# Patient Record
Sex: FEMALE | Race: WHITE | NOT HISPANIC OR LATINO | Employment: OTHER | URBAN - METROPOLITAN AREA
[De-identification: names, ages, dates, MRNs, and addresses within clinical notes are randomized per-mention and may not be internally consistent; named-entity substitution may affect disease eponyms.]

---

## 2017-01-12 ENCOUNTER — LAB REQUISITION (OUTPATIENT)
Dept: LAB | Facility: HOSPITAL | Age: 82
End: 2017-01-12

## 2017-01-12 DIAGNOSIS — D12.4 BENIGN NEOPLASM OF DESCENDING COLON: ICD-10-CM

## 2017-01-12 DIAGNOSIS — D12.5 BENIGN NEOPLASM OF SIGMOID COLON: ICD-10-CM

## 2017-01-12 PROCEDURE — 88305 TISSUE EXAM BY PATHOLOGIST: CPT | Performed by: INTERNAL MEDICINE

## 2017-01-13 LAB
CYTO UR: NORMAL
LAB AP CASE REPORT: NORMAL
LAB AP CLINICAL INFORMATION: NORMAL
Lab: NORMAL
PATH REPORT.FINAL DX SPEC: NORMAL
PATH REPORT.GROSS SPEC: NORMAL

## 2019-10-01 ENCOUNTER — APPOINTMENT (OUTPATIENT)
Dept: CT IMAGING | Facility: HOSPITAL | Age: 84
End: 2019-10-01

## 2019-10-01 ENCOUNTER — HOSPITAL ENCOUNTER (EMERGENCY)
Facility: HOSPITAL | Age: 84
Discharge: HOME | End: 2019-10-01
Attending: EMERGENCY MEDICINE | Admitting: EMERGENCY MEDICINE

## 2019-10-01 VITALS
RESPIRATION RATE: 16 BRPM | DIASTOLIC BLOOD PRESSURE: 78 MMHG | TEMPERATURE: 97.2 F | HEART RATE: 84 BPM | HEIGHT: 63 IN | BODY MASS INDEX: 24.63 KG/M2 | WEIGHT: 139 LBS | OXYGEN SATURATION: 92 % | SYSTOLIC BLOOD PRESSURE: 131 MMHG
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
## 2019-10-01 DIAGNOSIS — S00.83XA FOREHEAD CONTUSION, INITIAL ENCOUNTER: ICD-10-CM

## 2019-10-01 DIAGNOSIS — S09.90XA MINOR HEAD INJURY WITHOUT LOSS OF CONSCIOUSNESS, INITIAL ENCOUNTER: ICD-10-CM

## 2019-10-01 PROCEDURE — 72125 CT NECK SPINE W/O DYE: CPT

## 2019-10-01 PROCEDURE — 99283 EMERGENCY DEPT VISIT LOW MDM: CPT

## 2019-10-01 PROCEDURE — 70450 CT HEAD/BRAIN W/O DYE: CPT

## 2019-10-01 RX ORDER — LEVOTHYROXINE SODIUM 0.05 MG/1
50 TABLET ORAL DAILY
COMMUNITY

## 2019-10-01 RX ORDER — PRAVASTATIN SODIUM 20 MG
20 TABLET ORAL NIGHTLY
COMMUNITY

## 2019-10-01 RX ORDER — OMEPRAZOLE 20 MG/1
20 CAPSULE, DELAYED RELEASE ORAL DAILY
COMMUNITY

## 2019-10-01 RX ORDER — CARVEDILOL 12.5 MG/1
12.5 TABLET ORAL 2 TIMES DAILY WITH MEALS
Status: ON HOLD | COMMUNITY
End: 2020-10-29 | Stop reason: SDUPTHER

## 2019-10-01 RX ORDER — LISINOPRIL 20 MG/1
20 TABLET ORAL DAILY
Status: ON HOLD | COMMUNITY
End: 2021-06-25

## 2019-10-01 RX ORDER — ISOSORBIDE DINITRATE 30 MG/1
30 TABLET ORAL 4 TIMES DAILY
Status: ON HOLD | COMMUNITY
End: 2021-06-25

## 2019-10-01 RX ORDER — SPIRONOLACTONE 25 MG/1
25 TABLET ORAL DAILY
Status: ON HOLD | COMMUNITY
End: 2021-06-25

## 2019-10-01 RX ORDER — TORSEMIDE 20 MG/1
20 TABLET ORAL 2 TIMES DAILY
COMMUNITY

## 2019-10-01 RX ORDER — NITROGLYCERIN 0.4 MG/1
0.4 TABLET SUBLINGUAL
COMMUNITY

## 2019-10-01 RX ORDER — TRAZODONE HYDROCHLORIDE 50 MG/1
50 TABLET ORAL NIGHTLY
Status: ON HOLD | COMMUNITY
End: 2021-06-25

## 2019-10-02 NOTE — ED PROVIDER NOTES
Subjective   The patient presents to the emergency department after a trip and fall with head injury.  Patient reports she lost her footing and fell forward hitting her forehead on a step.  No loss of consciousness.  Patient denies any bleeding disorders or anticoagulation medicines.  Patient denies any neck or back pain.  No other acute injuries or complaints.  The patient does report a frontal headache.  No vision changes and no bloody nose or nasal/ear discharge.  No other acute symptoms or complaints this time.        History provided by:  Patient and relative      Review of Systems   Constitutional: Negative.    Respiratory: Negative.    Cardiovascular: Negative.    Gastrointestinal: Negative.    Musculoskeletal: Negative.  Negative for back pain and neck pain.   Skin: Negative.    Neurological: Positive for headaches.   Hematological: Does not bruise/bleed easily.   Psychiatric/Behavioral: Negative.    All other systems reviewed and are negative.      No past medical history on file.    Allergies   Allergen Reactions   • Codeine GI Intolerance   • Sulfa Antibiotics Unknown (See Comments)     Unknown         Past Surgical History:   Procedure Laterality Date   • CHOLECYSTECTOMY     • MASTECTOMY Left        No family history on file.    Social History     Socioeconomic History   • Marital status: Unknown     Spouse name: Not on file   • Number of children: Not on file   • Years of education: Not on file   • Highest education level: Not on file   Tobacco Use   • Smoking status: Never Smoker   • Smokeless tobacco: Never Used   Substance and Sexual Activity   • Alcohol use: No     Frequency: Never   • Drug use: No   • Sexual activity: Defer           Objective   Physical Exam   Constitutional: She is oriented to person, place, and time. She appears well-developed and well-nourished.   HENT:   Head: Normocephalic. Head is with abrasion and with contusion. Head is without raccoon's eyes, without Guardado's sign,  without laceration, without right periorbital erythema and without left periorbital erythema.       Right Ear: External ear normal.   Left Ear: External ear normal.   Nose: Nose normal.   Mouth/Throat: Oropharynx is clear and moist.   Eyes: EOM are normal. Pupils are equal, round, and reactive to light.   Neck: Normal range of motion. Neck supple.   Cardiovascular: Normal rate, regular rhythm, normal heart sounds and intact distal pulses.   Pulmonary/Chest: Effort normal and breath sounds normal.   Musculoskeletal: Normal range of motion.        Cervical back: She exhibits no bony tenderness.        Thoracic back: She exhibits no bony tenderness.        Lumbar back: She exhibits no bony tenderness.   Neurological: She is alert and oriented to person, place, and time.   Skin: Skin is warm and dry. Capillary refill takes less than 2 seconds.   Psychiatric: She has a normal mood and affect. Her behavior is normal.   Nursing note and vitals reviewed.      Procedures           ED Course  ED Course as of Oct 02 0043   Tue Oct 01, 2019   2016 CT scan of the head and neck demonstrate no acute traumatic findings.  We will discharge the patient with symptomatic management to follow-up with her doctor.  Patient is alert and oriented and in no distress.  She states she is ready and excited to go home. I had a discussion with the patient/family regarding diagnosis, diagnostic results, treatment plan, and medications.  The patient/family indicated understanding of these instructions.  I spent adequate time at the bedside proceeding discharge necessary to personally discuss the aftercare instructions, giving patient education, providing explanations of the results of our evaluations/findings, and my decision making to assure that the patient/family understand the plan of care.  Time was allotted to answer questions at that time and throughout the ED course.  Emphasis was placed on timely follow-up after discharge.  I also  discussed the potential for the development of an acute emergent condition requiring further evaluation, admission, or even surgical intervention. I discussed that we found nothing during the visit today indicating the need for further workup, admission, or the presence of an unstable medical condition.  I encouraged the patient to return to the emergency department immediately for ANY concerns, worsening, new complaints, or if symptoms persist and unable to seek follow-up in a timely fashion.  The patient/family expressed understanding and agreement with this plan.   [RS]      ED Course User Index  [RS] Salomón Miller MD                  MDM  Number of Diagnoses or Management Options  Fall on stairs, initial encounter:   Forehead contusion, initial encounter:   Minor head injury without loss of consciousness, initial encounter:   Diagnosis management comments: No results found for this or any previous visit (from the past 24 hour(s)).  Note: In addition to lab results from this visit, the labs listed above may include labs taken at another facility or during a different encounter within the last 24 hours. Please correlate lab times with ED admission and discharge times for further clarification of the services performed during this visit.    CT Head Without Contrast   Final Result        1. No acute intracranial abnormality. Age-appropriate senescent changes.    2. Small left frontal scalp hematoma. No underlying osseous injury.    3. Mild mucosal thickening bilateral maxillary sinuses.                    Signer Name: Onofre Keita MD     Signed: 10/1/2019 8:10 PM     Workstation Name: [email or web address]      [facility]     CT Cervical Spine Without Contrast   Final Result        1. No acute cervical spine injury.    2. Advanced multilevel degenerative changes, detailed above.    3. Minimal grade 1 anterolisthesis C5 on C6 and C7 on T1, likely secondary to advanced bilateral facet  "arthropathy.        Signer Name: Onofre Keita MD     Signed: 10/1/2019 8:09 PM     Workstation Name: [id number]      [facility]     --------------------------------------               10/01/19      10/01/19                  1800          2044     --------------------------------------   BP:          122/82        131/78     BP Location:    Left arm     Right arm    Patient Position:     Sitting      Sitting     Pulse:         95            84       Resp:          14            16       Temp:   97.2 °F (36.2 °C)             TempSrc:      Oral                    SpO2:          92%          92%       Weight:  63 kg (139 lb)               Height:   160 cm (63\")               --------------------------------------  Medications - No data to display  ECG/EMG Results (last 24 hours)     ** No results found for the last 24 hours. **      No orders to display         Amount and/or Complexity of Data Reviewed  Tests in the radiology section of CPT®: reviewed  Independent visualization of images, tracings, or specimens: yes        Final diagnoses:   Fall on stairs, initial encounter   Forehead contusion, initial encounter   Minor head injury without loss of consciousness, initial encounter              Salomón Miller MD  10/02/19 0043    "

## 2020-10-20 ENCOUNTER — HOSPITAL ENCOUNTER (EMERGENCY)
Facility: HOSPITAL | Age: 85
Discharge: HOME | End: 2020-10-20
Attending: EMERGENCY MEDICINE | Admitting: EMERGENCY MEDICINE

## 2020-10-20 ENCOUNTER — APPOINTMENT (OUTPATIENT)
Dept: CT IMAGING | Facility: HOSPITAL | Age: 85
End: 2020-10-20

## 2020-10-20 VITALS
RESPIRATION RATE: 16 BRPM | DIASTOLIC BLOOD PRESSURE: 80 MMHG | TEMPERATURE: 98 F | OXYGEN SATURATION: 94 % | WEIGHT: 140 LBS | HEART RATE: 75 BPM | HEIGHT: 63 IN | BODY MASS INDEX: 24.8 KG/M2 | SYSTOLIC BLOOD PRESSURE: 140 MMHG

## 2020-10-20 DIAGNOSIS — I48.20 CHRONIC ATRIAL FIBRILLATION (HCC): ICD-10-CM

## 2020-10-20 DIAGNOSIS — R05.9 COUGH: ICD-10-CM

## 2020-10-20 DIAGNOSIS — R11.2 NON-INTRACTABLE VOMITING WITH NAUSEA, UNSPECIFIED VOMITING TYPE: Primary | ICD-10-CM

## 2020-10-20 DIAGNOSIS — R06.02 SOB (SHORTNESS OF BREATH): ICD-10-CM

## 2020-10-20 LAB
ALBUMIN SERPL-MCNC: 4.3 G/DL (ref 3.5–5.2)
ALBUMIN/GLOB SERPL: 2.4 G/DL
ALP SERPL-CCNC: 136 U/L (ref 39–117)
ALT SERPL W P-5'-P-CCNC: 16 U/L (ref 1–33)
ANION GAP SERPL CALCULATED.3IONS-SCNC: 11 MMOL/L (ref 5–15)
AST SERPL-CCNC: 22 U/L (ref 1–32)
BACTERIA UR QL AUTO: NORMAL /HPF
BASOPHILS # BLD AUTO: 0.01 10*3/MM3 (ref 0–0.2)
BASOPHILS NFR BLD AUTO: 0.3 % (ref 0–1.5)
BILIRUB SERPL-MCNC: 0.4 MG/DL (ref 0–1.2)
BILIRUB UR QL STRIP: NEGATIVE
BUN SERPL-MCNC: 14 MG/DL (ref 8–23)
BUN/CREAT SERPL: 14.3 (ref 7–25)
CALCIUM SPEC-SCNC: 8.7 MG/DL (ref 8.2–9.6)
CHLORIDE SERPL-SCNC: 95 MMOL/L (ref 98–107)
CLARITY UR: CLEAR
CO2 SERPL-SCNC: 29 MMOL/L (ref 22–29)
COLOR UR: YELLOW
CREAT SERPL-MCNC: 0.98 MG/DL (ref 0.57–1)
DEPRECATED RDW RBC AUTO: 52.3 FL (ref 37–54)
EOSINOPHIL # BLD AUTO: 0.04 10*3/MM3 (ref 0–0.4)
EOSINOPHIL NFR BLD AUTO: 1 % (ref 0.3–6.2)
ERYTHROCYTE [DISTWIDTH] IN BLOOD BY AUTOMATED COUNT: 14.7 % (ref 12.3–15.4)
GFR SERPL CREATININE-BSD FRML MDRD: 53 ML/MIN/1.73
GLOBULIN UR ELPH-MCNC: 1.8 GM/DL
GLUCOSE SERPL-MCNC: 204 MG/DL (ref 65–99)
GLUCOSE UR STRIP-MCNC: NEGATIVE MG/DL
HCT VFR BLD AUTO: 32.6 % (ref 34–46.6)
HGB BLD-MCNC: 10.1 G/DL (ref 12–15.9)
HGB UR QL STRIP.AUTO: NEGATIVE
HOLD SPECIMEN: NORMAL
HOLD SPECIMEN: NORMAL
HYALINE CASTS UR QL AUTO: NORMAL /LPF
IMM GRANULOCYTES # BLD AUTO: 0.01 10*3/MM3 (ref 0–0.05)
IMM GRANULOCYTES NFR BLD AUTO: 0.3 % (ref 0–0.5)
KETONES UR QL STRIP: NEGATIVE
LEUKOCYTE ESTERASE UR QL STRIP.AUTO: NEGATIVE
LYMPHOCYTES # BLD AUTO: 0.54 10*3/MM3 (ref 0.7–3.1)
LYMPHOCYTES NFR BLD AUTO: 13.6 % (ref 19.6–45.3)
MCH RBC QN AUTO: 30.2 PG (ref 26.6–33)
MCHC RBC AUTO-ENTMCNC: 31 G/DL (ref 31.5–35.7)
MCV RBC AUTO: 97.6 FL (ref 79–97)
MONOCYTES # BLD AUTO: 0.32 10*3/MM3 (ref 0.1–0.9)
MONOCYTES NFR BLD AUTO: 8.1 % (ref 5–12)
NEUTROPHILS NFR BLD AUTO: 3.05 10*3/MM3 (ref 1.7–7)
NEUTROPHILS NFR BLD AUTO: 76.7 % (ref 42.7–76)
NITRITE UR QL STRIP: NEGATIVE
NRBC BLD AUTO-RTO: 0 /100 WBC (ref 0–0.2)
NT-PROBNP SERPL-MCNC: 3099 PG/ML (ref 0–1800)
PH UR STRIP.AUTO: 6 [PH] (ref 5–8)
PLATELET # BLD AUTO: 161 10*3/MM3 (ref 140–450)
PMV BLD AUTO: 10.8 FL (ref 6–12)
POTASSIUM SERPL-SCNC: 3.6 MMOL/L (ref 3.5–5.2)
PROT SERPL-MCNC: 6.1 G/DL (ref 6–8.5)
PROT UR QL STRIP: ABNORMAL
RBC # BLD AUTO: 3.34 10*6/MM3 (ref 3.77–5.28)
RBC # UR: NORMAL /HPF
REF LAB TEST METHOD: NORMAL
SODIUM SERPL-SCNC: 135 MMOL/L (ref 136–145)
SP GR UR STRIP: 1.02 (ref 1–1.03)
SQUAMOUS #/AREA URNS HPF: NORMAL /HPF
TROPONIN T SERPL-MCNC: 0.01 NG/ML (ref 0–0.03)
UROBILINOGEN UR QL STRIP: ABNORMAL
WBC # BLD AUTO: 3.97 10*3/MM3 (ref 3.4–10.8)
WBC UR QL AUTO: NORMAL /HPF
WHOLE BLOOD HOLD SPECIMEN: NORMAL
WHOLE BLOOD HOLD SPECIMEN: NORMAL

## 2020-10-20 PROCEDURE — 93005 ELECTROCARDIOGRAM TRACING: CPT | Performed by: EMERGENCY MEDICINE

## 2020-10-20 PROCEDURE — 71250 CT THORAX DX C-: CPT

## 2020-10-20 PROCEDURE — U0004 COV-19 TEST NON-CDC HGH THRU: HCPCS | Performed by: PHYSICIAN ASSISTANT

## 2020-10-20 PROCEDURE — 81001 URINALYSIS AUTO W/SCOPE: CPT | Performed by: PHYSICIAN ASSISTANT

## 2020-10-20 PROCEDURE — 84484 ASSAY OF TROPONIN QUANT: CPT | Performed by: EMERGENCY MEDICINE

## 2020-10-20 PROCEDURE — 85025 COMPLETE CBC W/AUTO DIFF WBC: CPT | Performed by: EMERGENCY MEDICINE

## 2020-10-20 PROCEDURE — 83880 ASSAY OF NATRIURETIC PEPTIDE: CPT | Performed by: EMERGENCY MEDICINE

## 2020-10-20 PROCEDURE — 80053 COMPREHEN METABOLIC PANEL: CPT | Performed by: EMERGENCY MEDICINE

## 2020-10-20 PROCEDURE — 99284 EMERGENCY DEPT VISIT MOD MDM: CPT

## 2020-10-20 RX ORDER — ALBUTEROL SULFATE 90 UG/1
2 AEROSOL, METERED RESPIRATORY (INHALATION) EVERY 6 HOURS PRN
Qty: 3.7 G | Refills: 0 | Status: ON HOLD | OUTPATIENT
Start: 2020-10-20 | End: 2020-10-29

## 2020-10-20 RX ORDER — SODIUM CHLORIDE 0.9 % (FLUSH) 0.9 %
10 SYRINGE (ML) INJECTION AS NEEDED
Status: DISCONTINUED | OUTPATIENT
Start: 2020-10-20 | End: 2020-10-20 | Stop reason: HOSPADM

## 2020-10-20 RX ORDER — ONDANSETRON 4 MG/1
4 TABLET, ORALLY DISINTEGRATING ORAL EVERY 6 HOURS PRN
Qty: 15 TABLET | Refills: 0 | Status: ON HOLD | OUTPATIENT
Start: 2020-10-20 | End: 2022-03-15

## 2020-10-20 RX ADMIN — SODIUM CHLORIDE 500 ML: 9 INJECTION, SOLUTION INTRAVENOUS at 16:10

## 2020-10-21 ENCOUNTER — TELEPHONE (OUTPATIENT)
Dept: EMERGENCY DEPT | Facility: HOSPITAL | Age: 85
End: 2020-10-21

## 2020-10-21 LAB — SARS-COV-2 RNA RESP QL NAA+PROBE: DETECTED

## 2020-10-21 NOTE — TELEPHONE ENCOUNTER
Discussed positive Covid test result with daughter Betty. She state she has been doing better today, getting out of bed and has been active. They have been monitoring her temp and pulse ox which have both been normal. They understand to have a low threshold to return to ED if new or worse sx. Daughter reports health department has contacted them already as well and will be monitoring her condition. They plan to touch base with PCP as well.

## 2020-10-22 ENCOUNTER — READMISSION MANAGEMENT (OUTPATIENT)
Dept: CALL CENTER | Facility: HOSPITAL | Age: 85
End: 2020-10-22

## 2020-10-22 NOTE — OUTREACH NOTE
COVID-19 Week 1 Survey      Responses   [facility] patient discharged from?  [place]   Does the patient have one of the following disease processes/diagnoses(primary or secondary)?  COVID-19   COVID-19 underlying condition?  None   Call Number  Call 1   Week 1 Call successful?  Yes   Call start time  1120   Call end time  1124   General alerts for this patient  ED Discharge - call x 3 only   Discharge diagnosis  Covid 19   Is patient permission given to speak with other caregiver?  Yes   Person spoke with today (if not patient) and relationship  Betty   Meds reviewed with patient/caregiver?  Yes   Is the patient having any side effects they believe may be caused by any medication additions or changes?  No   Does the patient have all medications ordered at discharge?  Yes   Is the patient taking all medications as directed (includes completed medication regime)?  Yes   Does the patient have a primary care provider?   Yes   Does the patient have an appointment with their PCP or specialist within 7 days of discharge?  Greater than 7 days   What is preventing the patient from scheduling follow up appointments within 7 days of discharge?  Earlier appointment not available   Nursing Interventions  Educated patient on importance of making appointment   Has the patient kept scheduled appointments due by today?  N/A   Has home health visited the patient within 72 hours of discharge?  N/A   What DME was ordered?  sent home w/ pulse ox   Psychosocial issues?  No   Comments  Patient unable to hold a long conversation.  Needs education on cleaning, toothbrush, etc   Did the patient receive a copy of their discharge instructions?  Yes   What is the patient's perception of their health status since discharge?  Same   Does the patient have any of the following symptoms?  Shortness of breath   Nursing Interventions  Nurse provided patient education   Pulse Ox monitoring  Intermittent   Pulse Ox device source  Hospital   Is  the patient/caregiver able to teach back steps to recovery at home?  Set small, achievable goals for return to baseline health   If the patient is a current smoker, are they able to teach back resources for cessation?  Not a smoker   Is the patient/caregiver able to teach back the hierarchy of who to call/visit for symptoms/problems? PCP, Specialist, Home health nurse, Urgent Care, ED, 911  Yes   COVID-19 call completed?  Yes   Wrap up additional comments  Says she is not feeling well at all.          Nitza Hidalgo RN

## 2020-10-22 NOTE — OUTREACH NOTE
Prep Survey      Responses   [facility] patient discharged from?  [place]   Is LACE score < 7 ?  Yes   Eligibility  Readm Mgmt   Discharge diagnosis  Covid 19   Does the patient have one of the following disease processes/diagnoses(primary or secondary)?  COVID-19   Does the patient have Home health ordered?  No   Is there a DME ordered?  Yes   What DME was ordered?  sent home w/ pulse ox   General alerts for this patient  ED Discharge - call x 3 only   Prep survey completed?  Yes          Vandana Mathews RN

## 2020-10-23 ENCOUNTER — APPOINTMENT (OUTPATIENT)
Dept: GENERAL RADIOLOGY | Facility: HOSPITAL | Age: 85
End: 2020-10-23

## 2020-10-23 ENCOUNTER — HOSPITAL ENCOUNTER (INPATIENT)
Facility: HOSPITAL | Age: 85
LOS: 6 days | Discharge: HOME | End: 2020-10-29
Attending: EMERGENCY MEDICINE | Admitting: INTERNAL MEDICINE

## 2020-10-23 ENCOUNTER — READMISSION MANAGEMENT (OUTPATIENT)
Dept: CALL CENTER | Facility: HOSPITAL | Age: 85
End: 2020-10-23

## 2020-10-23 ENCOUNTER — APPOINTMENT (OUTPATIENT)
Dept: CT IMAGING | Facility: HOSPITAL | Age: 85
End: 2020-10-23

## 2020-10-23 ENCOUNTER — NURSE TRIAGE (OUTPATIENT)
Dept: CALL CENTER | Facility: HOSPITAL | Age: 85
End: 2020-10-23

## 2020-10-23 DIAGNOSIS — R06.02 SHORTNESS OF BREATH: ICD-10-CM

## 2020-10-23 DIAGNOSIS — M79.10 MYALGIA: ICD-10-CM

## 2020-10-23 DIAGNOSIS — J12.82 PNEUMONIA DUE TO COVID-19 VIRUS: Primary | ICD-10-CM

## 2020-10-23 DIAGNOSIS — U07.1 PNEUMONIA DUE TO COVID-19 VIRUS: Primary | ICD-10-CM

## 2020-10-23 DIAGNOSIS — I50.9 CONGESTIVE HEART FAILURE, UNSPECIFIED HF CHRONICITY, UNSPECIFIED HEART FAILURE TYPE (HCC): ICD-10-CM

## 2020-10-23 DIAGNOSIS — R09.02 HYPOXIA: ICD-10-CM

## 2020-10-23 PROBLEM — R94.31 PROLONGED Q-T INTERVAL ON ECG: Status: ACTIVE | Noted: 2020-10-23

## 2020-10-23 PROBLEM — Z95.0 PRESENCE OF CARDIAC PACEMAKER: Status: ACTIVE | Noted: 2020-10-23

## 2020-10-23 PROBLEM — D61.818 PANCYTOPENIA: Status: ACTIVE | Noted: 2020-10-23

## 2020-10-23 PROBLEM — I48.91 ATRIAL FIBRILLATION (HCC): Status: ACTIVE | Noted: 2020-10-23

## 2020-10-23 LAB
ALBUMIN SERPL-MCNC: 3.9 G/DL (ref 3.5–5.2)
ALBUMIN/GLOB SERPL: 2.6 G/DL
ALP SERPL-CCNC: 113 U/L (ref 39–117)
ALT SERPL W P-5'-P-CCNC: 10 U/L (ref 1–33)
ANION GAP SERPL CALCULATED.3IONS-SCNC: 8 MMOL/L (ref 5–15)
AST SERPL-CCNC: 14 U/L (ref 1–32)
BASOPHILS # BLD AUTO: 0.01 10*3/MM3 (ref 0–0.2)
BASOPHILS NFR BLD AUTO: 0.3 % (ref 0–1.5)
BILIRUB SERPL-MCNC: 0.3 MG/DL (ref 0–1.2)
BUN SERPL-MCNC: 13 MG/DL (ref 8–23)
BUN/CREAT SERPL: 17.1 (ref 7–25)
CALCIUM SPEC-SCNC: 8.5 MG/DL (ref 8.2–9.6)
CHLORIDE SERPL-SCNC: 96 MMOL/L (ref 98–107)
CO2 SERPL-SCNC: 28 MMOL/L (ref 22–29)
CREAT SERPL-MCNC: 0.76 MG/DL (ref 0.57–1)
D-LACTATE SERPL-SCNC: 1.1 MMOL/L (ref 0.5–2)
DEPRECATED RDW RBC AUTO: 51.3 FL (ref 37–54)
EOSINOPHIL # BLD AUTO: 0.04 10*3/MM3 (ref 0–0.4)
EOSINOPHIL NFR BLD AUTO: 1.4 % (ref 0.3–6.2)
ERYTHROCYTE [DISTWIDTH] IN BLOOD BY AUTOMATED COUNT: 14.9 % (ref 12.3–15.4)
FERRITIN SERPL-MCNC: 1549 NG/ML (ref 13–150)
GFR SERPL CREATININE-BSD FRML MDRD: 71 ML/MIN/1.73
GLOBULIN UR ELPH-MCNC: 1.5 GM/DL
GLUCOSE SERPL-MCNC: 176 MG/DL (ref 65–99)
HCT VFR BLD AUTO: 28 % (ref 34–46.6)
HGB BLD-MCNC: 8.7 G/DL (ref 12–15.9)
HOLD SPECIMEN: NORMAL
HOLD SPECIMEN: NORMAL
IMM GRANULOCYTES # BLD AUTO: 0.01 10*3/MM3 (ref 0–0.05)
IMM GRANULOCYTES NFR BLD AUTO: 0.3 % (ref 0–0.5)
LDH SERPL-CCNC: 169 U/L (ref 135–214)
LYMPHOCYTES # BLD AUTO: 0.52 10*3/MM3 (ref 0.7–3.1)
LYMPHOCYTES NFR BLD AUTO: 17.8 % (ref 19.6–45.3)
MCH RBC QN AUTO: 29.2 PG (ref 26.6–33)
MCHC RBC AUTO-ENTMCNC: 31.1 G/DL (ref 31.5–35.7)
MCV RBC AUTO: 94 FL (ref 79–97)
MONOCYTES # BLD AUTO: 0.22 10*3/MM3 (ref 0.1–0.9)
MONOCYTES NFR BLD AUTO: 7.5 % (ref 5–12)
NEUTROPHILS NFR BLD AUTO: 2.12 10*3/MM3 (ref 1.7–7)
NEUTROPHILS NFR BLD AUTO: 72.7 % (ref 42.7–76)
NRBC BLD AUTO-RTO: 0 /100 WBC (ref 0–0.2)
NT-PROBNP SERPL-MCNC: 2994 PG/ML (ref 0–1800)
PLATELET # BLD AUTO: 129 10*3/MM3 (ref 140–450)
PMV BLD AUTO: 10.8 FL (ref 6–12)
POTASSIUM SERPL-SCNC: 3.5 MMOL/L (ref 3.5–5.2)
PROCALCITONIN SERPL-MCNC: 0.06 NG/ML (ref 0–0.25)
PROT SERPL-MCNC: 5.4 G/DL (ref 6–8.5)
RBC # BLD AUTO: 2.98 10*6/MM3 (ref 3.77–5.28)
SODIUM SERPL-SCNC: 132 MMOL/L (ref 136–145)
TROPONIN T SERPL-MCNC: 0.01 NG/ML (ref 0–0.03)
WBC # BLD AUTO: 2.92 10*3/MM3 (ref 3.4–10.8)
WHOLE BLOOD HOLD SPECIMEN: NORMAL
WHOLE BLOOD HOLD SPECIMEN: NORMAL

## 2020-10-23 PROCEDURE — 83605 ASSAY OF LACTIC ACID: CPT | Performed by: EMERGENCY MEDICINE

## 2020-10-23 PROCEDURE — 85045 AUTOMATED RETICULOCYTE COUNT: CPT | Performed by: NURSE PRACTITIONER

## 2020-10-23 PROCEDURE — 83615 LACTATE (LD) (LDH) ENZYME: CPT | Performed by: EMERGENCY MEDICINE

## 2020-10-23 PROCEDURE — 82728 ASSAY OF FERRITIN: CPT | Performed by: EMERGENCY MEDICINE

## 2020-10-23 PROCEDURE — 82746 ASSAY OF FOLIC ACID SERUM: CPT | Performed by: NURSE PRACTITIONER

## 2020-10-23 PROCEDURE — 93005 ELECTROCARDIOGRAM TRACING: CPT | Performed by: EMERGENCY MEDICINE

## 2020-10-23 PROCEDURE — 86900 BLOOD TYPING SEROLOGIC ABO: CPT | Performed by: INTERNAL MEDICINE

## 2020-10-23 PROCEDURE — 85025 COMPLETE CBC W/AUTO DIFF WBC: CPT | Performed by: EMERGENCY MEDICINE

## 2020-10-23 PROCEDURE — 82607 VITAMIN B-12: CPT | Performed by: NURSE PRACTITIONER

## 2020-10-23 PROCEDURE — 25010000002 DEXAMETHASONE PER 1 MG: Performed by: EMERGENCY MEDICINE

## 2020-10-23 PROCEDURE — 80053 COMPREHEN METABOLIC PANEL: CPT | Performed by: EMERGENCY MEDICINE

## 2020-10-23 PROCEDURE — 71045 X-RAY EXAM CHEST 1 VIEW: CPT

## 2020-10-23 PROCEDURE — 83880 ASSAY OF NATRIURETIC PEPTIDE: CPT | Performed by: EMERGENCY MEDICINE

## 2020-10-23 PROCEDURE — 84484 ASSAY OF TROPONIN QUANT: CPT | Performed by: EMERGENCY MEDICINE

## 2020-10-23 PROCEDURE — 85379 FIBRIN DEGRADATION QUANT: CPT | Performed by: INTERNAL MEDICINE

## 2020-10-23 PROCEDURE — 84145 PROCALCITONIN (PCT): CPT | Performed by: EMERGENCY MEDICINE

## 2020-10-23 PROCEDURE — 99284 EMERGENCY DEPT VISIT MOD MDM: CPT

## 2020-10-23 PROCEDURE — 86850 RBC ANTIBODY SCREEN: CPT | Performed by: INTERNAL MEDICINE

## 2020-10-23 PROCEDURE — 86901 BLOOD TYPING SEROLOGIC RH(D): CPT | Performed by: INTERNAL MEDICINE

## 2020-10-23 PROCEDURE — 71250 CT THORAX DX C-: CPT

## 2020-10-23 RX ORDER — DEXAMETHASONE SODIUM PHOSPHATE 4 MG/ML
6 INJECTION, SOLUTION INTRA-ARTICULAR; INTRALESIONAL; INTRAMUSCULAR; INTRAVENOUS; SOFT TISSUE ONCE
Status: COMPLETED | OUTPATIENT
Start: 2020-10-23 | End: 2020-10-23

## 2020-10-23 RX ORDER — SODIUM CHLORIDE 0.9 % (FLUSH) 0.9 %
10 SYRINGE (ML) INJECTION AS NEEDED
Status: DISCONTINUED | OUTPATIENT
Start: 2020-10-23 | End: 2020-10-29 | Stop reason: HOSPADM

## 2020-10-23 RX ORDER — FUROSEMIDE 10 MG/ML
20 INJECTION INTRAMUSCULAR; INTRAVENOUS ONCE
Status: COMPLETED | OUTPATIENT
Start: 2020-10-23 | End: 2020-10-24

## 2020-10-23 RX ADMIN — DEXAMETHASONE SODIUM PHOSPHATE 6 MG: 4 INJECTION, SOLUTION INTRAMUSCULAR; INTRAVENOUS at 21:48

## 2020-10-23 NOTE — OUTREACH NOTE
"COVID-19 Week 2 Survey      Responses   [facility] patient discharged from?  [place]   Does the patient have one of the following disease processes/diagnoses(primary or secondary)?  COVID-19   COVID-19 underlying condition?  None   Call start time  1348   Call end time  1352   Discharge diagnosis  Covid 19   Is patient permission given to speak with other caregiver?  Yes   Person spoke with today (if not patient) and relationship  Betty   Meds reviewed with patient/caregiver?  Yes   Is the patient having any side effects they believe may be caused by any medication additions or changes?  No   Does the patient have all medications ordered at discharge?  Yes   Is the patient taking all medications as directed (includes completed medication regime)?  Yes   Does the patient have a primary care provider?   Yes   Does the patient have an appointment with their PCP or specialist within 7 days of discharge?  Greater than 7 days   What is preventing the patient from scheduling follow up appointments within 7 days of discharge?  Earlier appointment not available   Has the patient kept scheduled appointments due by today?  N/A   Has home health visited the patient within 72 hours of discharge?  N/A   What DME was ordered?  sent home w/ pulse ox   Psychosocial issues?  No   Comments  Patient does not have a fever.  Cough is productive.  Patient states \"she does not feel well\"   Did the patient receive a copy of their discharge instructions?  Yes   Did the patient receive a copy of COVID-19 specific instructions?  Yes   Nursing interventions  Reviewed instructions with patient   What is the patient's perception of their health status since discharge?  Improving   Does the patient have any of the following symptoms?  Cough   Nursing Interventions  Nurse provided patient education   Pulse Ox monitoring  Intermittent   Pulse Ox device source  Hospital   O2 Sat comments  Daughter checking O2 sats 2x per day.  Oxygen level " "was 97% today   O2 Sat: education provided  Sat levels, Monitoring frequency, When to seek care   O2 Sat education comments  Daughter is aware of parameters.     Is the patient/caregiver able to teach back the hierarchy of who to call/visit for symptoms/problems? PCP, Specialist, Home health nurse, Urgent Care, ED, 911  Yes   Wrap up additional comments  Daughter states patient is \"about the same\"  she is eating and resting.  Daughter is also Covid positive at this time.              Oralia Gomez RN  "

## 2020-10-23 NOTE — TELEPHONE ENCOUNTER
"Daughter also has covid and cannot go inside, told her to pull to ER and call the ER number I gave her and staff will come get her.  Notified the ER staff.     Reason for Disposition  • MODERATE difficulty breathing (e.g., speaks in phrases, SOB even at rest, pulse 100-120)    Additional Information  • Negative: SEVERE difficulty breathing (e.g., struggling for each breath, speaks in single words)  • Negative: Difficult to awaken or acting confused (e.g., disoriented, slurred speech)  • Negative: Bluish (or gray) lips or face now  • Negative: Shock suspected (e.g., cold/pale/clammy skin, too weak to stand, low BP, rapid pulse)  • Negative: Sounds like a life-threatening emergency to the triager  • Negative: [1] COVID-19 exposure AND [2] no symptoms  • Negative: COVID-19 and Breastfeeding, questions about  • Negative: [1] Adult with possible COVID-19 symptoms AND [2] triager concerned about severity of symptoms or other causes  • Negative: SEVERE or constant chest pain or pressure (Exception: mild central chest pain, present only when coughing)    Answer Assessment - Initial Assessment Questions  1. COVID-19 DIAGNOSIS: \"Who made your Coronavirus (COVID-19) diagnosis?\" \"Was it confirmed by a positive lab test?\" If not diagnosed by a HCP, ask \"Are there lots of cases (community spread) where you live?\" (See public health department website, if unsure)      Confirmed 10/20  2. ONSET: \"When did the COVID-19 symptoms start?\"       Began feeling bad on 10/17 with body aches, vomiting, cough  3. WORST SYMPTOM: \"What is your worst symptom?\" (e.g., cough, fever, shortness of breath, muscle aches)      Body aches and \"I do not feel good\" she states  4. COUGH: \"Do you have a cough?\" If so, ask: \"How bad is the cough?\"        Yes, wet cough, coughs until she gags  5. FEVER: \"Do you have a fever?\" If so, ask: \"What is your temperature, how was it measured, and when did it start?\"      99.8 right now, highest reading has been " "101  6. RESPIRATORY STATUS: \"Describe your breathing?\" (e.g., shortness of breath, wheezing, unable to speak)       SOA  7. BETTER-SAME-WORSE: \"Are you getting better, staying the same or getting worse compared to yesterday?\"  If getting worse, ask, \"In what way?\"      Worse  8. HIGH RISK DISEASE: \"Do you have any chronic medical problems?\" (e.g., asthma, heart or lung disease, weak immune system, etc.)      CHF  9. PREGNANCY: \"Is there any chance you are pregnant?\" \"When was your last menstrual period?\"      NA  10. OTHER SYMPTOMS: \"Do you have any other symptoms?\"  (e.g., chills, fatigue, headache, loss of smell or taste, muscle pain, sore throat)        Loss of taste and smell, body aches, caller says she not thriving    Protocols used: CORONAVIRUS (COVID-19) DIAGNOSED OR SUSPECTED-ADULT-AH      "

## 2020-10-24 ENCOUNTER — READMISSION MANAGEMENT (OUTPATIENT)
Dept: CALL CENTER | Facility: HOSPITAL | Age: 85
End: 2020-10-24

## 2020-10-24 PROBLEM — I10 HTN (HYPERTENSION): Status: ACTIVE | Noted: 2020-10-24

## 2020-10-24 PROBLEM — E78.5 HLD (HYPERLIPIDEMIA): Status: ACTIVE | Noted: 2020-10-24

## 2020-10-24 PROBLEM — E03.9 HYPOTHYROIDISM (ACQUIRED): Status: ACTIVE | Noted: 2020-10-24

## 2020-10-24 LAB
ABO GROUP BLD: NORMAL
ALBUMIN SERPL-MCNC: 4 G/DL (ref 3.5–5.2)
ALBUMIN SERPL-MCNC: 4.1 G/DL (ref 3.5–5.2)
ALBUMIN/GLOB SERPL: 2.4 G/DL
ALP SERPL-CCNC: 107 U/L (ref 39–117)
ALP SERPL-CCNC: 113 U/L (ref 39–117)
ALT SERPL W P-5'-P-CCNC: 10 U/L (ref 1–33)
ALT SERPL W P-5'-P-CCNC: 10 U/L (ref 1–33)
ANION GAP SERPL CALCULATED.3IONS-SCNC: 11 MMOL/L (ref 5–15)
AST SERPL-CCNC: 14 U/L (ref 1–32)
AST SERPL-CCNC: 15 U/L (ref 1–32)
BASOPHILS # BLD AUTO: 0 10*3/MM3 (ref 0–0.2)
BASOPHILS NFR BLD AUTO: 0 % (ref 0–1.5)
BILIRUB CONJ SERPL-MCNC: 0.2 MG/DL (ref 0–0.3)
BILIRUB INDIRECT SERPL-MCNC: 0.2 MG/DL
BILIRUB SERPL-MCNC: 0.4 MG/DL (ref 0–1.2)
BILIRUB SERPL-MCNC: 0.4 MG/DL (ref 0–1.2)
BLD GP AB SCN SERPL QL: NEGATIVE
BUN SERPL-MCNC: 12 MG/DL (ref 8–23)
BUN/CREAT SERPL: 15.8 (ref 7–25)
CALCIUM SPEC-SCNC: 8.5 MG/DL (ref 8.2–9.6)
CHLORIDE SERPL-SCNC: 97 MMOL/L (ref 98–107)
CO2 SERPL-SCNC: 26 MMOL/L (ref 22–29)
CREAT SERPL-MCNC: 0.76 MG/DL (ref 0.57–1)
D DIMER PPP FEU-MCNC: 0.38 MCGFEU/ML (ref 0–0.56)
DEPRECATED RDW RBC AUTO: 50.8 FL (ref 37–54)
ELLIPTOCYTES BLD QL SMEAR: NORMAL
EOSINOPHIL # BLD AUTO: 0 10*3/MM3 (ref 0–0.4)
EOSINOPHIL NFR BLD AUTO: 0 % (ref 0.3–6.2)
ERYTHROCYTE [DISTWIDTH] IN BLOOD BY AUTOMATED COUNT: 14.9 % (ref 12.3–15.4)
FOLATE SERPL-MCNC: 13.3 NG/ML (ref 4.78–24.2)
GFR SERPL CREATININE-BSD FRML MDRD: 71 ML/MIN/1.73
GLOBULIN UR ELPH-MCNC: 1.7 GM/DL
GLUCOSE SERPL-MCNC: 199 MG/DL (ref 65–99)
HCT VFR BLD AUTO: 29.1 % (ref 34–46.6)
HGB BLD-MCNC: 9 G/DL (ref 12–15.9)
IMM GRANULOCYTES # BLD AUTO: 0.01 10*3/MM3 (ref 0–0.05)
IMM GRANULOCYTES NFR BLD AUTO: 0.4 % (ref 0–0.5)
IRON 24H UR-MRATE: 42 MCG/DL (ref 37–145)
IRON 24H UR-MRATE: 42 MCG/DL (ref 37–145)
IRON SATN MFR SERPL: 14 % (ref 20–50)
LYMPHOCYTES # BLD AUTO: 0.41 10*3/MM3 (ref 0.7–3.1)
LYMPHOCYTES NFR BLD AUTO: 15 % (ref 19.6–45.3)
MCH RBC QN AUTO: 28.9 PG (ref 26.6–33)
MCHC RBC AUTO-ENTMCNC: 30.9 G/DL (ref 31.5–35.7)
MCV RBC AUTO: 93.6 FL (ref 79–97)
MONOCYTES # BLD AUTO: 0.11 10*3/MM3 (ref 0.1–0.9)
MONOCYTES NFR BLD AUTO: 4 % (ref 5–12)
NEUTROPHILS NFR BLD AUTO: 2.2 10*3/MM3 (ref 1.7–7)
NEUTROPHILS NFR BLD AUTO: 80.6 % (ref 42.7–76)
NRBC BLD AUTO-RTO: 0 /100 WBC (ref 0–0.2)
PLAT MORPH BLD: NORMAL
PLATELET # BLD AUTO: 143 10*3/MM3 (ref 140–450)
PMV BLD AUTO: 11.4 FL (ref 6–12)
POTASSIUM SERPL-SCNC: 3.4 MMOL/L (ref 3.5–5.2)
POTASSIUM SERPL-SCNC: 4.7 MMOL/L (ref 3.5–5.2)
PROT SERPL-MCNC: 5.7 G/DL (ref 6–8.5)
PROT SERPL-MCNC: 5.8 G/DL (ref 6–8.5)
RBC # BLD AUTO: 3.11 10*6/MM3 (ref 3.77–5.28)
RETICS # AUTO: 0.14 10*6/MM3 (ref 0.02–0.13)
RETICS/RBC NFR AUTO: 4.91 % (ref 0.7–1.9)
RH BLD: POSITIVE
SODIUM SERPL-SCNC: 134 MMOL/L (ref 136–145)
T&S EXPIRATION DATE: NORMAL
TIBC SERPL-MCNC: 301 MCG/DL (ref 298–536)
TRANSFERRIN SERPL-MCNC: 202 MG/DL (ref 200–360)
VIT B12 BLD-MCNC: 405 PG/ML (ref 211–946)
WBC # BLD AUTO: 2.73 10*3/MM3 (ref 3.4–10.8)
WBC MORPH BLD: NORMAL

## 2020-10-24 PROCEDURE — 80076 HEPATIC FUNCTION PANEL: CPT | Performed by: FAMILY MEDICINE

## 2020-10-24 PROCEDURE — 80053 COMPREHEN METABOLIC PANEL: CPT | Performed by: NURSE PRACTITIONER

## 2020-10-24 PROCEDURE — 85025 COMPLETE CBC W/AUTO DIFF WBC: CPT | Performed by: NURSE PRACTITIONER

## 2020-10-24 PROCEDURE — 25010000002 HEPARIN (PORCINE) PER 1000 UNITS: Performed by: NURSE PRACTITIONER

## 2020-10-24 PROCEDURE — 85007 BL SMEAR W/DIFF WBC COUNT: CPT | Performed by: NURSE PRACTITIONER

## 2020-10-24 PROCEDURE — 84466 ASSAY OF TRANSFERRIN: CPT | Performed by: NURSE PRACTITIONER

## 2020-10-24 PROCEDURE — 84132 ASSAY OF SERUM POTASSIUM: CPT | Performed by: FAMILY MEDICINE

## 2020-10-24 PROCEDURE — 99223 1ST HOSP IP/OBS HIGH 75: CPT | Performed by: INTERNAL MEDICINE

## 2020-10-24 PROCEDURE — 25010000002 DEXAMETHASONE PER 1 MG: Performed by: NURSE PRACTITIONER

## 2020-10-24 PROCEDURE — 83540 ASSAY OF IRON: CPT | Performed by: NURSE PRACTITIONER

## 2020-10-24 PROCEDURE — XW033E5 INTRODUCTION OF REMDESIVIR ANTI-INFECTIVE INTO PERIPHERAL VEIN, PERCUTANEOUS APPROACH, NEW TECHNOLOGY GROUP 5: ICD-10-PCS | Performed by: FAMILY MEDICINE

## 2020-10-24 PROCEDURE — 25010000002 FUROSEMIDE PER 20 MG: Performed by: EMERGENCY MEDICINE

## 2020-10-24 PROCEDURE — 25010000002 ENOXAPARIN PER 10 MG: Performed by: FAMILY MEDICINE

## 2020-10-24 RX ORDER — SODIUM CHLORIDE 0.9 % (FLUSH) 0.9 %
10 SYRINGE (ML) INJECTION AS NEEDED
Status: DISCONTINUED | OUTPATIENT
Start: 2020-10-24 | End: 2020-10-29 | Stop reason: HOSPADM

## 2020-10-24 RX ORDER — PRAVASTATIN SODIUM 20 MG
20 TABLET ORAL DAILY
Status: DISCONTINUED | OUTPATIENT
Start: 2020-10-24 | End: 2020-10-29 | Stop reason: HOSPADM

## 2020-10-24 RX ORDER — SODIUM CHLORIDE 0.9 % (FLUSH) 0.9 %
10 SYRINGE (ML) INJECTION EVERY 12 HOURS SCHEDULED
Status: DISCONTINUED | OUTPATIENT
Start: 2020-10-24 | End: 2020-10-29 | Stop reason: HOSPADM

## 2020-10-24 RX ORDER — MULTIPLE VITAMINS W/ MINERALS TAB 9MG-400MCG
1 TAB ORAL DAILY
Status: DISCONTINUED | OUTPATIENT
Start: 2020-10-24 | End: 2020-10-29 | Stop reason: HOSPADM

## 2020-10-24 RX ORDER — DEXAMETHASONE SODIUM PHOSPHATE 4 MG/ML
6 INJECTION, SOLUTION INTRA-ARTICULAR; INTRALESIONAL; INTRAMUSCULAR; INTRAVENOUS; SOFT TISSUE DAILY
Status: DISCONTINUED | OUTPATIENT
Start: 2020-10-24 | End: 2020-10-25

## 2020-10-24 RX ORDER — ACETAMINOPHEN 650 MG/1
650 SUPPOSITORY RECTAL EVERY 4 HOURS PRN
Status: DISCONTINUED | OUTPATIENT
Start: 2020-10-24 | End: 2020-10-29 | Stop reason: HOSPADM

## 2020-10-24 RX ORDER — SPIRONOLACTONE 25 MG/1
25 TABLET ORAL DAILY
Status: DISCONTINUED | OUTPATIENT
Start: 2020-10-24 | End: 2020-10-29 | Stop reason: HOSPADM

## 2020-10-24 RX ORDER — POTASSIUM CHLORIDE 750 MG/1
40 CAPSULE, EXTENDED RELEASE ORAL AS NEEDED
Status: DISCONTINUED | OUTPATIENT
Start: 2020-10-24 | End: 2020-10-29 | Stop reason: HOSPADM

## 2020-10-24 RX ORDER — ISOSORBIDE DINITRATE 20 MG/1
30 TABLET ORAL 4 TIMES DAILY
Status: DISCONTINUED | OUTPATIENT
Start: 2020-10-24 | End: 2020-10-29 | Stop reason: HOSPADM

## 2020-10-24 RX ORDER — PANTOPRAZOLE SODIUM 40 MG/1
40 TABLET, DELAYED RELEASE ORAL
Status: DISCONTINUED | OUTPATIENT
Start: 2020-10-24 | End: 2020-10-29 | Stop reason: HOSPADM

## 2020-10-24 RX ORDER — ACETAMINOPHEN 160 MG/5ML
650 SOLUTION ORAL EVERY 4 HOURS PRN
Status: DISCONTINUED | OUTPATIENT
Start: 2020-10-24 | End: 2020-10-29 | Stop reason: HOSPADM

## 2020-10-24 RX ORDER — POTASSIUM CHLORIDE 1.5 G/1.77G
40 POWDER, FOR SOLUTION ORAL AS NEEDED
Status: DISCONTINUED | OUTPATIENT
Start: 2020-10-24 | End: 2020-10-29 | Stop reason: HOSPADM

## 2020-10-24 RX ORDER — ACETAMINOPHEN 325 MG/1
650 TABLET ORAL EVERY 4 HOURS PRN
Status: DISCONTINUED | OUTPATIENT
Start: 2020-10-24 | End: 2020-10-29 | Stop reason: HOSPADM

## 2020-10-24 RX ORDER — CARVEDILOL 12.5 MG/1
12.5 TABLET ORAL 2 TIMES DAILY WITH MEALS
Status: DISCONTINUED | OUTPATIENT
Start: 2020-10-24 | End: 2020-10-26

## 2020-10-24 RX ORDER — LISINOPRIL 20 MG/1
20 TABLET ORAL DAILY
Status: DISCONTINUED | OUTPATIENT
Start: 2020-10-24 | End: 2020-10-29 | Stop reason: HOSPADM

## 2020-10-24 RX ORDER — LEVOTHYROXINE SODIUM 0.03 MG/1
25 TABLET ORAL
Status: DISCONTINUED | OUTPATIENT
Start: 2020-10-24 | End: 2020-10-29 | Stop reason: HOSPADM

## 2020-10-24 RX ORDER — ALBUTEROL SULFATE 90 UG/1
2 AEROSOL, METERED RESPIRATORY (INHALATION) EVERY 6 HOURS PRN
Status: DISPENSED | OUTPATIENT
Start: 2020-10-24 | End: 2020-10-28

## 2020-10-24 RX ORDER — TORSEMIDE 20 MG/1
20 TABLET ORAL DAILY
Status: DISCONTINUED | OUTPATIENT
Start: 2020-10-24 | End: 2020-10-29 | Stop reason: HOSPADM

## 2020-10-24 RX ORDER — HEPARIN SODIUM 5000 [USP'U]/ML
5000 INJECTION, SOLUTION INTRAVENOUS; SUBCUTANEOUS EVERY 8 HOURS SCHEDULED
Status: DISCONTINUED | OUTPATIENT
Start: 2020-10-24 | End: 2020-10-24

## 2020-10-24 RX ADMIN — LISINOPRIL 20 MG: 20 TABLET ORAL at 08:13

## 2020-10-24 RX ADMIN — POTASSIUM CHLORIDE 40 MEQ: 10 CAPSULE, COATED, EXTENDED RELEASE ORAL at 17:02

## 2020-10-24 RX ADMIN — ACETAMINOPHEN 650 MG: 325 TABLET, FILM COATED ORAL at 22:37

## 2020-10-24 RX ADMIN — SODIUM CHLORIDE, PRESERVATIVE FREE 10 ML: 5 INJECTION INTRAVENOUS at 08:16

## 2020-10-24 RX ADMIN — FUROSEMIDE 20 MG: 10 INJECTION, SOLUTION INTRAMUSCULAR; INTRAVENOUS at 00:16

## 2020-10-24 RX ADMIN — ENOXAPARIN SODIUM 40 MG: 40 INJECTION SUBCUTANEOUS at 11:05

## 2020-10-24 RX ADMIN — SODIUM CHLORIDE, PRESERVATIVE FREE 10 ML: 5 INJECTION INTRAVENOUS at 20:44

## 2020-10-24 RX ADMIN — SPIRONOLACTONE 25 MG: 25 TABLET ORAL at 08:13

## 2020-10-24 RX ADMIN — DEXAMETHASONE SODIUM PHOSPHATE 6 MG: 4 INJECTION, SOLUTION INTRAMUSCULAR; INTRAVENOUS at 11:05

## 2020-10-24 RX ADMIN — ACETAMINOPHEN 650 MG: 325 TABLET, FILM COATED ORAL at 04:57

## 2020-10-24 RX ADMIN — SODIUM CHLORIDE, PRESERVATIVE FREE 10 ML: 5 INJECTION INTRAVENOUS at 01:21

## 2020-10-24 RX ADMIN — ISOSORBIDE DINITRATE 30 MG: 20 TABLET ORAL at 20:44

## 2020-10-24 RX ADMIN — CARVEDILOL 12.5 MG: 12.5 TABLET, FILM COATED ORAL at 08:13

## 2020-10-24 RX ADMIN — ISOSORBIDE DINITRATE 30 MG: 20 TABLET ORAL at 17:02

## 2020-10-24 RX ADMIN — POTASSIUM CHLORIDE 40 MEQ: 10 CAPSULE, COATED, EXTENDED RELEASE ORAL at 11:04

## 2020-10-24 RX ADMIN — PANTOPRAZOLE SODIUM 40 MG: 40 TABLET, DELAYED RELEASE ORAL at 11:05

## 2020-10-24 RX ADMIN — MULTIPLE VITAMINS W/ MINERALS TAB 1 TABLET: TAB at 08:13

## 2020-10-24 RX ADMIN — MAGNESIUM OXIDE TAB 400 MG (241.3 MG ELEMENTAL MG) 400 MG: 400 (241.3 MG) TAB at 08:13

## 2020-10-24 RX ADMIN — REMDESIVIR 200 MG: 100 INJECTION, POWDER, LYOPHILIZED, FOR SOLUTION INTRAVENOUS at 11:03

## 2020-10-24 RX ADMIN — HEPARIN SODIUM 5000 UNITS: 5000 INJECTION, SOLUTION INTRAVENOUS; SUBCUTANEOUS at 04:56

## 2020-10-24 RX ADMIN — CARVEDILOL 12.5 MG: 12.5 TABLET, FILM COATED ORAL at 17:02

## 2020-10-24 RX ADMIN — LEVOTHYROXINE SODIUM 25 MCG: 25 TABLET ORAL at 04:57

## 2020-10-24 RX ADMIN — PRAVASTATIN SODIUM 20 MG: 20 TABLET ORAL at 08:13

## 2020-10-24 RX ADMIN — ISOSORBIDE DINITRATE 30 MG: 20 TABLET ORAL at 08:13

## 2020-10-24 RX ADMIN — ISOSORBIDE DINITRATE 30 MG: 20 TABLET ORAL at 11:05

## 2020-10-24 RX ADMIN — TORSEMIDE 20 MG: 20 TABLET ORAL at 08:13

## 2020-10-24 NOTE — OUTREACH NOTE
COVID-19 Week 1 Survey      Responses   [facility] patient discharged from?  [place]   Does the patient have one of the following disease processes/diagnoses(primary or secondary)?  COVID-19   COVID-19 underlying condition?  None   Call Number  Call 3   Week 1 Call successful?  No   Revoke  Readmitted   Discharge diagnosis  Covid 19          Vandana Mathews RN

## 2020-10-25 PROBLEM — J96.01 ACUTE RESPIRATORY FAILURE WITH HYPOXIA: Status: ACTIVE | Noted: 2020-10-25

## 2020-10-25 LAB
ALBUMIN SERPL-MCNC: 3.6 G/DL (ref 3.5–5.2)
ALP SERPL-CCNC: 97 U/L (ref 39–117)
ALT SERPL W P-5'-P-CCNC: 8 U/L (ref 1–33)
ANION GAP SERPL CALCULATED.3IONS-SCNC: 8 MMOL/L (ref 5–15)
AST SERPL-CCNC: 14 U/L (ref 1–32)
BASOPHILS # BLD AUTO: 0.01 10*3/MM3 (ref 0–0.2)
BASOPHILS NFR BLD AUTO: 0.3 % (ref 0–1.5)
BILIRUB CONJ SERPL-MCNC: <0.2 MG/DL (ref 0–0.3)
BILIRUB INDIRECT SERPL-MCNC: ABNORMAL MG/DL
BILIRUB SERPL-MCNC: 0.3 MG/DL (ref 0–1.2)
BUN SERPL-MCNC: 15 MG/DL (ref 8–23)
BUN/CREAT SERPL: 16.1 (ref 7–25)
CALCIUM SPEC-SCNC: 8.3 MG/DL (ref 8.2–9.6)
CHLORIDE SERPL-SCNC: 100 MMOL/L (ref 98–107)
CO2 SERPL-SCNC: 29 MMOL/L (ref 22–29)
CREAT SERPL-MCNC: 0.93 MG/DL (ref 0.57–1)
DEPRECATED RDW RBC AUTO: 52.3 FL (ref 37–54)
EOSINOPHIL # BLD AUTO: 0.01 10*3/MM3 (ref 0–0.4)
EOSINOPHIL NFR BLD AUTO: 0.3 % (ref 0.3–6.2)
ERYTHROCYTE [DISTWIDTH] IN BLOOD BY AUTOMATED COUNT: 15 % (ref 12.3–15.4)
GFR SERPL CREATININE-BSD FRML MDRD: 56 ML/MIN/1.73
GLUCOSE SERPL-MCNC: 115 MG/DL (ref 65–99)
HCT VFR BLD AUTO: 28 % (ref 34–46.6)
HGB BLD-MCNC: 8.4 G/DL (ref 12–15.9)
IMM GRANULOCYTES # BLD AUTO: 0.01 10*3/MM3 (ref 0–0.05)
IMM GRANULOCYTES NFR BLD AUTO: 0.3 % (ref 0–0.5)
LYMPHOCYTES # BLD AUTO: 0.65 10*3/MM3 (ref 0.7–3.1)
LYMPHOCYTES NFR BLD AUTO: 17.1 % (ref 19.6–45.3)
MCH RBC QN AUTO: 28.9 PG (ref 26.6–33)
MCHC RBC AUTO-ENTMCNC: 30 G/DL (ref 31.5–35.7)
MCV RBC AUTO: 96.2 FL (ref 79–97)
MONOCYTES # BLD AUTO: 0.3 10*3/MM3 (ref 0.1–0.9)
MONOCYTES NFR BLD AUTO: 7.9 % (ref 5–12)
NEUTROPHILS NFR BLD AUTO: 2.82 10*3/MM3 (ref 1.7–7)
NEUTROPHILS NFR BLD AUTO: 74.1 % (ref 42.7–76)
NRBC BLD AUTO-RTO: 0 /100 WBC (ref 0–0.2)
PLATELET # BLD AUTO: 149 10*3/MM3 (ref 140–450)
PMV BLD AUTO: 10.9 FL (ref 6–12)
POTASSIUM SERPL-SCNC: 4.1 MMOL/L (ref 3.5–5.2)
PROT SERPL-MCNC: 5.2 G/DL (ref 6–8.5)
RBC # BLD AUTO: 2.91 10*6/MM3 (ref 3.77–5.28)
SODIUM SERPL-SCNC: 137 MMOL/L (ref 136–145)
WBC # BLD AUTO: 3.8 10*3/MM3 (ref 3.4–10.8)

## 2020-10-25 PROCEDURE — 86927 PLASMA FRESH FROZEN: CPT

## 2020-10-25 PROCEDURE — 63710000001 DEXAMETHASONE PER 0.25 MG: Performed by: FAMILY MEDICINE

## 2020-10-25 PROCEDURE — 99233 SBSQ HOSP IP/OBS HIGH 50: CPT | Performed by: FAMILY MEDICINE

## 2020-10-25 PROCEDURE — 25010000002 ENOXAPARIN PER 10 MG: Performed by: FAMILY MEDICINE

## 2020-10-25 PROCEDURE — 80048 BASIC METABOLIC PNL TOTAL CA: CPT | Performed by: FAMILY MEDICINE

## 2020-10-25 PROCEDURE — 80076 HEPATIC FUNCTION PANEL: CPT | Performed by: FAMILY MEDICINE

## 2020-10-25 PROCEDURE — 94640 AIRWAY INHALATION TREATMENT: CPT

## 2020-10-25 PROCEDURE — 94799 UNLISTED PULMONARY SVC/PX: CPT

## 2020-10-25 PROCEDURE — 25010000002 DEXAMETHASONE PER 1 MG: Performed by: NURSE PRACTITIONER

## 2020-10-25 PROCEDURE — 85025 COMPLETE CBC W/AUTO DIFF WBC: CPT | Performed by: FAMILY MEDICINE

## 2020-10-25 RX ORDER — MORPHINE SULFATE 2 MG/ML
1 INJECTION, SOLUTION INTRAMUSCULAR; INTRAVENOUS EVERY 4 HOURS PRN
Status: DISPENSED | OUTPATIENT
Start: 2020-10-25 | End: 2020-10-27

## 2020-10-25 RX ORDER — GUAIFENESIN 600 MG/1
600 TABLET, EXTENDED RELEASE ORAL 2 TIMES DAILY PRN
Status: DISCONTINUED | OUTPATIENT
Start: 2020-10-25 | End: 2020-10-29 | Stop reason: HOSPADM

## 2020-10-25 RX ORDER — PROMETHAZINE HYDROCHLORIDE 6.25 MG/5ML
12.5 SYRUP ORAL EVERY 6 HOURS PRN
Status: DISCONTINUED | OUTPATIENT
Start: 2020-10-25 | End: 2020-10-29 | Stop reason: HOSPADM

## 2020-10-25 RX ORDER — AMOXICILLIN 250 MG
1 CAPSULE ORAL 2 TIMES DAILY PRN
Status: DISCONTINUED | OUTPATIENT
Start: 2020-10-25 | End: 2020-10-29 | Stop reason: HOSPADM

## 2020-10-25 RX ORDER — LORAZEPAM 0.5 MG/1
0.5 TABLET ORAL EVERY 6 HOURS PRN
Status: DISPENSED | OUTPATIENT
Start: 2020-10-25 | End: 2020-10-28

## 2020-10-25 RX ORDER — POLYETHYLENE GLYCOL 3350 17 G/17G
17 POWDER, FOR SOLUTION ORAL 2 TIMES DAILY PRN
Status: DISCONTINUED | OUTPATIENT
Start: 2020-10-25 | End: 2020-10-29 | Stop reason: HOSPADM

## 2020-10-25 RX ORDER — BENZONATATE 100 MG/1
100 CAPSULE ORAL 3 TIMES DAILY PRN
Status: DISCONTINUED | OUTPATIENT
Start: 2020-10-25 | End: 2020-10-29 | Stop reason: HOSPADM

## 2020-10-25 RX ADMIN — ALBUTEROL SULFATE 2 PUFF: 90 AEROSOL, METERED RESPIRATORY (INHALATION) at 22:00

## 2020-10-25 RX ADMIN — ISOSORBIDE DINITRATE 30 MG: 20 TABLET ORAL at 08:19

## 2020-10-25 RX ADMIN — SODIUM CHLORIDE, PRESERVATIVE FREE 10 ML: 5 INJECTION INTRAVENOUS at 13:00

## 2020-10-25 RX ADMIN — REMDESIVIR 100 MG: 100 INJECTION, POWDER, LYOPHILIZED, FOR SOLUTION INTRAVENOUS at 08:19

## 2020-10-25 RX ADMIN — CARVEDILOL 12.5 MG: 12.5 TABLET, FILM COATED ORAL at 16:57

## 2020-10-25 RX ADMIN — MAGNESIUM OXIDE TAB 400 MG (241.3 MG ELEMENTAL MG) 400 MG: 400 (241.3 MG) TAB at 08:19

## 2020-10-25 RX ADMIN — PRAVASTATIN SODIUM 20 MG: 20 TABLET ORAL at 08:20

## 2020-10-25 RX ADMIN — GUAIFENESIN 600 MG: 600 TABLET, EXTENDED RELEASE ORAL at 23:57

## 2020-10-25 RX ADMIN — TORSEMIDE 20 MG: 20 TABLET ORAL at 08:20

## 2020-10-25 RX ADMIN — PANTOPRAZOLE SODIUM 40 MG: 40 TABLET, DELAYED RELEASE ORAL at 13:01

## 2020-10-25 RX ADMIN — LORAZEPAM 0.5 MG: 0.5 TABLET ORAL at 23:57

## 2020-10-25 RX ADMIN — ISOSORBIDE DINITRATE 30 MG: 20 TABLET ORAL at 16:57

## 2020-10-25 RX ADMIN — DEXAMETHASONE SODIUM PHOSPHATE 6 MG: 4 INJECTION, SOLUTION INTRAMUSCULAR; INTRAVENOUS at 08:19

## 2020-10-25 RX ADMIN — ISOSORBIDE DINITRATE 30 MG: 20 TABLET ORAL at 21:46

## 2020-10-25 RX ADMIN — ISOSORBIDE DINITRATE 30 MG: 20 TABLET ORAL at 13:01

## 2020-10-25 RX ADMIN — ENOXAPARIN SODIUM 40 MG: 40 INJECTION SUBCUTANEOUS at 08:19

## 2020-10-25 RX ADMIN — MULTIPLE VITAMINS W/ MINERALS TAB 1 TABLET: TAB at 08:19

## 2020-10-25 RX ADMIN — LEVOTHYROXINE SODIUM 25 MCG: 25 TABLET ORAL at 05:55

## 2020-10-25 RX ADMIN — CARVEDILOL 12.5 MG: 12.5 TABLET, FILM COATED ORAL at 08:19

## 2020-10-25 RX ADMIN — LISINOPRIL 20 MG: 20 TABLET ORAL at 08:20

## 2020-10-25 RX ADMIN — SODIUM CHLORIDE, PRESERVATIVE FREE 10 ML: 5 INJECTION INTRAVENOUS at 21:48

## 2020-10-25 RX ADMIN — SPIRONOLACTONE 25 MG: 25 TABLET ORAL at 08:19

## 2020-10-26 LAB
ALBUMIN SERPL-MCNC: 3.6 G/DL (ref 3.5–5.2)
ALP SERPL-CCNC: 91 U/L (ref 39–117)
ALT SERPL W P-5'-P-CCNC: 9 U/L (ref 1–33)
AST SERPL-CCNC: 14 U/L (ref 1–32)
BH BB BLOOD EXPIRATION DATE: NORMAL
BH BB BLOOD TYPE BARCODE: 6200
BH BB DISPENSE STATUS: NORMAL
BH BB PRODUCT CODE: NORMAL
BH BB UNIT NUMBER: NORMAL
BILIRUB CONJ SERPL-MCNC: <0.2 MG/DL (ref 0–0.3)
BILIRUB INDIRECT SERPL-MCNC: ABNORMAL MG/DL
BILIRUB SERPL-MCNC: 0.3 MG/DL (ref 0–1.2)
CREAT SERPL-MCNC: 0.84 MG/DL (ref 0.57–1)
GFR SERPL CREATININE-BSD FRML MDRD: 63 ML/MIN/1.73
PROT SERPL-MCNC: 5.1 G/DL (ref 6–8.5)
UNIT  ABO: NORMAL
UNIT  RH: NORMAL

## 2020-10-26 PROCEDURE — 82565 ASSAY OF CREATININE: CPT | Performed by: FAMILY MEDICINE

## 2020-10-26 PROCEDURE — 80076 HEPATIC FUNCTION PANEL: CPT | Performed by: FAMILY MEDICINE

## 2020-10-26 PROCEDURE — 25010000002 ENOXAPARIN PER 10 MG: Performed by: FAMILY MEDICINE

## 2020-10-26 PROCEDURE — 25010000002 FUROSEMIDE PER 20 MG: Performed by: INTERNAL MEDICINE

## 2020-10-26 PROCEDURE — 63710000001 DEXAMETHASONE PER 0.25 MG: Performed by: FAMILY MEDICINE

## 2020-10-26 PROCEDURE — 99233 SBSQ HOSP IP/OBS HIGH 50: CPT | Performed by: INTERNAL MEDICINE

## 2020-10-26 RX ORDER — FUROSEMIDE 10 MG/ML
40 INJECTION INTRAMUSCULAR; INTRAVENOUS ONCE
Status: COMPLETED | OUTPATIENT
Start: 2020-10-26 | End: 2020-10-26

## 2020-10-26 RX ADMIN — ISOSORBIDE DINITRATE 30 MG: 20 TABLET ORAL at 11:51

## 2020-10-26 RX ADMIN — MAGNESIUM OXIDE TAB 400 MG (241.3 MG ELEMENTAL MG) 400 MG: 400 (241.3 MG) TAB at 07:58

## 2020-10-26 RX ADMIN — SODIUM CHLORIDE, PRESERVATIVE FREE 10 ML: 5 INJECTION INTRAVENOUS at 07:59

## 2020-10-26 RX ADMIN — PANTOPRAZOLE SODIUM 40 MG: 40 TABLET, DELAYED RELEASE ORAL at 11:51

## 2020-10-26 RX ADMIN — FUROSEMIDE 40 MG: 10 INJECTION, SOLUTION INTRAMUSCULAR; INTRAVENOUS at 16:58

## 2020-10-26 RX ADMIN — CARVEDILOL 12.5 MG: 12.5 TABLET, FILM COATED ORAL at 07:58

## 2020-10-26 RX ADMIN — ENOXAPARIN SODIUM 40 MG: 40 INJECTION SUBCUTANEOUS at 07:58

## 2020-10-26 RX ADMIN — ISOSORBIDE DINITRATE 30 MG: 20 TABLET ORAL at 07:57

## 2020-10-26 RX ADMIN — ISOSORBIDE DINITRATE 30 MG: 20 TABLET ORAL at 16:56

## 2020-10-26 RX ADMIN — MULTIPLE VITAMINS W/ MINERALS TAB 1 TABLET: TAB at 07:58

## 2020-10-26 RX ADMIN — LISINOPRIL 20 MG: 20 TABLET ORAL at 07:57

## 2020-10-26 RX ADMIN — DEXAMETHASONE 6 MG: 4 TABLET ORAL at 09:58

## 2020-10-26 RX ADMIN — LEVOTHYROXINE SODIUM 25 MCG: 25 TABLET ORAL at 05:36

## 2020-10-26 RX ADMIN — SPIRONOLACTONE 25 MG: 25 TABLET ORAL at 07:57

## 2020-10-26 RX ADMIN — PRAVASTATIN SODIUM 20 MG: 20 TABLET ORAL at 07:58

## 2020-10-26 RX ADMIN — TORSEMIDE 20 MG: 20 TABLET ORAL at 07:57

## 2020-10-26 RX ADMIN — ISOSORBIDE DINITRATE 30 MG: 20 TABLET ORAL at 21:17

## 2020-10-26 RX ADMIN — REMDESIVIR 100 MG: 100 INJECTION, POWDER, LYOPHILIZED, FOR SOLUTION INTRAVENOUS at 07:58

## 2020-10-26 RX ADMIN — CARVEDILOL 18.75 MG: 12.5 TABLET, FILM COATED ORAL at 16:57

## 2020-10-26 RX ADMIN — SODIUM CHLORIDE, PRESERVATIVE FREE 10 ML: 5 INJECTION INTRAVENOUS at 21:21

## 2020-10-26 RX ADMIN — LORAZEPAM 0.5 MG: 0.5 TABLET ORAL at 21:20

## 2020-10-27 LAB
ALBUMIN SERPL-MCNC: 3.9 G/DL (ref 3.5–5.2)
ALP SERPL-CCNC: 98 U/L (ref 39–117)
ALT SERPL W P-5'-P-CCNC: 11 U/L (ref 1–33)
AST SERPL-CCNC: 18 U/L (ref 1–32)
BILIRUB CONJ SERPL-MCNC: 0.2 MG/DL (ref 0–0.3)
BILIRUB INDIRECT SERPL-MCNC: 0.1 MG/DL
BILIRUB SERPL-MCNC: 0.3 MG/DL (ref 0–1.2)
CREAT SERPL-MCNC: 0.82 MG/DL (ref 0.57–1)
GFR SERPL CREATININE-BSD FRML MDRD: 65 ML/MIN/1.73
PROT SERPL-MCNC: 5.7 G/DL (ref 6–8.5)

## 2020-10-27 PROCEDURE — 63710000001 DEXAMETHASONE PER 0.25 MG: Performed by: FAMILY MEDICINE

## 2020-10-27 PROCEDURE — 99232 SBSQ HOSP IP/OBS MODERATE 35: CPT | Performed by: INTERNAL MEDICINE

## 2020-10-27 PROCEDURE — 25010000002 ENOXAPARIN PER 10 MG: Performed by: FAMILY MEDICINE

## 2020-10-27 PROCEDURE — 97116 GAIT TRAINING THERAPY: CPT

## 2020-10-27 PROCEDURE — 25010000002 MORPHINE PER 10 MG: Performed by: FAMILY MEDICINE

## 2020-10-27 PROCEDURE — 82565 ASSAY OF CREATININE: CPT | Performed by: FAMILY MEDICINE

## 2020-10-27 PROCEDURE — 97161 PT EVAL LOW COMPLEX 20 MIN: CPT

## 2020-10-27 PROCEDURE — 80076 HEPATIC FUNCTION PANEL: CPT | Performed by: FAMILY MEDICINE

## 2020-10-27 RX ORDER — CHOLECALCIFEROL (VITAMIN D3) 125 MCG
5 CAPSULE ORAL NIGHTLY PRN
Status: DISCONTINUED | OUTPATIENT
Start: 2020-10-27 | End: 2020-10-29 | Stop reason: HOSPADM

## 2020-10-27 RX ADMIN — TORSEMIDE 20 MG: 20 TABLET ORAL at 08:33

## 2020-10-27 RX ADMIN — DEXAMETHASONE 6 MG: 4 TABLET ORAL at 08:33

## 2020-10-27 RX ADMIN — ISOSORBIDE DINITRATE 30 MG: 20 TABLET ORAL at 08:33

## 2020-10-27 RX ADMIN — MAGNESIUM OXIDE TAB 400 MG (241.3 MG ELEMENTAL MG) 400 MG: 400 (241.3 MG) TAB at 08:33

## 2020-10-27 RX ADMIN — SODIUM CHLORIDE, PRESERVATIVE FREE 10 ML: 5 INJECTION INTRAVENOUS at 21:38

## 2020-10-27 RX ADMIN — MORPHINE SULFATE 1 MG: 2 INJECTION, SOLUTION INTRAMUSCULAR; INTRAVENOUS at 01:29

## 2020-10-27 RX ADMIN — CARVEDILOL 18.75 MG: 12.5 TABLET, FILM COATED ORAL at 17:09

## 2020-10-27 RX ADMIN — CARVEDILOL 18.75 MG: 12.5 TABLET, FILM COATED ORAL at 08:33

## 2020-10-27 RX ADMIN — ISOSORBIDE DINITRATE 30 MG: 20 TABLET ORAL at 17:09

## 2020-10-27 RX ADMIN — LEVOTHYROXINE SODIUM 25 MCG: 25 TABLET ORAL at 05:19

## 2020-10-27 RX ADMIN — SODIUM CHLORIDE, PRESERVATIVE FREE 10 ML: 5 INJECTION INTRAVENOUS at 08:34

## 2020-10-27 RX ADMIN — REMDESIVIR 100 MG: 100 INJECTION, POWDER, LYOPHILIZED, FOR SOLUTION INTRAVENOUS at 08:34

## 2020-10-27 RX ADMIN — PRAVASTATIN SODIUM 20 MG: 20 TABLET ORAL at 08:33

## 2020-10-27 RX ADMIN — MULTIPLE VITAMINS W/ MINERALS TAB 1 TABLET: TAB at 08:33

## 2020-10-27 RX ADMIN — ENOXAPARIN SODIUM 40 MG: 40 INJECTION SUBCUTANEOUS at 08:33

## 2020-10-27 RX ADMIN — SPIRONOLACTONE 25 MG: 25 TABLET ORAL at 08:33

## 2020-10-27 RX ADMIN — ISOSORBIDE DINITRATE 30 MG: 20 TABLET ORAL at 11:18

## 2020-10-27 RX ADMIN — MELATONIN TAB 5 MG 5 MG: 5 TAB at 23:04

## 2020-10-27 RX ADMIN — ISOSORBIDE DINITRATE 30 MG: 20 TABLET ORAL at 21:37

## 2020-10-27 RX ADMIN — LISINOPRIL 20 MG: 20 TABLET ORAL at 11:34

## 2020-10-27 RX ADMIN — PANTOPRAZOLE SODIUM 40 MG: 40 TABLET, DELAYED RELEASE ORAL at 11:18

## 2020-10-28 LAB
ALBUMIN SERPL-MCNC: 3.7 G/DL (ref 3.5–5.2)
ALP SERPL-CCNC: 97 U/L (ref 39–117)
ALT SERPL W P-5'-P-CCNC: 13 U/L (ref 1–33)
AST SERPL-CCNC: 16 U/L (ref 1–32)
BILIRUB CONJ SERPL-MCNC: 0.2 MG/DL (ref 0–0.3)
BILIRUB INDIRECT SERPL-MCNC: 0.2 MG/DL
BILIRUB SERPL-MCNC: 0.4 MG/DL (ref 0–1.2)
CREAT SERPL-MCNC: 1.01 MG/DL (ref 0.57–1)
GFR SERPL CREATININE-BSD FRML MDRD: 51 ML/MIN/1.73
PROT SERPL-MCNC: 5.4 G/DL (ref 6–8.5)

## 2020-10-28 PROCEDURE — 97116 GAIT TRAINING THERAPY: CPT

## 2020-10-28 PROCEDURE — 25010000002 ENOXAPARIN PER 10 MG: Performed by: FAMILY MEDICINE

## 2020-10-28 PROCEDURE — 97110 THERAPEUTIC EXERCISES: CPT

## 2020-10-28 PROCEDURE — 82565 ASSAY OF CREATININE: CPT | Performed by: FAMILY MEDICINE

## 2020-10-28 PROCEDURE — 80076 HEPATIC FUNCTION PANEL: CPT | Performed by: FAMILY MEDICINE

## 2020-10-28 PROCEDURE — 99232 SBSQ HOSP IP/OBS MODERATE 35: CPT | Performed by: INTERNAL MEDICINE

## 2020-10-28 PROCEDURE — 63710000001 DEXAMETHASONE PER 0.25 MG: Performed by: FAMILY MEDICINE

## 2020-10-28 RX ORDER — CARVEDILOL 12.5 MG/1
25 TABLET ORAL 2 TIMES DAILY WITH MEALS
Status: DISCONTINUED | OUTPATIENT
Start: 2020-10-28 | End: 2020-10-29 | Stop reason: HOSPADM

## 2020-10-28 RX ADMIN — CARVEDILOL 18.75 MG: 12.5 TABLET, FILM COATED ORAL at 08:10

## 2020-10-28 RX ADMIN — LISINOPRIL 20 MG: 20 TABLET ORAL at 08:10

## 2020-10-28 RX ADMIN — REMDESIVIR 100 MG: 100 INJECTION, POWDER, LYOPHILIZED, FOR SOLUTION INTRAVENOUS at 08:09

## 2020-10-28 RX ADMIN — ENOXAPARIN SODIUM 40 MG: 40 INJECTION SUBCUTANEOUS at 08:09

## 2020-10-28 RX ADMIN — ACETAMINOPHEN 650 MG: 325 TABLET, FILM COATED ORAL at 08:10

## 2020-10-28 RX ADMIN — LEVOTHYROXINE SODIUM 25 MCG: 25 TABLET ORAL at 06:02

## 2020-10-28 RX ADMIN — SODIUM CHLORIDE, PRESERVATIVE FREE 10 ML: 5 INJECTION INTRAVENOUS at 20:09

## 2020-10-28 RX ADMIN — ISOSORBIDE DINITRATE 30 MG: 20 TABLET ORAL at 17:02

## 2020-10-28 RX ADMIN — PRAVASTATIN SODIUM 20 MG: 20 TABLET ORAL at 08:11

## 2020-10-28 RX ADMIN — MAGNESIUM OXIDE TAB 400 MG (241.3 MG ELEMENTAL MG) 400 MG: 400 (241.3 MG) TAB at 08:10

## 2020-10-28 RX ADMIN — DEXAMETHASONE 6 MG: 4 TABLET ORAL at 08:10

## 2020-10-28 RX ADMIN — MULTIPLE VITAMINS W/ MINERALS TAB 1 TABLET: TAB at 08:11

## 2020-10-28 RX ADMIN — ISOSORBIDE DINITRATE 30 MG: 20 TABLET ORAL at 11:50

## 2020-10-28 RX ADMIN — MELATONIN TAB 5 MG 5 MG: 5 TAB at 22:40

## 2020-10-28 RX ADMIN — SODIUM CHLORIDE, PRESERVATIVE FREE 10 ML: 5 INJECTION INTRAVENOUS at 08:09

## 2020-10-28 RX ADMIN — ISOSORBIDE DINITRATE 30 MG: 20 TABLET ORAL at 20:16

## 2020-10-28 RX ADMIN — SPIRONOLACTONE 25 MG: 25 TABLET ORAL at 08:10

## 2020-10-28 RX ADMIN — ISOSORBIDE DINITRATE 30 MG: 20 TABLET ORAL at 08:10

## 2020-10-28 RX ADMIN — PANTOPRAZOLE SODIUM 40 MG: 40 TABLET, DELAYED RELEASE ORAL at 11:50

## 2020-10-28 RX ADMIN — TORSEMIDE 20 MG: 20 TABLET ORAL at 08:09

## 2020-10-28 RX ADMIN — CARVEDILOL 25 MG: 12.5 TABLET, FILM COATED ORAL at 17:02

## 2020-10-29 ENCOUNTER — READMISSION MANAGEMENT (OUTPATIENT)
Dept: CALL CENTER | Facility: HOSPITAL | Age: 85
End: 2020-10-29

## 2020-10-29 VITALS
OXYGEN SATURATION: 97 % | RESPIRATION RATE: 18 BRPM | HEART RATE: 105 BPM | SYSTOLIC BLOOD PRESSURE: 145 MMHG | BODY MASS INDEX: 25.82 KG/M2 | WEIGHT: 145.7 LBS | DIASTOLIC BLOOD PRESSURE: 88 MMHG | HEIGHT: 63 IN | TEMPERATURE: 98.1 F

## 2020-10-29 PROCEDURE — 99239 HOSP IP/OBS DSCHRG MGMT >30: CPT | Performed by: INTERNAL MEDICINE

## 2020-10-29 PROCEDURE — 63710000001 DEXAMETHASONE PER 0.25 MG: Performed by: FAMILY MEDICINE

## 2020-10-29 PROCEDURE — 97165 OT EVAL LOW COMPLEX 30 MIN: CPT

## 2020-10-29 PROCEDURE — 25010000002 ENOXAPARIN PER 10 MG: Performed by: FAMILY MEDICINE

## 2020-10-29 RX ORDER — DEXAMETHASONE 6 MG/1
6 TABLET ORAL
Qty: 5 TABLET | Refills: 0 | Status: SHIPPED | OUTPATIENT
Start: 2020-10-30 | End: 2020-11-04

## 2020-10-29 RX ORDER — CARVEDILOL 25 MG/1
25 TABLET ORAL 2 TIMES DAILY WITH MEALS
Qty: 60 TABLET | Refills: 2 | Status: ON HOLD | OUTPATIENT
Start: 2020-10-29 | End: 2021-06-25

## 2020-10-29 RX ORDER — ALBUTEROL SULFATE 90 UG/1
2 AEROSOL, METERED RESPIRATORY (INHALATION) EVERY 6 HOURS PRN
Qty: 3.7 G | Refills: 0 | Status: SHIPPED | OUTPATIENT
Start: 2020-10-29 | End: 2020-11-05

## 2020-10-29 RX ADMIN — TORSEMIDE 20 MG: 20 TABLET ORAL at 09:19

## 2020-10-29 RX ADMIN — DEXAMETHASONE 6 MG: 4 TABLET ORAL at 09:18

## 2020-10-29 RX ADMIN — MAGNESIUM OXIDE TAB 400 MG (241.3 MG ELEMENTAL MG) 400 MG: 400 (241.3 MG) TAB at 09:19

## 2020-10-29 RX ADMIN — ISOSORBIDE DINITRATE 30 MG: 20 TABLET ORAL at 09:18

## 2020-10-29 RX ADMIN — SODIUM CHLORIDE, PRESERVATIVE FREE 10 ML: 5 INJECTION INTRAVENOUS at 09:19

## 2020-10-29 RX ADMIN — PRAVASTATIN SODIUM 20 MG: 20 TABLET ORAL at 09:18

## 2020-10-29 RX ADMIN — MULTIPLE VITAMINS W/ MINERALS TAB 1 TABLET: TAB at 09:19

## 2020-10-29 RX ADMIN — ENOXAPARIN SODIUM 40 MG: 40 INJECTION SUBCUTANEOUS at 09:19

## 2020-10-29 RX ADMIN — SPIRONOLACTONE 25 MG: 25 TABLET ORAL at 09:18

## 2020-10-29 RX ADMIN — LEVOTHYROXINE SODIUM 25 MCG: 25 TABLET ORAL at 06:09

## 2020-10-29 RX ADMIN — PANTOPRAZOLE SODIUM 40 MG: 40 TABLET, DELAYED RELEASE ORAL at 09:19

## 2020-10-29 RX ADMIN — LISINOPRIL 20 MG: 20 TABLET ORAL at 09:19

## 2020-10-29 RX ADMIN — CARVEDILOL 25 MG: 12.5 TABLET, FILM COATED ORAL at 09:19

## 2020-10-29 NOTE — OUTREACH NOTE
Prep Survey      Responses   [facility]  [place]   Is LACE score < 7 ?  No   Eligibility  Readm Mgmt   Discharge diagnosis  Pneumonia due to COVID-19 virus   Does the patient have one of the following disease processes/diagnoses(primary or secondary)?  COVID-19   Does the patient have Home health ordered?  No   Is there a DME ordered?  No   Prep survey completed?  Yes          Nitza Hidalgo RN

## 2020-10-30 ENCOUNTER — READMISSION MANAGEMENT (OUTPATIENT)
Dept: CALL CENTER | Facility: HOSPITAL | Age: 85
End: 2020-10-30

## 2020-10-30 NOTE — OUTREACH NOTE
COVID-19 Week 1 Survey      Responses   [facility] patient discharged from?  [place]   Does the patient have one of the following disease processes/diagnoses(primary or secondary)?  COVID-19   COVID-19 underlying condition?  None   Call Number  Call 1   Week 1 Call successful?  Yes   Call start time  0914   Call end time  0939   General alerts for this patient  .   Discharge diagnosis  Pneumonia due to COVID-19 virus   Is patient permission given to speak with other caregiver?  Yes   List who call center can speak with  dtr   Person spoke with today (if not patient) and relationship  Betty   Medication alerts for this patient  Dtr upset that the medication list has not been updated even though she gave an updated list to the ER nurse to update in system. Listed meds below as dtr stated pt took. Advised her to have PCP update system.    Meds reviewed with patient/caregiver?  Yes   Is the patient having any side effects they believe may be caused by any medication additions or changes?  No   Does the patient have all medications ordered at discharge?  Yes   Prescription comments  Current meds:Coreg 25mg BID: Albuterol inhaler & nebs prn,  Synthroid 50mcg daily,  Lisinopril 10mg daily, Magnesium Oxide 400mg daily,  NTG SL prn,  Omeprazole 20mg daily,  Zofran prn,  Pravastatin 20mg daily,  81mg ASA daily,  Softgel stool softener 250mg 2po BID,  Torsemide 20mg daily,  Presere Vision areds2 1tab BID,  Benadryl 25mg 2po QHS,     Is the patient taking all medications as directed (includes completed medication regime)?  Yes   Medication comments  Dtr wants it known that she does NOT take the following: centrum silver, spironolactone, trazodone, Vit B,    Does the patient have a primary care provider?   Yes   Does the patient have an appointment with their PCP or specialist within 7 days of discharge?  Yes   Has the patient kept scheduled appointments due by today?  N/A   Comments  Telehealth scheduled   What DME was  ordered?  sent home w/ pulse ox   Has all DME been delivered?  Yes   Psychosocial issues?  No   Psychosocial comments  Dtr also +Covid and going between her home and pt's home   Comments  Not checked temp today. Pt eating breakfast per dtr. No taste or smell at this time. Still feels poorly.    Did the patient receive a copy of their discharge instructions?  Yes   Did the patient receive a copy of COVID-19 specific instructions?  Yes   Nursing interventions  Reviewed instructions with patient   What is the patient's perception of their health status since discharge?  Improving   Does the patient have any of the following symptoms?  Loss of taste/smell, Cough   Pulse Ox monitoring  Intermittent   Pulse Ox device source  Patient, Hospital   O2 Sat comments  has not checked today.   O2 Sat: education provided  Sat levels   If the patient is a current smoker, are they able to teach back resources for cessation?  Not a smoker   Is the patient/caregiver able to teach back the hierarchy of who to call/visit for symptoms/problems? PCP, Specialist, Home health nurse, Urgent Care, ED, 911  Yes   COVID-19 call completed?  Yes          Vita Cortez RN

## 2020-10-31 ENCOUNTER — READMISSION MANAGEMENT (OUTPATIENT)
Dept: CALL CENTER | Facility: HOSPITAL | Age: 85
End: 2020-10-31

## 2020-10-31 NOTE — OUTREACH NOTE
COVID-19 Week 1 Survey      Responses   [facility] patient discharged from?  [place]   Does the patient have one of the following disease processes/diagnoses(primary or secondary)?  COVID-19   COVID-19 underlying condition?  None   Call Number  Call 2   Week 1 Call successful?  Yes   Call start time  1113   Call end time  1115   Discharge diagnosis  Pneumonia due to COVID-19 virus   Is patient permission given to speak with other caregiver?  Yes   List who call center can speak with  Betty- Daughter    Meds reviewed with patient/caregiver?  Yes   Is the patient having any side effects they believe may be caused by any medication additions or changes?  No   Does the patient have all medications ordered at discharge?  Yes   Is the patient taking all medications as directed (includes completed medication regime)?  Yes   Does the patient have a primary care provider?   Yes   Does the patient have an appointment with their PCP or specialist within 7 days of discharge?  Yes   Has the patient kept scheduled appointments due by today?  N/A   Psychosocial issues?  No   Did the patient receive a copy of their discharge instructions?  Yes   Did the patient receive a copy of COVID-19 specific instructions?  Yes   Nursing interventions  Reviewed instructions with patient   What is the patient's perception of their health status since discharge?  Same   Does the patient have any of the following symptoms?  Cough, Loss of taste/smell   Nursing Interventions  Nurse provided patient education   Pulse Ox monitoring  Intermittent   Pulse Ox device source  Patient   Is the patient/caregiver able to teach back steps to recovery at home?  Set small, achievable goals for return to baseline health, Rest and rebuild strength, gradually increase activity   Is the patient/caregiver able to teach back the hierarchy of who to call/visit for symptoms/problems? PCP, Specialist, Home health nurse, Urgent Care, ED, 911  Yes   COVID-19 call  completed?  Yes   Wrap up additional comments  She did not rest well last night. Seems more tired today.           Ankita Dawson RN

## 2020-11-01 ENCOUNTER — READMISSION MANAGEMENT (OUTPATIENT)
Dept: CALL CENTER | Facility: HOSPITAL | Age: 85
End: 2020-11-01

## 2020-11-01 NOTE — OUTREACH NOTE
COVID-19 Week 1 Survey      Responses   [facility] patient discharged from?  [place]   Does the patient have one of the following disease processes/diagnoses(primary or secondary)?  COVID-19   COVID-19 underlying condition?  None   Call Number  Call 3   Week 1 Call successful?  Yes   Call start time  1303   Call end time  1305   Discharge diagnosis  Pneumonia due to COVID-19 virus   Is patient permission given to speak with other caregiver?  Yes   List who call center can speak with  Betty- Daughter    Person spoke with today (if not patient) and relationship  Betty- Daughter    Meds reviewed with patient/caregiver?  Yes   Is the patient having any side effects they believe may be caused by any medication additions or changes?  No   Does the patient have all medications ordered at discharge?  Yes   Is the patient taking all medications as directed (includes completed medication regime)?  Yes   Does the patient have a primary care provider?   Yes   Does the patient have an appointment with their PCP or specialist within 7 days of discharge?  Yes   Has the patient kept scheduled appointments due by today?  N/A   Psychosocial issues?  No   Did the patient receive a copy of their discharge instructions?  Yes   Did the patient receive a copy of COVID-19 specific instructions?  Yes   Nursing interventions  Reviewed instructions with patient   What is the patient's perception of their health status since discharge?  Same   Does the patient have any of the following symptoms?  Cough, Loss of taste/smell   Nursing Interventions  Nurse provided patient education   Pulse Ox monitoring  Intermittent   Pulse Ox device source  Patient   Is the patient/caregiver able to teach back steps to recovery at home?  Set small, achievable goals for return to baseline health, Rest and rebuild strength, gradually increase activity   If the patient is a current smoker, are they able to teach back resources for cessation?  Not a  smoker   Is the patient/caregiver able to teach back the hierarchy of who to call/visit for symptoms/problems? PCP, Specialist, Home health nurse, Urgent Care, ED, 911  Yes   COVID-19 call completed?  Yes          Ankita Dawson RN

## 2020-11-04 ENCOUNTER — READMISSION MANAGEMENT (OUTPATIENT)
Dept: CALL CENTER | Facility: HOSPITAL | Age: 85
End: 2020-11-04

## 2021-06-14 ENCOUNTER — HOSPITAL ENCOUNTER (OUTPATIENT)
Facility: HOSPITAL | Age: 86
Setting detail: HOSPITAL OUTPATIENT SURGERY
End: 2021-06-14
Attending: INTERNAL MEDICINE | Admitting: INTERNAL MEDICINE

## 2021-06-14 ENCOUNTER — TRANSCRIBE ORDERS (OUTPATIENT)
Dept: ADMINISTRATIVE | Facility: HOSPITAL | Age: 86
End: 2021-06-14

## 2021-06-14 DIAGNOSIS — Z45.018 ELECTIVE REPLACEMENT INDICATED FOR PACEMAKER: Primary | ICD-10-CM

## 2021-06-14 DIAGNOSIS — Z45.018 ELECTIVE REPLACEMENT INDICATED FOR PACEMAKER: ICD-10-CM

## 2021-06-25 ENCOUNTER — HOSPITAL ENCOUNTER (OUTPATIENT)
Facility: HOSPITAL | Age: 86
Setting detail: HOSPITAL OUTPATIENT SURGERY
Discharge: HOME | End: 2021-06-25
Attending: INTERNAL MEDICINE | Admitting: INTERNAL MEDICINE

## 2021-06-25 VITALS
WEIGHT: 142.42 LBS | HEIGHT: 63 IN | HEART RATE: 65 BPM | OXYGEN SATURATION: 98 % | TEMPERATURE: 97.8 F | BODY MASS INDEX: 25.23 KG/M2 | RESPIRATION RATE: 16 BRPM | SYSTOLIC BLOOD PRESSURE: 103 MMHG | DIASTOLIC BLOOD PRESSURE: 58 MMHG

## 2021-06-25 LAB
ANION GAP SERPL CALCULATED.3IONS-SCNC: 9 MMOL/L (ref 5–15)
BUN SERPL-MCNC: 29 MG/DL (ref 8–23)
BUN/CREAT SERPL: 27.6 (ref 7–25)
CALCIUM SPEC-SCNC: 8.8 MG/DL (ref 8.2–9.6)
CHLORIDE SERPL-SCNC: 99 MMOL/L (ref 98–107)
CO2 SERPL-SCNC: 31 MMOL/L (ref 22–29)
CREAT SERPL-MCNC: 1.05 MG/DL (ref 0.57–1)
DEPRECATED RDW RBC AUTO: 46.4 FL (ref 37–54)
ERYTHROCYTE [DISTWIDTH] IN BLOOD BY AUTOMATED COUNT: 13.6 % (ref 12.3–15.4)
GFR SERPL CREATININE-BSD FRML MDRD: 49 ML/MIN/1.73
GLUCOSE SERPL-MCNC: 179 MG/DL (ref 65–99)
HCT VFR BLD AUTO: 25.4 % (ref 34–46.6)
HGB BLD-MCNC: 7.9 G/DL (ref 12–15.9)
MCH RBC QN AUTO: 28.9 PG (ref 26.6–33)
MCHC RBC AUTO-ENTMCNC: 31.1 G/DL (ref 31.5–35.7)
MCV RBC AUTO: 93 FL (ref 79–97)
PLATELET # BLD AUTO: 208 10*3/MM3 (ref 140–450)
PMV BLD AUTO: 10.2 FL (ref 6–12)
POTASSIUM SERPL-SCNC: 3.9 MMOL/L (ref 3.5–5.2)
RBC # BLD AUTO: 2.73 10*6/MM3 (ref 3.77–5.28)
SODIUM SERPL-SCNC: 139 MMOL/L (ref 136–145)
WBC # BLD AUTO: 4.17 10*3/MM3 (ref 3.4–10.8)

## 2021-06-25 PROCEDURE — 25010000002 MIDAZOLAM PER 1 MG: Performed by: INTERNAL MEDICINE

## 2021-06-25 PROCEDURE — 25010000003 LIDOCAINE 1 % SOLUTION: Performed by: INTERNAL MEDICINE

## 2021-06-25 PROCEDURE — 80048 BASIC METABOLIC PNL TOTAL CA: CPT | Performed by: INTERNAL MEDICINE

## 2021-06-25 PROCEDURE — 25010000002 VANCOMYCIN: Performed by: PHYSICIAN ASSISTANT

## 2021-06-25 PROCEDURE — 99152 MOD SED SAME PHYS/QHP 5/>YRS: CPT | Performed by: INTERNAL MEDICINE

## 2021-06-25 PROCEDURE — 99153 MOD SED SAME PHYS/QHP EA: CPT | Performed by: INTERNAL MEDICINE

## 2021-06-25 PROCEDURE — C1786 PMKR, SINGLE, RATE-RESP: HCPCS | Performed by: INTERNAL MEDICINE

## 2021-06-25 PROCEDURE — 33227 REMOVE&REPLACE PM GEN SINGL: CPT | Performed by: INTERNAL MEDICINE

## 2021-06-25 PROCEDURE — 85027 COMPLETE CBC AUTOMATED: CPT | Performed by: INTERNAL MEDICINE

## 2021-06-25 PROCEDURE — 25010000002 FENTANYL CITRATE (PF) 50 MCG/ML SOLUTION: Performed by: INTERNAL MEDICINE

## 2021-06-25 DEVICE — GEN PM ASSURITY SR RF PM1272: Type: IMPLANTABLE DEVICE | Status: FUNCTIONAL

## 2021-06-25 RX ORDER — SODIUM CHLORIDE 9 MG/ML
INJECTION, SOLUTION INTRAVENOUS CONTINUOUS PRN
Status: COMPLETED | OUTPATIENT
Start: 2021-06-25 | End: 2021-06-25

## 2021-06-25 RX ORDER — MIDAZOLAM HYDROCHLORIDE 1 MG/ML
INJECTION INTRAMUSCULAR; INTRAVENOUS AS NEEDED
Status: DISCONTINUED | OUTPATIENT
Start: 2021-06-25 | End: 2021-06-25 | Stop reason: HOSPADM

## 2021-06-25 RX ORDER — CLONAZEPAM 0.5 MG/1
.25-.5 TABLET ORAL NIGHTLY PRN
COMMUNITY
Start: 2022-04-06

## 2021-06-25 RX ORDER — LORATADINE 10 MG/1
10 TABLET ORAL DAILY
COMMUNITY

## 2021-06-25 RX ORDER — DOCUSATE SODIUM 100 MG/1
200 CAPSULE, LIQUID FILLED ORAL AS NEEDED
COMMUNITY
Start: 2022-04-06

## 2021-06-25 RX ORDER — LIDOCAINE HYDROCHLORIDE 10 MG/ML
INJECTION, SOLUTION INFILTRATION; PERINEURAL AS NEEDED
Status: DISCONTINUED | OUTPATIENT
Start: 2021-06-25 | End: 2021-06-25 | Stop reason: HOSPADM

## 2021-06-25 RX ORDER — SODIUM CHLORIDE 0.9 % (FLUSH) 0.9 %
10 SYRINGE (ML) INJECTION AS NEEDED
Status: DISCONTINUED | OUTPATIENT
Start: 2021-06-25 | End: 2021-06-25 | Stop reason: HOSPADM

## 2021-06-25 RX ORDER — SENNOSIDES 8.6 MG
650 CAPSULE ORAL EVERY 8 HOURS PRN
COMMUNITY
Start: 2022-04-06

## 2021-06-25 RX ORDER — ASPIRIN 81 MG/1
81 TABLET ORAL DAILY
COMMUNITY

## 2021-06-25 RX ORDER — CEFAZOLIN SODIUM 2 G/100ML
2 INJECTION, SOLUTION INTRAVENOUS ONCE
Status: DISCONTINUED | OUTPATIENT
Start: 2021-06-25 | End: 2021-06-25

## 2021-06-25 RX ORDER — SODIUM CHLORIDE 9 MG/ML
250 INJECTION, SOLUTION INTRAVENOUS CONTINUOUS
Status: ACTIVE | OUTPATIENT
Start: 2021-06-25 | End: 2021-06-25

## 2021-06-25 RX ORDER — BUPIVACAINE HYDROCHLORIDE 5 MG/ML
INJECTION, SOLUTION PERINEURAL AS NEEDED
Status: DISCONTINUED | OUTPATIENT
Start: 2021-06-25 | End: 2021-06-25 | Stop reason: HOSPADM

## 2021-06-25 RX ORDER — ISOSORBIDE MONONITRATE 60 MG/1
60 TABLET, EXTENDED RELEASE ORAL DAILY
COMMUNITY
End: 2022-03-19 | Stop reason: HOSPADM

## 2021-06-25 RX ORDER — LISINOPRIL 10 MG/1
10 TABLET ORAL DAILY
COMMUNITY
End: 2022-03-19 | Stop reason: HOSPADM

## 2021-06-25 RX ORDER — SODIUM CHLORIDE 0.9 % (FLUSH) 0.9 %
3 SYRINGE (ML) INJECTION EVERY 12 HOURS SCHEDULED
Status: DISCONTINUED | OUTPATIENT
Start: 2021-06-25 | End: 2021-06-25 | Stop reason: HOSPADM

## 2021-06-25 RX ORDER — FENTANYL CITRATE 50 UG/ML
INJECTION, SOLUTION INTRAMUSCULAR; INTRAVENOUS AS NEEDED
Status: DISCONTINUED | OUTPATIENT
Start: 2021-06-25 | End: 2021-06-25 | Stop reason: HOSPADM

## 2021-06-25 RX ORDER — CARVEDILOL 12.5 MG/1
12.5 TABLET ORAL 2 TIMES DAILY WITH MEALS
COMMUNITY

## 2021-06-25 RX ADMIN — VANCOMYCIN HYDROCHLORIDE 1250 MG: 10 INJECTION, POWDER, LYOPHILIZED, FOR SOLUTION INTRAVENOUS at 07:35

## 2021-06-25 NOTE — H&P
"Pre-PPM Report  Cardiovascular Laboratory  [facility]      Patient:  Syeda Gomez  :  1927  PCP:  Omayra Alcazar DO  PHONE:  [phone]    DATE: 2021    BRIEF HPI:  Syeda Gomez is a 89 y.o. female with permanent atrial fibrillation and sick sinus syndrome.  She is post previous permanent pacemaker.  She was recently evaluated in clinic and her device was interrogated.  Her device generator was found to be at elective replacement index.  She now presents for generator change.    Cardiac Risk Factors:  advanced age (older than 55 for men, 65 for women), dyslipidemia, hypertension    Anginal class in last 2 weeks:  CCS class I    CHF Class in last 2 weeks:  NYHA Class II    Cardiogenic shock:  no    Cardiac arrest <24 hours:  no    Stress test within last 6 months:   no   Details:    Previous cardiac catheterization:  no  Details:     Previous CABG:  no  Details:      Allergies:     IV contrast allergy:  no  Allergies   Allergen Reactions   • Cardizem [Diltiazem] Other (See Comments)     \"unknown\"   • Codeine GI Intolerance   • Morphine Other (See Comments)     \"unknown\"   • Percocet [Oxycodone-Acetaminophen] Other (See Comments)     \"unknown\"     • Sulfa Antibiotics Unknown (See Comments)     Unknown         MEDICATIONS:  Prior to Admission medications    Medication Sig Start Date End Date Taking? Authorizing Provider   acetaminophen (TYLENOL) 650 MG 8 hr tablet Take 650 mg by mouth Every 8 (Eight) Hours As Needed for Mild Pain .   Yes Armani Perkins MD   aspirin 81 MG EC tablet Take 81 mg by mouth Daily.   Yes Armani Perkins MD   carvedilol (COREG) 12.5 MG tablet Take 12.5 mg by mouth 2 (Two) Times a Day With Meals.   Yes Armani Perkins MD   clonazePAM (KlonoPIN) 0.5 MG tablet Take 0.5 mg by mouth 2 (Two) Times a Day As Needed.   Yes Armani Perkins MD   docusate sodium (COLACE) 250 MG capsule Take 250 mg by mouth Daily. Patient takes 2 nightly   Yes " Armani Perkins MD   isosorbide mononitrate (IMDUR) 60 MG 24 hr tablet Take 60 mg by mouth Daily.   Yes Armani Perkins MD   lisinopril (PRINIVIL,ZESTRIL) 10 MG tablet Take 10 mg by mouth Daily.   Yes Armani Perkins MD   loratadine (Claritin) 10 MG tablet Take 10 mg by mouth Daily.   Yes Armani Perkins MD   nitroglycerin (NITROSTAT) 0.4 MG SL tablet Place 0.4 mg under the tongue Every 5 (Five) Minutes As Needed for Chest Pain. Take no more than 3 doses in 15 minutes.   Yes Armani Perkins MD   levothyroxine (SYNTHROID, LEVOTHROID) 50 MCG tablet Take 50 mcg by mouth Daily.    Armani Perkins MD   magnesium oxide (MAGOX) 400 (241.3 Mg) MG tablet tablet Take 400 mg by mouth Daily.    Armani Perkins MD   omeprazole (priLOSEC) 20 MG capsule Take 20 mg by mouth Daily.    Armani Perkins MD   ondansetron ODT (ZOFRAN-ODT) 4 MG disintegrating tablet Place 1 tablet on the tongue Every 6 (Six) Hours As Needed for Nausea or Vomiting for up to 15 doses. 10/20/20   Luz Galindo PA   pravastatin (PRAVACHOL) 20 MG tablet Take 20 mg by mouth Daily.    Armani Perkins MD   sodium chloride 0.9 % nebulizer solution 0.82 mL with albuterol (5 MG/ML) 0.5% nebulizer solution 0.9 mg Take 15 mg/hr by nebulization As Needed.    Armani Perkins MD   torsemide (DEMADEX) 20 MG tablet Take 20 mg by mouth Daily. 2 tabs    Armani Perkins MD   B Complex Vitamins (VITAMIN B COMPLEX PO) Take  by mouth.  6/25/21  Armani Perkins MD   carvedilol (COREG) 25 MG tablet Take 1 tablet by mouth 2 (Two) Times a Day With Meals. 10/29/20 6/25/21  Vickey Armenta MD   isosorbide dinitrate (ISORDIL) 30 MG tablet Take 30 mg by mouth 4 (Four) Times a Day.  6/25/21  Armani Perkins MD   lisinopril (PRINIVIL,ZESTRIL) 20 MG tablet Take 20 mg by mouth Daily.  6/25/21  Armani Perkins MD   Multiple Vitamins-Minerals (CENTRUM SILVER PO) Take  by mouth.  6/25/21  Provider,  MD Armani   spironolactone (ALDACTONE) 25 MG tablet Take 25 mg by mouth Daily.  6/25/21  Provider, MD Armani   traZODone (DESYREL) 50 MG tablet Take 50 mg by mouth Every Night.  6/25/21  Provider, MD Armani       Past medical & surgical history, social and family history reviewed in the electronic medical record.    ROS:  Cardiovascular ROS: positive for - chest pain, dyspnea on exertion, irregular heartbeat and shortness of breath    Physical Exam:    Vitals:   Vitals:    06/25/21 0619   BP: (!) 88/48   Pulse: 86   Temp:    SpO2: 98%          06/25/21 0617   Weight: 64.6 kg (142 lb 6.7 oz)       General Appearance:    Alert, cooperative, in no acute distress   Head:    Normocephalic, without obvious abnormality, atraumatic   Eyes:            Lids and lashes normal, conjunctivae and sclerae normal, no   icterus, no pallor, corneas clear, PERRLA   Ears:    Ears appear intact with no abnormalities noted   Neck:   No adenopathy, supple, trachea midline, no thyromegaly, no   carotid bruit, no JVD   Back:     No kyphosis present, no scoliosis present, range of motion normal   Lungs:     Clear to auscultation,respirations regular, even and                unlabored    Heart:    Regular rhythm and normal rate, normal S1 and S2, no         murmur, no gallop, no rub, no click   Chest Wall:    No abnormalities observed   Abdomen:     Normal bowel sounds, no masses, no organomegaly, soft     nontender, nondistended, no guarding, no rebound                tenderness   Rectal:     Deferred   Extremities:   Moves all extremities well, no edema, no cyanosis, no           redness   Pulses:   Pulses palpable and equal bilaterally   Skin:   No bleeding, bruising or rash   Neurologic:   Cranial nerves 2 - 12 grossly intact, sensation intact     Barbaeu Test:  Left: Not Assessed  (oxymetric Allens) Right: Not Assessed         Results from last 7 days   Lab Units 06/25/21 0619   WBC 10*3/mm3 4.17   HEMOGLOBIN g/dL 7.9*    HEMATOCRIT % 25.4*   PLATELETS 10*3/mm3 208     Lab Results   Component Value Date    AST 16 10/28/2020    ALT 13 10/28/2020           Impression      · PPM generator at [facility]    Plan     · Procedure to perform: Generator change  · Planned access: Right infraclavicular              RONALD Preston   06/25/21  06:51 EDT

## 2022-03-10 ENCOUNTER — APPOINTMENT (OUTPATIENT)
Dept: GENERAL RADIOLOGY | Facility: HOSPITAL | Age: 87
End: 2022-03-10

## 2022-03-10 ENCOUNTER — HOSPITAL ENCOUNTER (INPATIENT)
Facility: HOSPITAL | Age: 87
LOS: 9 days | Discharge: HOME | End: 2022-03-19
Attending: EMERGENCY MEDICINE | Admitting: FAMILY MEDICINE

## 2022-03-10 DIAGNOSIS — I50.9 CONGESTIVE HEART FAILURE, UNSPECIFIED HF CHRONICITY, UNSPECIFIED HEART FAILURE TYPE: ICD-10-CM

## 2022-03-10 DIAGNOSIS — I48.11 LONGSTANDING PERSISTENT ATRIAL FIBRILLATION: ICD-10-CM

## 2022-03-10 DIAGNOSIS — J90 PLEURAL EFFUSION, RIGHT: ICD-10-CM

## 2022-03-10 DIAGNOSIS — R09.02 HYPOXIA: Primary | ICD-10-CM

## 2022-03-10 DIAGNOSIS — Z95.0 PRESENCE OF CARDIAC PACEMAKER: ICD-10-CM

## 2022-03-10 DIAGNOSIS — D64.9 ANEMIA, UNSPECIFIED TYPE: ICD-10-CM

## 2022-03-10 PROBLEM — J96.01 ACUTE HYPOXEMIC RESPIRATORY FAILURE: Status: ACTIVE | Noted: 2022-03-10

## 2022-03-10 PROBLEM — E87.1 HYPONATREMIA: Status: ACTIVE | Noted: 2022-03-10

## 2022-03-10 PROBLEM — N17.9 AKI (ACUTE KIDNEY INJURY) (HCC): Status: ACTIVE | Noted: 2022-03-10

## 2022-03-10 LAB
ALBUMIN SERPL-MCNC: 3.9 G/DL (ref 3.5–5.2)
ALBUMIN/GLOB SERPL: 2.1 G/DL
ALP SERPL-CCNC: 132 U/L (ref 39–117)
ALT SERPL W P-5'-P-CCNC: 9 U/L (ref 1–33)
ANION GAP SERPL CALCULATED.3IONS-SCNC: 10 MMOL/L (ref 5–15)
AST SERPL-CCNC: 12 U/L (ref 1–32)
BASOPHILS # BLD AUTO: 0.04 10*3/MM3 (ref 0–0.2)
BASOPHILS NFR BLD AUTO: 0.6 % (ref 0–1.5)
BILIRUB SERPL-MCNC: 0.3 MG/DL (ref 0–1.2)
BUN SERPL-MCNC: 26 MG/DL (ref 8–23)
BUN/CREAT SERPL: 16.4 (ref 7–25)
CALCIUM SPEC-SCNC: 8.8 MG/DL (ref 8.2–9.6)
CHLORIDE SERPL-SCNC: 93 MMOL/L (ref 98–107)
CK SERPL-CCNC: 39 U/L (ref 20–180)
CO2 SERPL-SCNC: 30 MMOL/L (ref 22–29)
CREAT SERPL-MCNC: 1.59 MG/DL (ref 0.57–1)
D-LACTATE SERPL-SCNC: 0.9 MMOL/L (ref 0.5–2)
DEPRECATED RDW RBC AUTO: 50 FL (ref 37–54)
EGFRCR SERPLBLD CKD-EPI 2021: 29.8 ML/MIN/1.73
EOSINOPHIL # BLD AUTO: 0.2 10*3/MM3 (ref 0–0.4)
EOSINOPHIL NFR BLD AUTO: 2.9 % (ref 0.3–6.2)
ERYTHROCYTE [DISTWIDTH] IN BLOOD BY AUTOMATED COUNT: 14.8 % (ref 12.3–15.4)
GLOBULIN UR ELPH-MCNC: 1.9 GM/DL
GLUCOSE SERPL-MCNC: 231 MG/DL (ref 65–99)
HCT VFR BLD AUTO: 27.5 % (ref 34–46.6)
HGB BLD-MCNC: 8.8 G/DL (ref 12–15.9)
HOLD SPECIMEN: NORMAL
HOLD SPECIMEN: NORMAL
IMM GRANULOCYTES # BLD AUTO: 0.03 10*3/MM3 (ref 0–0.05)
IMM GRANULOCYTES NFR BLD AUTO: 0.4 % (ref 0–0.5)
LYMPHOCYTES # BLD AUTO: 0.7 10*3/MM3 (ref 0.7–3.1)
LYMPHOCYTES NFR BLD AUTO: 10 % (ref 19.6–45.3)
MCH RBC QN AUTO: 29.5 PG (ref 26.6–33)
MCHC RBC AUTO-ENTMCNC: 32 G/DL (ref 31.5–35.7)
MCV RBC AUTO: 92.3 FL (ref 79–97)
MONOCYTES # BLD AUTO: 0.55 10*3/MM3 (ref 0.1–0.9)
MONOCYTES NFR BLD AUTO: 7.9 % (ref 5–12)
NEUTROPHILS NFR BLD AUTO: 5.48 10*3/MM3 (ref 1.7–7)
NEUTROPHILS NFR BLD AUTO: 78.2 % (ref 42.7–76)
NRBC BLD AUTO-RTO: 0 /100 WBC (ref 0–0.2)
NT-PROBNP SERPL-MCNC: 2352 PG/ML (ref 0–1800)
PLATELET # BLD AUTO: 283 10*3/MM3 (ref 140–450)
PMV BLD AUTO: 10 FL (ref 6–12)
POTASSIUM SERPL-SCNC: 3.8 MMOL/L (ref 3.5–5.2)
PROT SERPL-MCNC: 5.8 G/DL (ref 6–8.5)
RBC # BLD AUTO: 2.98 10*6/MM3 (ref 3.77–5.28)
SODIUM SERPL-SCNC: 133 MMOL/L (ref 136–145)
TROPONIN T SERPL-MCNC: 0.03 NG/ML (ref 0–0.03)
WBC NRBC COR # BLD: 7 10*3/MM3 (ref 3.4–10.8)
WHOLE BLOOD HOLD SPECIMEN: NORMAL
WHOLE BLOOD HOLD SPECIMEN: NORMAL

## 2022-03-10 PROCEDURE — 83880 ASSAY OF NATRIURETIC PEPTIDE: CPT | Performed by: EMERGENCY MEDICINE

## 2022-03-10 PROCEDURE — 99284 EMERGENCY DEPT VISIT MOD MDM: CPT

## 2022-03-10 PROCEDURE — 83540 ASSAY OF IRON: CPT | Performed by: PHYSICIAN ASSISTANT

## 2022-03-10 PROCEDURE — 85025 COMPLETE CBC W/AUTO DIFF WBC: CPT | Performed by: EMERGENCY MEDICINE

## 2022-03-10 PROCEDURE — 82728 ASSAY OF FERRITIN: CPT | Performed by: PHYSICIAN ASSISTANT

## 2022-03-10 PROCEDURE — 80053 COMPREHEN METABOLIC PANEL: CPT | Performed by: EMERGENCY MEDICINE

## 2022-03-10 PROCEDURE — 84466 ASSAY OF TRANSFERRIN: CPT | Performed by: PHYSICIAN ASSISTANT

## 2022-03-10 PROCEDURE — 84484 ASSAY OF TROPONIN QUANT: CPT | Performed by: EMERGENCY MEDICINE

## 2022-03-10 PROCEDURE — 83605 ASSAY OF LACTIC ACID: CPT | Performed by: NURSE PRACTITIONER

## 2022-03-10 PROCEDURE — 82550 ASSAY OF CK (CPK): CPT | Performed by: NURSE PRACTITIONER

## 2022-03-10 PROCEDURE — 83930 ASSAY OF BLOOD OSMOLALITY: CPT | Performed by: PHYSICIAN ASSISTANT

## 2022-03-10 PROCEDURE — 0202U NFCT DS 22 TRGT SARS-COV-2: CPT | Performed by: INTERNAL MEDICINE

## 2022-03-10 PROCEDURE — 99223 1ST HOSP IP/OBS HIGH 75: CPT | Performed by: HOSPITALIST

## 2022-03-10 PROCEDURE — 93005 ELECTROCARDIOGRAM TRACING: CPT | Performed by: EMERGENCY MEDICINE

## 2022-03-10 PROCEDURE — 25010000002 HEPARIN LOCK FLUSH PER 10 UNITS: Performed by: EMERGENCY MEDICINE

## 2022-03-10 PROCEDURE — 71045 X-RAY EXAM CHEST 1 VIEW: CPT

## 2022-03-10 PROCEDURE — 87040 BLOOD CULTURE FOR BACTERIA: CPT | Performed by: NURSE PRACTITIONER

## 2022-03-10 RX ORDER — FUROSEMIDE 10 MG/ML
20 INJECTION INTRAMUSCULAR; INTRAVENOUS ONCE
Status: DISCONTINUED | OUTPATIENT
Start: 2022-03-10 | End: 2022-03-10

## 2022-03-10 RX ORDER — LISINOPRIL 10 MG/1
10 TABLET ORAL DAILY
Status: CANCELLED | OUTPATIENT
Start: 2022-03-11

## 2022-03-10 RX ORDER — FUROSEMIDE 10 MG/ML
40 INJECTION INTRAMUSCULAR; INTRAVENOUS ONCE
Status: COMPLETED | OUTPATIENT
Start: 2022-03-10 | End: 2022-03-11

## 2022-03-10 RX ORDER — HEPARIN SODIUM (PORCINE) LOCK FLUSH IV SOLN 100 UNIT/ML 100 UNIT/ML
300 SOLUTION INTRAVENOUS ONCE
Status: COMPLETED | OUTPATIENT
Start: 2022-03-10 | End: 2022-03-10

## 2022-03-10 RX ORDER — SODIUM CHLORIDE 0.9 % (FLUSH) 0.9 %
10 SYRINGE (ML) INJECTION AS NEEDED
Status: DISCONTINUED | OUTPATIENT
Start: 2022-03-10 | End: 2022-03-19 | Stop reason: HOSPADM

## 2022-03-10 RX ADMIN — Medication 300 UNITS: at 21:03

## 2022-03-11 ENCOUNTER — APPOINTMENT (OUTPATIENT)
Dept: GENERAL RADIOLOGY | Facility: HOSPITAL | Age: 87
End: 2022-03-11

## 2022-03-11 ENCOUNTER — APPOINTMENT (OUTPATIENT)
Dept: ULTRASOUND IMAGING | Facility: HOSPITAL | Age: 87
End: 2022-03-11

## 2022-03-11 ENCOUNTER — APPOINTMENT (OUTPATIENT)
Dept: CARDIOLOGY | Facility: HOSPITAL | Age: 87
End: 2022-03-11

## 2022-03-11 LAB
ALBUMIN FLD-MCNC: 2.4 G/DL
ANION GAP SERPL CALCULATED.3IONS-SCNC: 11 MMOL/L (ref 5–15)
APPEARANCE FLD: ABNORMAL
B PARAPERT DNA SPEC QL NAA+PROBE: NOT DETECTED
B PERT DNA SPEC QL NAA+PROBE: NOT DETECTED
BACTERIA UR QL AUTO: ABNORMAL /HPF
BILIRUB UR QL STRIP: NEGATIVE
BUN SERPL-MCNC: 26 MG/DL (ref 8–23)
BUN/CREAT SERPL: 22.4 (ref 7–25)
C PNEUM DNA NPH QL NAA+NON-PROBE: NOT DETECTED
CALCIUM SPEC-SCNC: 8.7 MG/DL (ref 8.2–9.6)
CHLORIDE SERPL-SCNC: 95 MMOL/L (ref 98–107)
CLARITY UR: CLEAR
CO2 SERPL-SCNC: 30 MMOL/L (ref 22–29)
COLOR FLD: YELLOW
COLOR UR: YELLOW
CORTIS SERPL-MCNC: 18.08 MCG/DL
CREAT SERPL-MCNC: 1.16 MG/DL (ref 0.57–1)
DEPRECATED RDW RBC AUTO: 51.1 FL (ref 37–54)
EGFRCR SERPLBLD CKD-EPI 2021: 43.5 ML/MIN/1.73
ERYTHROCYTE [DISTWIDTH] IN BLOOD BY AUTOMATED COUNT: 14.6 % (ref 12.3–15.4)
FERRITIN SERPL-MCNC: 832.2 NG/ML (ref 13–150)
FLUAV RNA RESP QL NAA+PROBE: NOT DETECTED
FLUAV SUBTYP SPEC NAA+PROBE: NOT DETECTED
FLUBV RNA ISLT QL NAA+PROBE: NOT DETECTED
FLUBV RNA RESP QL NAA+PROBE: NOT DETECTED
GLUCOSE FLD-MCNC: 186 MG/DL
GLUCOSE SERPL-MCNC: 187 MG/DL (ref 65–99)
GLUCOSE UR STRIP-MCNC: NEGATIVE MG/DL
HADV DNA SPEC NAA+PROBE: NOT DETECTED
HBA1C MFR BLD: 6.2 % (ref 4.8–5.6)
HCOV 229E RNA SPEC QL NAA+PROBE: NOT DETECTED
HCOV HKU1 RNA SPEC QL NAA+PROBE: NOT DETECTED
HCOV NL63 RNA SPEC QL NAA+PROBE: NOT DETECTED
HCOV OC43 RNA SPEC QL NAA+PROBE: NOT DETECTED
HCT VFR BLD AUTO: 28.9 % (ref 34–46.6)
HGB BLD-MCNC: 9 G/DL (ref 12–15.9)
HGB UR QL STRIP.AUTO: NEGATIVE
HMPV RNA NPH QL NAA+NON-PROBE: NOT DETECTED
HOLD SPECIMEN: NORMAL
HPIV1 RNA ISLT QL NAA+PROBE: NOT DETECTED
HPIV2 RNA SPEC QL NAA+PROBE: NOT DETECTED
HPIV3 RNA NPH QL NAA+PROBE: NOT DETECTED
HPIV4 P GENE NPH QL NAA+PROBE: NOT DETECTED
HYALINE CASTS UR QL AUTO: ABNORMAL /LPF
INR PPP: 1.25 (ref 0.85–1.16)
IRON 24H UR-MRATE: 36 MCG/DL (ref 37–145)
IRON SATN MFR SERPL: 12 % (ref 20–50)
KETONES UR QL STRIP: NEGATIVE
LDH FLD-CCNC: 115 U/L
LDH SERPL-CCNC: 161 U/L (ref 135–214)
LEUKOCYTE ESTERASE UR QL STRIP.AUTO: ABNORMAL
LYMPHOCYTES NFR FLD MANUAL: 27 %
M PNEUMO IGG SER IA-ACNC: NOT DETECTED
MACROPHAGE FLUID: 3 %
MCH RBC QN AUTO: 29.9 PG (ref 26.6–33)
MCHC RBC AUTO-ENTMCNC: 31.1 G/DL (ref 31.5–35.7)
MCV RBC AUTO: 96 FL (ref 79–97)
MESOTHL CELL NFR FLD MANUAL: 3 %
MONOCYTES NFR FLD: 3 %
NEUTROPHILS NFR FLD MANUAL: 64 %
NITRITE UR QL STRIP: NEGATIVE
OSMOLALITY SERPL: 290 MOSM/KG (ref 275–295)
OSMOLALITY UR: 373 MOSM/KG (ref 300–1100)
PH FLD: 7.55 [PH]
PH UR STRIP.AUTO: 6 [PH] (ref 5–8)
PLATELET # BLD AUTO: 267 10*3/MM3 (ref 140–450)
PMV BLD AUTO: 9.8 FL (ref 6–12)
POTASSIUM SERPL-SCNC: 3.5 MMOL/L (ref 3.5–5.2)
PROT FLD-MCNC: 3.1 G/DL
PROT UR QL STRIP: NEGATIVE
PROTHROMBIN TIME: 15.3 SECONDS (ref 11.4–14.4)
QT INTERVAL: 454 MS
QTC INTERVAL: 540 MS
RBC # BLD AUTO: 3.01 10*6/MM3 (ref 3.77–5.28)
RBC # FLD AUTO: <2000 /MM3
RBC # UR STRIP: ABNORMAL /HPF
REF LAB TEST METHOD: ABNORMAL
RHINOVIRUS RNA SPEC NAA+PROBE: NOT DETECTED
RSV RNA NPH QL NAA+NON-PROBE: NOT DETECTED
SARS-COV-2 RNA NPH QL NAA+NON-PROBE: NOT DETECTED
SARS-COV-2 RNA RESP QL NAA+PROBE: NOT DETECTED
SODIUM SERPL-SCNC: 136 MMOL/L (ref 136–145)
SODIUM UR-SCNC: 43 MMOL/L
SP GR UR STRIP: 1.01 (ref 1–1.03)
SQUAMOUS #/AREA URNS HPF: ABNORMAL /HPF
T4 FREE SERPL-MCNC: 1.31 NG/DL (ref 0.93–1.7)
TIBC SERPL-MCNC: 308 MCG/DL (ref 298–536)
TRANSFERRIN SERPL-MCNC: 207 MG/DL (ref 200–360)
TSH SERPL DL<=0.05 MIU/L-ACNC: 11.33 UIU/ML (ref 0.27–4.2)
UROBILINOGEN UR QL STRIP: ABNORMAL
WBC # FLD AUTO: 1123 /MM3
WBC # UR STRIP: ABNORMAL /HPF
WBC NRBC COR # BLD: 6.5 10*3/MM3 (ref 3.4–10.8)

## 2022-03-11 PROCEDURE — 85610 PROTHROMBIN TIME: CPT | Performed by: INTERNAL MEDICINE

## 2022-03-11 PROCEDURE — 25010000002 FUROSEMIDE PER 20 MG: Performed by: PHYSICIAN ASSISTANT

## 2022-03-11 PROCEDURE — 99232 SBSQ HOSP IP/OBS MODERATE 35: CPT | Performed by: INTERNAL MEDICINE

## 2022-03-11 PROCEDURE — 84157 ASSAY OF PROTEIN OTHER: CPT | Performed by: PHYSICIAN ASSISTANT

## 2022-03-11 PROCEDURE — 87636 SARSCOV2 & INF A&B AMP PRB: CPT | Performed by: INTERNAL MEDICINE

## 2022-03-11 PROCEDURE — 83615 LACTATE (LD) (LDH) ENZYME: CPT | Performed by: INTERNAL MEDICINE

## 2022-03-11 PROCEDURE — 88112 CYTOPATH CELL ENHANCE TECH: CPT | Performed by: PHYSICIAN ASSISTANT

## 2022-03-11 PROCEDURE — 82945 GLUCOSE OTHER FLUID: CPT | Performed by: PHYSICIAN ASSISTANT

## 2022-03-11 PROCEDURE — 0W993ZZ DRAINAGE OF RIGHT PLEURAL CAVITY, PERCUTANEOUS APPROACH: ICD-10-PCS | Performed by: RADIOLOGY

## 2022-03-11 PROCEDURE — 83615 LACTATE (LD) (LDH) ENZYME: CPT | Performed by: PHYSICIAN ASSISTANT

## 2022-03-11 PROCEDURE — 83935 ASSAY OF URINE OSMOLALITY: CPT | Performed by: PHYSICIAN ASSISTANT

## 2022-03-11 PROCEDURE — 93306 TTE W/DOPPLER COMPLETE: CPT | Performed by: INTERNAL MEDICINE

## 2022-03-11 PROCEDURE — 83986 ASSAY PH BODY FLUID NOS: CPT | Performed by: PHYSICIAN ASSISTANT

## 2022-03-11 PROCEDURE — 81001 URINALYSIS AUTO W/SCOPE: CPT | Performed by: NURSE PRACTITIONER

## 2022-03-11 PROCEDURE — 82533 TOTAL CORTISOL: CPT | Performed by: PHYSICIAN ASSISTANT

## 2022-03-11 PROCEDURE — 88305 TISSUE EXAM BY PATHOLOGIST: CPT | Performed by: PHYSICIAN ASSISTANT

## 2022-03-11 PROCEDURE — 84443 ASSAY THYROID STIM HORMONE: CPT | Performed by: PHYSICIAN ASSISTANT

## 2022-03-11 PROCEDURE — 94640 AIRWAY INHALATION TREATMENT: CPT

## 2022-03-11 PROCEDURE — 80048 BASIC METABOLIC PNL TOTAL CA: CPT | Performed by: PHYSICIAN ASSISTANT

## 2022-03-11 PROCEDURE — 97116 GAIT TRAINING THERAPY: CPT

## 2022-03-11 PROCEDURE — 84439 ASSAY OF FREE THYROXINE: CPT | Performed by: INTERNAL MEDICINE

## 2022-03-11 PROCEDURE — 93306 TTE W/DOPPLER COMPLETE: CPT

## 2022-03-11 PROCEDURE — 71045 X-RAY EXAM CHEST 1 VIEW: CPT

## 2022-03-11 PROCEDURE — 82042 OTHER SOURCE ALBUMIN QUAN EA: CPT | Performed by: PHYSICIAN ASSISTANT

## 2022-03-11 PROCEDURE — 85027 COMPLETE CBC AUTOMATED: CPT | Performed by: PHYSICIAN ASSISTANT

## 2022-03-11 PROCEDURE — 89051 BODY FLUID CELL COUNT: CPT | Performed by: PHYSICIAN ASSISTANT

## 2022-03-11 PROCEDURE — 97161 PT EVAL LOW COMPLEX 20 MIN: CPT

## 2022-03-11 PROCEDURE — 94799 UNLISTED PULMONARY SVC/PX: CPT

## 2022-03-11 PROCEDURE — 84300 ASSAY OF URINE SODIUM: CPT | Performed by: PHYSICIAN ASSISTANT

## 2022-03-11 PROCEDURE — 87075 CULTR BACTERIA EXCEPT BLOOD: CPT | Performed by: PHYSICIAN ASSISTANT

## 2022-03-11 PROCEDURE — 83036 HEMOGLOBIN GLYCOSYLATED A1C: CPT | Performed by: PHYSICIAN ASSISTANT

## 2022-03-11 PROCEDURE — 87205 SMEAR GRAM STAIN: CPT | Performed by: PHYSICIAN ASSISTANT

## 2022-03-11 PROCEDURE — 76942 ECHO GUIDE FOR BIOPSY: CPT

## 2022-03-11 PROCEDURE — C1729 CATH, DRAINAGE: HCPCS

## 2022-03-11 PROCEDURE — 87070 CULTURE OTHR SPECIMN AEROBIC: CPT | Performed by: PHYSICIAN ASSISTANT

## 2022-03-11 RX ORDER — ACETAMINOPHEN 325 MG/1
650 TABLET ORAL EVERY 4 HOURS PRN
Status: DISCONTINUED | OUTPATIENT
Start: 2022-03-11 | End: 2022-03-19 | Stop reason: HOSPADM

## 2022-03-11 RX ORDER — IPRATROPIUM BROMIDE AND ALBUTEROL SULFATE 2.5; .5 MG/3ML; MG/3ML
3 SOLUTION RESPIRATORY (INHALATION)
Status: DISCONTINUED | OUTPATIENT
Start: 2022-03-11 | End: 2022-03-12

## 2022-03-11 RX ORDER — CARVEDILOL 12.5 MG/1
12.5 TABLET ORAL 2 TIMES DAILY WITH MEALS
Status: DISCONTINUED | OUTPATIENT
Start: 2022-03-11 | End: 2022-03-19 | Stop reason: HOSPADM

## 2022-03-11 RX ORDER — PRAVASTATIN SODIUM 20 MG
20 TABLET ORAL DAILY
Status: DISCONTINUED | OUTPATIENT
Start: 2022-03-11 | End: 2022-03-19 | Stop reason: HOSPADM

## 2022-03-11 RX ORDER — PANTOPRAZOLE SODIUM 40 MG/1
40 TABLET, DELAYED RELEASE ORAL EVERY MORNING
Status: DISCONTINUED | OUTPATIENT
Start: 2022-03-11 | End: 2022-03-19 | Stop reason: HOSPADM

## 2022-03-11 RX ORDER — BUDESONIDE 0.5 MG/2ML
0.5 INHALANT ORAL
Status: DISCONTINUED | OUTPATIENT
Start: 2022-03-11 | End: 2022-03-19 | Stop reason: HOSPADM

## 2022-03-11 RX ORDER — LEVOTHYROXINE SODIUM 0.05 MG/1
50 TABLET ORAL DAILY
Status: DISCONTINUED | OUTPATIENT
Start: 2022-03-11 | End: 2022-03-19 | Stop reason: HOSPADM

## 2022-03-11 RX ORDER — SODIUM CHLORIDE 0.9 % (FLUSH) 0.9 %
10 SYRINGE (ML) INJECTION EVERY 12 HOURS SCHEDULED
Status: DISCONTINUED | OUTPATIENT
Start: 2022-03-11 | End: 2022-03-19 | Stop reason: HOSPADM

## 2022-03-11 RX ORDER — TORSEMIDE 20 MG/1
20 TABLET ORAL DAILY
Status: DISCONTINUED | OUTPATIENT
Start: 2022-03-11 | End: 2022-03-19 | Stop reason: HOSPADM

## 2022-03-11 RX ORDER — ISOSORBIDE MONONITRATE 60 MG/1
60 TABLET, EXTENDED RELEASE ORAL DAILY
Status: DISCONTINUED | OUTPATIENT
Start: 2022-03-11 | End: 2022-03-12

## 2022-03-11 RX ORDER — CHOLECALCIFEROL (VITAMIN D3) 125 MCG
5 CAPSULE ORAL NIGHTLY PRN
Status: DISCONTINUED | OUTPATIENT
Start: 2022-03-11 | End: 2022-03-15

## 2022-03-11 RX ORDER — SODIUM CHLORIDE 0.9 % (FLUSH) 0.9 %
10 SYRINGE (ML) INJECTION AS NEEDED
Status: DISCONTINUED | OUTPATIENT
Start: 2022-03-11 | End: 2022-03-19 | Stop reason: HOSPADM

## 2022-03-11 RX ORDER — ONDANSETRON 2 MG/ML
4 INJECTION INTRAMUSCULAR; INTRAVENOUS EVERY 6 HOURS PRN
Status: DISCONTINUED | OUTPATIENT
Start: 2022-03-11 | End: 2022-03-19 | Stop reason: HOSPADM

## 2022-03-11 RX ADMIN — Medication 10 ML: at 12:39

## 2022-03-11 RX ADMIN — CARVEDILOL 12.5 MG: 12.5 TABLET, FILM COATED ORAL at 16:57

## 2022-03-11 RX ADMIN — BUDESONIDE 0.5 MG: 0.5 SUSPENSION RESPIRATORY (INHALATION) at 21:10

## 2022-03-11 RX ADMIN — IPRATROPIUM BROMIDE AND ALBUTEROL SULFATE 3 ML: 2.5; .5 SOLUTION RESPIRATORY (INHALATION) at 21:10

## 2022-03-11 RX ADMIN — LEVOTHYROXINE SODIUM 50 MCG: 50 TABLET ORAL at 06:51

## 2022-03-11 RX ADMIN — IPRATROPIUM BROMIDE AND ALBUTEROL SULFATE 3 ML: 2.5; .5 SOLUTION RESPIRATORY (INHALATION) at 17:18

## 2022-03-11 RX ADMIN — Medication 10 ML: at 20:22

## 2022-03-11 RX ADMIN — FUROSEMIDE 40 MG: 10 INJECTION, SOLUTION INTRAMUSCULAR; INTRAVENOUS at 01:55

## 2022-03-11 RX ADMIN — TORSEMIDE 20 MG: 20 TABLET ORAL at 12:38

## 2022-03-11 RX ADMIN — ACETAMINOPHEN 650 MG: 325 TABLET ORAL at 18:23

## 2022-03-11 RX ADMIN — PRAVASTATIN SODIUM 20 MG: 20 TABLET ORAL at 12:38

## 2022-03-11 RX ADMIN — ISOSORBIDE MONONITRATE 60 MG: 60 TABLET, EXTENDED RELEASE ORAL at 12:38

## 2022-03-11 NOTE — PLAN OF CARE
Goal Outcome Evaluation:  Plan of Care Reviewed With: patient        Progress: no change  Outcome Evaluation: PT eval completed. Patient alert and oriented x3. Presents with mild deficits in BLE strength, standing balance, and functional endurance effecting functional mobility below baseline. Requiring CGA for bed mobility, transfers, and ambulation with FWW x 20'. SpO2 levels decrease to 87% on RA with gait and resolve to 96% on 2 liters of O2 after 2-3 min of rest. Patient will benefit from IP PT services in order to address impairments for return to PLOF. Recommend home with assist and HHPT at [place].

## 2022-03-11 NOTE — PROGRESS NOTES
[facility] Medicine Services  PROGRESS NOTE    Patient Name: Syeda Gomez  : 1927  MRN: [id number]    Date of Admission: 3/10/2022  Primary Care Physician: Omayra Alcazar DO    Subjective   Subjective     CC:  Shortness of breath    HPI:  Still feels short of breath. Coughing, productive.  Left side of chest hurts.  Doesn't feel well in general.     ROS:  Gen- No fevers, chills  CV- + chest pain  Resp- + cough, dyspnea  GI- No N/V/D, abd pain        Objective   Objective     Vital Signs:   Temp:  [97.4 °F (36.3 °C)-98.1 °F (36.7 °C)] 98.1 °F (36.7 °C)  Heart Rate:  [] 85  Resp:  [18-24] 18  BP: (100-144)/(49-84) 115/58  Flow (L/min):  [2] 2     Physical Exam:  Constitutional - appears fatigued, in bed  HEENT-NCAT, mucous membranes moist  CV-irregular  Resp-diminished right base, a few scattered expiratory wheezes  Abd-soft, nontender, nondistended, normoactive bowel sounds  Ext-1+ LE edema bilaterally  Neuro-alert, speech clear, moves all extremities   Psych-normal affect   Skin- No rash on exposed UE or LE bilaterally      Results Reviewed:  LAB RESULTS:      Lab 22  0822 03/10/22  2121   WBC 6.50 7.00   HEMOGLOBIN 9.0* 8.8*   HEMATOCRIT 28.9* 27.5*   PLATELETS 267 283   NEUTROS ABS  --  5.48   IMMATURE GRANS (ABS)  --  0.03   LYMPHS ABS  --  0.70   MONOS ABS  --  0.55   EOS ABS  --  0.20   MCV 96.0 92.3   LACTATE  --  0.9   PROTIME 15.3*  --          Lab 22  0822 03/10/22  2121   SODIUM 136 133*   POTASSIUM 3.5 3.8   CHLORIDE 95* 93*   CO2 30.0* 30.0*   ANION GAP 11.0 10.0   BUN 26* 26*   CREATININE 1.16* 1.59*   EGFR 43.5* 29.8*   GLUCOSE 187* 231*   CALCIUM 8.7 8.8   HEMOGLOBIN A1C 6.20*  --    TSH 11.330*  --          Lab 03/10/22  2121   TOTAL PROTEIN 5.8*   ALBUMIN 3.90   GLOBULIN 1.9   ALT (SGPT) 9   AST (SGOT) 12   BILIRUBIN 0.3   ALK PHOS 132*         Lab 2222 03/10/22  2121   PROBNP  --  2,352.0*   TROPONIN T  --  0.025   PROTIME  15.3*  --    INR 1.25*  --              Lab 03/10/22  2203   IRON 36*   IRON SATURATION 12*   TIBC 308   TRANSFERRIN 207   FERRITIN 832.20*         Brief Urine Lab Results  (Last result in the past 365 days)      Color   Clarity   Blood   Leuk Est   Nitrite   Protein   CREAT   Urine HCG        03/11/22 0234 Yellow   Clear   Negative   Small (1+)   Negative   Negative                 Microbiology Results Abnormal     Procedure Component Value - Date/Time    COVID PRE-OP / PRE-PROCEDURE SCREENING ORDER (NO ISOLATION) - Swab, Nasopharynx [[id number]]  (Normal) Collected: 03/11/22 0829    Lab Status: Final result Specimen: Swab from Nasopharynx Updated: 03/11/22 0831    Narrative:      The following orders were created for panel order COVID PRE-OP / PRE-PROCEDURE SCREENING ORDER (NO ISOLATION) - Swab, Nasopharynx.  Procedure                               Abnormality         Status                     ---------                               -----------         ------                     COVID-19 and FLU A/B PCR...[[id number]]  Normal              Final result                 Please view results for these tests on the individual orders.    COVID-19 and FLU A/B PCR - Swab, Nasopharynx [[id number]]  (Normal) Collected: 03/11/22 0829    Lab Status: Final result Specimen: Swab from Nasopharynx Updated: 03/11/22 0831     COVID19 Not Detected     Influenza A PCR Not Detected     Influenza B PCR Not Detected    Narrative:      Fact sheet for providers: https://www.fda.gov/media/064051/download    Fact sheet for patients: https://www.fda.gov/media/638536/download    Test performed by PCR.    Respiratory Panel PCR w/COVID-19(SARS-CoV-2) VINICIO/RAGINI/YVETTE/PAD/COR/MAD/ASHKAN In-House, NP Swab in UTM/VTM, 3-4 HR TAT - Swab, Nasopharynx [[id number]]  (Normal) Collected: 03/10/22 2334    Lab Status: Final result Specimen: Swab from Nasopharynx Updated: 03/11/22 0321     ADENOVIRUS, PCR Not Detected     Coronavirus 229E Not Detected     Coronavirus  HKU1 Not Detected     Coronavirus NL63 Not Detected     Coronavirus OC43 Not Detected     COVID19 Not Detected     Human Metapneumovirus Not Detected     Human Rhinovirus/Enterovirus Not Detected     Influenza A PCR Not Detected     Influenza B PCR Not Detected     Parainfluenza Virus 1 Not Detected     Parainfluenza Virus 2 Not Detected     Parainfluenza Virus 3 Not Detected     Parainfluenza Virus 4 Not Detected     RSV, PCR Not Detected     Bordetella pertussis pcr Not Detected     Bordetella parapertussis PCR Not Detected     Chlamydophila pneumoniae PCR Not Detected     Mycoplasma pneumo by PCR Not Detected    Narrative:      In the setting of a positive respiratory panel with a viral infection PLUS a negative procalcitonin without other underlying concern for bacterial infection, consider observing off antibiotics or discontinuation of antibiotics and continue supportive care. If the respiratory panel is positive for atypical bacterial infection (Bordetella pertussis, Chlamydophila pneumoniae, or Mycoplasma pneumoniae), consider antibiotic de-escalation to target atypical bacterial infection.          XR Chest 1 View    Result Date: 3/11/2022  DATE OF EXAM: 3/11/2022 10:47 AM  PROCEDURE: XR CHEST 1 VW-  INDICATIONS: s/p thoracentesis; R09.02-Hypoxemia; U48-Rwkmepz effusion, not elsewhere classified; D64.9-Anemia, unspecified; I48.11-Longstanding persistent atrial fibrillation; Z95.0-Presence of cardiac pacemaker  COMPARISON: 3/10/2022  TECHNIQUE: Single radiographic AP view of the chest was obtained.  FINDINGS: Right-sided pacemaker and left-sided Port-A-Cath are again noted. Heart is enlarged. Vasculature appears at upper limits of normal. Right pleural effusion has been drained. There is some persistent elevation of the right hemidiaphragm and mild right basilar atelectasis. There is no evidence of pneumothorax. Left lung remains grossly clear.      Impression: Interval drainage of right effusion. No  evidence of pneumothorax.  This report was finalized on 3/11/2022 10:58 AM by Dr. Devonte Darnell MD.      XR Chest 1 View    Result Date: 3/10/2022   DATE OF EXAM: 3/10/2022 8:29 PM  PROCEDURE: XR CHEST 1 VW-  INDICATIONS: SOA triage protocol  COMPARISON: 10/23/2020  TECHNIQUE: Portable chest radiograph.  FINDINGS:  There is a left-sided port catheter with the tip at the mid SVC. There is a large right-sided pleural effusion noted. Right basilar airspace disease likely compressive atelectasis of the right middle and lower lobe. No effusion on the left. No pneumothorax. There is an AICD noted. Surgical anchor overlies the right humeral head.      Impression: 1. Large right-sided pleural effusion likely resulting in right middle and lower lobe compressive atelectasis. 2. Clear left lung.  This report was finalized on 3/10/2022 8:41 PM by Jostin Fagan MD.            I have reviewed the medications:  Scheduled Meds:budesonide, 0.5 mg, Nebulization, BID - RT  carvedilol, 12.5 mg, Oral, BID With Meals  ipratropium-albuterol, 3 mL, Nebulization, 4x Daily - RT  isosorbide mononitrate, 60 mg, Oral, Daily  levothyroxine, 50 mcg, Oral, Daily  pantoprazole, 40 mg, Oral, QAM  pravastatin, 20 mg, Oral, Daily  sodium chloride, 10 mL, Intravenous, Q12H  torsemide, 20 mg, Oral, Daily      Continuous Infusions:hold, 1 each      PRN Meds:.•  acetaminophen  •  hold  •  melatonin  •  ondansetron  •  sodium chloride  •  sodium chloride    Assessment/Plan   Assessment & Plan     Active Hospital Problems    Diagnosis  POA   • **Acute respiratory failure with hypoxia (HCC) [J96.01]  Yes   • CHF (congestive heart failure) (HCC) [I50.9]  Yes   • Anemia [D64.9]  Yes   • CHAPARRO (acute kidney injury) (HCC) [N17.9]  Yes   • Acute hypoxemic respiratory failure (HCC) [J96.01]  Yes   • Hyponatremia [E87.1]  Yes   • Pleural effusion on right [J90]  Yes   • Hypothyroidism (acquired) [E03.9]  Yes   • HTN (hypertension) [I10]  Yes   • HLD (hyperlipidemia)  [E78.5]  Yes   • Presence of cardiac pacemaker [Z95.0]  Yes   • Atrial fibrillation (HCC) [I48.91]  Yes      Resolved Hospital Problems   No resolved problems to display.        Brief Hospital Course to date:    Syeda Gomez is a 89 y.o. female with a past medical history of breast cancer s/p left mastectomy 33 years ago, atrial fibrillation, HTN, HLD, CHF s/p PPM, hypothyroidism, and chronic idiopathic iron deficiency anemia that presented to the ED complaining of 1 week of ongoing shortness of air. She states she has also been more fatigued than her baseline. The patient's daughter is at the bedside and states the patient has required multiple iron and blood transfusions due to anemia over the past year. She states they thought her shortness of air was due to worsened anemia again. The patient was, however, found to have a large pleural effusion in the ED.    Acute Respiratory Failure with Hypoxia  Large right pleural effusion  Acute on Chronic CHF  Bronchospasm  - thoracentesis today  - obtain echocardiogram - Lorenzo/Christine to read  - received lasix in ED, home oral torsemide resumed  - nebs scheduled     Anemia:  - stable  - iron sat 12%     CHAPARRO:  - improved with diuresis  - holding lisinopril     Hyponatremia:  - resolved    Atrial fibrillation:  - stable and rate controlled  - not anticoagulated due to fall risk, age on ASA only      HTN/HLD:  - continue statin, imdur     Hypothyroidism:  - free T4 1.31    DVT prophylaxis:  Mechanical DVT prophylaxis orders are present.            Disposition: I expect the patient to be discharged home TBD    CODE STATUS:   Code Status and Medical Interventions:   Ordered at: 03/10/22 2706     Medical Intervention Limits:    NO intubation (DNI)     Level Of Support Discussed With:    Patient     Code Status (Patient has no pulse and is not breathing):    No CPR (Do Not Attempt to Resuscitate)     Medical Interventions (Patient has pulse or is breathing):    Limited Support        Los Fan MD  03/11/22

## 2022-03-11 NOTE — ED PROVIDER NOTES
EMERGENCY DEPARTMENT ENCOUNTER    Pt Name: Syeda Gomez  MRN: [id number]  Pt :   1927  Room Number:    Date of encounter:  3/10/2022  PCP: Omayra Alcazar DO  ED Provider: GONZÁLEZ Sotelo    Historian: daughter and patient      HPI:  Chief Complaint: shortness of breath        Context: Syeda Gomez is a 89 y.o. female who presents to the ED c/o shortness of breath for approximately 1 week getting increasingly worse.  The adult caregiver daughter, was concerned that her mother may be anemic yet again.  Therefore, they were seen by the oncology physician today to check for anemia.  She was found to have a hemoglobin of 9 but advised to seek attention with her primary care provider for the cough and shortness of breath.  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  She is required 2 units on 3 occasions since  as well as 4 units of iron.  She has no history of oxygen dependence or lung disease.  No history of pleural effusion.  She does have a history of atrial fibrillation with pacemaker and congestive heart failure.  She is anticoagulated on aspirin.    Review of systems is negative for fever or chills.  Positive for left upper chest pain.  Positive for cough worsening over the last 72 hours as well as shortness of breath especially on exertion.  Negative for nausea, vomiting, diarrhea or abdominal pain.  Positive for profound weakness without dizziness or syncope.  No neurosensory complaints or focal weakness.          PAST MEDICAL HISTORY  Past Medical History:   Diagnosis Date   • A-fib (HCC)    • Cancer (HCC)    • Carpal tunnel syndrome    • CHF (congestive heart failure) (HCC)    • Disease of thyroid gland    • Hypertension          PAST SURGICAL HISTORY  Past Surgical History:   Procedure Laterality Date   • APPENDECTOMY     • CARDIAC ELECTROPHYSIOLOGY PROCEDURE N/A 2021    Procedure: DEVICE IMPLANT;   Surgeon: Calvin Ventura MD;  Location: [facility][1] INVASIVE LOCATION;  Service: Cardiovascular;  Laterality: N/A;   • CATARACT EXTRACTION, BILATERAL     • CHOLECYSTECTOMY     • HYSTERECTOMY     • MASTECTOMY Left    • REPLACEMENT TOTAL KNEE BILATERAL     • RETINAL DETACHMENT SURGERY     • SHOULDER ROTATOR CUFF REPAIR Right    • TOTAL HIP ARTHROPLASTY Right          FAMILY HISTORY  History reviewed. No pertinent family history.      SOCIAL HISTORY  Social History     Socioeconomic History   • Marital status:    Tobacco Use   • Smoking status: Never Smoker   • Smokeless tobacco: Never Used   Substance and Sexual Activity   • Alcohol use: No   • Drug use: No   • Sexual activity: Defer         ALLERGIES  Cardizem [diltiazem], Codeine, Morphine, Percocet [oxycodone-acetaminophen], and Sulfa antibiotics        REVIEW OF SYSTEMS  Review of Systems     All systems reviewed and negative except for those discussed in HPI.       PHYSICAL EXAM    I have reviewed the triage vital signs and nursing notes.    ED Triage Vitals [03/10/22 2019]   Temp Heart Rate Resp BP SpO2   98.1 °F (36.7 °C) 86 24 119/70 90 %      Temp src Heart Rate Source Patient Position BP Location FiO2 (%)   Oral Monitor Sitting Right arm --       Physical Exam  GENERAL:   Appears ill-appearing elder who is slightly tachypneic.  She is hypoxic on initial room air saturations in the high 80s.  She is a good historian.  Her caregiver daughter is a better historian on her behalf.  She appears underweight.  HENT: Nares patent.  EYES: No scleral icterus.  CV: Irregular rhythm.  Rate around 100.  No murmur.  No peripheral edema no JVD.  RESPIRATORY: Slight tachypnea.  No audible wheezes, rales or rhonchi.  ABDOMEN: Soft, nontender  MUSCULOSKELETAL: No deformities.  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  The daughter assures that the left lower extremity is chronically greater in circumference than the right.  NEURO: Alert,  moves all extremities, follows commands.  No focal neurosensory deficits or focal weakness.  SKIN: Warm, dry, no rash visualized.        LAB RESULTS  Recent Results (from the past 24 hour(s))   Comprehensive Metabolic Panel    Collection Time: 03/10/22  9:21 PM    Specimen: Blood   Result Value Ref Range    Glucose 231 (H) 65 - 99 mg/dL    BUN 26 (H) 8 - 23 mg/dL    Creatinine 1.59 (H) 0.57 - 1.00 mg/dL    Sodium 133 (L) 136 - 145 mmol/L    Potassium 3.8 3.5 - 5.2 mmol/L    Chloride 93 (L) 98 - 107 mmol/L    CO2 30.0 (H) 22.0 - 29.0 mmol/L    Calcium 8.8 8.2 - 9.6 mg/dL    Total Protein 5.8 (L) 6.0 - 8.5 g/dL    Albumin 3.90 3.50 - 5.20 g/dL    ALT (SGPT) 9 1 - 33 U/L    AST (SGOT) 12 1 - 32 U/L    Alkaline Phosphatase 132 (H) 39 - 117 U/L    Total Bilirubin 0.3 0.0 - 1.2 mg/dL    Globulin 1.9 gm/dL    A/G Ratio 2.1 g/dL    BUN/Creatinine Ratio 16.4 7.0 - 25.0    Anion Gap 10.0 5.0 - 15.0 mmol/L    eGFR 29.8 (L) >60.0 mL/min/1.73   BNP    Collection Time: 03/10/22  9:21 PM    Specimen: Blood   Result Value Ref Range    proBNP 2,352.0 (H) 0.0 - 1,800.0 pg/mL   Troponin    Collection Time: 03/10/22  9:21 PM    Specimen: Blood   Result Value Ref Range    Troponin T 0.025 0.000 - 0.030 ng/mL   Green Top (Gel)    Collection Time: 03/10/22  9:21 PM   Result Value Ref Range    Extra Tube Hold for add-ons.    Lavender Top    Collection Time: 03/10/22  9:21 PM   Result Value Ref Range    Extra Tube hold for add-on    Gold Top - SST    Collection Time: 03/10/22  9:21 PM   Result Value Ref Range    Extra Tube Hold for add-ons.    Light Blue Top    Collection Time: 03/10/22  9:21 PM   Result Value Ref Range    Extra Tube hold for add-on    CBC Auto Differential    Collection Time: 03/10/22  9:21 PM    Specimen: Blood   Result Value Ref Range    WBC 7.00 3.40 - 10.80 10*3/mm3    RBC 2.98 (L) 3.77 - 5.28 10*6/mm3    Hemoglobin 8.8 (L) 12.0 - 15.9 g/dL    Hematocrit 27.5 (L) 34.0 - 46.6 %    MCV 92.3 79.0 - 97.0 fL    MCH 29.5  26.6 - 33.0 pg    MCHC 32.0 31.5 - 35.7 g/dL    RDW 14.8 12.3 - 15.4 %    RDW-SD 50.0 37.0 - 54.0 fl    MPV 10.0 6.0 - 12.0 fL    Platelets 283 140 - 450 10*3/mm3    Neutrophil % 78.2 (H) 42.7 - 76.0 %    Lymphocyte % 10.0 (L) 19.6 - 45.3 %    Monocyte % 7.9 5.0 - 12.0 %    Eosinophil % 2.9 0.3 - 6.2 %    Basophil % 0.6 0.0 - 1.5 %    Immature Grans % 0.4 0.0 - 0.5 %    Neutrophils, Absolute 5.48 1.70 - 7.00 10*3/mm3    Lymphocytes, Absolute 0.70 0.70 - 3.10 10*3/mm3    Monocytes, Absolute 0.55 0.10 - 0.90 10*3/mm3    Eosinophils, Absolute 0.20 0.00 - 0.40 10*3/mm3    Basophils, Absolute 0.04 0.00 - 0.20 10*3/mm3    Immature Grans, Absolute 0.03 0.00 - 0.05 10*3/mm3    nRBC 0.0 0.0 - 0.2 /100 WBC   Lactic Acid, Plasma    Collection Time: 03/10/22  9:21 PM    Specimen: Blood   Result Value Ref Range    Lactate 0.9 0.5 - 2.0 mmol/L   CK    Collection Time: 03/10/22 10:03 PM    Specimen: Blood   Result Value Ref Range    Creatine Kinase 39 20 - 180 U/L       If labs were ordered, I independently reviewed the results.        RADIOLOGY  XR Chest 1 View    Result Date: 3/10/2022   DATE OF EXAM: 3/10/2022 8:29 PM  PROCEDURE: XR CHEST 1 VW-  INDICATIONS: SOA triage protocol  COMPARISON: 10/23/2020  TECHNIQUE: Portable chest radiograph.  FINDINGS:  There is a left-sided port catheter with the tip at the mid SVC. There is a large right-sided pleural effusion noted. Right basilar airspace disease likely compressive atelectasis of the right middle and lower lobe. No effusion on the left. No pneumothorax. There is an AICD noted. Surgical anchor overlies the right humeral head.      1. Large right-sided pleural effusion likely resulting in right middle and lower lobe compressive atelectasis. 2. Clear left lung.  This report was finalized on 3/10/2022 8:41 PM by Jostin Fagan MD.        PROCEDURES    Procedures    ECG 12 Lead   Preliminary Result             MEDICATIONS GIVEN IN ER    Medications   sodium chloride 0.9 % flush 10 mL  (has no administration in time range)   Hold medication (has no administration in time range)   furosemide (LASIX) injection 40 mg (has no administration in time range)   heparin injection 300 Units (300 Units Intravenous Given 3/10/22 2103)           ED Course as of 03/10/22 2319   Thu Mar 10, 2022   2131 Patient's work-up is most remarkable for hypoxia and large pleural effusion on the right which is a new finding for the patient.  Patient has responded well to 2 L of oxygen by nasal cannula.  Patient and her daughter understand and concur with inpatient plan.  Dr. Ribera accepts inpatient care [MS]   2247 proBNP(!): 2,352.0 [MS]   2247 BUN(!): 26 [MS]   2247 Creatinine(!): 1.59 [MS]      ED Course User Index  [MS] Lakeisha Gonsales APRN             AS OF 23:19 EST VITALS:    BP - 127/77  HR - 87  TEMP - 98.1 °F (36.7 °C) (Oral)  O2 SATS - 100%                  DIAGNOSIS  Final diagnoses:   Hypoxia   Pleural effusion, right   Anemia, unspecified type   Longstanding persistent atrial fibrillation (HCC)   Presence of cardiac pacemaker         DISPOSITION    Admitted             Lakeisha Gonsales APRN  03/10/22 5249

## 2022-03-11 NOTE — PRE-SEDATION DOCUMENTATION
[facility]   Vascular Interventional Radiology  History & Physicial    Patient Name:Syeda Gomez    : 1927    MRN: [id number]    Primary Care Physician: Omayra Alcazar DO    Referring Physician: No ref. provider found     Date of admission: 3/10/2022    Subjective     Reason for Consult: R thora    History of Present Illness     Syeda Gomez is a 89 y.o. female referred to IR as noted above.      Active Hospital Problems:  Active Hospital Problems    Diagnosis    • **Acute respiratory failure with hypoxia (HCC)    • CHF (congestive heart failure) (HCC)    • Anemia    • CHAPARRO (acute kidney injury) (HCC)    • Acute hypoxemic respiratory failure (HCC)    • Hyponatremia    • Pleural effusion on right    • Hypothyroidism (acquired)    • HTN (hypertension)    • HLD (hyperlipidemia)    • Presence of cardiac pacemaker    • Atrial fibrillation (HCC)        Personal History     Past Medical History:   Diagnosis Date   • A-fib (HCC)    • Cancer (HCC)    • Carpal tunnel syndrome    • CHF (congestive heart failure) (HCC)    • Disease of thyroid gland    • Hypertension        Past Surgical History:   Procedure Laterality Date   • APPENDECTOMY     • CARDIAC ELECTROPHYSIOLOGY PROCEDURE N/A 2021    Procedure: DEVICE IMPLANT;  Surgeon: Calvin Ventura MD;  Location: [facility] INVASIVE LOCATION;  Service: Cardiovascular;  Laterality: N/A;   • CATARACT EXTRACTION, BILATERAL     • CHOLECYSTECTOMY     • HYSTERECTOMY     • MASTECTOMY Left    • REPLACEMENT TOTAL KNEE BILATERAL     • RETINAL DETACHMENT SURGERY     • SHOULDER ROTATOR CUFF REPAIR Right    • TOTAL HIP ARTHROPLASTY Right        Family History: Her family history is not on file.     Social History: She  reports that she has never smoked. She has never used smokeless tobacco. She reports that she does not drink alcohol and does not use drugs.    Home Medications:  acetaminophen, aspirin, carvedilol, clonazePAM, docusate sodium, isosorbide  "mononitrate, levothyroxine, lisinopril, loratadine, magnesium oxide, nitroglycerin, omeprazole, ondansetron ODT, pravastatin, sodium chloride 0.9 % nebulizer solution 0.82 mL with albuterol (5 MG/ML) 0.5% nebulizer solution 0.9 mg, and torsemide    Current Medications:  •  acetaminophen  •  budesonide  •  carvedilol  •  hold  •  ipratropium-albuterol  •  isosorbide mononitrate  •  levothyroxine  •  melatonin  •  ondansetron  •  pantoprazole  •  pravastatin  •  sodium chloride  •  sodium chloride  •  sodium chloride  •  torsemide     Allergies:  She is allergic to cardizem [diltiazem], codeine, morphine, percocet [oxycodone-acetaminophen], and sulfa antibiotics.    Review of Systems    Objective     Visit Vitals  /56 (BP Location: Right arm, Patient Position: Lying)   Pulse 93   Temp 98.1 °F (36.7 °C) (Oral)   Resp 18   Ht 160 cm (63\")   Wt 71.9 kg (158 lb 9.6 oz)   SpO2 98%   BMI 28.09 kg/m²        Physical Exam    A&Ox3. Able to communicate  No Apparent Distress  Average physique      Result Review      I have personally reviewed the results from the time of this admission to 3/11/2022 10:28 EST and agree with these findings.  [x]  Laboratory  []  Microbiology  [x]  Radiology  []  EKG/Telemetry   []  Cardiology/Vascular   []  Pathology  []  Old records  []  Other:    Most notable findings include: As noted:    Results from last 7 days   Lab Units 03/11/22  0822 03/10/22  2121   INR  1.25*  --    HEMOGLOBIN g/dL 9.0* 8.8*   HEMATOCRIT % 28.9* 27.5*   PLATELETS 10*3/mm3 267 283       Estimated Creatinine Clearance: 27.6 mL/min (A) (by C-G formula based on SCr of 1.16 mg/dL (H)).   Creatinine   Date Value Ref Range Status   03/11/2022 1.16 (H) 0.57 - 1.00 mg/dL Final   03/10/2022 1.59 (H) 0.57 - 1.00 mg/dL Final       COVID19   Date Value Ref Range Status   03/11/2022 Not Detected Not Detected - Ref. Range Final        No results found for: PREGTESTUR, PREGSERUM, HCG, HCGQUANT       Assessment / Plan     Syeda WARNER" Jason is a 89 y.o. female referred to the IR service with above problem.    Plan:   As above.    Sergio Persaud MD   Vascular Interventional Radiology  03/11/22   10:28 AM EST

## 2022-03-11 NOTE — CASE MANAGEMENT/SOCIAL WORK
Discharge Planning Assessment  [facility]     Patient Name: Syeda Gomez  MRN: [id number]  Today's Date: 3/11/2022    Admit Date: 3/10/2022     Discharge Needs Assessment     Row Name 03/11/22 1057       Living Environment    People in Home alone    Unique Family Situation Pt resides in [facility] alone. Her daughter reports she is with her mom most of the time.    Current Living Arrangements home    Primary Care Provided by self    Provides Primary Care For no one, unable/limited ability to care for self    Family Caregiver if Needed child(fernando), adult    Family Caregiver Names Betty Alonso(daughter)    Quality of Family Relationships helpful;involved;supportive    Able to Return to Prior Arrangements yes    Living Arrangement Comments CM spoke with pt and pt's daughter, Betty, in room with permission, regarding discharge plan. Pt resides in [facility] in a house alone. Her daughter reports she with her mother most of the time.       Transition Planning    Patient/Family Anticipates Transition to home with family    Patient/Family Anticipated Services at Transition     Transportation Anticipated family or friend will provide       Discharge Needs Assessment    Readmission Within the Last 30 Days no previous admission in last 30 days    Equipment Currently Used at Home cane, quad tip;nebulizer;walker, standard    Concerns to be Addressed discharge planning    Equipment Needed After Discharge cane, quad;walker, standard;nebulizer    Discharge Coordination/Progress Pt has Medicare and [employer] insurance with no  prescription coverage, but pt is able to afford her medications.. Pt uses [facility] in [place]. Per daughter, pt is independent of ADLs and daughter provides transportation for pt. Pt here for acute respiratory failure with hypoxia and uses no home O2 currently. Per daughter, Betty, she is her mother's POA.  Plan is home at discharge and daughter will assist. Pt/daughter  currently deny discharge needs. CM will cont to follow,               Discharge Plan     Row Name 03/11/22 1106       Plan    Plan discharge plan    Plan Comments Plan is home at discharge and daughter will assist. Pt/daughter currently deny discharge needs. CM will cont to follow,    Final Discharge Disposition Code 01 - home or self-care              Continued Care and Services - Admitted Since 3/10/2022    Coordination has not been started for this encounter.       Expected Discharge Date and Time     Expected Discharge Date Expected Discharge Time    Mar 14, 2022          Demographic Summary     Row Name 03/11/22 1056       General Information    General Information Comments Pt's PCP is Omayra Alcazar       Contact Information    Permission Granted to Share Info With     Contact Information Obtained for     Contact Information Comments Betty Alonso(daughter) [phone]               Functional Status    No documentation.                Psychosocial    No documentation.                Abuse/Neglect    No documentation.                Legal    No documentation.                Substance Abuse    No documentation.                Patient Forms    No documentation.                   Kim Phillips RN

## 2022-03-11 NOTE — THERAPY EVALUATION
Patient Name: Syeda Gomez  : 1927    MRN: [id number]                              Today's Date: 3/11/2022       Admit Date: 3/10/2022    Visit Dx:     ICD-10-CM ICD-9-CM   1. Hypoxia  R09.02 799.02   2. Pleural effusion, right  J90 511.9   3. Anemia, unspecified type  D64.9 285.9   4. Longstanding persistent atrial fibrillation (HCC)  I48.11 427.31   5. Presence of cardiac pacemaker  Z95.0 V45.01     Patient Active Problem List   Diagnosis   • Pneumonia due to COVID-19 virus   • Pancytopenia (HCC)   • Presence of cardiac pacemaker   • Atrial fibrillation (HCC)   • Prolonged QTc interval on ECG   • Hypothyroidism (acquired)   • HTN (hypertension)   • HLD (hyperlipidemia)   • Acute respiratory failure with hypoxia (HCC)   • Elective replacement indicated for pacemaker   • CHF (congestive heart failure) (HCC)   • Anemia   • CHAPARRO (acute kidney injury) (HCC)   • Acute hypoxemic respiratory failure (HCC)   • Hyponatremia   • Pleural effusion on right     Past Medical History:   Diagnosis Date   • A-fib (HCC)    • Cancer (HCC)    • Carpal tunnel syndrome    • CHF (congestive heart failure) (HCC)    • Disease of thyroid gland    • Hypertension      Past Surgical History:   Procedure Laterality Date   • APPENDECTOMY     • CARDIAC ELECTROPHYSIOLOGY PROCEDURE N/A 2021    Procedure: DEVICE IMPLANT;  Surgeon: Calvin Ventura MD;  Location: [facility] INVASIVE LOCATION;  Service: Cardiovascular;  Laterality: N/A;   • CATARACT EXTRACTION, BILATERAL     • CHOLECYSTECTOMY     • HYSTERECTOMY     • MASTECTOMY Left    • REPLACEMENT TOTAL KNEE BILATERAL     • RETINAL DETACHMENT SURGERY     • SHOULDER ROTATOR CUFF REPAIR Right    • TOTAL HIP ARTHROPLASTY Right       General Information     Row Name 22 1533          Physical Therapy Time and Intention    Document Type evaluation  -LO     Mode of Treatment individual therapy;physical therapy  -LO     Row Name 22 1533          General Information    Patient  Profile Reviewed yes  -LO     Prior Level of Function min assist:;transfer;gait;all household mobility  Ambulated with FWW in house, per dtr was inconsistent with using and would use the furniture or flores to steady herself  -LO     Existing Precautions/Restrictions fall;oxygen therapy device and L/min  -LO     Barriers to Rehab previous functional deficit  -LO     Row Name 03/11/22 1533          Living Environment    People in Home alone  Dtr and grandson stop in often to prep meals, provide care  -LO     Row Name 03/11/22 1533          Home Main Entrance    Number of Stairs, Main Entrance two  -LO     Stair Railings, Main Entrance none  -LO     Row Name 03/11/22 1533          Stairs Within Home, Primary    Stairs, Within Home, Primary split foyer 5 + 5; Dtr states she usually only has to ascend the 5 steps once inside the home  -LO     Number of Stairs, Within Home, Primary ten  -LO     Row Name 03/11/22 1533          Cognition    Orientation Status (Cognition) oriented x 3  -LO     Row Name 03/11/22 1533          Safety Issues, Functional Mobility    Safety Issues Affecting Function (Mobility) at risk behavior observed;awareness of need for assistance;insight into deficits/self-awareness  -LO     Impairments Affecting Function (Mobility) balance;endurance/activity tolerance;strength  -LO           User Key  (r) = Recorded By, (t) = Taken By, (c) = Cosigned By    Initials Name Provider Type    Katia Smart, PT Physical Therapist               Mobility     Row Name 03/11/22 1539          Bed Mobility    Bed Mobility supine-sit;sit-supine  -LO     Supine-Sit [place] (Bed Mobility) contact guard;verbal cues  -LO     Sit-Supine [place] (Bed Mobility) contact guard  -LO     Assistive Device (Bed Mobility) bed rails;head of bed elevated  -LO     Comment, (Bed Mobility) vc for sequencing, no physical assist required  -LO     Row Name 03/11/22 1539          Transfers    Comment, (Transfers) CGA for safety as  tends to lose balance immediat. upon standing; EOB>FWW>commode in bathroom>FWW>EOB  -LO     Row Name 03/11/22 1539          Bed-Chair Transfer    Bed-Chair [place] (Transfers) not tested  -LO     Row Name 03/11/22 1539          Sit-Stand Transfer    Sit-Stand [place] (Transfers) contact guard;verbal cues  -LO     Row Name 03/11/22 1539          Gait/Stairs (Locomotion)    [place] Level (Gait) contact guard;verbal cues  -LO     Assistive Device (Gait) walker, front-wheeled  -LO     Distance in Feet (Gait) 20  -LO     Deviations/Abnormal Patterns (Gait) bilateral deviations  -LO     Bilateral Gait Deviations forward flexed posture;heel strike decreased  -LO     [place] Level (Stairs) not tested  -LO     Comment, (Gait/Stairs) Occassional unsteadiness with use of FWW, with episodes of self corrected loss of balance.  -LO           User Key  (r) = Recorded By, (t) = Taken By, (c) = Cosigned By    Initials Name Provider Type    Katia Smart PT Physical Therapist               Obj/Interventions     Row Name 03/11/22 1541          Range of Motion Comprehensive    General Range of Motion bilateral lower extremity ROM WNL  -LO     Row Name 03/11/22 1541          Strength Comprehensive (MMT)    General Manual Muscle Testing (MMT) Assessment lower extremity strength deficits identified  -LO     Comment, General Manual Muscle Testing (MMT) Assessment BLE grossly 4-/5  -LO     Row Name 03/11/22 1541          Motor Skills    Motor Skills functional endurance  -LO     Functional Endurance tolerates 2-3 min of activity before fatigues. SpO2 levels decrease to 87% on RA with activity and resolve to 96% within 2-3 min of rest and 2 liters of O2.  -LO     Row Name 03/11/22 1541          Balance    Balance Assessment sitting static balance;sitting dynamic balance;standing static balance;standing dynamic balance  -LO     Static Sitting Balance supervision  -LO     Dynamic Sitting Balance supervision  -LO      Position, Sitting Balance unsupported;sitting edge of bed  -LO     Static Standing Balance contact guard  -LO     Dynamic Standing Balance contact guard  -LO     Position/Device Used, Standing Balance supported;walker, rolling  -LO     Comment, Balance Requires FWW and CGA for safety in standing, couple episodes of self corrected loss of balance noted during gait.  -     Row Name 03/11/22 1541          Sensory Assessment (Somatosensory)    Sensory Assessment (Somatosensory) sensation intact  -LO           User Key  (r) = Recorded By, (t) = Taken By, (c) = Cosigned By    Initials Name Provider Type    Katia Smart, PT Physical Therapist               Goals/Plan     Row Name 03/11/22 1548          Bed Mobility Goal 1 (PT)    Activity/Assistive Device (Bed Mobility Goal 1, PT) rolling to left;rolling to right;scooting;sit to supine;supine to sit  -LO     [place] Level/Cues Needed (Bed Mobility Goal 1, PT) independent  -LO     Time Frame (Bed Mobility Goal 1, PT) long term goal (LTG);10 days  -     Row Name 03/11/22 1548          Transfer Goal 1 (PT)    Activity/Assistive Device (Transfer Goal 1, PT) sit-to-stand/stand-to-sit;bed-to-chair/chair-to-bed;car transfer  -LO     [place] Level/Cues Needed (Transfer Goal 1, PT) independent  -LO     Time Frame (Transfer Goal 1, PT) long term goal (LTG);10 days  -     Row Name 03/11/22 1548          Gait Training Goal 1 (PT)    Activity/Assistive Device (Gait Training Goal 1, PT) gait (walking locomotion);assistive device use;diminish gait deviation;improve balance and speed  -LO     [place] Level (Gait Training Goal 1, PT) modified independence  -LO     Distance (Gait Training Goal 1, PT) 100  -LO     Time Frame (Gait Training Goal 1, PT) long term goal (LTG);10 days  -     Row Name 03/11/22 1548          Stairs Goal 1 (PT)    Activity/Assistive Device (Stairs Goal 1, PT) ascending stairs;descending stairs  -LO     [place] Level/Cues Needed (Stairs  Goal 1, PT) modified independence  -LO     Number of Stairs (Stairs Goal 1, PT) 5  -LO     Time Frame (Stairs Goal 1, PT) long term goal (LTG);10 days  -LO     Row Name 03/11/22 1548          Patient Education Goal (PT)    Activity (Patient Education Goal, PT) Patient will demonstrate safe and effective sequencing for bed mobility and transfers.  -LO     Time Frame (Patient Education Goal, PT) long term goal (LTG);10 days  -LO           User Key  (r) = Recorded By, (t) = Taken By, (c) = Cosigned By    Initials Name Provider Type    Katia Smart, PT Physical Therapist               Clinical Impression     Row Name 03/11/22 1544          Pain    Pretreatment Pain Rating 0/10 - no pain  -LO     Posttreatment Pain Rating 0/10 - no pain  -LO     Row Name 03/11/22 1544          Plan of Care Review    Plan of Care Reviewed With patient  -LO     Progress no change  -LO     Outcome Evaluation PT eval completed. Patient alert and oriented x3. Presents with mild deficits in BLE strength, standing balance, and functional endurance effecting functional mobility below baseline. Requiring CGA for bed mobility, transfers, and ambulation with FWW x 20'. SpO2 levels decrease to 87% on RA with gait and resolve to 96% on 2 liters of O2 after 2-3 min of rest. Patient will benefit from IP PT services in order to address impairments for return to PLOF. Recommend home with assist and HHPT at [place].  -     Row Name 03/11/22 1544          Therapy Assessment/Plan (PT)    Patient/Family Therapy Goals Statement (PT) return home  -LO     Rehab Potential (PT) good, to achieve stated therapy goals  -LO     Criteria for Skilled Interventions Met (PT) yes;meets criteria  -     Row Name 03/11/22 154          Vital Signs    Post Systolic BP Rehab 120  -LO     Post Treatment Diastolic BP 56  -LO     Posttreatment Heart Rate (beats/min) 84  -LO     Pre SpO2 (%) 90  -LO     O2 Delivery Pre Treatment supplemental O2  -LO     Intra SpO2 (%) 87  -LO      O2 Delivery Intra Treatment room air  -LO     Post SpO2 (%) 96  -LO     O2 Delivery Post Treatment supplemental O2  -LO     Pre Patient Position Supine  -LO     Intra Patient Position Standing  -LO     Post Patient Position Supine  -LO     Rest Breaks  1  -LO     Row Name 03/11/22 1544          Positioning and Restraints    Pre-Treatment Position in bed  -LO     Post Treatment Position bed  -LO     In Bed notified nsg;call light within reach;encouraged to call for assist;supine;exit alarm on;with family/caregiver;fowlers  -           User Key  (r) = Recorded By, (t) = Taken By, (c) = Cosigned By    Initials Name Provider Type    Katia Smart, PT Physical Therapist               Outcome Measures     Row Name 03/11/22 1550          How much help from another person do you currently need...    Turning from your back to your side while in flat bed without using bedrails? 3  -LO     Moving from lying on back to sitting on the side of a flat bed without bedrails? 3  -LO     Moving to and from a bed to a chair (including a wheelchair)? 3  -LO     Standing up from a chair using your arms (e.g., wheelchair, bedside chair)? 4  -LO     Climbing 3-5 steps with a railing? 3  -LO     To walk in hospital room? 3  -LO     AM-PAC 6 Clicks Score (PT) 19  -LO     Row Name 03/11/22 1550          Functional Assessment    Outcome Measure Options AM-PAC 6 Clicks Basic Mobility (PT)  -LO           User Key  (r) = Recorded By, (t) = Taken By, (c) = Cosigned By    Initials Name Provider Type    Katia Smart, PT Physical Therapist                             Physical Therapy Education                 Title: PT OT SLP Therapies (Done)     Topic: Physical Therapy (Done)     Point: Mobility training (Done)     Learning Progress Summary           Patient Acceptance, E, VU,NR by CHELSEA at 3/11/2022 1505    Comment: Patient education regarding sequencing for bed mobilty and transfers.                   Point: Home exercise program (Done)      Learning Progress Summary           Patient Acceptance, E, VU,NR by  at 3/11/2022 1505    Comment: Patient education regarding sequencing for bed mobilty and transfers.                   Point: Body mechanics (Done)     Learning Progress Summary           Patient Acceptance, E, VU,NR by  at 3/11/2022 1505    Comment: Patient education regarding sequencing for bed mobilty and transfers.                   Point: Precautions (Done)     Learning Progress Summary           Patient Acceptance, E, VU,NR by  at 3/11/2022 1505    Comment: Patient education regarding sequencing for bed mobilty and transfers.                               User Key     Initials Effective Dates Name Provider Type Discipline     06/16/21 -  Katia Clemente, PT Physical Therapist PT              PT Recommendation and Plan  Planned Therapy Interventions (PT): balance training, bed mobility training, home exercise program, patient/family education, neuromuscular re-education, transfer training, strengthening, stair training, gait training  Plan of Care Reviewed With: patient  Progress: no change  Outcome Evaluation: PT eval completed. Patient alert and oriented x3. Presents with mild deficits in BLE strength, standing balance, and functional endurance effecting functional mobility below baseline. Requiring CGA for bed mobility, transfers, and ambulation with FWW x 20'. SpO2 levels decrease to 87% on RA with gait and resolve to 96% on 2 liters of O2 after 2-3 min of rest. Patient will benefit from IP PT services in order to address impairments for return to PLOF. Recommend home with assist and HHPT at [place].     Time Calculation:    PT Charges     Row Name 03/11/22 1505             Time Calculation    Start Time 1505  -LO      PT Received On 03/11/22  -      PT Goal Re-Cert Due Date 03/21/22  -              Timed Charges    37059 - Gait Training Minutes  12  -LO      37910 - PT Therapeutic Activity Minutes 6  -LO              Untimed Charges     PT Eval/Re-eval Minutes 35  -LO              Total Minutes    Timed Charges Total Minutes 18  -LO      Untimed Charges Total Minutes 35  -LO       Total Minutes 53  -LO            User Key  (r) = Recorded By, (t) = Taken By, (c) = Cosigned By    Initials Name Provider Type    Katia Smart, PT Physical Therapist              Therapy Charges for Today     Code Description Service Date Service Provider Modifiers Qty    04001027923 HC GAIT TRAINING EA 15 MIN 3/11/2022 Katia Clemente, PT GP 1    [id number] HC PT EVAL LOW COMPLEXITY 3 3/11/2022 Katia Clemente, PT GP 1          PT G-Codes  Outcome Measure Options: AM-PAC 6 Clicks Basic Mobility (PT)  AM-PAC 6 Clicks Score (PT): 19    Katia Clemente, PT  3/11/2022

## 2022-03-11 NOTE — H&P
"    [facility] Medicine Services  HISTORY AND PHYSICAL    Patient Name: Syeda Gomez  : 1927  MRN: [id number]  Primary Care Physician: Omayra Alcazar DO  Date of admission: 3/10/2022    Subjective   Subjective     Chief Complaint:  SOB    HPI:  Syeda Gomez is a 89 y.o. female with a past medical history significant for breast cancer s/p left mastectomy, atrial fibrillation, hypertension, HLD, CHF s/p PPM, hypothyroidism, and chronic idiopathic iron deficiency anemia. She present today with complaints of progressively worsening SOB going on for the past week. Dyspnea worse with exertion and lying flat. There is associated generalized weakness/fatigue with increased in lower extremity edema bilaterally.   Daughter at bedside volunteers that patient's anemia has been worse this year. She is followed by oncology outpatient. States patient has required 2 units of PRBC on three separate occasions plus a total of 4 units of IV iron since . Daughter suspected another acute drop in hemoglobin and has the patient seen by oncology yesterday. Laboratory studies revealed stable, baseline hemoglobin at 9. Due to profound dyspnea patient was seen by PCP today where oxygen saturations were \"low\". Patient was subsequently sent to ED for further evaluation and treatment.  Currently there are no complaints of fever, cough, congestion, or chest pain. No falls or trauma. No abdominal pain or N/v/D. No dysuria or flank pain. She tested positive for COVID-19 a year ago. Is vaccinated but overdue for booster. Will admit to inpatient for further evaluation and treatment.      COVID Details:    Symptoms:    [] NONE [] Fever []  Cough [x] Shortness of breath [] Change in taste/smell      Review of Systems   Constitutional: Negative for chills, fatigue and fever.   HENT: Negative for congestion and trouble swallowing.    Eyes: Negative for photophobia and visual disturbance.   Respiratory: " Positive for shortness of breath. Negative for cough.    Cardiovascular: Positive for leg swelling. Negative for chest pain.   Gastrointestinal: Negative for abdominal pain, diarrhea, nausea and vomiting.   Endocrine: Negative for cold intolerance and heat intolerance.   Genitourinary: Negative for dysuria and flank pain.   Musculoskeletal: Negative for back pain and gait problem.   Skin: Negative for pallor and rash.   Allergic/Immunologic: Positive for immunocompromised state.   Neurological: Negative for dizziness, weakness and headaches.   Hematological: Negative for adenopathy.   Psychiatric/Behavioral: Negative for agitation and confusion.        All other systems reviewed and are negative.     Personal History     Past Medical History:   Diagnosis Date   • A-fib (HCC)    • Cancer (HCC)    • Carpal tunnel syndrome    • CHF (congestive heart failure) (HCC)    • Disease of thyroid gland    • Hypertension        Past Surgical History:   Procedure Laterality Date   • APPENDECTOMY     • CARDIAC ELECTROPHYSIOLOGY PROCEDURE N/A 6/25/2021    Procedure: DEVICE IMPLANT;  Surgeon: Calvin Ventura MD;  Location: [facility] INVASIVE LOCATION;  Service: Cardiovascular;  Laterality: N/A;   • CATARACT EXTRACTION, BILATERAL     • CHOLECYSTECTOMY     • HYSTERECTOMY     • MASTECTOMY Left    • REPLACEMENT TOTAL KNEE BILATERAL     • RETINAL DETACHMENT SURGERY     • SHOULDER ROTATOR CUFF REPAIR Right    • TOTAL HIP ARTHROPLASTY Right        Family History: family history is not on file. Otherwise pertinent FHx was reviewed and unremarkable.     Social History:  reports that she has never smoked. She has never used smokeless tobacco. She reports that she does not drink alcohol and does not use drugs.  Social History     Social History Narrative   • Not on file       Medications:  acetaminophen, aspirin, carvedilol, clonazePAM, docusate sodium, isosorbide mononitrate, levothyroxine, lisinopril, loratadine, magnesium oxide,  "nitroglycerin, omeprazole, ondansetron ODT, pravastatin, sodium chloride 0.9 % nebulizer solution 0.82 mL with albuterol (5 MG/ML) 0.5% nebulizer solution 0.9 mg, and torsemide    Allergies   Allergen Reactions   • Cardizem [Diltiazem] Other (See Comments)     \"unknown\"   • Codeine GI Intolerance   • Morphine Other (See Comments)     \"unknown\"   • Percocet [Oxycodone-Acetaminophen] Other (See Comments)     \"unknown\"     • Sulfa Antibiotics Unknown (See Comments)     Unknown         Objective   Objective     Vital Signs:   Temp:  [98.1 °F (36.7 °C)] 98.1 °F (36.7 °C)  Heart Rate:  [81-87] 87  Resp:  [24] 24  BP: (100-127)/(58-83) 127/77    Physical Exam   Constitutional: Awake, alert  Eyes: PERRLA, sclerae anicteric, no conjunctival injection  HENT: NCAT, mucous membranes moist  Neck: Supple, no thyromegaly, no lymphadenopathy, trachea midline  Respiratory: Clear to auscultation bilaterally, nonlabored respirations   Breath sounds diminished in RLL  Cardiovascular: RRR, no murmurs, rubs, or gallops, palpable pedal pulses bilaterally  Gastrointestinal: Positive bowel sounds, soft, nontender, nondistended  Musculoskeletal: trace BLE edema no clubbing or cyanosis to extremities  Psychiatric: Appropriate affect, cooperative  Neurologic: Oriented x 3, strength symmetric in all extremities, Cranial Nerves grossly intact to confrontation, speech clear  Skin: No rashes      Results Reviewed:  I have personally reviewed most recent indicated data and agree with findings including:  [x]  Laboratory  [x]  Radiology  []  EKG/Telemetry  []  Pathology  []  Cardiac/Vascular Studies  [x]  Old records  []  Other:  Most pertinent findings include: hypoxic to 82% on room air. Vitals otherwise stable. BNP 2300. Glucose 231. Sodium 133. Creatinine 1.59. BUN 26. alsk phos 132. H/H 8.8 and 27.5 respectively. CXr shows large right pleural effusion      LAB RESULTS:      Lab 03/10/22  2121   WBC 7.00   HEMOGLOBIN 8.8*   HEMATOCRIT 27.5* "   PLATELETS 283   NEUTROS ABS 5.48   IMMATURE GRANS (ABS) 0.03   LYMPHS ABS 0.70   MONOS ABS 0.55   EOS ABS 0.20   MCV 92.3   LACTATE 0.9         Lab 03/10/22  2121   SODIUM 133*   POTASSIUM 3.8   CHLORIDE 93*   CO2 30.0*   ANION GAP 10.0   BUN 26*   CREATININE 1.59*   EGFR 29.8*   GLUCOSE 231*   CALCIUM 8.8         Lab 03/10/22  2121   TOTAL PROTEIN 5.8*   ALBUMIN 3.90   GLOBULIN 1.9   ALT (SGPT) 9   AST (SGOT) 12   BILIRUBIN 0.3   ALK PHOS 132*         Lab 03/10/22  2121   PROBNP 2,352.0*   TROPONIN T 0.025                 Brief Urine Lab Results     None        Microbiology Results (last 10 days)     ** No results found for the last 240 hours. **          XR Chest 1 View    Result Date: 3/10/2022   DATE OF EXAM: 3/10/2022 8:29 PM  PROCEDURE: XR CHEST 1 VW-  INDICATIONS: SOA triage protocol  COMPARISON: 10/23/2020  TECHNIQUE: Portable chest radiograph.  FINDINGS:  There is a left-sided port catheter with the tip at the mid SVC. There is a large right-sided pleural effusion noted. Right basilar airspace disease likely compressive atelectasis of the right middle and lower lobe. No effusion on the left. No pneumothorax. There is an AICD noted. Surgical anchor overlies the right humeral head.      Impression: 1. Large right-sided pleural effusion likely resulting in right middle and lower lobe compressive atelectasis. 2. Clear left lung.  This report was finalized on 3/10/2022 8:41 PM by Jostin Fagan MD.            Assessment/Plan   Assessment & Plan       Acute respiratory failure with hypoxia (HCC)    Anemia    CHAPARRO (acute kidney injury) (HCC)    Hyponatremia    Pleural effusion on right    Atrial fibrillation (HCC)    HTN (hypertension)    CHF (congestive heart failure) (HCC)    Presence of cardiac pacemaker    Hypothyroidism (acquired)    HLD (hyperlipidemia)    Acute hypoxemic respiratory failure (HCC)      1. Acute Respiratory Failure with Hypoxia:  - stable on 2L nasal cannula  - CXR shows large right pleural  effusion  - will administer 40 mg lasix in ED then resume home torsemide  - daily weights  - strict I&O  - obtain echocardiogram  - check TSH  - consult to IR for CT guided thoracentesis with cytology ( history of breast cancer)  - ABG as needed. Monitor closely  - am labs    2. Anemia:  - stable  - check iron studies  - type and screen    3. CHAPARRO:  - ? Cardiorenal  - monitor and avoid nephrotoxins  - holding lisinopril    4. Hyponatremia:  - suspect hypervolemic hyponatremia  - check urine sodium, urine osmolality, serum osmolality  - TSH  - am cortisol  - trend chemistry every 6 hours X4  - seizure precautions    5. Atrial fibrillation:  - stable and rate controlled  - not anticoagulated due to fall risk, age on ASA only  - followed by Dr. Ventura per cardiology  - PPM    6. CHF:  - decompensated with right pleural effusion  - continue torsemide and coreg  - continue with plan as above    7. HTN/HLD:  - continue statin, imdur    8. Hypothyroidism:  - continue levothyroixine      DVT prophylaxis: mechanical    CODE STATUS: DNR/DNI  Level Of Support Discussed With: Patient  Code Status (Patient has no pulse and is not breathing): CPR (Attempt to Resuscitate)  Medical Interventions (Patient has pulse or is breathing): Full Support      This note has been completed as part of a split-shared workflow.     .Electronically signed by Karlee Billingsley PA-C, 03/10/22, 11:17 PM EST.        Attending   Admission Attestation       I have seen and examined the patient, performing an independent face-to-face diagnostic evaluation with plan of care reviewed and developed with the advanced practice clinician (APC).      Brief Summary Statement:   Syeda Gomez is a 89 y.o. female with a past medical history of breast cancer s/p left mastectomy 33 years ago, atrial fibrillation, HTN, HLD, CHF s/p PPM, hypothyroidism, and chronic idiopathic iron deficiency anemia that presented to the ED complaining of 1 week of ongoing shortness of  air. She states she has also been more fatigued than her baseline. The patient's daughter is at the bedside and states the patient has required multiple iron and blood transfusions due to anemia over the past year. She states they thought her shortness of air was due to worsened anemia again. The patient was, however, found to have a large pleural effusion in the ED. Currently requiring 2L NC. H/H is stable.Patient states she had some sort of pericardial effusion or tear about 5 years ago but cannot give me specifics and states it was at [place]. Patient will be admitted to the hospitalist service for further workup and evaluation.    Remainder of detailed HPI is as noted by APC and has been reviewed and/or edited by me for completeness.    Attending Physical Exam:  Constitutional: Awake, alert  Eyes: PERRLA, sclerae anicteric, no conjunctival injection  HENT: NCAT, mucous membranes moist  Neck: Supple, no thyromegaly, no lymphadenopathy, trachea midline  Respiratory: decreased breath sounds b/l with R>L, nonlabored respirations on 2L NC  Cardiovascular: RRR, no murmurs, rubs, or gallops, palpable pedal pulses bilaterally  Gastrointestinal: Positive bowel sounds, soft, nontender, nondistended  Musculoskeletal: trace bilateral ankle edema, no clubbing or cyanosis to extremities  Psychiatric: Appropriate affect, cooperative  Neurologic: Oriented x 3, strength symmetric in all extremities, Cranial Nerves grossly intact to confrontation, speech clear  Skin: No rashes    Brief Assessment/Plan :  See detailed assessment and plan developed with APC which I have reviewed and/or edited for completeness.        Admission Status: I believe that this patient meets INPATIENT status due to respiratory failure with new pleural effusion.  I feel patient’s risk for adverse outcomes and need for care warrant INPATIENT evaluation and I predict the patient’s care encounter to likely last beyond 2 midnights.        Mare Vera,  DO  03/11/22

## 2022-03-11 NOTE — PLAN OF CARE
Goal Outcome Evaluation:         Pt on 2 liters. Wheezy, tachypnic, and labored at rest. NPO for thoracentesis today. CTM

## 2022-03-11 NOTE — NURSING NOTE
US guided right thoracentesis performed by Dr Persaud. 1600 mls yellow fluid removed from pleural space with samples sent to lab. Patient tolerated well. CXR done.

## 2022-03-12 LAB
ANION GAP SERPL CALCULATED.3IONS-SCNC: 9 MMOL/L (ref 5–15)
BUN SERPL-MCNC: 21 MG/DL (ref 8–23)
BUN/CREAT SERPL: 20 (ref 7–25)
CALCIUM SPEC-SCNC: 8.3 MG/DL (ref 8.2–9.6)
CHLORIDE SERPL-SCNC: 95 MMOL/L (ref 98–107)
CO2 SERPL-SCNC: 32 MMOL/L (ref 22–29)
CREAT SERPL-MCNC: 1.05 MG/DL (ref 0.57–1)
EGFRCR SERPLBLD CKD-EPI 2021: 49 ML/MIN/1.73
GLUCOSE SERPL-MCNC: 164 MG/DL (ref 65–99)
POTASSIUM SERPL-SCNC: 3.9 MMOL/L (ref 3.5–5.2)
SODIUM SERPL-SCNC: 136 MMOL/L (ref 136–145)

## 2022-03-12 PROCEDURE — 97530 THERAPEUTIC ACTIVITIES: CPT

## 2022-03-12 PROCEDURE — 94799 UNLISTED PULMONARY SVC/PX: CPT

## 2022-03-12 PROCEDURE — 97166 OT EVAL MOD COMPLEX 45 MIN: CPT

## 2022-03-12 PROCEDURE — 80048 BASIC METABOLIC PNL TOTAL CA: CPT | Performed by: INTERNAL MEDICINE

## 2022-03-12 PROCEDURE — 99232 SBSQ HOSP IP/OBS MODERATE 35: CPT | Performed by: HOSPITALIST

## 2022-03-12 PROCEDURE — 99222 1ST HOSP IP/OBS MODERATE 55: CPT | Performed by: INTERNAL MEDICINE

## 2022-03-12 RX ORDER — ISOSORBIDE MONONITRATE 30 MG/1
30 TABLET, EXTENDED RELEASE ORAL DAILY
Status: DISCONTINUED | OUTPATIENT
Start: 2022-03-13 | End: 2022-03-19 | Stop reason: HOSPADM

## 2022-03-12 RX ORDER — IPRATROPIUM BROMIDE AND ALBUTEROL SULFATE 2.5; .5 MG/3ML; MG/3ML
3 SOLUTION RESPIRATORY (INHALATION)
Status: DISCONTINUED | OUTPATIENT
Start: 2022-03-12 | End: 2022-03-13

## 2022-03-12 RX ADMIN — CARVEDILOL 12.5 MG: 12.5 TABLET, FILM COATED ORAL at 18:07

## 2022-03-12 RX ADMIN — TORSEMIDE 20 MG: 20 TABLET ORAL at 08:33

## 2022-03-12 RX ADMIN — Medication 10 ML: at 20:11

## 2022-03-12 RX ADMIN — PANTOPRAZOLE SODIUM 40 MG: 40 TABLET, DELAYED RELEASE ORAL at 08:33

## 2022-03-12 RX ADMIN — Medication 10 ML: at 08:34

## 2022-03-12 RX ADMIN — CARVEDILOL 12.5 MG: 12.5 TABLET, FILM COATED ORAL at 08:34

## 2022-03-12 RX ADMIN — PRAVASTATIN SODIUM 20 MG: 20 TABLET ORAL at 08:34

## 2022-03-12 RX ADMIN — BUDESONIDE 0.5 MG: 0.5 SUSPENSION RESPIRATORY (INHALATION) at 19:41

## 2022-03-12 RX ADMIN — LEVOTHYROXINE SODIUM 50 MCG: 50 TABLET ORAL at 05:07

## 2022-03-12 RX ADMIN — GUAIFENESIN 100 MG: 200 SOLUTION ORAL at 18:07

## 2022-03-12 RX ADMIN — BUDESONIDE 0.5 MG: 0.5 SUSPENSION RESPIRATORY (INHALATION) at 06:54

## 2022-03-12 RX ADMIN — IPRATROPIUM BROMIDE AND ALBUTEROL SULFATE 3 ML: .5; 2.5 SOLUTION RESPIRATORY (INHALATION) at 19:41

## 2022-03-12 RX ADMIN — IPRATROPIUM BROMIDE AND ALBUTEROL SULFATE 3 ML: 2.5; .5 SOLUTION RESPIRATORY (INHALATION) at 11:19

## 2022-03-12 RX ADMIN — IPRATROPIUM BROMIDE AND ALBUTEROL SULFATE 3 ML: 2.5; .5 SOLUTION RESPIRATORY (INHALATION) at 06:54

## 2022-03-12 NOTE — PLAN OF CARE
Problem: Adult Inpatient Plan of Care  Goal: Plan of Care Review  Recent Flowsheet Documentation  Taken 3/12/2022 1009 by Hugo Viveros OT  Plan of Care Reviewed With:   patient   daughter  Outcome Evaluation:   VSS   Pt presents with fxl decline from PLOF, deficits in ADL performance, fxl mobility, occupational endurance. Pt limited by SOA, generalized weakness, decreased balance. Pt required Min A/CGA with bed mobility, CGA STS, CGA/RW to ambulate 8'. Pt completes grooming with set up, LBD with Max A. Completed BLE ex's at EOB x 5 reps all ex's. Willbenefit from skilled OT services to address deficits, facilitate increased fxl I. Recommend SNF for rehab at discharge.   Goal Outcome Evaluation:  Plan of Care Reviewed With: patient, daughter           Outcome Evaluation: VSS; Pt presents with fxl decline from PLOF, deficits in ADL performance, fxl mobility, occupational endurance. Pt limited by SOA, generalized weakness, decreased balance. Pt required Min A/CGA with bed mobility, CGA STS, CGA/RW to ambulate 8'. Pt completes grooming with set up, LBD with Max A. Completed BLE ex's at EOB x 5 reps all ex's. Willbenefit from skilled OT services to address deficits, facilitate increased fxl I. Recommend SNF for rehab at discharge.

## 2022-03-12 NOTE — CONSULTS
[facility] Cardiology   [address], [address]  [place], [place], [address] [phone]  [email or web address] [email or web address]           INPATIENT CONSULTATION NOTE    Patient Care Team:  Patient Care Team:  Omayra Alcazar DO as PCP - General (Internal Medicine)    Requesting Provider and Reason for consultation: The patient is being seen today at the request of Dr Hernandez for volume overload, heart failure.     Chief complaint:   Chief Complaint   Patient presents with   • Shortness of Breath   • Cough            HPI:    Syeda Gomez is a 89 y.o. female.    Cardiac focused problem list:  1. Heart failure with preserved EF, dated deficit  2. Rate controlled atrial fibrillation  3. Sick sinus syndrome status post pacemaker  4. Right bundle branch block  5. Right pleural effusion  a. Thoracentesis 3/2022  6. Chronic idiopathic iron deficiency anemia  7. Hypertension  8. Mixed hyperlipidemia  9. Hypothyroidism  10. Covid infection, early 2021    The patient has had shortness of breath for the last week.  Has felt more fatigued.  Associated with orthopnea, lower extremity swelling.  Has needed multiple transfusions over the last year for anemia.  Denies chest pain, abdominal pain, nausea, emesis    Review of Systems:  Positive for dyspnea, orthopnea, weakness, fatigue, lower extremity swelling  All other systems reviewed and are negative.    PFSH:  Patient Active Problem List   Diagnosis   • Pneumonia due to COVID-19 virus   • Pancytopenia (HCC)   • Presence of cardiac pacemaker   • Atrial fibrillation (HCC)   • Prolonged QTc interval on ECG   • Hypothyroidism (acquired)   • HTN (hypertension)   • HLD (hyperlipidemia)   • Acute respiratory failure with hypoxia (HCC)   • Elective replacement indicated for pacemaker   • CHF (congestive heart failure) (HCC)   • Anemia   • CHAPARRO (acute kidney injury) (HCC)   • Acute hypoxemic respiratory failure (HCC)   • Hyponatremia   • Pleural effusion on right  "      No current facility-administered medications on file prior to encounter.     Current Outpatient Medications on File Prior to Encounter   Medication Sig Dispense Refill   • acetaminophen (TYLENOL) 650 MG 8 hr tablet Take 650 mg by mouth Every 8 (Eight) Hours As Needed for Mild Pain .     • aspirin 81 MG EC tablet Take 81 mg by mouth Daily.     • carvedilol (COREG) 12.5 MG tablet Take 12.5 mg by mouth 2 (Two) Times a Day With Meals.     • clonazePAM (KlonoPIN) 0.5 MG tablet Take 0.5 mg by mouth 2 (Two) Times a Day As Needed.     • docusate sodium (COLACE) 250 MG capsule Take 250 mg by mouth Daily. Patient takes 2 nightly     • isosorbide mononitrate (IMDUR) 60 MG 24 hr tablet Take 60 mg by mouth Daily.     • levothyroxine (SYNTHROID, LEVOTHROID) 50 MCG tablet Take 50 mcg by mouth Daily.     • lisinopril (PRINIVIL,ZESTRIL) 10 MG tablet Take 10 mg by mouth Daily.     • loratadine (CLARITIN) 10 MG tablet Take 10 mg by mouth Daily.     • magnesium oxide (MAGOX) 400 (241.3 Mg) MG tablet tablet Take 400 mg by mouth Daily.     • nitroglycerin (NITROSTAT) 0.4 MG SL tablet Place 0.4 mg under the tongue Every 5 (Five) Minutes As Needed for Chest Pain. Take no more than 3 doses in 15 minutes.     • omeprazole (priLOSEC) 20 MG capsule Take 20 mg by mouth Daily.     • ondansetron ODT (ZOFRAN-ODT) 4 MG disintegrating tablet Place 1 tablet on the tongue Every 6 (Six) Hours As Needed for Nausea or Vomiting for up to 15 doses. 15 tablet 0   • pravastatin (PRAVACHOL) 20 MG tablet Take 20 mg by mouth Daily.     • sodium chloride 0.9 % nebulizer solution 0.82 mL with albuterol (5 MG/ML) 0.5% nebulizer solution 0.9 mg Take 15 mg/hr by nebulization As Needed.     • torsemide (DEMADEX) 20 MG tablet Take 20 mg by mouth Daily. 2 tabs       Allergies   Allergen Reactions   • Cardizem [Diltiazem] Other (See Comments)     \"unknown\"   • Codeine GI Intolerance   • Morphine Other (See Comments)     \"unknown\"   • Percocet " "[Oxycodone-Acetaminophen] Other (See Comments)     \"unknown\"     • Sulfa Antibiotics Unknown (See Comments)     Unknown         Social History     Socioeconomic History   • Marital status:    Tobacco Use   • Smoking status: Never Smoker   • Smokeless tobacco: Never Used   Substance and Sexual Activity   • Alcohol use: No   • Drug use: No   • Sexual activity: Defer     History reviewed. No pertinent family history.         Objective:     Vital Sign Min/Max for last 24 hours  Temp  Min: 97.9 °F (36.6 °C)  Max: 98.3 °F (36.8 °C)   BP  Min: 95/61  Max: 120/50   Pulse  Min: 82  Max: 96   Resp  Min: 16  Max: 20   SpO2  Min: 88 %  Max: 99 %   Flow (L/min)  Min: 2  Max: 2      Intake/Output Summary (Last 24 hours) at 3/12/2022 1657  Last data filed at 3/12/2022 0955  Gross per 24 hour   Intake --   Output 625 ml   Net -625 ml           Vitals:    03/12/22 1619   BP: 106/57   Pulse: 82   Resp: 18   Temp: 98 °F (36.7 °C)   SpO2: 97%       CONSTITUTIONAL: Well-nourished. In no acute distress.   SKIN: Warm and dry. No rashes noted  HEENT: Head is normocephalic and atraumatic. Mucous membranes are pink and moist.   NECK: Supple without masses or thyromegaly.   LUNGS: Normal effort.  Mild rhonchi bilaterally, slightly decreased breath sounds at the right base  CARDIOVASCULAR: Irregularly irregular rate and rhythm with a normal S1 and S2. There is no gallop, rub, or click appreciated.  Systolic murmur at the sternal border  PERIPHERAL VASCULAR: Carotid upstroke is 2+ bilaterally and without bruits. Radial pulses are 2+ bilaterally. There is mild lower extremity edema.   ABDOMEN: Normal bowel sounds.  Soft with no tenderness with palpitation. No hepatosplenomegaly  MUSCULOSKELETAL:  No digital cyanosis  NEUROLOGICAL: Nonfocal.  PSYCHIATRIC: Alert, orientated, appropriate affect and mood    Labs, results reviewed by myself:  Lab Results   Component Value Date    CKTOTAL 39 03/10/2022    TROPONINT 0.025 03/10/2022 "       Glucose   Date Value Ref Range Status   03/12/2022 164 (H) 65 - 99 mg/dL Final     BUN   Date Value Ref Range Status   03/12/2022 21 8 - 23 mg/dL Final     Creatinine   Date Value Ref Range Status   03/12/2022 1.05 (H) 0.57 - 1.00 mg/dL Final     Sodium   Date Value Ref Range Status   03/12/2022 136 136 - 145 mmol/L Final     Potassium   Date Value Ref Range Status   03/12/2022 3.9 3.5 - 5.2 mmol/L Final     Chloride   Date Value Ref Range Status   03/12/2022 95 (L) 98 - 107 mmol/L Final     CO2   Date Value Ref Range Status   03/12/2022 32.0 (H) 22.0 - 29.0 mmol/L Final     Calcium   Date Value Ref Range Status   03/12/2022 8.3 8.2 - 9.6 mg/dL Final     Total Protein   Date Value Ref Range Status   03/10/2022 5.8 (L) 6.0 - 8.5 g/dL Final     Albumin   Date Value Ref Range Status   03/10/2022 3.90 3.50 - 5.20 g/dL Final     ALT (SGPT)   Date Value Ref Range Status   03/10/2022 9 1 - 33 U/L Final     AST (SGOT)   Date Value Ref Range Status   03/10/2022 12 1 - 32 U/L Final     Alkaline Phosphatase   Date Value Ref Range Status   03/10/2022 132 (H) 39 - 117 U/L Final     Total Bilirubin   Date Value Ref Range Status   03/10/2022 0.3 0.0 - 1.2 mg/dL Final     BUN/Creatinine Ratio   Date Value Ref Range Status   03/12/2022 20.0 7.0 - 25.0 Final     Anion Gap   Date Value Ref Range Status   03/12/2022 9.0 5.0 - 15.0 mmol/L Final       No results found for: CHOL  No results found for: TRIG  No results found for: HDL  No results found for: LDL  No components found for: LDLDIRECTC      WBC   Date Value Ref Range Status   03/11/2022 6.50 3.40 - 10.80 10*3/mm3 Final     RBC   Date Value Ref Range Status   03/11/2022 3.01 (L) 3.77 - 5.28 10*6/mm3 Final     Hemoglobin   Date Value Ref Range Status   03/11/2022 9.0 (L) 12.0 - 15.9 g/dL Final     Hematocrit   Date Value Ref Range Status   03/11/2022 28.9 (L) 34.0 - 46.6 % Final     MCV   Date Value Ref Range Status   03/11/2022 96.0 79.0 - 97.0 fL Final     MCH   Date Value  Ref Range Status   03/11/2022 29.9 26.6 - 33.0 pg Final     MCHC   Date Value Ref Range Status   03/11/2022 31.1 (L) 31.5 - 35.7 g/dL Final     RDW   Date Value Ref Range Status   03/11/2022 14.6 12.3 - 15.4 % Final     RDW-SD   Date Value Ref Range Status   03/11/2022 51.1 37.0 - 54.0 fl Final     MPV   Date Value Ref Range Status   03/11/2022 9.8 6.0 - 12.0 fL Final     Platelets   Date Value Ref Range Status   03/11/2022 267 140 - 450 10*3/mm3 Final     Neutrophil %   Date Value Ref Range Status   03/10/2022 78.2 (H) 42.7 - 76.0 % Final     Lymphocyte %   Date Value Ref Range Status   03/10/2022 10.0 (L) 19.6 - 45.3 % Final     Monocyte %   Date Value Ref Range Status   03/10/2022 7.9 5.0 - 12.0 % Final     Eosinophil %   Date Value Ref Range Status   03/10/2022 2.9 0.3 - 6.2 % Final     Basophil %   Date Value Ref Range Status   03/10/2022 0.6 0.0 - 1.5 % Final     Immature Grans %   Date Value Ref Range Status   03/10/2022 0.4 0.0 - 0.5 % Final     Neutrophils, Absolute   Date Value Ref Range Status   03/10/2022 5.48 1.70 - 7.00 10*3/mm3 Final     Lymphocytes, Absolute   Date Value Ref Range Status   03/10/2022 0.70 0.70 - 3.10 10*3/mm3 Final     Monocytes, Absolute   Date Value Ref Range Status   03/10/2022 0.55 0.10 - 0.90 10*3/mm3 Final     Eosinophils, Absolute   Date Value Ref Range Status   03/10/2022 0.20 0.00 - 0.40 10*3/mm3 Final     Basophils, Absolute   Date Value Ref Range Status   03/10/2022 0.04 0.00 - 0.20 10*3/mm3 Final     Immature Grans, Absolute   Date Value Ref Range Status   03/10/2022 0.03 0.00 - 0.05 10*3/mm3 Final     nRBC   Date Value Ref Range Status   03/10/2022 0.0 0.0 - 0.2 /100 WBC Final         Diagnostic Data:    EKG: Atrial fibrillation, right bundle branch block    Tele: Atrial fibrillation         Assessment and Plan:     Problem list/assessment:    Acute respiratory failure with hypoxia (HCC)    Presence of cardiac pacemaker    Atrial fibrillation (HCC)    Hypothyroidism  (acquired)    HTN (hypertension)    HLD (hyperlipidemia)    CHF (congestive heart failure) (HCC)    Anemia    CHAPARRO (acute kidney injury) (HCC)    Acute hypoxemic respiratory failure (HCC)    Hyponatremia    Pleural effusion on right    PLAN:  1. Will review echocardiogram when available  2. Continue carvedilol, isosorbide mononitrate, statin, torsemide  3. Renal function has been labile.  Improved but blood pressure remains borderline on other current medications.  Would hold off restarting lisinopril for now  4. Not anticoagulated due to fall risk, age.  Continue aspirin  5. Repeat CXR in AM    Chilango Spaulding MD, MSc, FACC, [facility]  Interventional Cardiology  [facility]

## 2022-03-12 NOTE — THERAPY EVALUATION
Patient Name: Syeda Gomez  : 1927    MRN: [id number]                              Today's Date: 3/12/2022       Admit Date: 3/10/2022    Visit Dx:     ICD-10-CM ICD-9-CM   1. Hypoxia  R09.02 799.02   2. Pleural effusion, right  J90 511.9   3. Anemia, unspecified type  D64.9 285.9   4. Longstanding persistent atrial fibrillation (HCC)  I48.11 427.31   5. Presence of cardiac pacemaker  Z95.0 V45.01     Patient Active Problem List   Diagnosis   • Pneumonia due to COVID-19 virus   • Pancytopenia (HCC)   • Presence of cardiac pacemaker   • Atrial fibrillation (HCC)   • Prolonged QTc interval on ECG   • Hypothyroidism (acquired)   • HTN (hypertension)   • HLD (hyperlipidemia)   • Acute respiratory failure with hypoxia (HCC)   • Elective replacement indicated for pacemaker   • CHF (congestive heart failure) (HCC)   • Anemia   • CHAPARRO (acute kidney injury) (HCC)   • Acute hypoxemic respiratory failure (HCC)   • Hyponatremia   • Pleural effusion on right     Past Medical History:   Diagnosis Date   • A-fib (HCC)    • Cancer (HCC)    • Carpal tunnel syndrome    • CHF (congestive heart failure) (HCC)    • Disease of thyroid gland    • Hypertension      Past Surgical History:   Procedure Laterality Date   • APPENDECTOMY     • CARDIAC ELECTROPHYSIOLOGY PROCEDURE N/A 2021    Procedure: DEVICE IMPLANT;  Surgeon: Calvin Ventura MD;  Location: [facility] INVASIVE LOCATION;  Service: Cardiovascular;  Laterality: N/A;   • CATARACT EXTRACTION, BILATERAL     • CHOLECYSTECTOMY     • HYSTERECTOMY     • MASTECTOMY Left    • REPLACEMENT TOTAL KNEE BILATERAL     • RETINAL DETACHMENT SURGERY     • SHOULDER ROTATOR CUFF REPAIR Right    • TOTAL HIP ARTHROPLASTY Right       General Information     Row Name 22 1009          OT Time and Intention    Document Type evaluation  -TA     Mode of Treatment occupational therapy  -TA     Row Name 22 1009          General Information    Patient Profile Reviewed yes  -TA      Prior Level of Function independent:;all household mobility;gait;transfer;bed mobility;ADL's  -TA     Existing Precautions/Restrictions fall;oxygen therapy device and L/min  -TA     Barriers to Rehab none identified  -TA     Row Name 03/12/22 1009          Occupational Profile    Reason for Services/Referral (Occupational Profile) fxl decline from PLOF  -TA     Patient Goals (Occupational Profile) Return to PLOF  -TA     Row Name 03/12/22 1009          Living Environment    People in Home alone  Pt's DTR and GSon check on pt each evening  -TA     Row Name 03/12/22 1009          Home Main Entrance    Number of Stairs, Main Entrance two  -TA     Row Name 03/12/22 1009          Stairs Within Home, Primary    Number of Stairs, Within Home, Primary five  split level entry 5 up/5 down  -TA     Row Name 03/12/22 1009          Cognition    Orientation Status (Cognition) oriented x 4  -TA     Row Name 03/12/22 1009          Safety Issues, Functional Mobility    Safety Issues Affecting Function (Mobility) safety precautions follow-through/compliance  -TA     Impairments Affecting Function (Mobility) balance;endurance/activity tolerance;shortness of breath;strength  -TA           User Key  (r) = Recorded By, (t) = Taken By, (c) = Cosigned By    Initials Name Provider Type    TA Hugo Viveros OT Occupational Therapist                 Mobility/ADL's     Row Name 03/12/22 1009          Bed Mobility    Bed Mobility scooting/bridging;supine-sit;sit-supine  -TA     Scooting/Bridging [place] (Bed Mobility) minimum assist (75% patient effort);verbal cues  -TA     Supine-Sit [place] (Bed Mobility) standby assist;verbal cues  -TA     Sit-Supine [place] (Bed Mobility) minimum assist (75% patient effort);verbal cues  -TA     Bed Mobility, Safety Issues decreased use of legs for bridging/pushing;decreased use of arms for pushing/pulling  -TA     Assistive Device (Bed Mobility) head of bed elevated;bed rails  -TA      Row Name 03/12/22 1009          Transfers    Transfers sit-stand transfer  -TA     Sit-Stand [place] (Transfers) contact guard;verbal cues  VCs for safe hand placement  -TA     Row Name 03/12/22 1009          Sit-Stand Transfer    Assistive Device (Sit-Stand Transfers) walker, front-wheeled  -TA     Row Name 03/12/22 1009          Functional Mobility    Functional Mobility- Ind. Level contact guard assist;verbal cues required  -TA     Functional Mobility- Device rolling walker  -TA     Functional Mobility-Distance (Feet) 8  4 forw, 4 rev  -TA     Functional Mobility- Safety Issues step length decreased;supplemental O2  -TA     Row Name 03/12/22 1009          Activities of Daily Living    BADL Assessment/Intervention lower body dressing;grooming  -TA     [facility] Name 03/12/22 1009          Lower Body Dressing Assessment/Training    [place] Level (Lower Body Dressing) don;doff;socks;maximum assist (25% patient effort)  -TA     Position (Lower Body Dressing) edge of bed sitting  -TA     Row Name 03/12/22 1009          Grooming Assessment/Training    [place] Level (Grooming) wash face, hands;set up;supervision  -TA     Position (Grooming) sitting up in bed  -TA           User Key  (r) = Recorded By, (t) = Taken By, (c) = Cosigned By    Initials Name Provider Type    TA Hugo Viveros, OT Occupational Therapist               Obj/Interventions     [facility] Name 03/12/22 1009          Sensory Assessment (Somatosensory)    Sensory Assessment (Somatosensory) right UE;sensation intact;left UE  -TA     Left UE Sensory Assessment light touch awareness;impaired  -TA     [facility] Name 03/12/22 1009          Vision Assessment/Intervention    Visual Impairment/Limitations WFL;corrective lenses full-time  -TA     Row Name 03/12/22 1009          Range of Motion Comprehensive    General Range of Motion bilateral upper extremity ROM WFL  -TA     Comment, General Range of Motion BUE WFL  -TA     Row Name 03/12/22 1009           Strength Comprehensive (MMT)    General Manual Muscle Testing (MMT) Assessment upper extremity strength deficits identified  -TA     Comment, General Manual Muscle Testing (MMT) Assessment BUE 3+/5  -TA     Row Name 03/12/22 1009          Balance    Balance Assessment sitting dynamic balance;standing static balance  -TA     Dynamic Sitting Balance contact guard  -TA     Position, Sitting Balance sitting edge of bed  -TA     Static Standing Balance contact guard  -TA     Position/Device Used, Standing Balance supported;walker, front-wheeled  -TA     Balance Interventions sit to stand;occupation based/functional task;weight shifting activity  -TA     Row Name 03/12/22 1009          Hip (Therapeutic Exercise)    Hip (Therapeutic Exercise) AROM (active range of motion)  -TA     Hip AROM (Therapeutic Exercise) left;right;flexion;extension;5 repetitions;sitting  -TA     Row Name 03/12/22 1009          Knee (Therapeutic Exercise)    Knee (Therapeutic Exercise) AROM (active range of motion)  -TA     Knee AROM (Therapeutic Exercise) left;right;SAQ (short arc quad);5 repetitions  -TA           User Key  (r) = Recorded By, (t) = Taken By, (c) = Cosigned By    Initials Name Provider Type    TA Hugo Viveros OT Occupational Therapist               Goals/Plan     Row Name 03/12/22 1009          Transfer Goal 1 (OT)    Activity/Assistive Device (Transfer Goal 1, OT) sit-to-stand/stand-to-sit;bed-to-chair/chair-to-bed;toilet;walker, rolling  -TA     [place] Level/Cues Needed (Transfer Goal 1, OT) modified independence  -TA     Time Frame (Transfer Goal 1, OT) by discharge  -TA     Progress/Outcome (Transfer Goal 1, OT) goal ongoing  -TA     Row Name 03/12/22 1009          Dressing Goal 1 (OT)    Activity/Device (Dressing Goal 1, OT) lower body dressing  -TA     [place]/Cues Needed (Dressing Goal 1, OT) modified independence  -TA     Time Frame (Dressing Goal 1, OT) by discharge  -TA     Progress/Outcome (Dressing  Goal 1, OT) goal ongoing  -TA     Row Name 03/12/22 1009          Toileting Goal 1 (OT)    Activity/Device (Toileting Goal 1, OT) adjust/manage clothing;perform perineal hygiene  -TA     [place] Level/Cues Needed (Toileting Goal 1, OT) modified independence  -TA     Time Frame (Toileting Goal 1, OT) by discharge  -TA     Progress/Outcome (Toileting Goal 1, OT) goal ongoing  -TA     Row Name 03/12/22 1009          Strength Goal 1 (OT)    Strength Goal 1 (OT) Pt will increase BUE MMS by 1/2 MMG to support fxl I with ADLs  -TA     Time Frame (Strength Goal 1, OT) by discharge  -TA     Progress/Outcome (Strength Goal 1, OT) goal ongoing  -TA     Row Name 03/12/22 1009          Therapy Assessment/Plan (OT)    Planned Therapy Interventions (OT) activity tolerance training;BADL retraining;functional balance retraining;occupation/activity based interventions;patient/caregiver education/training;ROM/therapeutic exercise;strengthening exercise;transfer/mobility retraining  -TA           User Key  (r) = Recorded By, (t) = Taken By, (c) = Cosigned By    Initials Name Provider Type    TA Hugo Viveros, OT Occupational Therapist               Clinical Impression     Row Name 03/12/22 1009          Pain Assessment    Pretreatment Pain Rating 0/10 - no pain  -TA     Posttreatment Pain Rating 0/10 - no pain  -TA     Pre/Posttreatment Pain Comment Pt denied pain, tolerated  -TA     Pain Intervention(s) Ambulation/increased activity;Repositioned  -TA     Row Name 03/12/22 1009          Plan of Care Review    Plan of Care Reviewed With patient;daughter  -TA     Outcome Evaluation VSS; Pt presents with fxl decline from PLOF, deficits in ADL performance, fxl mobility, occupational endurance. Pt limited by SOA, generalized weakness, decreased balance. Pt required Min A/CGA with bed mobility, CGA STS, CGA/RW to ambulate 8'. Pt completes grooming with set up, LBD with Max A. Completed BLE ex's at EOB x 5 reps all ex's.  Sugey from skilled OT services to address deficits, facilitate increased fxl I. Recommend SNF for rehab at discharge.  -TA     Row Name 03/12/22 1009          Therapy Assessment/Plan (OT)    Patient/Family Therapy Goal Statement (OT) Get stronger  -TA     Rehab Potential (OT) good, to achieve stated therapy goals  -TA     Criteria for Skilled Therapeutic Interventions Met (OT) yes;skilled treatment is necessary  -TA     Therapy Frequency (OT) daily  -TA     Predicted Duration of Therapy Intervention (OT) 1 week  -TA     Row Name 03/12/22 1009          Therapy Plan Review/Discharge Plan (OT)    Anticipated Discharge Disposition (OT) skilled nursing facility  rehab  -TA     Row Name 03/12/22 1009          Vital Signs    Pre Systolic BP Rehab --  VSS; RN cleared pt for tx  -TA     Pre SpO2 (%) 97  -TA     O2 Delivery Pre Treatment supplemental O2  -TA     Intra SpO2 (%) 90  -TA     O2 Delivery Intra Treatment supplemental O2  -TA     Post SpO2 (%) 95  -TA     O2 Delivery Post Treatment supplemental O2  -TA     Pre Patient Position Supine  -TA     Intra Patient Position Standing  -TA     Post Patient Position Supine  -TA     Row Name 03/12/22 1009          Positioning and Restraints    Pre-Treatment Position in bed  -TA     Post Treatment Position bed  -TA     In Bed notified nsg;supine;call light within reach;encouraged to call for assist;exit alarm on;with family/caregiver;side rails up x2;legs elevated  -TA           User Key  (r) = Recorded By, (t) = Taken By, (c) = Cosigned By    Initials Name Provider Type    Hugo Vela, OT Occupational Therapist               Outcome Measures     Row Name 03/12/22 1009          How much help from another is currently needed...    Putting on and taking off regular lower body clothing? 2  -TA     Bathing (including washing, rinsing, and drying) 2  -TA     Toileting (which includes using toilet bed pan or urinal) 3  -TA     Putting on and taking off regular upper  body clothing 3  -TA     Taking care of personal grooming (such as brushing teeth) 3  -TA     Eating meals 4  -TA     AM-PAC 6 Clicks Score (OT) 17  -TA     Row Name 03/12/22 1009          Functional Assessment    Outcome Measure Options AM-PAC 6 Clicks Daily Activity (OT)  -TA           User Key  (r) = Recorded By, (t) = Taken By, (c) = Cosigned By    Initials Name Provider Type    TA Hugo Viveros OT Occupational Therapist                Occupational Therapy Education                 Title: PT OT SLP Therapies (In Progress)     Topic: Occupational Therapy (In Progress)     Point: ADL training (Not Started)     Description:   Instruct learner(s) on proper safety adaptation and remediation techniques during self care or transfers.   Instruct in proper use of assistive devices.              Learner Progress:  Not documented in this visit.          Point: Home exercise program (Not Started)     Description:   Instruct learner(s) on appropriate technique for monitoring, assisting and/or progressing therapeutic exercises/activities.              Learner Progress:  Not documented in this visit.          Point: Precautions (Done)     Description:   Instruct learner(s) on prescribed precautions during self-care and functional transfers.              Learning Progress Summary           Patient Acceptance, E, VU,NR by TA at 3/12/2022 1058                   Point: Body mechanics (Done)     Description:   Instruct learner(s) on proper positioning and spine alignment during self-care, functional mobility activities and/or exercises.              Learning Progress Summary           Patient Acceptance, E, VU,NR by TA at 3/12/2022 1058                               User Key     Initials Effective Dates Name Provider Type Discipline    TA 06/16/21 -  Hugo Viveros OT Occupational Therapist OT              OT Recommendation and Plan  Planned Therapy Interventions (OT): activity tolerance training, BADL retraining,  functional balance retraining, occupation/activity based interventions, patient/caregiver education/training, ROM/therapeutic exercise, strengthening exercise, transfer/mobility retraining  Therapy Frequency (OT): daily  Plan of Care Review  Plan of Care Reviewed With: patient, daughter  Outcome Evaluation: VSS; Pt presents with fxl decline from PLOF, deficits in ADL performance, fxl mobility, occupational endurance. Pt limited by SOA, generalized weakness, decreased balance. Pt required Min A/CGA with bed mobility, CGA STS, CGA/RW to ambulate 8'. Pt completes grooming with set up, LBD with Max A. Completed BLE ex's at EOB x 5 reps all ex's. Willbenefit from skilled OT services to address deficits, facilitate increased fxl I. Recommend SNF for rehab at discharge.     Time Calculation:    Time Calculation- OT     Row Name 03/12/22 1009             Time Calculation- OT    OT Start Time 1009  ttc 9 minutes  -TA      Total Timed Code Minutes- OT 9 minute(s)  -TA      OT Received On 03/12/22  -TA      OT Goal Re-Cert Due Date 03/22/22  -TA              Untimed Charges    OT Eval/Re-eval Minutes 47  -TA              Total Minutes    Untimed Charges Total Minutes 47  -TA       Total Minutes 47  -TA            User Key  (r) = Recorded By, (t) = Taken By, (c) = Cosigned By    Initials Name Provider Type    TA Hugo Viveros OT Occupational Therapist              Therapy Charges for Today     Code Description Service Date Service Provider Modifiers Qty    [id number]  OT EVAL MOD COMPLEXITY 4 3/12/2022 Hugo Viveros OT GO 1    [id number]  OT THERAPEUTIC ACT EA 15 MIN 3/12/2022 Hugo Viveros OT GO 1               Hugo Viveros OT  3/12/2022

## 2022-03-12 NOTE — PLAN OF CARE
Goal Outcome Evaluation:            Pt slept well all night with RR even and unlabored, sats 95-98% on 2 liters. Turned q 2, no pain overnight afebrile with stable vital signs. CTM

## 2022-03-12 NOTE — PROGRESS NOTES
[facility] Medicine Services  PROGRESS NOTE    Patient Name: Syeda Gomez  : 1927  MRN: [id number]    Date of Admission: 3/10/2022  Primary Care Physician: Omayra Alcazar DO    Subjective   Subjective     CC:   Shortness of breath    HPI:   Daughter in room today.  Patient denied shortness of breath.  Had Thoracentesis yesterday.   She said the IR doctor said that it may return.  She is coughing today and does not appear to have good inspiratory capacity.  I did not see an incentive spirometer in room and I told them I would get one    ROS:  Gen-  Denies chills, denies fevers  CV-  No chest pain  Resp- + cough, dyspnea  GI- No N/V/D, abd pain        Objective   Objective     Vital Signs:   Temp:  [97.9 °F (36.6 °C)-98.3 °F (36.8 °C)] 98 °F (36.7 °C)  Heart Rate:  [82-96] 82  Resp:  [16-20] 18  BP: ()/(44-75) 106/57  Flow (L/min):  [2] 2     Physical Exam:    Constitutional: awake, no acute distress  HENT: NCAT, dry tongue  Respiratory: poor inspiratory effort, no wheezes, cough with deep inspiration  Cardiovascular: RRR, s1 and s2  Gastrointestinal: Positive bowel sounds, soft, nontender, nondistended  Musculoskeletal: No bilateral ankle edema  Psychiatric: Appropriate affect, cooperative  Neurologic: Oriented x 3, non focal, speech clear  Skin: dry, + skin tenting      Results Reviewed:  LAB RESULTS:      Lab 03/11/22  0822 03/10/22  2121   WBC 6.50 7.00   HEMOGLOBIN 9.0* 8.8*   HEMATOCRIT 28.9* 27.5*   PLATELETS 267 283   NEUTROS ABS  --  5.48   IMMATURE GRANS (ABS)  --  0.03   LYMPHS ABS  --  0.70   MONOS ABS  --  0.55   EOS ABS  --  0.20   MCV 96.0 92.3   LACTATE  --  0.9     --    PROTIME 15.3*  --          Lab 22  0342 2222 03/10/22  2121   SODIUM 136 136 133*   POTASSIUM 3.9 3.5 3.8   CHLORIDE 95* 95* 93*   CO2 32.0* 30.0* 30.0*   ANION GAP 9.0 11.0 10.0   BUN 21 26* 26*   CREATININE 1.05* 1.16* 1.59*   EGFR 49.0* 43.5* 29.8*   GLUCOSE  164* 187* 231*   CALCIUM 8.3 8.7 8.8   HEMOGLOBIN A1C  --  6.20*  --    TSH  --  11.330*  --          Lab 03/10/22  2121   TOTAL PROTEIN 5.8*   ALBUMIN 3.90   GLOBULIN 1.9   ALT (SGPT) 9   AST (SGOT) 12   BILIRUBIN 0.3   ALK PHOS 132*         Lab 03/11/22  0822 03/10/22  2121   PROBNP  --  2,352.0*   TROPONIN T  --  0.025   PROTIME 15.3*  --    INR 1.25*  --              Lab 03/10/22  2203   IRON 36*   IRON SATURATION 12*   TIBC 308   TRANSFERRIN 207   FERRITIN 832.20*         Brief Urine Lab Results  (Last result in the past 365 days)      Color   Clarity   Blood   Leuk Est   Nitrite   Protein   CREAT   Urine HCG        03/11/22 0234 Yellow   Clear   Negative   Small (1+)   Negative   Negative                 Microbiology Results Abnormal     Procedure Component Value - Date/Time    Body Fluid Culture - Body Fluid, Pleural Cavity [[id number]] Collected: 03/11/22 1110    Lab Status: Preliminary result Specimen: Body Fluid from Pleural Cavity Updated: 03/12/22 0706     Body Fluid Culture No growth     Gram Stain Moderate (3+) WBCs seen      No organisms seen    Blood Culture - Blood, Arm, Left [[id number]]  (Normal) Collected: 03/10/22 2200    Lab Status: Preliminary result Specimen: Blood from Arm, Left Updated: 03/11/22 2233     Blood Culture No growth at 24 hours    Blood Culture - Blood, Arm, Right [[id number]]  (Normal) Collected: 03/10/22 2155    Lab Status: Preliminary result Specimen: Blood from Arm, Right Updated: 03/11/22 2233     Blood Culture No growth at 24 hours    COVID PRE-OP / PRE-PROCEDURE SCREENING ORDER (NO ISOLATION) - Swab, Nasopharynx [[id number]]  (Normal) Collected: 03/11/22 0829    Lab Status: Final result Specimen: Swab from Nasopharynx Updated: 03/11/22 0831    Narrative:      The following orders were created for panel order COVID PRE-OP / PRE-PROCEDURE SCREENING ORDER (NO ISOLATION) - Swab, Nasopharynx.  Procedure                               Abnormality         Status                      ---------                               -----------         ------                     COVID-19 and FLU A/B PCR...[[id number]]  Normal              Final result                 Please view results for these tests on the individual orders.    COVID-19 and FLU A/B PCR - Swab, Nasopharynx [[id number]]  (Normal) Collected: 03/11/22 0829    Lab Status: Final result Specimen: Swab from Nasopharynx Updated: 03/11/22 0831     COVID19 Not Detected     Influenza A PCR Not Detected     Influenza B PCR Not Detected    Narrative:      Fact sheet for providers: https://www.fda.gov/media/099033/download    Fact sheet for patients: https://www.fda.gov/media/548359/download    Test performed by PCR.    Respiratory Panel PCR w/COVID-19(SARS-CoV-2) VINICIO/RAGINI/YVETTE/PAD/COR/MAD/ASHKAN In-House, NP Swab in UTM/VTM, 3-4 HR TAT - Swab, Nasopharynx [[id number]]  (Normal) Collected: 03/10/22 2334    Lab Status: Final result Specimen: Swab from Nasopharynx Updated: 03/11/22 0321     ADENOVIRUS, PCR Not Detected     Coronavirus 229E Not Detected     Coronavirus HKU1 Not Detected     Coronavirus NL63 Not Detected     Coronavirus OC43 Not Detected     COVID19 Not Detected     Human Metapneumovirus Not Detected     Human Rhinovirus/Enterovirus Not Detected     Influenza A PCR Not Detected     Influenza B PCR Not Detected     Parainfluenza Virus 1 Not Detected     Parainfluenza Virus 2 Not Detected     Parainfluenza Virus 3 Not Detected     Parainfluenza Virus 4 Not Detected     RSV, PCR Not Detected     Bordetella pertussis pcr Not Detected     Bordetella parapertussis PCR Not Detected     Chlamydophila pneumoniae PCR Not Detected     Mycoplasma pneumo by PCR Not Detected    Narrative:      In the setting of a positive respiratory panel with a viral infection PLUS a negative procalcitonin without other underlying concern for bacterial infection, consider observing off antibiotics or discontinuation of antibiotics and continue supportive care. If the  respiratory panel is positive for atypical bacterial infection (Bordetella pertussis, Chlamydophila pneumoniae, or Mycoplasma pneumoniae), consider antibiotic de-escalation to target atypical bacterial infection.          XR Chest 1 View    Result Date: 3/11/2022  DATE OF EXAM: 3/11/2022 10:47 AM  PROCEDURE: XR CHEST 1 VW-  INDICATIONS: s/p thoracentesis; R09.02-Hypoxemia; M29-Ynkjgnl effusion, not elsewhere classified; D64.9-Anemia, unspecified; I48.11-Longstanding persistent atrial fibrillation; Z95.0-Presence of cardiac pacemaker  COMPARISON: 3/10/2022  TECHNIQUE: Single radiographic AP view of the chest was obtained.  FINDINGS: Right-sided pacemaker and left-sided Port-A-Cath are again noted. Heart is enlarged. Vasculature appears at upper limits of normal. Right pleural effusion has been drained. There is some persistent elevation of the right hemidiaphragm and mild right basilar atelectasis. There is no evidence of pneumothorax. Left lung remains grossly clear.      Impression: Interval drainage of right effusion. No evidence of pneumothorax.  This report was finalized on 3/11/2022 10:58 AM by Dr. Devonte Darnell MD.      XR Chest 1 View    Result Date: 3/10/2022   DATE OF EXAM: 3/10/2022 8:29 PM  PROCEDURE: XR CHEST 1 VW-  INDICATIONS: SOA triage protocol  COMPARISON: 10/23/2020  TECHNIQUE: Portable chest radiograph.  FINDINGS:  There is a left-sided port catheter with the tip at the mid SVC. There is a large right-sided pleural effusion noted. Right basilar airspace disease likely compressive atelectasis of the right middle and lower lobe. No effusion on the left. No pneumothorax. There is an AICD noted. Surgical anchor overlies the right humeral head.      Impression: 1. Large right-sided pleural effusion likely resulting in right middle and lower lobe compressive atelectasis. 2. Clear left lung.  This report was finalized on 3/10/2022 8:41 PM by Jostin Fagan MD.      US Thoracentesis    Result Date:  3/11/2022  US THORACENTESIS-                                                         History: US THORACENTESIS-                                                : Sergio Persaud MD.  Assistant:  None.                                                                             Modality: Ultrasound                                                                                                          Sedation: None.  Anesthesia: Lidocaine buffered with bicarbonate, local infiltration.          Estimated blood loss:  < 5 cc.          Technique: Discussion of risks, benefits, and alternatives were made with the patient. The patient expressed understanding and agreed to proceed. Informed written consent was obtained. A universal timeout was performed prior to starting the procedure. Maximal sterile precautions were utilized. The procedure room personnel used personal protective equipment. The operators additionally used sterile surgical gloves.  A preliminary ultrasonography was performed. It showed a pleural effusion. A low intercostal access site was selected for access. Pertinent ultrasound images were stored to the PACS for documentation. The patient position was optimized for the performance of thoracentesis. The access site was sterilely prepped and draped. Local anesthesia was administered. A catheter over the needle system was advanced into the pleura. Straw colored fluid was aspirated and the plastic catheter advanced into the pleural space. The catheter was then connected to a fluid recovery system. At the end of the procedure, the catheter was withdrawn and an aseptic dressing applied. The patient tolerated the procedure well.  Postprocedure chest x-ray is pending.  After uneventful recovery recovery, the patient was discharged from the department in stable condition.           Complications: None immediate.  Specimen: Sent to the lab.                                                                   Impression: Impression:   Successful ultrasound-guided thoracentesis of right pleural effusion with recovery of 1.6 liters of pleural fluid.                                                           Thank you for the opportunity to assist in the care of your patient.  This report was finalized on 3/11/2022 12:54 PM by Sergio Persaud MD.            I have reviewed the medications:  Scheduled Meds:budesonide, 0.5 mg, Nebulization, BID - RT  carvedilol, 12.5 mg, Oral, BID With Meals  ipratropium-albuterol, 3 mL, Nebulization, Q6H While Awake - RT  [START ON 3/13/2022] isosorbide mononitrate, 30 mg, Oral, Daily  levothyroxine, 50 mcg, Oral, Daily  pantoprazole, 40 mg, Oral, QAM  pharmacy consult - [facility], , Does not apply, Daily  pravastatin, 20 mg, Oral, Daily  sodium chloride, 10 mL, Intravenous, Q12H  torsemide, 20 mg, Oral, Daily      Continuous Infusions:   PRN Meds:.•  acetaminophen  •  guaiFENesin  •  melatonin  •  ondansetron  •  sodium chloride  •  sodium chloride    Assessment/Plan   Assessment & Plan     Active Hospital Problems    Diagnosis  POA   • **Acute respiratory failure with hypoxia (HCC) [J96.01]  Yes   • CHF (congestive heart failure) (HCC) [I50.9]  Yes   • Anemia [D64.9]  Yes   • CHAPARRO (acute kidney injury) (HCC) [N17.9]  Yes   • Acute hypoxemic respiratory failure (HCC) [J96.01]  Yes   • Hyponatremia [E87.1]  Yes   • Pleural effusion on right [J90]  Yes   • Hypothyroidism (acquired) [E03.9]  Yes   • HTN (hypertension) [I10]  Yes   • HLD (hyperlipidemia) [E78.5]  Yes   • Presence of cardiac pacemaker [Z95.0]  Yes   • Atrial fibrillation (HCC) [I48.91]  Yes      Resolved Hospital Problems   No resolved problems to display.        Brief Hospital Course to date:    Syeda Gomez is a 89 y.o. female with a past medical history of breast cancer s/p left mastectomy 33 years ago, atrial fibrillation, HTN, HLD, CHF s/p PPM, hypothyroidism, and chronic idiopathic iron deficiency anemia that presented to the  ED complaining of 1 week of ongoing shortness of air. She states she has also been more fatigued than her baseline. The patient's daughter is at the bedside and states the patient has required multiple iron and blood transfusions due to anemia over the past year. She states they thought her shortness of air was due to worsened anemia again. The patient was, however, found to have a large pleural effusion in the ED.    Acute Respiratory Failure with Hypoxia  Large right pleural effusion  Acute on Chronic CHF  Bronchospasm  - thoracentesis done yesterday by Dr. Persaud by US guidance  - obtain echocardiogram - Lorenzo/Christine to read - pending  - decrease frequency of scheduled DuoNebs  - back on oral torsemide but pressures have been marginal concerning for overdiuresis     Hypotension  - decrease Imdur with diuresis    Anemia:  - stable  - iron sat 12%     CHAPARRO:  - improved with diuresis  - holding lisinopril     Hyponatremia:  - resolved    Atrial fibrillation:  - stable and rate controlled  - not anticoagulated due to fall risk, age on ASA only      HTN/HLD:  - continue statin, imdur     Hypothyroidism:  - free T4 1.31    CXR PA/Lateral in AM  Incentive Spirometer  Cough medication    DVT prophylaxis:  Mechanical DVT prophylaxis orders are present.       AM-PAC 6 Clicks Score (PT): 19 (03/11/22 1550)    Disposition: I expect the patient to be discharged home TBD    CODE STATUS:   Code Status and Medical Interventions:   Ordered at: 03/10/22 7226     Medical Intervention Limits:    NO intubation (DNI)     Level Of Support Discussed With:    Patient     Code Status (Patient has no pulse and is not breathing):    No CPR (Do Not Attempt to Resuscitate)     Medical Interventions (Patient has pulse or is breathing):    Limited Support       Félix Hernandez MD  03/12/22

## 2022-03-12 NOTE — PLAN OF CARE
Goal Outcome Evaluation:  Plan of Care Reviewed With: patient, daughter        Progress: no change  Outcome Evaluation: PT with c/o SOB with any activity today; MD aware, repeat chest xray ordered for AM. VSS, on 2L NC. +BM today. Robtiussin given for cough. Rehab vs. home at d/c per therapy.

## 2022-03-13 ENCOUNTER — APPOINTMENT (OUTPATIENT)
Dept: GENERAL RADIOLOGY | Facility: HOSPITAL | Age: 87
End: 2022-03-13

## 2022-03-13 LAB
BH CV ECHO MEAS - AI DEC SLOPE: 185 CM/SEC^2
BH CV ECHO MEAS - AI MAX PG: 37 MMHG
BH CV ECHO MEAS - AI MAX VEL: 304.1 CM/SEC
BH CV ECHO MEAS - AI P1/2T: 481.3 MSEC
BH CV ECHO MEAS - AO ROOT AREA (BSA CORRECTED): 1.9
BH CV ECHO MEAS - AO ROOT AREA: 8.6 CM^2
BH CV ECHO MEAS - AO ROOT DIAM: 3.3 CM
BH CV ECHO MEAS - BSA(HAYCOCK): 1.8 M^2
BH CV ECHO MEAS - BSA: 1.7 M^2
BH CV ECHO MEAS - BZI_BMI: 28 KILOGRAMS/M^2
BH CV ECHO MEAS - BZI_METRIC_HEIGHT: 160 CM
BH CV ECHO MEAS - BZI_METRIC_WEIGHT: 71.7 KG
BH CV ECHO MEAS - EDV(CUBED): 23.5 ML
BH CV ECHO MEAS - EDV(MOD-SP2): 32.7 ML
BH CV ECHO MEAS - EDV(MOD-SP4): 51.6 ML
BH CV ECHO MEAS - EDV(TEICH): 31.2 ML
BH CV ECHO MEAS - EF(CUBED): 72.6 %
BH CV ECHO MEAS - EF(MOD-BP): 53.9 %
BH CV ECHO MEAS - EF(MOD-SP2): 37.6 %
BH CV ECHO MEAS - EF(MOD-SP4): 62.8 %
BH CV ECHO MEAS - EF(TEICH): 66.1 %
BH CV ECHO MEAS - ESV(CUBED): 6.4 ML
BH CV ECHO MEAS - ESV(MOD-SP2): 20.4 ML
BH CV ECHO MEAS - ESV(MOD-SP4): 19.2 ML
BH CV ECHO MEAS - ESV(TEICH): 10.6 ML
BH CV ECHO MEAS - FS: 35.1 %
BH CV ECHO MEAS - IVS/LVPW: 0.95
BH CV ECHO MEAS - IVSD: 1.7 CM
BH CV ECHO MEAS - LA DIMENSION: 4.5 CM
BH CV ECHO MEAS - LA/AO: 1.4
BH CV ECHO MEAS - LAD MAJOR: 5.8 CM
BH CV ECHO MEAS - LATERAL E/E' RATIO: 9.6
BH CV ECHO MEAS - LV DIASTOLIC VOL/BSA (35-75): 29.5 ML/M^2
BH CV ECHO MEAS - LV MASS(C)D: 199.3 GRAMS
BH CV ECHO MEAS - LV MASS(C)DI: 113.9 GRAMS/M^2
BH CV ECHO MEAS - LV MAX PG: 2.7 MMHG
BH CV ECHO MEAS - LV MEAN PG: 1.5 MMHG
BH CV ECHO MEAS - LV SYSTOLIC VOL/BSA (12-30): 11 ML/M^2
BH CV ECHO MEAS - LV V1 MAX: 81.4 CM/SEC
BH CV ECHO MEAS - LV V1 MEAN: 57.1 CM/SEC
BH CV ECHO MEAS - LV V1 VTI: 21.1 CM
BH CV ECHO MEAS - LVIDD: 2.9 CM
BH CV ECHO MEAS - LVIDS: 1.9 CM
BH CV ECHO MEAS - LVLD AP2: 6.5 CM
BH CV ECHO MEAS - LVLD AP4: 7.3 CM
BH CV ECHO MEAS - LVLS AP2: 5.7 CM
BH CV ECHO MEAS - LVLS AP4: 5.9 CM
BH CV ECHO MEAS - LVOT AREA (M): 3.1 CM^2
BH CV ECHO MEAS - LVOT AREA: 3.2 CM^2
BH CV ECHO MEAS - LVOT DIAM: 2 CM
BH CV ECHO MEAS - LVPWD: 1.8 CM
BH CV ECHO MEAS - MEDIAL E/E' RATIO: 18.2
BH CV ECHO MEAS - MV E MAX VEL: 111 CM/SEC
BH CV ECHO MEAS - MV MAX PG: 14.6 MMHG
BH CV ECHO MEAS - MV MEAN PG: 5.2 MMHG
BH CV ECHO MEAS - MV V2 MAX: 191.1 CM/SEC
BH CV ECHO MEAS - MV V2 MEAN: 96.7 CM/SEC
BH CV ECHO MEAS - MV V2 VTI: 27.9 CM
BH CV ECHO MEAS - MVA(VTI): 2.4 CM^2
BH CV ECHO MEAS - PA ACC TIME: 0.17 SEC
BH CV ECHO MEAS - PA PR(ACCEL): 4.5 MMHG
BH CV ECHO MEAS - RAP SYSTOLE: 15 MMHG
BH CV ECHO MEAS - RVSP: 50 MMHG
BH CV ECHO MEAS - SI(CUBED): 9.7 ML/M^2
BH CV ECHO MEAS - SI(LVOT): 38.5 ML/M^2
BH CV ECHO MEAS - SI(MOD-SP2): 7 ML/M^2
BH CV ECHO MEAS - SI(MOD-SP4): 18.5 ML/M^2
BH CV ECHO MEAS - SI(TEICH): 11.8 ML/M^2
BH CV ECHO MEAS - SV(CUBED): 17 ML
BH CV ECHO MEAS - SV(LVOT): 67.3 ML
BH CV ECHO MEAS - SV(MOD-SP2): 12.3 ML
BH CV ECHO MEAS - SV(MOD-SP4): 32.4 ML
BH CV ECHO MEAS - SV(TEICH): 20.6 ML
BH CV ECHO MEAS - TAPSE (>1.6): 1.3 CM
BH CV ECHO MEAS - TR MAX PG: 35 MMHG
BH CV ECHO MEAS - TR MAX VEL: 297.1 CM/SEC
BH CV VAS BP LEFT ARM: NORMAL MMHG
BH CV XLRA - RV BASE: 4.1 CM
BH CV XLRA - RV LENGTH: 5.3 CM
BH CV XLRA - RV MID: 2.8 CM
BH CV XLRA - TDI S': 9.9 CM/SEC
LEFT ATRIUM VOLUME INDEX: 32.1 ML/M^2
LEFT ATRIUM VOLUME: 56.2 ML
MAXIMAL PREDICTED HEART RATE: 125 BPM
STRESS TARGET HR: 106 BPM

## 2022-03-13 PROCEDURE — 71045 X-RAY EXAM CHEST 1 VIEW: CPT

## 2022-03-13 PROCEDURE — 94799 UNLISTED PULMONARY SVC/PX: CPT

## 2022-03-13 PROCEDURE — 25010000002 FUROSEMIDE PER 20 MG: Performed by: HOSPITALIST

## 2022-03-13 PROCEDURE — 99232 SBSQ HOSP IP/OBS MODERATE 35: CPT | Performed by: HOSPITALIST

## 2022-03-13 PROCEDURE — 71046 X-RAY EXAM CHEST 2 VIEWS: CPT

## 2022-03-13 PROCEDURE — 94761 N-INVAS EAR/PLS OXIMETRY MLT: CPT

## 2022-03-13 PROCEDURE — 25010000002 ONDANSETRON PER 1 MG: Performed by: PHYSICIAN ASSISTANT

## 2022-03-13 RX ORDER — BENZONATATE 100 MG/1
100 CAPSULE ORAL EVERY 4 HOURS PRN
Status: DISCONTINUED | OUTPATIENT
Start: 2022-03-13 | End: 2022-03-19 | Stop reason: HOSPADM

## 2022-03-13 RX ORDER — FUROSEMIDE 10 MG/ML
20 INJECTION INTRAMUSCULAR; INTRAVENOUS ONCE
Status: COMPLETED | OUTPATIENT
Start: 2022-03-13 | End: 2022-03-13

## 2022-03-13 RX ORDER — ASPIRIN 81 MG/1
81 TABLET ORAL DAILY
Status: DISCONTINUED | OUTPATIENT
Start: 2022-03-13 | End: 2022-03-19 | Stop reason: HOSPADM

## 2022-03-13 RX ORDER — IPRATROPIUM BROMIDE AND ALBUTEROL SULFATE 2.5; .5 MG/3ML; MG/3ML
3 SOLUTION RESPIRATORY (INHALATION) EVERY 4 HOURS PRN
Status: DISCONTINUED | OUTPATIENT
Start: 2022-03-13 | End: 2022-03-19 | Stop reason: HOSPADM

## 2022-03-13 RX ADMIN — GUAIFENESIN 100 MG: 200 SOLUTION ORAL at 16:30

## 2022-03-13 RX ADMIN — ONDANSETRON 4 MG: 2 INJECTION INTRAMUSCULAR; INTRAVENOUS at 13:37

## 2022-03-13 RX ADMIN — BUDESONIDE 0.5 MG: 0.5 SUSPENSION RESPIRATORY (INHALATION) at 19:06

## 2022-03-13 RX ADMIN — TORSEMIDE 20 MG: 20 TABLET ORAL at 08:30

## 2022-03-13 RX ADMIN — Medication 5 MG: at 22:51

## 2022-03-13 RX ADMIN — FUROSEMIDE 20 MG: 10 INJECTION INTRAMUSCULAR; INTRAVENOUS at 13:37

## 2022-03-13 RX ADMIN — CARVEDILOL 12.5 MG: 12.5 TABLET, FILM COATED ORAL at 08:30

## 2022-03-13 RX ADMIN — ISOSORBIDE MONONITRATE 30 MG: 30 TABLET, EXTENDED RELEASE ORAL at 08:30

## 2022-03-13 RX ADMIN — CARVEDILOL 12.5 MG: 12.5 TABLET, FILM COATED ORAL at 16:30

## 2022-03-13 RX ADMIN — ASPIRIN 81 MG: 81 TABLET, COATED ORAL at 13:35

## 2022-03-13 RX ADMIN — BUDESONIDE 0.5 MG: 0.5 SUSPENSION RESPIRATORY (INHALATION) at 07:07

## 2022-03-13 RX ADMIN — IPRATROPIUM BROMIDE AND ALBUTEROL SULFATE 3 ML: .5; 2.5 SOLUTION RESPIRATORY (INHALATION) at 07:07

## 2022-03-13 RX ADMIN — BENZONATATE 100 MG: 100 CAPSULE ORAL at 13:35

## 2022-03-13 RX ADMIN — PRAVASTATIN SODIUM 20 MG: 20 TABLET ORAL at 08:30

## 2022-03-13 RX ADMIN — GUAIFENESIN 100 MG: 200 SOLUTION ORAL at 22:49

## 2022-03-13 RX ADMIN — Medication 10 ML: at 20:05

## 2022-03-13 RX ADMIN — Medication 10 ML: at 08:30

## 2022-03-13 RX ADMIN — LEVOTHYROXINE SODIUM 50 MCG: 50 TABLET ORAL at 05:47

## 2022-03-13 RX ADMIN — PANTOPRAZOLE SODIUM 40 MG: 40 TABLET, DELAYED RELEASE ORAL at 08:30

## 2022-03-13 NOTE — PROGRESS NOTES
[facility] Cardiology    Inpatient Progress Note      Chief Complaint/Reason for service:    Volume overload, heart failure         Subjective:       Patient resting in bed.  Feels worse today, more short of breath.  On 2 L nasal cannula.  Denies chest pain, palpitations.     Past medical, surgical, social and family history reviewed in the patient's electronic medical record.    Review of Systems:   Positive for shortness of breath, cough, fatigue  Negative for exertional chest pain, unusual dyspnea with exertion, lower extremity edema, palpitations    Problem List  Active Hospital Problems    Diagnosis  POA    **Acute respiratory failure with hypoxia (HCC) [J96.01]  Yes    CHF (congestive heart failure) ([facility]) [I50.9]  Yes    Anemia [D64.9]  Yes    CHAPARRO (acute kidney injury) ([facility]) [N17.9]  Yes    Acute hypoxemic respiratory failure (HCC) [J96.01]  Yes    Hyponatremia [E87.1]  Yes    Pleural effusion on right [J90]  Yes    Hypothyroidism (acquired) [E03.9]  Yes    HTN (hypertension) [I10]  Yes    HLD (hyperlipidemia) [E78.5]  Yes    Presence of cardiac pacemaker [Z95.0]  Yes    Atrial fibrillation (HCC) [I48.91]  Yes      Resolved Hospital Problems   No resolved problems to display.            Objective:      Infusions:        Medications:    Current Facility-Administered Medications:     acetaminophen (TYLENOL) tablet 650 mg, 650 mg, Oral, Q4H PRN, Karlee Billingsley PA-C, 650 mg at 03/11/22 1823    budesonide (PULMICORT) nebulizer solution 0.5 mg, 0.5 mg, Nebulization, BID - RT, Los Fan MD, 0.5 mg at 03/13/22 0707    carvedilol (COREG) tablet 12.5 mg, 12.5 mg, Oral, BID With Meals, Karlee Billingsley PA-C, 12.5 mg at 03/13/22 0830    guaiFENesin (ROBITUSSIN) 100 MG/5ML oral solution 100 mg, 100 mg, Oral, Q4H PRN, Félix Hernandez MD, 100 mg at 03/12/22 1807    ipratropium-albuterol (DUO-NEB) nebulizer solution 3 mL, 3 mL, Nebulization, Q6H While Awake - RT, Félix Hernandez MD, 3 mL at  03/13/22 0707    isosorbide mononitrate (IMDUR) 24 hr tablet 30 mg, 30 mg, Oral, Daily, Félix Hernandez MD, 30 mg at 03/13/22 0830    levothyroxine (SYNTHROID, LEVOTHROID) tablet 50 mcg, 50 mcg, Oral, Daily, Karlee Billingsley, PA-C, 50 mcg at 03/13/22 0547    melatonin tablet 5 mg, 5 mg, Oral, Nightly PRN, Lizeth Billingsleyia FRANCHESKA, PA-C    ondansetron (ZOFRAN) injection 4 mg, 4 mg, Intravenous, Q6H PRN, Karlee Billingsley, PA-C    pantoprazole (PROTONIX) EC tablet 40 mg, 40 mg, Oral, QAM, Karlee Billingsley, PA-C, 40 mg at 03/13/22 0830    Pharmacy Consult - [facility], , Does not apply, Daily, Aimee Tobias, PharmD    pravastatin (PRAVACHOL) tablet 20 mg, 20 mg, Oral, Daily, Karlee Billingsley, PA-C, 20 mg at 03/13/22 0830    sodium chloride 0.9 % flush 10 mL, 10 mL, Intravenous, PRN, TaylorIván MD    sodium chloride 0.9 % flush 10 mL, 10 mL, Intravenous, Q12H, Karlee Billingsley, PA-C, 10 mL at 03/13/22 0830    sodium chloride 0.9 % flush 10 mL, 10 mL, Intravenous, PRN, Karlee Billingsley, PA-C    torsemide (DEMADEX) tablet 20 mg, 20 mg, Oral, Daily, Rustam Billingsleynicia L, PA-C, 20 mg at 03/13/22 0830    Vital Sign Min/Max for last 24 hours  Temp  Min: 98 °F (36.7 °C)  Max: 98.1 °F (36.7 °C)   BP  Min: 96/56  Max: 123/55   Pulse  Min: 71  Max: 89   Resp  Min: 16  Max: 20   SpO2  Min: 94 %  Max: 97 %   No data recorded      Intake/Output Summary (Last 24 hours) at 3/13/2022 1004  Last data filed at 3/13/2022 0925  Gross per 24 hour   Intake --   Output 850 ml   Net -850 ml           CONSTITUTIONAL: No acute distress  RESPIRATORY: Normal effort. Mild rhonchi bilaterally, decreased breath sounds at the right base.   CARDIOVASCULAR: Irregularly irregular rate and rhythm with normal S1 and S2. Without gallop or rub. Normal radial pulse. Systolic murmur at the sternal border. There is mild lower extremity edema bilaterally.    Results reviewed by myself today including studies listed below:   Lab Results   Component Value Date     TROPONINT 0.025 03/10/2022       BUN   Date Value Ref Range Status   03/12/2022 21 8 - 23 mg/dL Final     Creatinine   Date Value Ref Range Status   03/12/2022 1.05 (H) 0.57 - 1.00 mg/dL Final     Potassium   Date Value Ref Range Status   03/12/2022 3.9 3.5 - 5.2 mmol/L Final     ALT (SGPT)   Date Value Ref Range Status   03/10/2022 9 1 - 33 U/L Final     AST (SGOT)   Date Value Ref Range Status   03/10/2022 12 1 - 32 U/L Final         Results for orders placed during the hospital encounter of 03/10/22    Adult Transthoracic Echo Complete w/ Color, Spectral and Contrast if necessary per protocol    Interpretation Summary  · Left ventricular ejection fraction appears to be 51 - 55%. Left ventricular systolic function is normal.  · Left ventricular wall thickness is consistent with severe concentric hypertrophy.  · Mildly reduced right ventricular systolic function noted.  · The right ventricular cavity is borderline dilated.  · The right atrial cavity is moderately dilated.  · There is mild calcification of the aortic valve.  · Mild aortic valve regurgitation is present.  · Moderate to severe tricuspid valve regurgitation is present.  · Estimated right ventricular systolic pressure from tricuspid regurgitation is moderately elevated (45-55 mmHg).    Tele:  Afib           Assessment/Plan:       ASSESSMENT:  Acute respiratory failure with hypoxia (HCC)  Presence of cardiac pacemaker  Atrial fibrillation (HCC)  Hypothyroidism (acquired)  HTN (hypertension)  HLD (hyperlipidemia)  CHF (congestive heart failure) (HCC)  Anemia  CHAPARRO (acute kidney injury) (HCC)  Acute hypoxemic respiratory failure (HCC)  Hyponatremia  Pleural effusion on right    PLAN:  CXR shows slightly worsened right pleural effusion. Repeat CXR tomorrow and consider repeat thoracentesis if needed.   Renal function stable, diurese with additional Lasix 20 mg IV x 1 today.  Repeat BMP in am.   Continue carvedilol, isosorbide mononitrate, statin,  torsemide  Continue to hold off lisinopril due to borderline hypotension and labile renal function.   Dr. Ventura to resume care tomorrow.     Electronically signed by GONZÁLEZ Guo, 03/13/22, 2:01 PM EDT.

## 2022-03-13 NOTE — PLAN OF CARE
Goal Outcome Evaluation:           Pt more SOB this shift, JHAVERI up to bathroom however is quick to recover. Plan for xray this am. VSS on 2 liters 02. CTM

## 2022-03-13 NOTE — PROGRESS NOTES
"Clinical Nutrition   Reason For Visit: RN consult for \"chronic PO intakes\"    Patient Name: Syeda Gomez  YOB: 1927  MRN: [id number]  Date of Encounter: 03/13/22 14:55 EDT  Admission date: 3/10/2022    Due to pt's age and poor appetite/likely inadequate sodium intake, pt and daughter request regular diet. RD concurs with this request and liberalized diet, will monitor to alter as indicated.     Nutrition Assessment     Admission Problem List:    Acute respiratory failure with hypoxia (HCC)    Presence of cardiac pacemaker    Atrial fibrillation (HCC)    Hypothyroidism (acquired)    HTN (hypertension)    HLD (hyperlipidemia)    CHF (congestive heart failure) (HCC)    Anemia    CHAPARRO (acute kidney injury) (HCC)    Acute hypoxemic respiratory failure (HCC)    Hyponatremia    Pleural effusion on right         Applicable PMH:        Applicable Nutrition-Related Information:        Applicable medical tests/procedures since admission:        PMH: She  has a past medical history of A-fib (HCC), Cancer (HCC), Carpal tunnel syndrome, CHF (congestive heart failure) (HCC), Disease of thyroid gland, and Hypertension.   PSxH: She  has a past surgical history that includes Mastectomy (Left); Cholecystectomy; Total hip arthroplasty (Right); Replacement total knee bilateral; Shoulder open rotator cuff repair (Right); Cataract extraction, bilateral; Retinal detachment surgery; Appendectomy; Hysterectomy; and Cardiac electrophysiology procedure (N/A, 6/25/2021).        Reported/Observed/Food/Nutrition Related History     Pt lying in bed with daughter at bedside. They report pt with loss of taste and appetite since having COVID in 10/2020, has not returned. Pt states she is just never hungry any more and reports early satiety. Her daughter makes excellent efforts to cook appealing foods for pt but she is only able to eat small amounts. She drinks at least 1 Boost/day, open to having while at the hospital. Due to pt's age, " and poor appetite/likely inadequate sodium intake, pt and daughter request regular diet. Daughter states pt was unable to eat dinner last night until dtr found her some salt packets.They also request soft diet stating pt has trouble with meats but no hx dysphagia. Further dietary needs/preferences denied, NKFA. Pt has done well maintaining her weight though it does fluctuate r/t fluid.    Anthropometrics   Height: 63 in  Weight: 155 lb  BMI: 27.60  BMI classification: Overweight: 25.0-29.9kg/m2    IBW: 115 lb    UBW: ~140 lb per daughter, pt was 142 lb in 6/2021, has been around 140-150 lb past 3 y per EMR  Weight change: none / fluctuates with fluid    Nutrition Focused Physical Exam     Mild temporalis and clavicular muscle wasting  Mild UAR subcutaneous fat loss    *may be pt's baseline, wt has been stable though fluid could be masking weight loss and interfering with NFPE, RD will monitor t/o admission.    Labs reviewed   Labs reviewed: Yes    Results from last 7 days   Lab Units 03/12/22  0342 03/11/22  0822 03/10/22  2121   SODIUM mmol/L 136 136 133*   POTASSIUM mmol/L 3.9 3.5 3.8   CHLORIDE mmol/L 95* 95* 93*   CO2 mmol/L 32.0* 30.0* 30.0*   BUN mg/dL 21 26* 26*   CREATININE mg/dL 1.05* 1.16* 1.59*   GLUCOSE mg/dL 164* 187* 231*   CALCIUM mg/dL 8.3 8.7 8.8     Results from last 7 days   Lab Units 03/11/22  0822 03/10/22  2121   WBC 10*3/mm3 6.50 7.00   ALBUMIN g/dL  --  3.90         Lab Results   Lab Value Date/Time    HGBA1C 6.20 (H) 03/11/2022 0822     Medications reviewed   Medications reviewed: Yes  Scheduled: protonix  GTT:  PRN: [email or web address]      Current Nutrition Prescription   PO: Diet Regular; Cardiac  Oral Nutrition Supplement: Orders Placed This Encounter      DIET MESSAGE Please send cottage cheese and fruit plate.      Dietary Nutrition Supplements Boost Plus; chocolate       Average PO intake: 37.5 % x 2 meals documented      Nutrition Diagnosis     Problem Inadequate energy intake   Etiology  Decreased appetite   Signs/Symptoms PO intakes <75% estimated needs for at least 1 year       Goal:   General: Nutrition to support treatment  PO: Increase intake     Intervention   Intervention: Follow treatment progress, Care plan reviewed, Advise alternate selection, Advised available snacks, Interview for preferences, Encourage intake, Supplement provided, Education provided for nutritional needs/managing adequate intakes with loss appetite/taste    Diet liberalized, will monitor to alter as indicated    Soft added to diet order    Boost Plus 2x/day    May meet criteria malnutrition r/t chronic poor intakes, wt has been stable though fluid could be masking weight loss and interfering with NFPE, RD will monitor t/o admission.    Monitoring/Evaluation:   Monitoring/Evaluation: Per protocol, I&O, PO intake, Supplement intake, Pertinent labs, Weight, Skin status, GI status, Symptoms, POC/GOC, Swallow function    Criss Concepcion RD  Time Spent: 45

## 2022-03-13 NOTE — PROGRESS NOTES
[facility] Medicine Services  PROGRESS NOTE    Patient Name: Syeda Gomez  : 1927  MRN: [id number]    Date of Admission: 3/10/2022  Primary Care Physician: Omayra Alcazar DO    Subjective   Subjective     CC:   cough    HPI:    Daughter in room.  No chills.  No fevers.  Discussed CXR done this morning with patient and daughter    ROS:    Gen-  No chills, no fevers  CV-  No chest pain  Resp - no dyspnea, persistent cough  GI- No N/V/D, abd pain    Objective   Objective     Vital Signs:   Temp:  [98 °F (36.7 °C)-98.1 °F (36.7 °C)] 98.1 °F (36.7 °C)  Heart Rate:  [71-89] 89  Resp:  [16-20] 20  BP: ()/(48-57) 123/55  Flow (L/min):  [2] 2     Physical Exam:    Constitutional: no distress, awake  HENT: NCAT, dry tongue  Respiratory: poor inspiratory effort, no wheezes, cough with deep inspiration  Cardiovascular: s1 and s2, RRR  Gastrointestinal: Positive bowel sounds, soft, nontender, nondistended  Musculoskeletal: No bilateral ankle edema  Psychiatric: Appropriate affect, cooperative  Neurologic: Oriented x 3, non focal, speech clear  Skin: dry, + skin tenting      Results Reviewed:  LAB RESULTS:      Lab 22  0822 03/10/22  2121   WBC 6.50 7.00   HEMOGLOBIN 9.0* 8.8*   HEMATOCRIT 28.9* 27.5*   PLATELETS 267 283   NEUTROS ABS  --  5.48   IMMATURE GRANS (ABS)  --  0.03   LYMPHS ABS  --  0.70   MONOS ABS  --  0.55   EOS ABS  --  0.20   MCV 96.0 92.3   LACTATE  --  0.9     --    PROTIME 15.3*  --          Lab 22  0342 22  0822 03/10/22  2121   SODIUM 136 136 133*   POTASSIUM 3.9 3.5 3.8   CHLORIDE 95* 95* 93*   CO2 32.0* 30.0* 30.0*   ANION GAP 9.0 11.0 10.0   BUN 21 26* 26*   CREATININE 1.05* 1.16* 1.59*   EGFR 49.0* 43.5* 29.8*   GLUCOSE 164* 187* 231*   CALCIUM 8.3 8.7 8.8   HEMOGLOBIN A1C  --  6.20*  --    TSH  --  11.330*  --          Lab 03/10/22  2121   TOTAL PROTEIN 5.8*   ALBUMIN 3.90   GLOBULIN 1.9   ALT (SGPT) 9   AST (SGOT) 12   BILIRUBIN  0.3   ALK PHOS 132*         Lab 03/11/22  0822 03/10/22  2121   PROBNP  --  2,352.0*   TROPONIN T  --  0.025   PROTIME 15.3*  --    INR 1.25*  --              Lab 03/10/22  2203   IRON 36*   IRON SATURATION 12*   TIBC 308   TRANSFERRIN 207   FERRITIN 832.20*         Brief Urine Lab Results  (Last result in the past 365 days)      Color   Clarity   Blood   Leuk Est   Nitrite   Protein   CREAT   Urine HCG        03/11/22 0234 Yellow   Clear   Negative   Small (1+)   Negative   Negative                 Microbiology Results Abnormal     Procedure Component Value - Date/Time    Body Fluid Culture - Body Fluid, Pleural Cavity [[id number]] Collected: 03/11/22 1110    Lab Status: Preliminary result Specimen: Body Fluid from Pleural Cavity Updated: 03/13/22 0745     Body Fluid Culture No growth at 2 days     Gram Stain Moderate (3+) WBCs seen      No organisms seen    Blood Culture - Blood, Arm, Left [[id number]]  (Normal) Collected: 03/10/22 2200    Lab Status: Preliminary result Specimen: Blood from Arm, Left Updated: 03/12/22 2231     Blood Culture No growth at 2 days    Blood Culture - Blood, Arm, Right [[id number]]  (Normal) Collected: 03/10/22 2155    Lab Status: Preliminary result Specimen: Blood from Arm, Right Updated: 03/12/22 2231     Blood Culture No growth at 2 days    COVID PRE-OP / PRE-PROCEDURE SCREENING ORDER (NO ISOLATION) - Swab, Nasopharynx [[id number]]  (Normal) Collected: 03/11/22 0829    Lab Status: Final result Specimen: Swab from Nasopharynx Updated: 03/11/22 0831    Narrative:      The following orders were created for panel order COVID PRE-OP / PRE-PROCEDURE SCREENING ORDER (NO ISOLATION) - Swab, Nasopharynx.  Procedure                               Abnormality         Status                     ---------                               -----------         ------                     COVID-19 and FLU A/B PCR...[[id number]]  Normal              Final result                 Please view results for these  tests on the individual orders.    COVID-19 and FLU A/B PCR - Swab, Nasopharynx [[id number]]  (Normal) Collected: 03/11/22 0829    Lab Status: Final result Specimen: Swab from Nasopharynx Updated: 03/11/22 0831     COVID19 Not Detected     Influenza A PCR Not Detected     Influenza B PCR Not Detected    Narrative:      Fact sheet for providers: https://www.fda.gov/media/434710/download    Fact sheet for patients: https://www.fda.gov/media/547014/download    Test performed by PCR.    Respiratory Panel PCR w/COVID-19(SARS-CoV-2) VINICIO/RAGINI/YVETTE/PAD/COR/MAD/ASHKAN In-House, NP Swab in UTM/VTM, 3-4 HR TAT - Swab, Nasopharynx [[id number]]  (Normal) Collected: 03/10/22 2334    Lab Status: Final result Specimen: Swab from Nasopharynx Updated: 03/11/22 0321     ADENOVIRUS, PCR Not Detected     Coronavirus 229E Not Detected     Coronavirus HKU1 Not Detected     Coronavirus NL63 Not Detected     Coronavirus OC43 Not Detected     COVID19 Not Detected     Human Metapneumovirus Not Detected     Human Rhinovirus/Enterovirus Not Detected     Influenza A PCR Not Detected     Influenza B PCR Not Detected     Parainfluenza Virus 1 Not Detected     Parainfluenza Virus 2 Not Detected     Parainfluenza Virus 3 Not Detected     Parainfluenza Virus 4 Not Detected     RSV, PCR Not Detected     Bordetella pertussis pcr Not Detected     Bordetella parapertussis PCR Not Detected     Chlamydophila pneumoniae PCR Not Detected     Mycoplasma pneumo by PCR Not Detected    Narrative:      In the setting of a positive respiratory panel with a viral infection PLUS a negative procalcitonin without other underlying concern for bacterial infection, consider observing off antibiotics or discontinuation of antibiotics and continue supportive care. If the respiratory panel is positive for atypical bacterial infection (Bordetella pertussis, Chlamydophila pneumoniae, or Mycoplasma pneumoniae), consider antibiotic de-escalation to target atypical bacterial infection.           XR Chest 1 View    Result Date: 3/13/2022  XR CHEST 1 VW-  Date of Exam: 3/13/2022 4:48 AM  Indication: Dyspnea, pleural effusion; R09.02-Hypoxemia; O20-Zvrfpxs effusion, not elsewhere classified; D64.9-Anemia, unspecified; I48.11-Longstanding persistent atrial fibrillation; Z95.0-Presence of cardiac pacemaker.  Comparison:?03/11/2022  Technique:?A single view of the chest was obtained.  FINDINGS:  ?Left internal jugular Port-A-Cath is unchanged.  Right subclavian transvenous pacemaker remains in place unchanged in position. Cardiomegaly is stable.  Pulmonary vessels are within normal limits. There is hazy opacity throughout the right hemithorax which is new. Findings may be secondary to residual layering right pleural effusion or potentially reexpansion pulmonary edema although there was only mild volume loss in the right hemithorax when compared to the prior study. There are more dense patchy areas of airspace opacity in the right lung base which may be due to atelectasis or pneumonia.  Left lung is clear. No left-sided pleural effusion.  There is no evidence pneumothorax.        Impression:   1.  New hazy hazy opacity throughout the right hemithorax which may be due to residual layering right pleural effusion or potentially reexpansion pulmonary edema as detailed above.  There are more dense areas of consolidation within the right lung base which may be due to atelectasis or pneumonia. 2.  No evidence pneumothorax. 3.  Stable cardiomegaly  This report was finalized on 3/13/2022 9:08 AM by Best Carter MD.      XR Chest PA & Lateral    Result Date: 3/13/2022  XR CHEST PA AND LATERAL-  Date of Exam: 3/13/2022 9:29 AM  Indication: re-assess effusion; R09.02-Hypoxemia; F90-Erouzfp effusion, not elsewhere classified; D64.9-Anemia, unspecified; I48.11-Longstanding persistent atrial fibrillation; Z95.0-Presence of cardiac pacemaker.  Comparison:?03/13/2022 at 4:31 AM  Technique:?Two views of the chest were  obtained.  FINDINGS: Right subclavian transvenous pacemaker is unchanged.  Left internal jugular Port-A-Cath is unchanged.  Heart size is stable.  Hazy opacity of the right hemithorax has markedly improved.  There is a small residual right pleural effusion.  There is some hazy right basilar airspace disease likely due to atelectasis.  Left lung is clear.  No left pleural effusion.  No evidence of pneumothorax.  Postoperative changes are noted of the right shoulder.      Impression:   1.  Interval improvement in hazy opacity throughout the right hemiliver thorax likely due to layering pleural effusion.  There is a small residual right pleural effusion. 2.  Minimal hazy right basilar airspace disease favored to be due to atelectasis.   This report was finalized on 3/13/2022 10:40 AM by Best Carter MD.            I have reviewed the medications:  Scheduled Meds:aspirin, 81 mg, Oral, Daily  budesonide, 0.5 mg, Nebulization, BID - RT  carvedilol, 12.5 mg, Oral, BID With Meals  isosorbide mononitrate, 30 mg, Oral, Daily  levothyroxine, 50 mcg, Oral, Daily  pantoprazole, 40 mg, Oral, QAM  pharmacy consult - [place], , Does not apply, Daily  pravastatin, 20 mg, Oral, Daily  sodium chloride, 10 mL, Intravenous, Q12H  torsemide, 20 mg, Oral, Daily      Continuous Infusions:   PRN Meds:.•  acetaminophen  •  benzonatate  •  guaiFENesin  •  ipratropium-albuterol  •  melatonin  •  ondansetron  •  sodium chloride  •  sodium chloride    Assessment/Plan   Assessment & Plan     Active Hospital Problems    Diagnosis  POA   • **Acute respiratory failure with hypoxia ([facility]) [J96.01]  Yes   • CHF (congestive heart failure) ([facility]) [I50.9]  Yes   • Anemia [D64.9]  Yes   • CHAPARRO (acute kidney injury) ([facility]) [N17.9]  Yes   • Acute hypoxemic respiratory failure ([facility]) [J96.01]  Yes   • Hyponatremia [E87.1]  Yes   • Pleural effusion on right [J90]  Yes   • Hypothyroidism (acquired) [E03.9]  Yes   • HTN (hypertension) [I10]  Yes   • HLD  (hyperlipidemia) [E78.5]  Yes   • Presence of cardiac pacemaker [Z95.0]  Yes   • Atrial fibrillation (HCC) [I48.91]  Yes      Resolved Hospital Problems   No resolved problems to display.        Brief Hospital Course to date:    Syeda Gomez is a 89 y.o. female with a past medical history of breast cancer s/p left mastectomy 33 years ago, atrial fibrillation, HTN, HLD, CHF s/p PPM, hypothyroidism, and chronic idiopathic iron deficiency anemia that presented to the ED complaining of 1 week of ongoing shortness of air. She states she has also been more fatigued than her baseline. The patient's daughter is at the bedside and states the patient has required multiple iron and blood transfusions due to anemia over the past year. She states they thought her shortness of air was due to worsened anemia again. The patient was, however, found to have a large pleural effusion in the ED.    Acute Respiratory Failure with Hypoxia  Large right pleural effusion  Acute on Chronic CHF  Bronchospasm  - thoracentesis done yesterday by Dr. Persaud by US guidance  - obtain echocardiogram - Lorenzo/Christine to read - pending  - decrease DuoNebs  - change from standing to PRN  - low volume on IS assessment ~ 500 cc  - some atelectasis    Hypotension  - blood pressure improved on less Imdur    Anemia:  - hemoglobin 9.0  - appears stable     CHAPARRO:  - improved with diuresis  - holding lisinopril     Hyponatremia:  - resolved  - sodium improved    Atrial fibrillation:  - stable and rate controlled  - aspirin 81 mg daily      HTN/HLD:  - continue statin, imdur     Hypothyroidism:  - free T4 1.31    Add tessalon as needed  Incentive Spirometer exercises every hour or as tolerated    DVT prophylaxis:  Mechanical DVT prophylaxis orders are present.       AM-PAC 6 Clicks Score (PT): 19 (03/12/22 2000)    Disposition: I expect the patient to be discharged home TBD    CODE STATUS:   Code Status and Medical Interventions:   Ordered at: 03/10/22 9999      Medical Intervention Limits:    NO intubation (DNI)     Level Of Support Discussed With:    Patient     Code Status (Patient has no pulse and is not breathing):    No CPR (Do Not Attempt to Resuscitate)     Medical Interventions (Patient has pulse or is breathing):    Limited Support       Félix Hernandez MD  03/13/22

## 2022-03-14 ENCOUNTER — APPOINTMENT (OUTPATIENT)
Dept: GENERAL RADIOLOGY | Facility: HOSPITAL | Age: 87
End: 2022-03-14

## 2022-03-14 LAB
ALBUMIN SERPL-MCNC: 3.4 G/DL (ref 3.5–5.2)
ALBUMIN/GLOB SERPL: 1.7 G/DL
ALP SERPL-CCNC: 112 U/L (ref 39–117)
ALT SERPL W P-5'-P-CCNC: 13 U/L (ref 1–33)
ANION GAP SERPL CALCULATED.3IONS-SCNC: 7 MMOL/L (ref 5–15)
AST SERPL-CCNC: 17 U/L (ref 1–32)
BACTERIA FLD CULT: NORMAL
BASOPHILS # BLD AUTO: 0.04 10*3/MM3 (ref 0–0.2)
BASOPHILS NFR BLD AUTO: 0.5 % (ref 0–1.5)
BILIRUB SERPL-MCNC: 0.3 MG/DL (ref 0–1.2)
BUN SERPL-MCNC: 19 MG/DL (ref 8–23)
BUN/CREAT SERPL: 17.9 (ref 7–25)
CALCIUM SPEC-SCNC: 8.4 MG/DL (ref 8.2–9.6)
CHLORIDE SERPL-SCNC: 93 MMOL/L (ref 98–107)
CO2 SERPL-SCNC: 32 MMOL/L (ref 22–29)
CORTIS SERPL-MCNC: 9.29 MCG/DL
CREAT SERPL-MCNC: 1.06 MG/DL (ref 0.57–1)
DEPRECATED RDW RBC AUTO: 46.2 FL (ref 37–54)
EGFRCR SERPLBLD CKD-EPI 2021: 48.5 ML/MIN/1.73
EOSINOPHIL # BLD AUTO: 0.28 10*3/MM3 (ref 0–0.4)
EOSINOPHIL NFR BLD AUTO: 3.6 % (ref 0.3–6.2)
ERYTHROCYTE [DISTWIDTH] IN BLOOD BY AUTOMATED COUNT: 13.7 % (ref 12.3–15.4)
GLOBULIN UR ELPH-MCNC: 2 GM/DL
GLUCOSE SERPL-MCNC: 190 MG/DL (ref 65–99)
GRAM STN SPEC: NORMAL
GRAM STN SPEC: NORMAL
HCT VFR BLD AUTO: 27.9 % (ref 34–46.6)
HGB BLD-MCNC: 8.7 G/DL (ref 12–15.9)
IMM GRANULOCYTES # BLD AUTO: 0.06 10*3/MM3 (ref 0–0.05)
IMM GRANULOCYTES NFR BLD AUTO: 0.8 % (ref 0–0.5)
INR PPP: 1.19 (ref 0.85–1.16)
LAB AP CASE REPORT: NORMAL
LYMPHOCYTES # BLD AUTO: 0.57 10*3/MM3 (ref 0.7–3.1)
LYMPHOCYTES NFR BLD AUTO: 7.3 % (ref 19.6–45.3)
MAGNESIUM SERPL-MCNC: 2 MG/DL (ref 1.7–2.3)
MCH RBC QN AUTO: 29 PG (ref 26.6–33)
MCHC RBC AUTO-ENTMCNC: 31.2 G/DL (ref 31.5–35.7)
MCV RBC AUTO: 93 FL (ref 79–97)
MONOCYTES # BLD AUTO: 0.56 10*3/MM3 (ref 0.1–0.9)
MONOCYTES NFR BLD AUTO: 7.2 % (ref 5–12)
NEUTROPHILS NFR BLD AUTO: 6.31 10*3/MM3 (ref 1.7–7)
NEUTROPHILS NFR BLD AUTO: 80.6 % (ref 42.7–76)
NRBC BLD AUTO-RTO: 0 /100 WBC (ref 0–0.2)
NT-PROBNP SERPL-MCNC: 2358 PG/ML (ref 0–1800)
PATH REPORT.FINAL DX SPEC: NORMAL
PHOSPHATE SERPL-MCNC: 3 MG/DL (ref 2.5–4.5)
PLATELET # BLD AUTO: 247 10*3/MM3 (ref 140–450)
PMV BLD AUTO: 9.9 FL (ref 6–12)
POTASSIUM SERPL-SCNC: 4 MMOL/L (ref 3.5–5.2)
PROT SERPL-MCNC: 5.4 G/DL (ref 6–8.5)
PROTHROMBIN TIME: 14.8 SECONDS (ref 11.4–14.4)
RBC # BLD AUTO: 3 10*6/MM3 (ref 3.77–5.28)
SODIUM SERPL-SCNC: 132 MMOL/L (ref 136–145)
WBC NRBC COR # BLD: 7.82 10*3/MM3 (ref 3.4–10.8)

## 2022-03-14 PROCEDURE — 83735 ASSAY OF MAGNESIUM: CPT | Performed by: HOSPITALIST

## 2022-03-14 PROCEDURE — 85025 COMPLETE CBC W/AUTO DIFF WBC: CPT | Performed by: HOSPITALIST

## 2022-03-14 PROCEDURE — 99233 SBSQ HOSP IP/OBS HIGH 50: CPT | Performed by: INTERNAL MEDICINE

## 2022-03-14 PROCEDURE — 94799 UNLISTED PULMONARY SVC/PX: CPT

## 2022-03-14 PROCEDURE — 84100 ASSAY OF PHOSPHORUS: CPT | Performed by: HOSPITALIST

## 2022-03-14 PROCEDURE — 97116 GAIT TRAINING THERAPY: CPT

## 2022-03-14 PROCEDURE — 97110 THERAPEUTIC EXERCISES: CPT

## 2022-03-14 PROCEDURE — 80053 COMPREHEN METABOLIC PANEL: CPT | Performed by: HOSPITALIST

## 2022-03-14 PROCEDURE — 82533 TOTAL CORTISOL: CPT | Performed by: HOSPITALIST

## 2022-03-14 PROCEDURE — 83880 ASSAY OF NATRIURETIC PEPTIDE: CPT | Performed by: HOSPITALIST

## 2022-03-14 PROCEDURE — 85610 PROTHROMBIN TIME: CPT | Performed by: HOSPITALIST

## 2022-03-14 PROCEDURE — 71046 X-RAY EXAM CHEST 2 VIEWS: CPT

## 2022-03-14 RX ORDER — GUAIFENESIN 600 MG/1
1200 TABLET, EXTENDED RELEASE ORAL EVERY 12 HOURS SCHEDULED
Status: DISCONTINUED | OUTPATIENT
Start: 2022-03-14 | End: 2022-03-19 | Stop reason: HOSPADM

## 2022-03-14 RX ADMIN — ASPIRIN 81 MG: 81 TABLET, COATED ORAL at 09:16

## 2022-03-14 RX ADMIN — TORSEMIDE 20 MG: 20 TABLET ORAL at 09:16

## 2022-03-14 RX ADMIN — CARVEDILOL 12.5 MG: 12.5 TABLET, FILM COATED ORAL at 17:04

## 2022-03-14 RX ADMIN — LEVOTHYROXINE SODIUM 50 MCG: 50 TABLET ORAL at 06:21

## 2022-03-14 RX ADMIN — Medication 10 ML: at 09:16

## 2022-03-14 RX ADMIN — ISOSORBIDE MONONITRATE 30 MG: 30 TABLET, EXTENDED RELEASE ORAL at 09:16

## 2022-03-14 RX ADMIN — GUAIFENESIN 100 MG: 200 SOLUTION ORAL at 20:01

## 2022-03-14 RX ADMIN — GUAIFENESIN 100 MG: 200 SOLUTION ORAL at 09:16

## 2022-03-14 RX ADMIN — PRAVASTATIN SODIUM 20 MG: 20 TABLET ORAL at 09:16

## 2022-03-14 RX ADMIN — BUDESONIDE 0.5 MG: 0.5 SUSPENSION RESPIRATORY (INHALATION) at 19:35

## 2022-03-14 RX ADMIN — PANTOPRAZOLE SODIUM 40 MG: 40 TABLET, DELAYED RELEASE ORAL at 09:16

## 2022-03-14 RX ADMIN — ACETAMINOPHEN 650 MG: 325 TABLET ORAL at 21:51

## 2022-03-14 RX ADMIN — GUAIFENESIN 1200 MG: 600 TABLET, EXTENDED RELEASE ORAL at 17:04

## 2022-03-14 RX ADMIN — CARVEDILOL 12.5 MG: 12.5 TABLET, FILM COATED ORAL at 09:16

## 2022-03-14 RX ADMIN — BENZONATATE 100 MG: 100 CAPSULE ORAL at 00:09

## 2022-03-14 NOTE — PLAN OF CARE
Goal Outcome Evaluation:  Plan of Care Reviewed With: patient        Progress: no change  Outcome Evaluation: patient ambulated 6 feet bed>chair this date, limited by weakness and increased coughing/congestion noticed this date. Cues to stay close to walker and to improve posture. Distance limited by weakness, fatigue and coughing. Completed B UE/LE exercises requiring frequent rest breaks due to SOA and coughing.

## 2022-03-14 NOTE — PROGRESS NOTES
"    [facility] Medicine Services  PROGRESS NOTE    Patient Name: Syeda Gomez  : 1927  MRN: [id number]    Date of Admission: 3/10/2022  Primary Care Physician: Omayra Alcazar DO    Subjective   Subjective     CC:   cough    HPI:    States that she feels ok but feels \"weak.\"  She plans to go home at discharge, states that nephew will stay with her    ROS:    Gen- No fevers, chills  CV- No chest pain, palpitations  Resp- +persistent cough, No dyspnea  GI- No N/V/D, abd pain        Objective   Objective     Vital Signs:   Temp:  [98.1 °F (36.7 °C)-98.2 °F (36.8 °C)] 98.1 °F (36.7 °C)  Heart Rate:  [72-79] 78  Resp:  [16-22] 16  BP: ()/(62-75) 111/75  Flow (L/min):  [2] 2     Physical Exam:  Constitutional: No acute distress, awake, alert, sitting up in chair, looks weak  HENT: NCAT, mucous membranes moist  Respiratory: decreased air movement in the bases, respiratory effort normal, very congested cough, on 2LNC  Cardiovascular: RRR, no murmurs, rubs, or gallops  Gastrointestinal: Positive bowel sounds, soft, nontender, nondistended  Musculoskeletal: No bilateral ankle edema  Psychiatric: Appropriate affect, cooperative  Neurologic: Oriented x 3, strength symmetric in all extremities, Cranial Nerves grossly intact to confrontation, speech clear  Skin: No rashes      Results Reviewed:  LAB RESULTS:      Lab 22  0401 22  0822 03/10/22  2121   WBC 7.82 6.50 7.00   HEMOGLOBIN 8.7* 9.0* 8.8*   HEMATOCRIT 27.9* 28.9* 27.5*   PLATELETS 247 267 283   NEUTROS ABS 6.31  --  5.48   IMMATURE GRANS (ABS) 0.06*  --  0.03   LYMPHS ABS 0.57*  --  0.70   MONOS ABS 0.56  --  0.55   EOS ABS 0.28  --  0.20   MCV 93.0 96.0 92.3   LACTATE  --   --  0.9   LDH  --  161  --    PROTIME 14.8* 15.3*  --          Lab 22  0401 22  0342 22  0822 03/10/22  2121   SODIUM 132* 136 136 133*   POTASSIUM 4.0 3.9 3.5 3.8   CHLORIDE 93* 95* 95* 93*   CO2 32.0* 32.0* 30.0* 30.0* "   ANION GAP 7.0 9.0 11.0 10.0   BUN 19 21 26* 26*   CREATININE 1.06* 1.05* 1.16* 1.59*   EGFR 48.5* 49.0* 43.5* 29.8*   GLUCOSE 190* 164* 187* 231*   CALCIUM 8.4 8.3 8.7 8.8   MAGNESIUM 2.0  --   --   --    PHOSPHORUS 3.0  --   --   --    HEMOGLOBIN A1C  --   --  6.20*  --    TSH  --   --  11.330*  --          Lab 03/14/22  0401 03/10/22  2121   TOTAL PROTEIN 5.4* 5.8*   ALBUMIN 3.40* 3.90   GLOBULIN 2.0 1.9   ALT (SGPT) 13 9   AST (SGOT) 17 12   BILIRUBIN 0.3 0.3   ALK PHOS 112 132*         Lab 03/14/22  0401 03/11/22  0822 03/10/22  2121   PROBNP 2,358.0*  --  2,352.0*   TROPONIN T  --   --  0.025   PROTIME 14.8* 15.3*  --    INR 1.19* 1.25*  --              Lab 03/10/22  2203   IRON 36*   IRON SATURATION 12*   TIBC 308   TRANSFERRIN 207   FERRITIN 832.20*         Brief Urine Lab Results  (Last result in the past 365 days)      Color   Clarity   Blood   Leuk Est   Nitrite   Protein   CREAT   Urine HCG        03/11/22 0234 Yellow   Clear   Negative   Small (1+)   Negative   Negative                 Microbiology Results Abnormal     Procedure Component Value - Date/Time    Anaerobic Culture - Pleural Fluid, Pleural Cavity [[id number]]  (Normal) Collected: 03/11/22 1110    Lab Status: Preliminary result Specimen: Pleural Fluid from Pleural Cavity Updated: 03/14/22 0749     Anaerobic Culture No anaerobes isolated at 3 days    Blood Culture - Blood, Arm, Left [[id number]]  (Normal) Collected: 03/10/22 2200    Lab Status: Preliminary result Specimen: Blood from Arm, Left Updated: 03/13/22 2331     Blood Culture No growth at 3 days    Blood Culture - Blood, Arm, Right [[id number]]  (Normal) Collected: 03/10/22 2155    Lab Status: Preliminary result Specimen: Blood from Arm, Right Updated: 03/13/22 2330     Blood Culture No growth at 3 days    Body Fluid Culture - Body Fluid, Pleural Cavity [[id number]] Collected: 03/11/22 1110    Lab Status: Preliminary result Specimen: Body Fluid from Pleural Cavity Updated: 03/13/22 0750      Body Fluid Culture No growth at 2 days     Gram Stain Moderate (3+) WBCs seen      No organisms seen    COVID PRE-OP / PRE-PROCEDURE SCREENING ORDER (NO ISOLATION) - Swab, Nasopharynx [[id number]]  (Normal) Collected: 03/11/22 0829    Lab Status: Final result Specimen: Swab from Nasopharynx Updated: 03/11/22 0831    Narrative:      The following orders were created for panel order COVID PRE-OP / PRE-PROCEDURE SCREENING ORDER (NO ISOLATION) - Swab, Nasopharynx.  Procedure                               Abnormality         Status                     ---------                               -----------         ------                     COVID-19 and FLU A/B PCR...[[id number]]  Normal              Final result                 Please view results for these tests on the individual orders.    COVID-19 and FLU A/B PCR - Swab, Nasopharynx [[id number]]  (Normal) Collected: 03/11/22 0829    Lab Status: Final result Specimen: Swab from Nasopharynx Updated: 03/11/22 0831     COVID19 Not Detected     Influenza A PCR Not Detected     Influenza B PCR Not Detected    Narrative:      Fact sheet for providers: https://www.fda.gov/media/533918/download    Fact sheet for patients: https://www.fda.gov/media/585675/download    Test performed by PCR.    Respiratory Panel PCR w/COVID-19(SARS-CoV-2) VINICIO/RAGINI/YVETTE/PAD/COR/MAD/ASHKAN In-House, NP Swab in UTM/VTM, 3-4 HR TAT - Swab, Nasopharynx [[id number]]  (Normal) Collected: 03/10/22 2724    Lab Status: Final result Specimen: Swab from Nasopharynx Updated: 03/11/22 0321     ADENOVIRUS, PCR Not Detected     Coronavirus 229E Not Detected     Coronavirus HKU1 Not Detected     Coronavirus NL63 Not Detected     Coronavirus OC43 Not Detected     COVID19 Not Detected     Human Metapneumovirus Not Detected     Human Rhinovirus/Enterovirus Not Detected     Influenza A PCR Not Detected     Influenza B PCR Not Detected     Parainfluenza Virus 1 Not Detected     Parainfluenza Virus 2 Not Detected      Parainfluenza Virus 3 Not Detected     Parainfluenza Virus 4 Not Detected     RSV, PCR Not Detected     Bordetella pertussis pcr Not Detected     Bordetella parapertussis PCR Not Detected     Chlamydophila pneumoniae PCR Not Detected     Mycoplasma pneumo by PCR Not Detected    Narrative:      In the setting of a positive respiratory panel with a viral infection PLUS a negative procalcitonin without other underlying concern for bacterial infection, consider observing off antibiotics or discontinuation of antibiotics and continue supportive care. If the respiratory panel is positive for atypical bacterial infection (Bordetella pertussis, Chlamydophila pneumoniae, or Mycoplasma pneumoniae), consider antibiotic de-escalation to target atypical bacterial infection.          XR Chest 1 View    Result Date: 3/13/2022  XR CHEST 1 VW-  Date of Exam: 3/13/2022 4:48 AM  Indication: Dyspnea, pleural effusion; R09.02-Hypoxemia; Z82-Dbyhlwv effusion, not elsewhere classified; D64.9-Anemia, unspecified; I48.11-Longstanding persistent atrial fibrillation; Z95.0-Presence of cardiac pacemaker.  Comparison:?03/11/2022  Technique:?A single view of the chest was obtained.  FINDINGS:  ?Left internal jugular Port-A-Cath is unchanged.  Right subclavian transvenous pacemaker remains in place unchanged in position. Cardiomegaly is stable.  Pulmonary vessels are within normal limits. There is hazy opacity throughout the right hemithorax which is new. Findings may be secondary to residual layering right pleural effusion or potentially reexpansion pulmonary edema although there was only mild volume loss in the right hemithorax when compared to the prior study. There are more dense patchy areas of airspace opacity in the right lung base which may be due to atelectasis or pneumonia.  Left lung is clear. No left-sided pleural effusion.  There is no evidence pneumothorax.        Impression:   1.  New hazy hazy opacity throughout the right  hemithorax which may be due to residual layering right pleural effusion or potentially reexpansion pulmonary edema as detailed above.  There are more dense areas of consolidation within the right lung base which may be due to atelectasis or pneumonia. 2.  No evidence pneumothorax. 3.  Stable cardiomegaly  This report was finalized on 3/13/2022 9:08 AM by Best Carter MD.      XR Chest PA & Lateral    Result Date: 3/13/2022  XR CHEST PA AND LATERAL-  Date of Exam: 3/13/2022 9:29 AM  Indication: re-assess effusion; R09.02-Hypoxemia; C88-Uosfdsw effusion, not elsewhere classified; D64.9-Anemia, unspecified; I48.11-Longstanding persistent atrial fibrillation; Z95.0-Presence of cardiac pacemaker.  Comparison:?03/13/2022 at 4:31 AM  Technique:?Two views of the chest were obtained.  FINDINGS: Right subclavian transvenous pacemaker is unchanged.  Left internal jugular Port-A-Cath is unchanged.  Heart size is stable.  Hazy opacity of the right hemithorax has markedly improved.  There is a small residual right pleural effusion.  There is some hazy right basilar airspace disease likely due to atelectasis.  Left lung is clear.  No left pleural effusion.  No evidence of pneumothorax.  Postoperative changes are noted of the right shoulder.      Impression:   1.  Interval improvement in hazy opacity throughout the right hemiliver thorax likely due to layering pleural effusion.  There is a small residual right pleural effusion. 2.  Minimal hazy right basilar airspace disease favored to be due to atelectasis.   This report was finalized on 3/13/2022 10:40 AM by Best Carter MD.        Results for orders placed during the hospital encounter of 03/10/22    Adult Transthoracic Echo Complete w/ Color, Spectral and Contrast if necessary per protocol    Interpretation Summary  · Left ventricular ejection fraction appears to be 51 - 55%. Left ventricular systolic function is normal.  · Left ventricular wall thickness is consistent with  severe concentric hypertrophy.  · Mildly reduced right ventricular systolic function noted.  · The right ventricular cavity is borderline dilated.  · The right atrial cavity is moderately dilated.  · There is mild calcification of the aortic valve.  · Mild aortic valve regurgitation is present.  · Moderate to severe tricuspid valve regurgitation is present.  · Estimated right ventricular systolic pressure from tricuspid regurgitation is moderately elevated (45-55 mmHg).      I have reviewed the medications:  Scheduled Meds:aspirin, 81 mg, Oral, Daily  budesonide, 0.5 mg, Nebulization, BID - RT  carvedilol, 12.5 mg, Oral, BID With Meals  isosorbide mononitrate, 30 mg, Oral, Daily  levothyroxine, 50 mcg, Oral, Daily  pantoprazole, 40 mg, Oral, QAM  pharmacy consult - [place], , Does not apply, Daily  pravastatin, 20 mg, Oral, Daily  sodium chloride, 10 mL, Intravenous, Q12H  torsemide, 20 mg, Oral, Daily      Continuous Infusions:   PRN Meds:.•  acetaminophen  •  benzonatate  •  guaiFENesin  •  ipratropium-albuterol  •  melatonin  •  ondansetron  •  sodium chloride  •  sodium chloride    Assessment/Plan   Assessment & Plan     Active Hospital Problems    Diagnosis  POA   • **Acute respiratory failure with hypoxia (HCC) [J96.01]  Yes   • CHF (congestive heart failure) (HCC) [I50.9]  Yes   • Anemia [D64.9]  Yes   • CHAPARRO (acute kidney injury) (HCC) [N17.9]  Yes   • Acute hypoxemic respiratory failure (HCC) [J96.01]  Yes   • Hyponatremia [E87.1]  Yes   • Pleural effusion on right [J90]  Yes   • Hypothyroidism (acquired) [E03.9]  Yes   • HTN (hypertension) [I10]  Yes   • HLD (hyperlipidemia) [E78.5]  Yes   • Presence of cardiac pacemaker [Z95.0]  Yes   • Atrial fibrillation (HCC) [I48.91]  Yes      Resolved Hospital Problems   No resolved problems to display.        Brief Hospital Course to date:    Syeda Gomez is a 89 y.o. female with a past medical history of breast cancer s/p left mastectomy 33 years ago, atrial  fibrillation, HTN, HLD, CHF s/p PPM, hypothyroidism, and chronic idiopathic iron deficiency anemia that presented to the ED complaining of 1 week of ongoing shortness of air. She states she has also been more fatigued than her baseline. The patient's daughter is at the bedside and states the patient has required multiple iron and blood transfusions due to anemia over the past year. She states they thought her shortness of air was due to worsened anemia again. The patient was, however, found to have a large pleural effusion in the ED.    Acute Respiratory Failure with Hypoxia  Large right pleural effusion  Acute on Chronic CHF  Bronchospasm  pHTN  - thoracentesis done 3/11 by Dr. Persaud by US guidance  - echocardiogram shows EF 51-55%, severe LVH, RV borderline dilated, RA moderately dilated, mild calcification of the AV, mild AR, moderate to severe TR, RVSP 45-55mmHg  - decrease DuoNebs  - change from standing to PRN  - low volume on IS assessment ~ 500 cc  - some atelectasis, encouraged to continue to work hard on incentive spirometer  - add mucinex  - cultures no growth to date  - fluid cytology pending    Hypotension  - blood pressure improved on less Imdur    Anemia:  - appears stable     CHAPARRO:  - improved with diuresis  - holding lisinopril     Hyponatremia:  - a little worse today, monitor  - sodium improved    Atrial fibrillation:  - stable and rate controlled  - aspirin 81 mg daily      HTN/HLD:  - continue statin, imdur     Hypothyroidism:  - free T4 1.31      DVT prophylaxis:  Mechanical DVT prophylaxis orders are present.       AM-PAC 6 Clicks Score (PT): 19 (03/12/22 2000)    Disposition: I expect the patient to be discharged home TBD, ?maybe soon/?in AM    Attempted to call and update daughter, Betty Rocha, on 3/14/22 but was unsuccessful in reaching.    CODE STATUS:   Code Status and Medical Interventions:   Ordered at: 03/10/22 6794     Medical Intervention Limits:    NO intubation (DNI)     Level  Of Support Discussed With:    Patient     Code Status (Patient has no pulse and is not breathing):    No CPR (Do Not Attempt to Resuscitate)     Medical Interventions (Patient has pulse or is breathing):    Limited Support       Dewayne Villalobos MD  03/14/22

## 2022-03-14 NOTE — CASE MANAGEMENT/SOCIAL WORK
"Continued Stay Note  [facility]     Patient Name: Syeda Gomez  MRN: [id number]  Today's Date: 3/14/2022    Admit Date: 3/10/2022     Discharge Plan     Row Name 03/14/22 1203       Plan    Plan discharge plan    Plan Comments CM spoke with pt in room with permission regarding discharge plan. PT recommends home with HH and pt declines, stating she does not need it. She reports she has had it before and it wasn't that helpful. Pt currently on 2 Liters O2 and does not wear home O2. When CM questioned pt about possibly needing O2 at home and which DME company she would want to uses, pt states \"lets just see how I do over next day or so\". If home O2 needed at discharge, pt has no preference to DME company. CM will cont to follow.    Final Discharge Disposition Code 01 - home or self-care               Discharge Codes    No documentation.               Expected Discharge Date and Time     Expected Discharge Date Expected Discharge Time    Mar 16, 2022             Kim Phillips RN    "

## 2022-03-14 NOTE — PLAN OF CARE
Goal Outcome Evaluation:  Plan of Care Reviewed With: patient      Pt has had very little sleep despite melatonin due to cough. Prn's given overnight. RR even and unlabored, afebrile. CTM

## 2022-03-14 NOTE — THERAPY TREATMENT NOTE
Patient Name: Syeda Gomez  : 1927    MRN: [id number]                              Today's Date: 3/14/2022       Admit Date: 3/10/2022    Visit Dx:     ICD-10-CM ICD-9-CM   1. Hypoxia  R09.02 799.02   2. Pleural effusion, right  J90 511.9   3. Anemia, unspecified type  D64.9 285.9   4. Longstanding persistent atrial fibrillation (HCC)  I48.11 427.31   5. Presence of cardiac pacemaker  Z95.0 V45.01     Patient Active Problem List   Diagnosis   • Pneumonia due to COVID-19 virus   • Pancytopenia (HCC)   • Presence of cardiac pacemaker   • Atrial fibrillation (HCC)   • Prolonged QTc interval on ECG   • Hypothyroidism (acquired)   • HTN (hypertension)   • HLD (hyperlipidemia)   • Acute respiratory failure with hypoxia (HCC)   • Elective replacement indicated for pacemaker   • CHF (congestive heart failure) (HCC)   • Anemia   • CHAPARRO (acute kidney injury) (HCC)   • Acute hypoxemic respiratory failure (HCC)   • Hyponatremia   • Pleural effusion on right     Past Medical History:   Diagnosis Date   • A-fib (HCC)    • Cancer (HCC)    • Carpal tunnel syndrome    • CHF (congestive heart failure) (HCC)    • Disease of thyroid gland    • Hypertension      Past Surgical History:   Procedure Laterality Date   • APPENDECTOMY     • CARDIAC ELECTROPHYSIOLOGY PROCEDURE N/A 2021    Procedure: DEVICE IMPLANT;  Surgeon: Calvin Ventura MD;  Location: [facility] INVASIVE LOC[facility];  Service: Cardiovascular;  Laterality: N/A;   • CATARACT EXTRACTION, BILATERAL     • CHOLECYSTECTOMY     • HYSTERECTOMY     • MASTECTOMY Left    • REPLACEMENT TOTAL KNEE BILATERAL     • RETINAL DETACHMENT SURGERY     • SHOULDER ROTATOR CUFF REPAIR Right    • TOTAL HIP ARTHROPLASTY Right       General Information     Row Name 22 0848          Physical Therapy Time and Intention    Document Type therapy note (daily note)  -AS     Mode of Treatment physical therapy  -AS     Row Name 22          General Information    Patient Profile  Reviewed yes  -AS     Existing Precautions/Restrictions fall;oxygen therapy device and L/min  -AS     Barriers to Rehab none identified  -AS     Row Name 03/14/22 0848          Cognition    Orientation Status (Cognition) oriented x 4  -AS     Row Name 03/14/22 0848          Safety Issues, Functional Mobility    Safety Issues Affecting Function (Mobility) safety precaution awareness;safety precautions follow-through/compliance  -AS     Impairments Affecting Function (Mobility) balance;endurance/activity tolerance;shortness of breath;strength  -AS     Comment, Safety Issues/Impairments (Mobility) alert and following commands, coughing and congestion noticed, nursing notifed and aware  -AS           User Key  (r) = Recorded By, (t) = Taken By, (c) = Cosigned By    Initials Name Provider Type    AS Jania Junior PTA Physical Therapy Assistant               Mobility     Row Name 03/14/22 0849          Bed Mobility    Supine-Sit [place] (Bed Mobility) verbal cues;contact guard;1 person assist  increased time and effort to reach EOB  -AS     Sit-Supine [place] (Bed Mobility) not tested  -AS     Assistive Device (Bed Mobility) draw sheet;head of bed elevated  -AS     Comment, (Bed Mobility) CGA and drawsheet to scoot EOB, slow transition and increased time to complete transfer, several epsiodes of coughing  -AS     Row Name 03/14/22 0849          Transfers    Comment, (Transfers) cues for hand placement  -AS     Row Name 03/14/22 0849          Bed-Chair Transfer    Bed-Chair [place] (Transfers) verbal cues;minimum assist (75% patient effort);1 person assist  -AS     Assistive Device (Bed-Chair Transfers) walker, front-wheeled  -AS     Row Name 03/14/22 0849          Sit-Stand Transfer    Sit-Stand [place] (Transfers) verbal cues;contact guard;1 person assist  -AS     Assistive Device (Sit-Stand Transfers) walker, 4-wheeled  -AS     Comment, (Sit-Stand Transfer) cues for hand placement  -AS     Row  Name 03/14/22 0849          Gait/Stairs (Locomotion)    [place] Level (Gait) verbal cues;minimum assist (75% patient effort);1 person assist  -AS     Assistive Device (Gait) walker, front-wheeled  -AS     Distance in Feet (Gait) 6  -AS     Deviations/Abnormal Patterns (Gait) bilateral deviations;vang[place] decreased;gait speed decreased  -AS     Bilateral Gait Deviations forward flexed posture;weight shift ability decreased;heel strike decreased  -AS     Comment, (Gait/Stairs) patient ambulateed 6 feet bed>chair this date, limited by weakness and increased coughing/congestion noticed this date. Cues to stay close to walker and to improve posture. Distance limited by weakness, fatigue and coughing. Completed B UE/LE exercises requiring frequent rest breaks due to SOA and coughing.  -AS           User Key  (r) = Recorded By, (t) = Taken By, (c) = Cosigned By    Initials Name Provider Type    AS Jania Junior PTA Physical Therapy Assistant               Obj/Interventions     Row Name 03/14/22 0854          Motor Skills    Therapeutic Exercise shoulder;knee;ankle  -AS     Row Name 03/14/22 0854          Shoulder (Therapeutic Exercise)    Shoulder (Therapeutic Exercise) AROM (active range of motion)  -AS     Shoulder AROM (Therapeutic Exercise) bilateral;flexion;extension;aBduction;aDduction;sitting;10 repetitions  -AS     Row Name 03/14/22 0854          Knee (Therapeutic Exercise)    Knee (Therapeutic Exercise) strengthening exercise  -AS     Knee Strengthening (Therapeutic Exercise) bilateral;marching while seated;LAQ (long arc quad);sitting;10 repetitions  -AS     Row Name 03/14/22 0854          Ankle (Therapeutic Exercise)    Ankle (Therapeutic Exercise) AROM (active range of motion)  -AS     Ankle AROM (Therapeutic Exercise) bilateral;dorsiflexion;plantarflexion;sitting;10 repetitions  -AS           User Key  (r) = Recorded By, (t) = Taken By, (c) = Cosigned By    Initials Name Provider Type    AS [place]  Jania Link PTA Physical Therapy Assistant               Goals/Plan    No documentation.                Clinical Impression     Row Name 03/14/22 0854          Pain    Pretreatment Pain Rating 0/10 - no pain  -AS     Posttreatment Pain Rating 0/10 - no pain  -AS     Row Name 03/14/22 0854          Plan of Care Review    Plan of Care Reviewed With patient  -AS     Progress no change  -AS     Outcome Evaluation patient ambulated 6 feet bed>chair this date, limited by weakness and increased coughing/congestion noticed this date. Cues to stay close to walker and to improve posture. Distance limited by weakness, fatigue and coughing. Completed B UE/LE exercises requiring frequent rest breaks due to SOA and coughing.  -AS     Row Name 03/14/22 0854          Vital Signs    Post Systolic BP Rehab 122  -AS     Post Treatment Diastolic BP 62  -AS     Posttreatment Heart Rate (beats/min) 77  -AS     Pre SpO2 (%) 97  -AS     O2 Delivery Pre Treatment supplemental O2  -AS     Intra SpO2 (%) 96  -AS     O2 Delivery Intra Treatment supplemental O2  -AS     Post SpO2 (%) 96  -AS     O2 Delivery Post Treatment supplemental O2  -AS     Pre Patient Position Supine  -AS     Intra Patient Position Sitting  -AS     Post Patient Position Sitting  -AS     [facility] Name 03/14/22 0854          Positioning and Restraints    Pre-Treatment Position in bed  -AS     Post Treatment Position chair  -AS     In Chair reclined;call light within reach;encouraged to call for assist;exit alarm on;waffle cushion;legs elevated  -AS           User Key  (r) = Recorded By, (t) = Taken By, (c) = Cosigned By    Initials Name Provider Type    AS Jania Junior PTA Physical Therapy Assistant               Outcome Measures     Row Name 03/14/22 0855          How much help from another person do you currently need...    Turning from your back to your side while in flat bed without using bedrails? 3  -AS     Moving from lying on back to sitting on the side of a  flat bed without bedrails? 3  -AS     Climbing 3-5 steps with a railing? 2  -AS     To walk in hospital room? 3  -AS     Row Name 03/14/22 0855          Functional Assessment    Outcome Measure Options AM-PAC 6 Clicks Basic Mobility (PT)  -AS           User Key  (r) = Recorded By, (t) = Taken By, (c) = Cosigned By    Initials Name Provider Type    AS Jania Junior PTA Physical Therapy Assistant                             Physical Therapy Education                 Title: PT OT SLP Therapies (In Progress)     Topic: Physical Therapy (In Progress)     Point: Mobility training (In Progress)     Learning Progress Summary           Patient Acceptance, E, NR by AS at 3/14/2022 0856    Acceptance, E, VU,NR by LO at 3/11/2022 1505    Comment: Patient education regarding sequencing for bed mobilty and transfers.                   Point: Home exercise program (In Progress)     Learning Progress Summary           Patient Acceptance, E, NR by AS at 3/14/2022 0856    Acceptance, E, VU,NR by LO at 3/11/2022 1505    Comment: Patient education regarding sequencing for bed mobilty and transfers.                   Point: Body mechanics (In Progress)     Learning Progress Summary           Patient Acceptance, E, NR by AS at 3/14/2022 0856    Acceptance, E, VU,NR by LO at 3/11/2022 1505    Comment: Patient education regarding sequencing for bed mobilty and transfers.                   Point: Precautions (In Progress)     Learning Progress Summary           Patient Acceptance, E, NR by AS at 3/14/2022 0856    Acceptance, E, VU,NR by LO at 3/11/2022 1505    Comment: Patient education regarding sequencing for bed mobilty and transfers.                               User Key     Initials Effective Dates Name Provider Type Discipline    AS 06/16/21 -  Jania Junior PTA Physical Therapy Assistant PT    LO 06/16/21 -  Katia Clemente, BRIAN Physical Therapist PT              PT Recommendation and Plan     Plan of Care Reviewed With:  patient  Progress: no change  Outcome Evaluation: patient ambulated 6 feet bed>chair this date, limited by weakness and increased coughing/congestion noticed this date. Cues to stay close to walker and to improve posture. Distance limited by weakness, fatigue and coughing. Completed B UE/LE exercises requiring frequent rest breaks due to SOA and coughing.     Time Calculation:    PT Charges     Row Name 03/14/22 0856             Time Calculation    Start Time 0822  -AS      PT Received On 03/14/22  -AS      PT Goal Re-Cert Due Date 03/21/22  -AS              Timed Charges    09187 - PT Therapeutic Exercise Minutes 13  -AS      65744 - Gait Training Minutes  10  -AS              Total Minutes    Timed Charges Total Minutes 23  -AS       Total Minutes 23  -AS            User Key  (r) = Recorded By, (t) = Taken By, (c) = Cosigned By    Initials Name Provider Type    AS Jania Junior PTA Physical Therapy Assistant              Therapy Charges for Today     Code Description Service Date Service Provider Modifiers Qty    78812416244 HC PT THER PROC EA 15 MIN 3/14/2022 Jania Junior PTA GP 1    11635644602 HC GAIT TRAINING EA 15 MIN 3/14/2022 Jania Junior PTA GP 1          PT G-Codes  Outcome Measure Options: AM-PAC 6 Clicks Basic Mobility (PT)  AM-PAC 6 Clicks Score (PT): 19  AM-PAC 6 Clicks Score (OT): 17    Jania Junior PTA  3/14/2022

## 2022-03-14 NOTE — PROGRESS NOTES
"                                 [facility] Progress Note      LOS: 4 days   Patient Care Team:  Omayra Alcazar DO as PCP - General (Internal Medicine)    Chief Complaint:    Chief Complaint   Patient presents with   • Shortness of Breath   • Cough       Subjective     Interval History: Quiet night    Patient Complaints: Still short of breath; no angina      Review of Systems:   A 14 point review of systems was negative except as was stated in the HPI      Objective     Vital Sign Min/Max for last 24 hours  Temp  Min: 98.1 °F (36.7 °C)  Max: 98.2 °F (36.8 °C)   BP  Min: 97/70  Max: 122/62   Pulse  Min: 71  Max: 79   Resp  Min: 16  Max: 22   SpO2  Min: 93 %  Max: 99 %   Flow (L/min)  Min: 2  Max: 2   Weight  Min: 69.2 kg (152 lb 9.6 oz)  Max: 69.2 kg (152 lb 9.6 oz)     Flowsheet Rows    Flowsheet Row First Filed Value   Admission Height 160 cm (63\") Documented at 03/10/2022 2019   Admission Weight 63.5 kg (140 lb) Documented at 03/10/2022 2019          Physical Exam:  General Appearance: Alert, appears stated age and cooperative  Lungs: Diffuse bilateral rhonchi  Heart:: Irregular with distant heart tones  Abdomen: Soft and nontender with adequate bowel sounds.  No organomegaly  Extremities: No cyanosis, clubbing or edema  Pulses: Pulses palpable and equal bilaterally  Skin: Warm and dry with no rash  Psych: Normal     Results Review:     I reviewed the patient's new clinical results.  Results from last 7 days   Lab Units 03/14/22  0401 03/12/22 0342 03/11/22  0822   SODIUM mmol/L 132* 136 136   POTASSIUM mmol/L 4.0 3.9 3.5   CHLORIDE mmol/L 93* 95* 95*   CO2 mmol/L 32.0* 32.0* 30.0*   BUN mg/dL 19 21 26*   CREATININE mg/dL 1.06* 1.05* 1.16*   GLUCOSE mg/dL 190* 164* 187*   CALCIUM mg/dL 8.4 8.3 8.7     Results from last 7 days   Lab Units 03/14/22  0401 03/11/22  0822 03/10/22  2121   WBC 10*3/mm3 7.82 6.50 7.00   HEMOGLOBIN g/dL 8.7* 9.0* 8.8*   HEMATOCRIT % 27.9* 28.9* 27.5*   PLATELETS " 10*3/mm3 247 267 283     Lab Results   Lab Value Date/Time    TROPONINT 0.025 03/10/2022 2121    TROPONINT 0.012 10/23/2020 2138    TROPONINT 0.011 10/20/2020 1525         Results from last 7 days   Lab Units 03/14/22  0401 03/11/22  0822   INR  1.19* 1.25*               Medication Review: yes  Current Facility-Administered Medications   Medication Dose Route Frequency Provider Last Rate Last Admin   • acetaminophen (TYLENOL) tablet 650 mg  650 mg Oral Q4H PRN Karlee Billingsley PA-C   650 mg at 03/11/22 1823   • aspirin EC tablet 81 mg  81 mg Oral Daily Félix Hernandez MD   81 mg at 03/14/22 0916   • benzonatate (TESSALON) capsule 100 mg  100 mg Oral Q4H PRN Félix Hernandez MD   100 mg at 03/14/22 0009   • budesonide (PULMICORT) nebulizer solution 0.5 mg  0.5 mg Nebulization BID - RT Los Fan MD   0.5 mg at 03/13/22 1906   • carvedilol (COREG) tablet 12.5 mg  12.5 mg Oral BID With Meals Karlee Billingsley PA-C   12.5 mg at 03/14/22 0916   • guaiFENesin (ROBITUSSIN) 100 MG/5ML oral solution 100 mg  100 mg Oral Q4H PRN Félix Hernandez MD   100 mg at 03/14/22 0916   • ipratropium-albuterol (DUO-NEB) nebulizer solution 3 mL  3 mL Nebulization Q4H PRN Félix Hernandez MD       • isosorbide mononitrate (IMDUR) 24 hr tablet 30 mg  30 mg Oral Daily Félix Hernandez MD   30 mg at 03/14/22 0916   • levothyroxine (SYNTHROID, LEVOTHROID) tablet 50 mcg  50 mcg Oral Daily Karlee Billingsley PA-C   50 mcg at 03/14/22 0621   • melatonin tablet 5 mg  5 mg Oral Nightly PRN Karlee Billingsley PA-C   5 mg at 03/13/22 2251   • ondansetron (ZOFRAN) injection 4 mg  4 mg Intravenous Q6H PRN Karlee Billingsley PA-C   4 mg at 03/13/22 1337   • pantoprazole (PROTONIX) EC tablet 40 mg  40 mg Oral M Karlee Billingsley PA-C   40 mg at 03/14/22 0916   • Pharmacy Consult - MTM   Does not apply Daily Aimee Tobias, PharmD       • pravastatin (PRAVACHOL) tablet 20 mg  20 mg Oral Daily Karlee Billingsley PA-C   20 mg at 03/14/22 0916   •  sodium chloride 0.9 % flush 10 mL  10 mL Intravenous PRN Iván Taylor MD       • sodium chloride 0.9 % flush 10 mL  10 mL Intravenous Q12H Karlee Billingsley, PA-C   10 mL at 03/14/22 0916   • sodium chloride 0.9 % flush 10 mL  10 mL Intravenous PRN Lizeth Billingsleyia L, PA-C       • torsemide (DEMADEX) tablet 20 mg  20 mg Oral Daily Karlee Billingsley L, PA-C   20 mg at 03/14/22 0916         Acute respiratory failure with hypoxia (HCC)    Presence of cardiac pacemaker    Atrial fibrillation (HCC)    Hypothyroidism (acquired)    HTN (hypertension)    HLD (hyperlipidemia)    CHF (congestive heart failure) (HCC)    Anemia    CHAPARRO (acute kidney injury) (HCC)    Acute hypoxemic respiratory failure (HCC)    Hyponatremia    Pleural effusion on right        Impression      -Hypoxic respiratory failure  -Right pleural effusion status post ultrasound-guided thoracentesis 3/11/2022  -Permanent atrial fibrillation  -Sick sinus syndrome with [place] single-chamber pacemaker  -Normal LV systolic function with mild AI by echo this admission  -Hypothyroidism  -Anemia    Plan     Continue present medications  Pulmonary toilet  Nothing else to add    Chilango King MD   03/14/22  10:12 EDT

## 2022-03-15 ENCOUNTER — APPOINTMENT (OUTPATIENT)
Dept: ULTRASOUND IMAGING | Facility: HOSPITAL | Age: 87
End: 2022-03-15

## 2022-03-15 ENCOUNTER — APPOINTMENT (OUTPATIENT)
Dept: GENERAL RADIOLOGY | Facility: HOSPITAL | Age: 87
End: 2022-03-15

## 2022-03-15 LAB
ANION GAP SERPL CALCULATED.3IONS-SCNC: 9 MMOL/L (ref 5–15)
BACTERIA SPEC AEROBE CULT: NORMAL
BACTERIA SPEC AEROBE CULT: NORMAL
BUN SERPL-MCNC: 26 MG/DL (ref 8–23)
BUN/CREAT SERPL: 25.5 (ref 7–25)
CALCIUM SPEC-SCNC: 8.6 MG/DL (ref 8.2–9.6)
CHLORIDE SERPL-SCNC: 92 MMOL/L (ref 98–107)
CO2 SERPL-SCNC: 33 MMOL/L (ref 22–29)
CREAT SERPL-MCNC: 1.02 MG/DL (ref 0.57–1)
DEPRECATED RDW RBC AUTO: 45.1 FL (ref 37–54)
EGFRCR SERPLBLD CKD-EPI 2021: 50.8 ML/MIN/1.73
ERYTHROCYTE [DISTWIDTH] IN BLOOD BY AUTOMATED COUNT: 13.5 % (ref 12.3–15.4)
GLUCOSE SERPL-MCNC: 222 MG/DL (ref 65–99)
HCT VFR BLD AUTO: 27.1 % (ref 34–46.6)
HGB BLD-MCNC: 8.8 G/DL (ref 12–15.9)
MCH RBC QN AUTO: 29.6 PG (ref 26.6–33)
MCHC RBC AUTO-ENTMCNC: 32.5 G/DL (ref 31.5–35.7)
MCV RBC AUTO: 91.2 FL (ref 79–97)
PLATELET # BLD AUTO: 277 10*3/MM3 (ref 140–450)
PMV BLD AUTO: 10.2 FL (ref 6–12)
POTASSIUM SERPL-SCNC: 4.6 MMOL/L (ref 3.5–5.2)
RBC # BLD AUTO: 2.97 10*6/MM3 (ref 3.77–5.28)
SODIUM SERPL-SCNC: 134 MMOL/L (ref 136–145)
WBC NRBC COR # BLD: 8.59 10*3/MM3 (ref 3.4–10.8)

## 2022-03-15 PROCEDURE — 99222 1ST HOSP IP/OBS MODERATE 55: CPT

## 2022-03-15 PROCEDURE — 0W9930Z DRAINAGE OF RIGHT PLEURAL CAVITY WITH DRAINAGE DEVICE, PERCUTANEOUS APPROACH: ICD-10-PCS | Performed by: RADIOLOGY

## 2022-03-15 PROCEDURE — 97110 THERAPEUTIC EXERCISES: CPT

## 2022-03-15 PROCEDURE — 94799 UNLISTED PULMONARY SVC/PX: CPT

## 2022-03-15 PROCEDURE — C1729 CATH, DRAINAGE: HCPCS

## 2022-03-15 PROCEDURE — 94761 N-INVAS EAR/PLS OXIMETRY MLT: CPT

## 2022-03-15 PROCEDURE — 80048 BASIC METABOLIC PNL TOTAL CA: CPT | Performed by: INTERNAL MEDICINE

## 2022-03-15 PROCEDURE — 99232 SBSQ HOSP IP/OBS MODERATE 35: CPT | Performed by: NURSE PRACTITIONER

## 2022-03-15 PROCEDURE — 85027 COMPLETE CBC AUTOMATED: CPT | Performed by: INTERNAL MEDICINE

## 2022-03-15 PROCEDURE — 25010000002 METHYLPREDNISOLONE PER 125 MG: Performed by: NURSE PRACTITIONER

## 2022-03-15 PROCEDURE — 71045 X-RAY EXAM CHEST 1 VIEW: CPT

## 2022-03-15 RX ORDER — ONDANSETRON HYDROCHLORIDE 8 MG/1
8 TABLET, FILM COATED ORAL EVERY 6 HOURS PRN
COMMUNITY

## 2022-03-15 RX ORDER — IPRATROPIUM BROMIDE AND ALBUTEROL SULFATE 2.5; .5 MG/3ML; MG/3ML
3 SOLUTION RESPIRATORY (INHALATION)
Status: DISCONTINUED | OUTPATIENT
Start: 2022-03-15 | End: 2022-03-19 | Stop reason: HOSPADM

## 2022-03-15 RX ORDER — DOCUSATE SODIUM 100 MG/1
100 CAPSULE, LIQUID FILLED ORAL 2 TIMES DAILY
Status: DISCONTINUED | OUTPATIENT
Start: 2022-03-15 | End: 2022-03-19 | Stop reason: HOSPADM

## 2022-03-15 RX ORDER — CHOLECALCIFEROL (VITAMIN D3) 125 MCG
10 CAPSULE ORAL NIGHTLY PRN
Status: DISCONTINUED | OUTPATIENT
Start: 2022-03-15 | End: 2022-03-19 | Stop reason: HOSPADM

## 2022-03-15 RX ORDER — METHYLPREDNISOLONE SODIUM SUCCINATE 125 MG/2ML
60 INJECTION, POWDER, LYOPHILIZED, FOR SOLUTION INTRAMUSCULAR; INTRAVENOUS ONCE
Status: COMPLETED | OUTPATIENT
Start: 2022-03-15 | End: 2022-03-15

## 2022-03-15 RX ORDER — ALBUTEROL SULFATE 2.5 MG/3ML
2.5 SOLUTION RESPIRATORY (INHALATION) 2 TIMES DAILY PRN
COMMUNITY
Start: 2022-04-06

## 2022-03-15 RX ADMIN — Medication 10 ML: at 10:15

## 2022-03-15 RX ADMIN — TORSEMIDE 20 MG: 20 TABLET ORAL at 08:10

## 2022-03-15 RX ADMIN — BUDESONIDE 0.5 MG: 0.5 SUSPENSION RESPIRATORY (INHALATION) at 19:35

## 2022-03-15 RX ADMIN — BENZONATATE 100 MG: 100 CAPSULE ORAL at 11:40

## 2022-03-15 RX ADMIN — ASPIRIN 81 MG: 81 TABLET, COATED ORAL at 08:10

## 2022-03-15 RX ADMIN — IPRATROPIUM BROMIDE AND ALBUTEROL SULFATE 3 ML: 2.5; .5 SOLUTION RESPIRATORY (INHALATION) at 13:28

## 2022-03-15 RX ADMIN — ISOSORBIDE MONONITRATE 30 MG: 30 TABLET, EXTENDED RELEASE ORAL at 08:09

## 2022-03-15 RX ADMIN — GUAIFENESIN 1200 MG: 600 TABLET, EXTENDED RELEASE ORAL at 08:09

## 2022-03-15 RX ADMIN — IPRATROPIUM BROMIDE AND ALBUTEROL SULFATE 3 ML: 2.5; .5 SOLUTION RESPIRATORY (INHALATION) at 10:27

## 2022-03-15 RX ADMIN — LEVOTHYROXINE SODIUM 50 MCG: 50 TABLET ORAL at 06:30

## 2022-03-15 RX ADMIN — IPRATROPIUM BROMIDE AND ALBUTEROL SULFATE 3 ML: 2.5; .5 SOLUTION RESPIRATORY (INHALATION) at 19:35

## 2022-03-15 RX ADMIN — METHYLPREDNISOLONE SODIUM SUCCINATE 60 MG: 125 INJECTION, POWDER, FOR SOLUTION INTRAMUSCULAR; INTRAVENOUS at 12:30

## 2022-03-15 RX ADMIN — ACETAMINOPHEN 650 MG: 325 TABLET ORAL at 20:41

## 2022-03-15 RX ADMIN — GUAIFENESIN 100 MG: 200 SOLUTION ORAL at 10:15

## 2022-03-15 RX ADMIN — PRAVASTATIN SODIUM 20 MG: 20 TABLET ORAL at 08:10

## 2022-03-15 RX ADMIN — CARVEDILOL 12.5 MG: 12.5 TABLET, FILM COATED ORAL at 18:08

## 2022-03-15 RX ADMIN — DOCUSATE SODIUM 100 MG: 100 CAPSULE, LIQUID FILLED ORAL at 20:41

## 2022-03-15 RX ADMIN — Medication 10 MG: at 20:41

## 2022-03-15 RX ADMIN — GUAIFENESIN 1200 MG: 600 TABLET, EXTENDED RELEASE ORAL at 20:41

## 2022-03-15 RX ADMIN — CARVEDILOL 12.5 MG: 12.5 TABLET, FILM COATED ORAL at 08:11

## 2022-03-15 RX ADMIN — BUDESONIDE 0.5 MG: 0.5 SUSPENSION RESPIRATORY (INHALATION) at 08:39

## 2022-03-15 RX ADMIN — PANTOPRAZOLE SODIUM 40 MG: 40 TABLET, DELAYED RELEASE ORAL at 08:10

## 2022-03-15 RX ADMIN — GUAIFENESIN 100 MG: 200 SOLUTION ORAL at 18:08

## 2022-03-15 NOTE — CONSULTS
[facility] Cardiothoracic Surgery In-Patient Consult    Name:  Syeda Gomez  MRN Number:  [id number]  Date of Admission:  3/10/2022  Date of Consultation: 3/15/2022    Consulting Provider:    PCP: Omayra Alcazar DO  IP Care Team:  Patient Care Team:  Omayra Alcazar DO as PCP - General (Internal Medicine)    Reason for Consultation: Recurrent right pleural effusion    History of Present Illness:    Syeda Gomez is a 89 y.o. female with a history of A. fib, HTN, HLD, breast cancer s/p mastectomy, iron deficiency anemia, hypothyroidism, Covid-2021, CHF-EF 51-55%, and PPM.  She presented to [facility] on 3/10/2022 for resulting worsening dyspnea, lower extremity edema, and generalized fatigue.  Chest x-ray revealed large right sided pleural effusion and she underwent thoracentesis on 3/11 and 1.6L was removed.  She had plans to be discharged today however, she became more short of breath and repeat chest x-ray showed again a reaccumulating large right pleural effusion.  Our service was consulted for further management of right pleural effusion.     Review of Systems:  Review of Systems   Constitutional: Positive for activity change and fatigue.   HENT: Negative.    Eyes: Negative.    Respiratory: Positive for cough and shortness of breath.    Cardiovascular: Positive for leg swelling.   Gastrointestinal: Negative.    Endocrine: Negative.    Genitourinary: Negative.    Musculoskeletal: Negative.    Allergic/Immunologic: Negative.    Neurological: Negative.    Hematological: Negative.    Psychiatric/Behavioral: Negative.        Past Medical History:    Past Medical History:   Diagnosis Date   • A-fib (HCC)    • Cancer (HCC)    • Carpal tunnel syndrome    • CHF (congestive heart failure) (HCC)    • Disease of thyroid gland    • Hypertension        Past Surgical History:    Past Surgical History:   Procedure Laterality Date   • APPENDECTOMY     • CARDIAC ELECTROPHYSIOLOGY PROCEDURE  "N/A 6/25/2021    Procedure: DEVICE IMPLANT;  Surgeon: Calvin Ventura MD;  Location: [facility] INVASIVE LOCATION;  Service: Cardiovascular;  Laterality: N/A;   • CATARACT EXTRACTION, BILATERAL     • CHOLECYSTECTOMY     • HYSTERECTOMY     • MASTECTOMY Left    • REPLACEMENT TOTAL KNEE BILATERAL     • RETINAL DETACHMENT SURGERY     • SHOULDER ROTATOR CUFF REPAIR Right    • TOTAL HIP ARTHROPLASTY Right        Family History:  History reviewed. No pertinent family history.    Social History:    Social History     Socioeconomic History   • Marital status:    Tobacco Use   • Smoking status: Never Smoker   • Smokeless tobacco: Never Used   Substance and Sexual Activity   • Alcohol use: No   • Drug use: No   • Sexual activity: Defer       Allergies:  Allergies   Allergen Reactions   • Cardizem [Diltiazem] Other (See Comments)     \"unknown\"   • Codeine GI Intolerance   • Morphine Other (See Comments)     \"unknown\"   • Percocet [Oxycodone-Acetaminophen] Other (See Comments)     \"unknown\"     • Sulfa Antibiotics Unknown (See Comments)     Unknown           Physical Exam:  Vital Signs:    Vitals:    03/15/22 0811 03/15/22 0839 03/15/22 1328 03/15/22 1349   BP: 133/76   106/51   BP Location:    Right arm   Patient Position:    Lying   Pulse:  75 70 72   Resp:    18   Temp:    98.2 °F (36.8 °C)   TempSrc:    Axillary   SpO2:  100% 98%    Weight:       Height:         Body mass index is 27.05 kg/m².     Physical Exam  Vitals and nursing note reviewed.   Constitutional:       Appearance: Normal appearance.      Interventions: Nasal cannula in place.      Comments: 2L NC   HENT:      Head: Normocephalic.      Mouth/Throat:      Pharynx: Oropharynx is clear.   Eyes:      Pupils: Pupils are equal, round, and reactive to light.   Cardiovascular:      Rate and Rhythm: Rhythm irregularly irregular.      Pulses:           Dorsalis pedis pulses are 1+ on the right side and 1+ on the left side.        Posterior tibial pulses are " 1+ on the right side and 1+ on the left side.   Pulmonary:      Breath sounds: Examination of the right-middle field reveals rales. Examination of the right-lower field reveals rales. Rhonchi and rales present.   Abdominal:      General: Bowel sounds are normal.   Musculoskeletal:      Cervical back: Normal range of motion.      Right lower leg: Edema present.      Left lower leg: Edema present.   Skin:     General: Skin is warm.      Findings: Bruising present.   Neurological:      General: No focal deficit present.      Mental Status: She is alert and oriented to person, place, and time.   Psychiatric:         Mood and Affect: Mood normal.         Behavior: Behavior normal.         Labs/Imaging/Procedures:   Results from last 7 days   Lab Units 03/15/22  0353 03/14/22  0401   SODIUM mmol/L 134* 132*   POTASSIUM mmol/L 4.6 4.0   CHLORIDE mmol/L 92* 93*   CO2 mmol/L 33.0* 32.0*   BUN mg/dL 26* 19   CREATININE mg/dL 1.02* 1.06*   CALCIUM mg/dL 8.6 8.4   BILIRUBIN mg/dL  --  0.3   ALK PHOS U/L  --  112   ALT (SGPT) U/L  --  13   AST (SGOT) U/L  --  17   GLUCOSE mg/dL 222* 190*     Results from last 7 days   Lab Units 03/10/22  2203 03/10/22  2121   CK TOTAL U/L 39  --    TROPONIN T ng/mL  --  0.025     Results from last 7 days   Lab Units 03/15/22  0353 03/14/22  0401 03/11/22  0822   WBC 10*3/mm3 8.59 7.82 6.50   HEMOGLOBIN g/dL 8.8* 8.7* 9.0*   HEMATOCRIT % 27.1* 27.9* 28.9*   PLATELETS 10*3/mm3 277 247 267     Results from last 7 days   Lab Units 03/14/22  0401 03/11/22  0822   INR  1.19* 1.25*     Results from last 7 days   Lab Units 03/14/22  0401   MAGNESIUM mg/dL 2.0         XR Chest 1 View    Result Date: 3/15/2022  1.  Right pleural effusion. Some atelectasis or consolidation in this area not excluded. This has been noted. 2.  Prominence of interstitial markings that may be reflective of edema. 3.  Cardiomegaly.  This report was finalized on 3/15/2022 12:24 PM by Ariel Burton MD.      XR Chest 1  View    Result Date: 3/13/2022    1.  New hazy hazy opacity throughout the right hemithorax which may be due to residual layering right pleural effusion or potentially reexpansion pulmonary edema as detailed above.  There are more dense areas of consolidation within the right lung base which may be due to atelectasis or pneumonia. 2.  No evidence pneumothorax. 3.  Stable cardiomegaly  This report was finalized on 3/13/2022 9:08 AM by Best Carter MD.      XR Chest 1 View    Result Date: 3/11/2022  Interval drainage of right effusion. No evidence of pneumothorax.  This report was finalized on 3/11/2022 10:58 AM by Dr. Devonte Darnell MD.      XR Chest 1 View    Result Date: 3/10/2022  1. Large right-sided pleural effusion likely resulting in right middle and lower lobe compressive atelectasis. 2. Clear left lung.  This report was finalized on 3/10/2022 8:41 PM by Jostin Fagan MD.      US Thoracentesis    Result Date: 3/11/2022  Impression:   Successful ultrasound-guided thoracentesis of right pleural effusion with recovery of 1.6 liters of pleural fluid.                                                           Thank you for the opportunity to assist in the care of your patient.  This report was finalized on 3/11/2022 12:54 PM by Sergio Persaud MD.      XR Chest PA & Lateral    Result Date: 3/14/2022  Reaccumulating large right pleural effusion and associated atelectasis, with mild interval worsening compared to yesterday's study. Stable pulmonary vascular congestion elsewhere.  This report was finalized on 3/14/2022 12:41 PM by Dr. Devonte Darnell MD.      XR Chest PA & Lateral    Result Date: 3/13/2022    1.  Interval improvement in hazy opacity throughout the right hemiliver thorax likely due to layering pleural effusion.  There is a small residual right pleural effusion. 2.  Minimal hazy right basilar airspace disease favored to be due to atelectasis.   This report was finalized on 3/13/2022 10:40 AM by Best Carter  MD.          Echo: Results for orders placed during the hospital encounter of 03/10/22    Adult Transthoracic Echo Complete w/ Color, Spectral and Contrast if necessary per protocol    Interpretation Summary  · Left ventricular ejection fraction appears to be 51 - 55%. Left ventricular systolic function is normal.  · Left ventricular wall thickness is consistent with severe concentric hypertrophy.  · Mildly reduced right ventricular systolic function noted.  · The right ventricular cavity is borderline dilated.  · The right atrial cavity is moderately dilated.  · There is mild calcification of the aortic valve.  · Mild aortic valve regurgitation is present.  · Moderate to severe tricuspid valve regurgitation is present.  · Estimated right ventricular systolic pressure from tricuspid regurgitation is moderately elevated (45-55 mmHg).          Assessment:    Acute respiratory failure with hypoxia (HCC)    Presence of cardiac pacemaker    Atrial fibrillation (HCC)    Hypothyroidism (acquired)    HTN (hypertension)    HLD (hyperlipidemia)    CHF (congestive heart failure) (HCC)    Anemia    CHAPARRO (acute kidney injury) (HCC)    Acute hypoxemic respiratory failure (HCC)    Hyponatremia    Pleural effusion on right      Plan:  -CT guided right pigtail catheter placement in IR today  -Place CT to -20 mmHg  -Daily portable chest x-ray  -Will reassess tomorrow after morning x-ray  Would always recommend that hospitalized patients have pigtail catheters for complete drainage, rather than thoracentesis      GONZÁLEZ Ahn  [facility] Cardiothoracic Surgery  14:19 EDT  03/15/22     Thank you for allowing us to participate in the care of your patient. Please do not hesitate to contact us with additional questions or concerns.

## 2022-03-15 NOTE — PROGRESS NOTES
Procedure: right sided chest tube placement    History: right effusion    Physician: Ayaan    Medications: lidocaine     Findings: 8.5 fr right sided chest tube    Complications: None

## 2022-03-15 NOTE — PROGRESS NOTES
"    [facility] Medicine Services  PROGRESS NOTE    Patient Name: Syeda Gomez  : 1927  MRN: [id number]    Date of Admission: 3/10/2022  Primary Care Physician: Omayra Alcazar DO    Subjective   Subjective     CC:   cough    HPI:      Patient seen resting up in bed awake and alert.  Frail appearing.  Appears mildly short of breath.  States she feels \"terrible today\".  Reports increasing cough and shortness of air.  Nurse on the phone with patient's daughter.  All in agreement patient not ready for discharge today.  Repeat CXR.      ROS:  Gen- No fevers, chills  CV- No chest pain, palpitations  Resp- +persistent cough, + dyspnea  GI- No N/V/D, abd pain      Objective   Objective     Vital Signs:   Temp:  [98 °F (36.7 °C)-98.4 °F (36.9 °C)] 98.2 °F (36.8 °C)  Heart Rate:  [70-87] 72  Resp:  [15-20] 18  BP: (102-133)/(51-76) 106/51  Flow (L/min):  [2] 2     Physical Exam:  Constitutional: No acute distress, awake, alert, sitting up in bed.    HENT: NCAT, mucous membranes moist  Respiratory: Occasional expiratory wheeze, rhonchi, decreased breath sounds at bases (or decreased to right base), mild increased work of breathing.  Very congested cough, on 2LNC with sats currently 94%.  Cardiovascular: RRR, no murmurs, rubs, or gallops  Gastrointestinal: Positive bowel sounds, soft, nontender, nondistended  Musculoskeletal: No bilateral ankle edema  Psychiatric: Appropriate affect, cooperative  Neurologic: Oriented x 3, strength symmetric in all extremities, Cranial Nerves grossly intact to confrontation, speech clear  Skin: No rashes      Results Reviewed:  LAB RESULTS:      Lab 03/15/22  0353 22  0401 22  0822 03/10/22  2121   WBC 8.59 7.82 6.50 7.00   HEMOGLOBIN 8.8* 8.7* 9.0* 8.8*   HEMATOCRIT 27.1* 27.9* 28.9* 27.5*   PLATELETS 277 247 267 283   NEUTROS ABS  --  6.31  --  5.48   IMMATURE GRANS (ABS)  --  0.06*  --  0.03   LYMPHS ABS  --  0.57*  --  0.70   MONOS ABS  --  " 0.56  --  0.55   EOS ABS  --  0.28  --  0.20   MCV 91.2 93.0 96.0 92.3   LACTATE  --   --   --  0.9   LDH  --   --  161  --    PROTIME  --  14.8* 15.3*  --          Lab 03/15/22  0353 03/14/22  0401 03/12/22  0342 03/11/22  0822 03/10/22  2121   SODIUM 134* 132* 136 136 133*   POTASSIUM 4.6 4.0 3.9 3.5 3.8   CHLORIDE 92* 93* 95* 95* 93*   CO2 33.0* 32.0* 32.0* 30.0* 30.0*   ANION GAP 9.0 7.0 9.0 11.0 10.0   BUN 26* 19 21 26* 26*   CREATININE 1.02* 1.06* 1.05* 1.16* 1.59*   EGFR 50.8* 48.5* 49.0* 43.5* 29.8*   GLUCOSE 222* 190* 164* 187* 231*   CALCIUM 8.6 8.4 8.3 8.7 8.8   MAGNESIUM  --  2.0  --   --   --    PHOSPHORUS  --  3.0  --   --   --    HEMOGLOBIN A1C  --   --   --  6.20*  --    TSH  --   --   --  11.330*  --          Lab 03/14/22  0401 03/10/22  2121   TOTAL PROTEIN 5.4* 5.8*   ALBUMIN 3.40* 3.90   GLOBULIN 2.0 1.9   ALT (SGPT) 13 9   AST (SGOT) 17 12   BILIRUBIN 0.3 0.3   ALK PHOS 112 132*         Lab 03/14/22  0401 03/11/22  0822 03/10/22  2121   PROBNP 2,358.0*  --  2,352.0*   TROPONIN T  --   --  0.025   PROTIME 14.8* 15.3*  --    INR 1.19* 1.25*  --              Lab 03/10/22  2203   IRON 36*   IRON SATURATION 12*   TIBC 308   TRANSFERRIN 207   FERRITIN 832.20*         Brief Urine Lab Results  (Last result in the past 365 days)      Color   Clarity   Blood   Leuk Est   Nitrite   Protein   CREAT   Urine HCG        03/11/22 0234 Yellow   Clear   Negative   Small (1+)   Negative   Negative                 Microbiology Results Abnormal     Procedure Component Value - Date/Time    Blood Culture - Blood, Arm, Left [[id number]]  (Normal) Collected: 03/10/22 2200    Lab Status: Preliminary result Specimen: Blood from Arm, Left Updated: 03/14/22 2331     Blood Culture No growth at 4 days    Blood Culture - Blood, Arm, Right [[id number]]  (Normal) Collected: 03/10/22 2155    Lab Status: Preliminary result Specimen: Blood from Arm, Right Updated: 03/14/22 2331     Blood Culture No growth at 4 days    Body Fluid  Culture - Body Fluid, Pleural Cavity [[id number]] Collected: 03/11/22 1110    Lab Status: Final result Specimen: Body Fluid from Pleural Cavity Updated: 03/14/22 0922     Body Fluid Culture No growth at 3 days     Gram Stain Moderate (3+) WBCs seen      No organisms seen    Anaerobic Culture - Pleural Fluid, Pleural Cavity [[id number]]  (Normal) Collected: 03/11/22 1110    Lab Status: Preliminary result Specimen: Pleural Fluid from Pleural Cavity Updated: 03/14/22 0749     Anaerobic Culture No anaerobes isolated at 3 days    COVID PRE-OP / PRE-PROCEDURE SCREENING ORDER (NO ISOLATION) - Swab, Nasopharynx [[id number]]  (Normal) Collected: 03/11/22 0829    Lab Status: Final result Specimen: Swab from Nasopharynx Updated: 03/11/22 0831    Narrative:      The following orders were created for panel order COVID PRE-OP / PRE-PROCEDURE SCREENING ORDER (NO ISOLATION) - Swab, Nasopharynx.  Procedure                               Abnormality         Status                     ---------                               -----------         ------                     COVID-19 and FLU A/B PCR...[[id number]]  Normal              Final result                 Please view results for these tests on the individual orders.    COVID-19 and FLU A/B PCR - Swab, Nasopharynx [[id number]]  (Normal) Collected: 03/11/22 0829    Lab Status: Final result Specimen: Swab from Nasopharynx Updated: 03/11/22 0831     COVID19 Not Detected     Influenza A PCR Not Detected     Influenza B PCR Not Detected    Narrative:      Fact sheet for providers: https://www.fda.gov/media/300188/download    Fact sheet for patients: https://www.fda.gov/media/856330/download    Test performed by PCR.    Respiratory Panel PCR w/COVID-19(SARS-CoV-2) VINICIO/RAGINI/YVETTE/PAD/COR/MAD/ASHKAN In-House, NP Swab in UTM/VTM, 3-4 HR TAT - Swab, Nasopharynx [[id number]]  (Normal) Collected: 03/10/22 9144    Lab Status: Final result Specimen: Swab from Nasopharynx Updated: 03/11/22 0321     ADENOVIRUS,  PCR Not Detected     Coronavirus 229E Not Detected     Coronavirus HKU1 Not Detected     Coronavirus NL63 Not Detected     Coronavirus OC43 Not Detected     COVID19 Not Detected     Human Metapneumovirus Not Detected     Human Rhinovirus/Enterovirus Not Detected     Influenza A PCR Not Detected     Influenza B PCR Not Detected     Parainfluenza Virus 1 Not Detected     Parainfluenza Virus 2 Not Detected     Parainfluenza Virus 3 Not Detected     Parainfluenza Virus 4 Not Detected     RSV, PCR Not Detected     Bordetella pertussis pcr Not Detected     Bordetella parapertussis PCR Not Detected     Chlamydophila pneumoniae PCR Not Detected     Mycoplasma pneumo by PCR Not Detected    Narrative:      In the setting of a positive respiratory panel with a viral infection PLUS a negative procalcitonin without other underlying concern for bacterial infection, consider observing off antibiotics or discontinuation of antibiotics and continue supportive care. If the respiratory panel is positive for atypical bacterial infection (Bordetella pertussis, Chlamydophila pneumoniae, or Mycoplasma pneumoniae), consider antibiotic de-escalation to target atypical bacterial infection.          XR Chest 1 View    Result Date: 3/15/2022  DATE OF EXAM: 3/15/2022 12:09 PM  PROCEDURE: XR CHEST 1 VW-  INDICATIONS: increased soa/cough; R09.02-Hypoxemia; S74-Luimtvx effusion, not elsewhere classified; D64.9-Anemia, unspecified; I48.11-Longstanding persistent atrial fibrillation; Z95.0-Presence of cardiac pacemaker  COMPARISON: March 14, 2022  TECHNIQUE: Single radiographic AP view of the chest was obtained.  FINDINGS: A port catheter is noted with its tip in the superior vena cava. The heart is enlarged. Cardiac pacemaker device is present. There remains a right pleural effusion and possibly atelectasis or consolidation in the right lower lobe. There is slight prominence of interstitial markings. Some edema cannot be excluded. There is a  curvature to the thoracolumbar spine.      Impression: 1.  Right pleural effusion. Some atelectasis or consolidation in this area not excluded. This has been noted. 2.  Prominence of interstitial markings that may be reflective of edema. 3.  Cardiomegaly.  This report was finalized on 3/15/2022 12:24 PM by Ariel Burton MD.      XR Chest PA & Lateral    Result Date: 3/14/2022  DATE OF EXAM: 3/14/2022 11:21 AM  PROCEDURE: XR CHEST PA AND LATERAL-  INDICATIONS: Cough, dyspnea, pleural effusion; R09.02-Hypoxemia; X32-Xgqqobx effusion, not elsewhere classified; D64.9-Anemia, unspecified; I48.11-Longstanding persistent atrial fibrillation; Z95.0-Presence of cardiac pacemaker  COMPARISON: 3/13/2022  TECHNIQUE: Two radiologic views of the chest , PA and lateral were obtained.  FINDINGS: Left-sided Port-A-Cath and right-sided dual-lead pacemaker are again noted. The heart remains enlarged and the vasculature is mildly cephalized. Mild diffuse interstitial lung changes, perhaps early interstitial edema, appear similar to prior study. Right basilar opacity appears stable or minimally increased, apparently a combination of atelectasis and effusion. Effusion is apparently reaccumulating after the patient's 3/11/2022 thoracentesis. No pneumothorax is seen.      Impression: Reaccumulating large right pleural effusion and associated atelectasis, with mild interval worsening compared to yesterday's study. Stable pulmonary vascular congestion elsewhere.  This report was finalized on 3/14/2022 12:41 PM by Dr. Devonte Darnell MD.        Results for orders placed during the hospital encounter of 03/10/22    Adult Transthoracic Echo Complete w/ Color, Spectral and Contrast if necessary per protocol    Interpretation Summary  · Left ventricular ejection fraction appears to be 51 - 55%. Left ventricular systolic function is normal.  · Left ventricular wall thickness is consistent with severe concentric hypertrophy.  · Mildly reduced right  ventricular systolic function noted.  · The right ventricular cavity is borderline dilated.  · The right atrial cavity is moderately dilated.  · There is mild calcification of the aortic valve.  · Mild aortic valve regurgitation is present.  · Moderate to severe tricuspid valve regurgitation is present.  · Estimated right ventricular systolic pressure from tricuspid regurgitation is moderately elevated (45-55 mmHg).      I have reviewed the medications:  Scheduled Meds:aspirin, 81 mg, Oral, Daily  budesonide, 0.5 mg, Nebulization, BID - RT  carvedilol, 12.5 mg, Oral, BID With Meals  guaiFENesin, 1,200 mg, Oral, Q12H  ipratropium-albuterol, 3 mL, Nebulization, 4x Daily - RT  isosorbide mononitrate, 30 mg, Oral, Daily  levothyroxine, 50 mcg, Oral, Daily  pantoprazole, 40 mg, Oral, QAM  pharmacy consult - [place], , Does not apply, Daily  pravastatin, 20 mg, Oral, Daily  sodium chloride, 10 mL, Intravenous, Q12H  torsemide, 20 mg, Oral, Daily      Continuous Infusions:   PRN Meds:.•  acetaminophen  •  benzocaine-menthol  •  benzonatate  •  guaiFENesin  •  ipratropium-albuterol  •  melatonin  •  ondansetron  •  phenol  •  sodium chloride  •  sodium chloride    Assessment/Plan   Assessment & Plan     Active Hospital Problems    Diagnosis  POA   • **Acute respiratory failure with hypoxia ([facility]) [J96.01]  Yes   • CHF (congestive heart failure) ([facility]) [I50.9]  Yes   • Anemia [D64.9]  Yes   • CHAPARRO (acute kidney injury) ([facility]) [N17.9]  Yes   • Acute hypoxemic respiratory failure ([facility]) [J96.01]  Yes   • Hyponatremia [E87.1]  Yes   • Pleural effusion on right [J90]  Yes   • Hypothyroidism (acquired) [E03.9]  Yes   • HTN (hypertension) [I10]  Yes   • HLD (hyperlipidemia) [E78.5]  Yes   • Presence of cardiac pacemaker [Z95.0]  Yes   • Atrial fibrillation ([facility]) [I48.91]  Yes      Resolved Hospital Problems   No resolved problems to display.        Brief Hospital Course to date:    Syeda Gomez is a 89 y.o. female with a past medical  history of breast cancer s/p left mastectomy 33 years ago, atrial fibrillation, HTN, HLD, CHF s/p PPM, hypothyroidism, and chronic idiopathic iron deficiency anemia that presented to the ED complaining of 1 week of ongoing shortness of air. She states she has also been more fatigued than her baseline. The patient's daughter is at the bedside and states the patient has required multiple iron and blood transfusions due to anemia over the past year. She states they thought her shortness of air was due to worsened anemia again. The patient was, however, found to have a large pleural effusion in the ED.    This patient's problems and plans were partially entered by my partner and updated as appropriate by me 03/15/22.    Assessment/Plan:  Pt is new to me today     Acute Respiratory Failure with Hypoxia  Large right pleural effusion  Acute on Chronic CHF  Bronchospasm  pHTN  - thoracentesis done 3/11 by Dr. Persaud by US guidance  - echocardiogram shows EF 51-55%, severe LVH, RV borderline dilated, RA moderately dilated, mild calcification of the AV, mild AR, moderate to severe TR, RVSP 45-55mmHg  - decreased DuoNebs to as needed only yesterday 3/14.  Worsening rhonchi and wheezing overnight.  Resume scheduled and as needed.  --One-time dose of Solu-Medrol 60 mg IV.  - low volume on IS assessment ~ 500 cc  - some atelectasis, encouraged to continue to work hard on incentive spirometer  - added mucinex 3/14  - cultures no growth to date  - fluid cytology pending  --Today 3/15, increasing shortness of air and cough.  Chest x-ray yesterday revealed worsening right effusion again.  Sats stable on 2 LNC.  We will recheck chest x-ray today.  --Addendum: 1300 p.m.  Worsening right effusion after thoracentesis 3/11 by Dr. Persaud with IR.  Due to recurrence so quickly, cardiology recommends CTS consult.      Hypotension  - blood pressure improved on less Imdur  --Stable    Anemia:  - appears stable     CHAPARRO:  - improved with  diuresis  - holding lisinopril.  Stable     Hyponatremia:  - monitor  - sodium improved    Atrial fibrillation:  - stable and rate controlled  - aspirin 81 mg daily      HTN/HLD:  - continue statin, imdur     Hypothyroidism:  - free T4 1.31      DVT prophylaxis:  Mechanical DVT prophylaxis orders are present.       AM-PAC 6 Clicks Score (PT): 16 (03/15/22 1013)    Disposition: I expect the patient to be discharged TBD.  Patient wants to return home however today states she feels terrible and not ready.  Daughter aware.  On the Phone with nurse during my exam.      CODE STATUS:   Code Status and Medical Interventions:   Ordered at: 03/10/22 6666     Medical Intervention Limits:    NO intubation (DNI)     Level Of Support Discussed With:    Patient     Code Status (Patient has no pulse and is not breathing):    No CPR (Do Not Attempt to Resuscitate)     Medical Interventions (Patient has pulse or is breathing):    Limited Support       Sheyla Lacy, APRN  03/15/22

## 2022-03-15 NOTE — PLAN OF CARE
"Goal Outcome Evaluation:  Plan of Care Reviewed With: patient        Progress: no change  Outcome Evaluation: patient deferred EOB/OOB activity this date stating she was \"exhausted\" and didn't feel well overall. Patient completed B UE/LE AROM in supine position. Required frequent rest breaks and increased time to complete HEP due to SOA and weakness. At end of session patient with increased coughing and phlegm production, nursing notified and aware.  "

## 2022-03-15 NOTE — THERAPY TREATMENT NOTE
Patient Name: Syeda Gomez  : 1927    MRN: [id number]                              Today's Date: 3/15/2022       Admit Date: 3/10/2022    Visit Dx:     ICD-10-CM ICD-9-CM   1. Hypoxia  R09.02 799.02   2. Pleural effusion, right  J90 511.9   3. Anemia, unspecified type  D64.9 285.9   4. Longstanding persistent atrial fibrillation (HCC)  I48.11 427.31   5. Presence of cardiac pacemaker  Z95.0 V45.01     Patient Active Problem List   Diagnosis   • Pneumonia due to COVID-19 virus   • Pancytopenia (HCC)   • Presence of cardiac pacemaker   • Atrial fibrillation (HCC)   • Prolonged QTc interval on ECG   • Hypothyroidism (acquired)   • HTN (hypertension)   • HLD (hyperlipidemia)   • Acute respiratory failure with hypoxia (HCC)   • Elective replacement indicated for pacemaker   • CHF (congestive heart failure) (HCC)   • Anemia   • CHAPARRO (acute kidney injury) (HCC)   • Acute hypoxemic respiratory failure (HCC)   • Hyponatremia   • Pleural effusion on right     Past Medical History:   Diagnosis Date   • A-fib (HCC)    • Cancer (HCC)    • Carpal tunnel syndrome    • CHF (congestive heart failure) (HCC)    • Disease of thyroid gland    • Hypertension      Past Surgical History:   Procedure Laterality Date   • APPENDECTOMY     • CARDIAC ELECTROPHYSIOLOGY PROCEDURE N/A 2021    Procedure: DEVICE IMPLANT;  Surgeon: Calvin Ventura MD;  Location: [facility] INVASIVE LOCATION;  Service: Cardiovascular;  Laterality: N/A;   • CATARACT EXTRACTION, BILATERAL     • CHOLECYSTECTOMY     • HYSTERECTOMY     • MASTECTOMY Left    • REPLACEMENT TOTAL KNEE BILATERAL     • RETINAL DETACHMENT SURGERY     • SHOULDER ROTATOR CUFF REPAIR Right    • TOTAL HIP ARTHROPLASTY Right       General Information     Row Name 03/15/22 1004          Physical Therapy Time and Intention    Document Type therapy note (daily note)  -AS     Mode of Treatment physical therapy  -AS     Row Name 03/15/22 100          General Information    Patient Profile  "Reviewed yes  -AS     Existing Precautions/Restrictions fall;oxygen therapy device and L/min  -AS     Barriers to Rehab none identified  -AS     Row Name 03/15/22 1004          Cognition    Orientation Status (Cognition) oriented x 4  -AS     Row Name 03/15/22 1004          Safety Issues, Functional Mobility    Safety Issues Affecting Function (Mobility) safety precaution awareness;safety precautions follow-through/compliance  -AS     Impairments Affecting Function (Mobility) balance;endurance/activity tolerance;shortness of breath;strength  -AS     Comment, Safety Issues/Impairments (Mobility) alert and following commnds, patient with c/o fatigue and weakness, coughing at end of session nursing notified and aware.  -AS           User Key  (r) = Recorded By, (t) = Taken By, (c) = Cosigned By    Initials Name Provider Type    AS Jania Junior PTA Physical Therapy Assistant               Mobility     Row Name 03/15/22 1005          Bed Mobility    Comment, (Bed Mobility) deferred bed mobility this date as patient reports not feeling well and is \"exhausted\" agreed to supine exercises.  -AS     Row Name 03/15/22 1005          Transfers    Comment, (Transfers) deferred  -AS     Row Name 03/15/22 1005          Bed-Chair Transfer    Bed-Chair [place] (Transfers) not tested  -AS     Row Name 03/15/22 1005          Sit-Stand Transfer    Sit-Stand [place] (Transfers) not tested  -AS     Row Name 03/15/22 1005          Gait/Stairs (Locomotion)    [place] Level (Gait) not tested  -AS           User Key  (r) = Recorded By, (t) = Taken By, (c) = Cosigned By    Initials Name Provider Type    AS Jania Junior PTA Physical Therapy Assistant               Obj/Interventions     [facility] Name 03/15/22 1007          Motor Skills    Therapeutic Exercise shoulder;knee;hip;ankle  -AS     Row Name 03/15/22 1007          Shoulder (Therapeutic Exercise)    Shoulder (Therapeutic Exercise) AROM (active range of motion)  " "-AS     Shoulder AROM (Therapeutic Exercise) bilateral;flexion;extension;aBduction;aDduction;supine;10 repetitions  bicep curls  -AS     Row Name 03/15/22 1007          Hip (Therapeutic Exercise)    Hip AROM (Therapeutic Exercise) bilateral;aBduction;aDduction;external rotation;internal rotation;supine;10 repetitions  -AS     Row Name 03/15/22 1007          Knee (Therapeutic Exercise)    Knee (Therapeutic Exercise) AROM (active range of motion)  -AS     Knee AROM (Therapeutic Exercise) bilateral;SAQ (short arc quad);heel slides;supine;10 repetitions  -AS     Row Name 03/15/22 1007          Ankle (Therapeutic Exercise)    Ankle (Therapeutic Exercise) AROM (active range of motion)  -AS     Ankle AROM (Therapeutic Exercise) bilateral;dorsiflexion;plantarflexion;supine;10 repetitions  -AS           User Key  (r) = Recorded By, (t) = Taken By, (c) = Cosigned By    Initials Name Provider Type    AS Jania Junior, ZARI Physical Therapy Assistant               Goals/Plan    No documentation.                Clinical Impression     Row Name 03/15/22 1007          Pain    Pretreatment Pain Rating 0/10 - no pain  -AS     Posttreatment Pain Rating 0/10 - no pain  -AS     Pre/Posttreatment Pain Comment denied pain but c/o weakness and being \"exhausted\"  -AS     Pain Intervention(s) Repositioned;Ambulation/increased activity  -AS     Row Name 03/15/22 1007          Plan of Care Review    Plan of Care Reviewed With patient  -AS     Progress no change  -AS     Outcome Evaluation patient deferred EOB/OOB activity this date stating she was \"exhausted\" and didn't feel well overall. Patient completed B UE/LE AROM in supine position. Required frequent rest breaks and increased time to complete HEP due to SOA and weakness. At end of session patient with increased coughing and phlegm production, nursing notified and aware.  -AS     Row Name 03/15/22 1007          Vital Signs    Pre SpO2 (%) 97  -AS     O2 Delivery Pre Treatment " supplemental O2  -AS     Intra SpO2 (%) 96  -AS     O2 Delivery Intra Treatment supplemental O2  -AS     Post SpO2 (%) 97  -AS     O2 Delivery Post Treatment supplemental O2  -AS     Pre Patient Position Supine  -AS     Intra Patient Position Supine  -AS     Post Patient Position Supine  -AS     Row Name 03/15/22 1007          Positioning and Restraints    Pre-Treatment Position in bed  -AS     Post Treatment Position bed  -AS     In Bed supine;call light within reach;encouraged to call for assist;exit alarm on;legs elevated;notified nsg  -AS           User Key  (r) = Recorded By, (t) = Taken By, (c) = Cosigned By    Initials Name Provider Type    AS Jania Junior PTA Physical Therapy Assistant               Outcome Measures     Row Name 03/15/22 1013          How much help from another person do you currently need...    Turning from your back to your side while in flat bed without using bedrails? 3  -AS     Moving from lying on back to sitting on the side of a flat bed without bedrails? 3  -AS     Moving to and from a bed to a chair (including a wheelchair)? 3  -AS     Standing up from a chair using your arms (e.g., wheelchair, bedside chair)? 3  -AS     Climbing 3-5 steps with a railing? 2  -AS     To walk in hospital room? 2  -AS     AM-PAC 6 Clicks Score (PT) 16  -AS     Row Name 03/15/22 1013          Functional Assessment    Outcome Measure Options AM-PAC 6 Clicks Basic Mobility (PT)  -AS           User Key  (r) = Recorded By, (t) = Taken By, (c) = Cosigned By    Initials Name Provider Type    AS Jania Junior PTA Physical Therapy Assistant                             Physical Therapy Education                 Title: PT OT SLP Therapies (In Progress)     Topic: Physical Therapy (In Progress)     Point: Mobility training (In Progress)     Learning Progress Summary           Patient Acceptance, E, NR by AS at 3/15/2022 1013    Acceptance, E, NR by AS at 3/14/2022 0856    Acceptance, E, VU,NR by CHELSEA  "at 3/11/2022 1505    Comment: Patient education regarding sequencing for bed mobilty and transfers.                   Point: Home exercise program (In Progress)     Learning Progress Summary           Patient Acceptance, E, NR by AS at 3/15/2022 1013    Acceptance, E, NR by AS at 3/14/2022 0856    Acceptance, E, VU,NR by LO at 3/11/2022 1505    Comment: Patient education regarding sequencing for bed mobilty and transfers.                   Point: Body mechanics (In Progress)     Learning Progress Summary           Patient Acceptance, E, NR by AS at 3/15/2022 1013    Acceptance, E, NR by AS at 3/14/2022 0856    Acceptance, E, VU,NR by LO at 3/11/2022 1505    Comment: Patient education regarding sequencing for bed mobilty and transfers.                   Point: Precautions (In Progress)     Learning Progress Summary           Patient Acceptance, E, NR by AS at 3/15/2022 1013    Acceptance, E, NR by AS at 3/14/2022 0856    Acceptance, E, VU,NR by LO at 3/11/2022 1505    Comment: Patient education regarding sequencing for bed mobilty and transfers.                               User Key     Initials Effective Dates Name Provider Type Discipline    AS 06/16/21 -  Jania Junior, PTA Physical Therapy Assistant PT    LO 06/16/21 -  Katia Clemente, PT Physical Therapist PT              PT Recommendation and Plan     Plan of Care Reviewed With: patient  Progress: no change  Outcome Evaluation: patient deferred EOB/OOB activity this date stating she was \"exhausted\" and didn't feel well overall. Patient completed B UE/LE AROM in supine position. Required frequent rest breaks and increased time to complete HEP due to SOA and weakness. At end of session patient with increased coughing and phlegm production, nursing notified and aware.     Time Calculation:    PT Charges     Row Name 03/15/22 1014             Time Calculation    Start Time 0937  -AS      PT Received On 03/15/22  -AS      PT Goal Re-Cert Due Date 03/21/22  -AS  "             Timed Charges    42898 - PT Therapeutic Exercise Minutes 23  -AS              Total Minutes    Timed Charges Total Minutes 23  -AS       Total Minutes 23  -AS            User Key  (r) = Recorded By, (t) = Taken By, (c) = Cosigned By    Initials Name Provider Type    AS Jania Junior PTA Physical Therapy Assistant              Therapy Charges for Today     Code Description Service Date Service Provider Modifiers Qty    35052833665 HC PT THER PROC EA 15 MIN 3/14/2022 Jania Junior, ZARI GP 1    09003672665 HC GAIT TRAINING EA 15 MIN 3/14/2022 Jania Junior, ZARI GP 1    79028184080 HC PT THER PROC EA 15 MIN 3/15/2022 Jania Junior, ZARI GP 2          PT G-Codes  Outcome Measure Options: AM-PAC 6 Clicks Basic Mobility (PT)  AM-PAC 6 Clicks Score (PT): 16  AM-PAC 6 Clicks Score (OT): 17    Jania Junior PTA  3/15/2022

## 2022-03-15 NOTE — NURSING NOTE
Patient brought down for US guided right chest tube placement. Patient's vitals stable.  8.5FR pigtail chest tube placed per Dr. Kuo.  Hooked to [facility] device.  Serosanguinous fluid draining.  Patient tolerated well.  Report called to [facility] on 5G.  Patient sent back up via transport. Orders already placed per CT surgery for maintenance of chest tube.

## 2022-03-15 NOTE — PROGRESS NOTES
"                                 [facility] Progress Note      LOS: 5 days   Patient Care Team:  Omayra Alcazar DO as PCP - General (Internal Medicine)    Chief Complaint:    Chief Complaint   Patient presents with   • Shortness of Breath   • Cough       Subjective     Interval History: Quiet night    Patient Complaints: Feels a little bit better today.  Less congestion.      Review of Systems:   A 14 point review of systems was negative except as was stated in the HPI      Objective     Vital Sign Min/Max for last 24 hours  Temp  Min: 98 °F (36.7 °C)  Max: 98.4 °F (36.9 °C)   BP  Min: 102/60  Max: 133/76   Pulse  Min: 75  Max: 87   Resp  Min: 15  Max: 20   SpO2  Min: 92 %  Max: 100 %   Flow (L/min)  Min: 2  Max: 2   Weight  Min: 69.3 kg (152 lb 11.2 oz)  Max: 69.3 kg (152 lb 11.2 oz)     Flowsheet Rows    Flowsheet Row First Filed Value   Admission Height 160 cm (63\") Documented at 03/10/2022 2019   Admission Weight 63.5 kg (140 lb) Documented at 03/10/2022 2019          Physical Exam:  General Appearance: Alert, appears stated age and cooperative  Lungs: Diffuse bilateral rhonchi/wheeze  Heart:: Irregular with distant heart tones  Abdomen: Soft and nontender with adequate bowel sounds.  No organomegaly  Extremities: No cyanosis, clubbing or edema  Pulses: Pulses palpable and equal bilaterally  Skin: Warm and dry with no rash  Psych: Normal     Results Review:     I reviewed the patient's new clinical results.  Results from last 7 days   Lab Units 03/15/22  0353 03/14/22  0401 03/12/22  0342   SODIUM mmol/L 134* 132* 136   POTASSIUM mmol/L 4.6 4.0 3.9   CHLORIDE mmol/L 92* 93* 95*   CO2 mmol/L 33.0* 32.0* 32.0*   BUN mg/dL 26* 19 21   CREATININE mg/dL 1.02* 1.06* 1.05*   GLUCOSE mg/dL 222* 190* 164*   CALCIUM mg/dL 8.6 8.4 8.3     Results from last 7 days   Lab Units 03/15/22  0353 03/14/22  0401 03/11/22  0822   WBC 10*3/mm3 8.59 7.82 6.50   HEMOGLOBIN g/dL 8.8* 8.7* 9.0*   HEMATOCRIT % " 27.1* 27.9* 28.9*   PLATELETS 10*3/mm3 277 247 267     Lab Results   Lab Value Date/Time    TROPONINT 0.025 03/10/2022 2121    TROPONINT 0.012 10/23/2020 2138    TROPONINT 0.011 10/20/2020 1525         Results from last 7 days   Lab Units 03/14/22  0401 03/11/22  0822   INR  1.19* 1.25*               Medication Review: yes  Current Facility-Administered Medications   Medication Dose Route Frequency Provider Last Rate Last Admin   • acetaminophen (TYLENOL) tablet 650 mg  650 mg Oral Q4H PRN Karlee Billingsley PA-C   650 mg at 03/14/22 2151   • aspirin EC tablet 81 mg  81 mg Oral Daily Félix Hernandez MD   81 mg at 03/15/22 0810   • benzocaine-menthol (CEPACOL) lozenge 1 lozenge  1 lozenge Mouth/Throat TID PRN Fransisca Mathis APRN       • benzonatate (TESSALON) capsule 100 mg  100 mg Oral Q4H PRN Félix Hernandez MD   100 mg at 03/14/22 0009   • budesonide (PULMICORT) nebulizer solution 0.5 mg  0.5 mg Nebulization BID - RT Los Fan MD   0.5 mg at 03/15/22 0839   • carvedilol (COREG) tablet 12.5 mg  12.5 mg Oral BID With Meals Karlee Billingsley PA-C   12.5 mg at 03/15/22 0811   • guaiFENesin (MUCINEX) 12 hr tablet 1,200 mg  1,200 mg Oral Q12H Dewayne Villalobos MD   1,200 mg at 03/15/22 0809   • guaiFENesin (ROBITUSSIN) 100 MG/5ML oral solution 100 mg  100 mg Oral Q4H PRN Félix Hernandez MD   100 mg at 03/14/22 2001   • ipratropium-albuterol (DUO-NEB) nebulizer solution 3 mL  3 mL Nebulization Q4H PRN Félix Hernandez MD       • isosorbide mononitrate (IMDUR) 24 hr tablet 30 mg  30 mg Oral Daily Félix Hernandez MD   30 mg at 03/15/22 0809   • levothyroxine (SYNTHROID, LEVOTHROID) tablet 50 mcg  50 mcg Oral Daily Karlee Billingsley PA-C   50 mcg at 03/15/22 0630   • melatonin tablet 5 mg  5 mg Oral Nightly PRN Karlee Billingsley PA-C   5 mg at 03/13/22 2251   • ondansetron (ZOFRAN) injection 4 mg  4 mg Intravenous Q6H PRN Karlee Billingsley PA-C   4 mg at 03/13/22 1337   • pantoprazole (PROTONIX) EC tablet 40 mg   40 mg Oral QAM Lizeth Billingsleyia L, PA-C   40 mg at 03/15/22 0810   • Pharmacy Consult - MTM   Does not apply Daily Aimee Tobias, PharmD       • phenol (CHLORASEPTIC) 1.4 % liquid 1 spray  1 spray Mouth/Throat Q2H PRN Fransisca Mathis APRN       • pravastatin (PRAVACHOL) tablet 20 mg  20 mg Oral Daily Lizeth Billingsleyia L, PA-C   20 mg at 03/15/22 0810   • sodium chloride 0.9 % flush 10 mL  10 mL Intravenous PRN Iván Taylor MD       • sodium chloride 0.9 % flush 10 mL  10 mL Intravenous Q12H Lizeth Billingsleyia FRANCHESKA, PA-C   10 mL at 03/14/22 0916   • sodium chloride 0.9 % flush 10 mL  10 mL Intravenous PRN Rustam Billingsleynicia L, PA-C       • torsemide (DEMADEX) tablet 20 mg  20 mg Oral Daily Rustam Billingsleynicia L, PA-C   20 mg at 03/15/22 0810         Acute respiratory failure with hypoxia (HCC)    Presence of cardiac pacemaker    Atrial fibrillation (HCC)    Hypothyroidism (acquired)    HTN (hypertension)    HLD (hyperlipidemia)    CHF (congestive heart failure) (HCC)    Anemia    CHAPARRO (acute kidney injury) (HCC)    Acute hypoxemic respiratory failure (HCC)    Hyponatremia    Pleural effusion on right        Impression      -Hypoxic respiratory failure  -Right pleural effusion status post ultrasound-guided thoracentesis 3/11/2022  -Reaccumulating large right pleural effusion on chest x-ray 3/15/2022    -Permanent atrial fibrillation  -Sick sinus syndrome with [place] single-chamber pacemaker  -Normal LV systolic function with mild AI by echo this admission  -Hypothyroidism  -Anemia    Plan     May need repeat thoracentesis and CT surgery evaluation    Chilango King MD   03/15/22  09:50 EDT

## 2022-03-15 NOTE — PLAN OF CARE
Goal Outcome Evaluation:               VSS. Pt c/o sore throat, lozenges ordered. Resting well. Remains on 2l nc. Will continue to monitor.

## 2022-03-16 ENCOUNTER — APPOINTMENT (OUTPATIENT)
Dept: GENERAL RADIOLOGY | Facility: HOSPITAL | Age: 87
End: 2022-03-16

## 2022-03-16 LAB
ANION GAP SERPL CALCULATED.3IONS-SCNC: 10 MMOL/L (ref 5–15)
ANION GAP SERPL CALCULATED.3IONS-SCNC: 9 MMOL/L (ref 5–15)
BACTERIA SPEC ANAEROBE CULT: NORMAL
BASOPHILS # BLD AUTO: 0.01 10*3/MM3 (ref 0–0.2)
BASOPHILS NFR BLD AUTO: 0.1 % (ref 0–1.5)
BUN SERPL-MCNC: 35 MG/DL (ref 8–23)
BUN SERPL-MCNC: 37 MG/DL (ref 8–23)
BUN/CREAT SERPL: 34 (ref 7–25)
BUN/CREAT SERPL: 35.9 (ref 7–25)
CALCIUM SPEC-SCNC: 8.7 MG/DL (ref 8.2–9.6)
CALCIUM SPEC-SCNC: 8.8 MG/DL (ref 8.2–9.6)
CHLORIDE SERPL-SCNC: 88 MMOL/L (ref 98–107)
CHLORIDE SERPL-SCNC: 90 MMOL/L (ref 98–107)
CO2 SERPL-SCNC: 29 MMOL/L (ref 22–29)
CO2 SERPL-SCNC: 30 MMOL/L (ref 22–29)
CREAT SERPL-MCNC: 1.03 MG/DL (ref 0.57–1)
CREAT SERPL-MCNC: 1.03 MG/DL (ref 0.57–1)
DEPRECATED RDW RBC AUTO: 44.5 FL (ref 37–54)
EGFRCR SERPLBLD CKD-EPI 2021: 50.2 ML/MIN/1.73
EGFRCR SERPLBLD CKD-EPI 2021: 50.2 ML/MIN/1.73
EOSINOPHIL # BLD AUTO: 0 10*3/MM3 (ref 0–0.4)
EOSINOPHIL NFR BLD AUTO: 0 % (ref 0.3–6.2)
ERYTHROCYTE [DISTWIDTH] IN BLOOD BY AUTOMATED COUNT: 13.2 % (ref 12.3–15.4)
GLUCOSE BLDC GLUCOMTR-MCNC: 271 MG/DL (ref 70–130)
GLUCOSE BLDC GLUCOMTR-MCNC: 274 MG/DL (ref 70–130)
GLUCOSE BLDC GLUCOMTR-MCNC: 330 MG/DL (ref 70–130)
GLUCOSE BLDC GLUCOMTR-MCNC: 348 MG/DL (ref 70–130)
GLUCOSE BLDC GLUCOMTR-MCNC: 361 MG/DL (ref 70–130)
GLUCOSE BLDC GLUCOMTR-MCNC: 463 MG/DL (ref 70–130)
GLUCOSE BLDC GLUCOMTR-MCNC: 492 MG/DL (ref 70–130)
GLUCOSE BLDC GLUCOMTR-MCNC: 564 MG/DL (ref 70–130)
GLUCOSE BLDC GLUCOMTR-MCNC: 567 MG/DL (ref 70–130)
GLUCOSE BLDC GLUCOMTR-MCNC: 571 MG/DL (ref 70–130)
GLUCOSE BLDC GLUCOMTR-MCNC: 572 MG/DL (ref 70–130)
GLUCOSE BLDC GLUCOMTR-MCNC: 592 MG/DL (ref 70–130)
GLUCOSE SERPL-MCNC: 494 MG/DL (ref 65–99)
GLUCOSE SERPL-MCNC: 511 MG/DL (ref 65–99)
HCT VFR BLD AUTO: 26.1 % (ref 34–46.6)
HGB BLD-MCNC: 8.4 G/DL (ref 12–15.9)
IMM GRANULOCYTES # BLD AUTO: 0.07 10*3/MM3 (ref 0–0.05)
IMM GRANULOCYTES NFR BLD AUTO: 1 % (ref 0–0.5)
LYMPHOCYTES # BLD AUTO: 0.29 10*3/MM3 (ref 0.7–3.1)
LYMPHOCYTES NFR BLD AUTO: 4.2 % (ref 19.6–45.3)
MCH RBC QN AUTO: 29.8 PG (ref 26.6–33)
MCHC RBC AUTO-ENTMCNC: 32.2 G/DL (ref 31.5–35.7)
MCV RBC AUTO: 92.6 FL (ref 79–97)
MONOCYTES # BLD AUTO: 0.24 10*3/MM3 (ref 0.1–0.9)
MONOCYTES NFR BLD AUTO: 3.5 % (ref 5–12)
NEUTROPHILS NFR BLD AUTO: 6.23 10*3/MM3 (ref 1.7–7)
NEUTROPHILS NFR BLD AUTO: 91.2 % (ref 42.7–76)
NRBC BLD AUTO-RTO: 0 /100 WBC (ref 0–0.2)
PLATELET # BLD AUTO: 246 10*3/MM3 (ref 140–450)
PMV BLD AUTO: 10.5 FL (ref 6–12)
POTASSIUM SERPL-SCNC: 4.9 MMOL/L (ref 3.5–5.2)
POTASSIUM SERPL-SCNC: 5.1 MMOL/L (ref 3.5–5.2)
RBC # BLD AUTO: 2.82 10*6/MM3 (ref 3.77–5.28)
SODIUM SERPL-SCNC: 128 MMOL/L (ref 136–145)
SODIUM SERPL-SCNC: 128 MMOL/L (ref 136–145)
WBC NRBC COR # BLD: 6.84 10*3/MM3 (ref 3.4–10.8)

## 2022-03-16 PROCEDURE — 80048 BASIC METABOLIC PNL TOTAL CA: CPT | Performed by: NURSE PRACTITIONER

## 2022-03-16 PROCEDURE — 99232 SBSQ HOSP IP/OBS MODERATE 35: CPT | Performed by: INTERNAL MEDICINE

## 2022-03-16 PROCEDURE — 94799 UNLISTED PULMONARY SVC/PX: CPT

## 2022-03-16 PROCEDURE — 82962 GLUCOSE BLOOD TEST: CPT

## 2022-03-16 PROCEDURE — 63710000001 INSULIN LISPRO (HUMAN) PER 5 UNITS: Performed by: INTERNAL MEDICINE

## 2022-03-16 PROCEDURE — 94761 N-INVAS EAR/PLS OXIMETRY MLT: CPT

## 2022-03-16 PROCEDURE — 85025 COMPLETE CBC W/AUTO DIFF WBC: CPT | Performed by: NURSE PRACTITIONER

## 2022-03-16 PROCEDURE — 80048 BASIC METABOLIC PNL TOTAL CA: CPT | Performed by: INTERNAL MEDICINE

## 2022-03-16 PROCEDURE — 99232 SBSQ HOSP IP/OBS MODERATE 35: CPT | Performed by: THORACIC SURGERY (CARDIOTHORACIC VASCULAR SURGERY)

## 2022-03-16 PROCEDURE — 63710000001 INSULIN LISPRO (HUMAN) PER 5 UNITS: Performed by: NURSE PRACTITIONER

## 2022-03-16 PROCEDURE — 71045 X-RAY EXAM CHEST 1 VIEW: CPT

## 2022-03-16 RX ORDER — DEXTROSE MONOHYDRATE 25 G/50ML
25 INJECTION, SOLUTION INTRAVENOUS
Status: DISCONTINUED | OUTPATIENT
Start: 2022-03-16 | End: 2022-03-19 | Stop reason: HOSPADM

## 2022-03-16 RX ORDER — NICOTINE POLACRILEX 4 MG
15 LOZENGE BUCCAL
Status: DISCONTINUED | OUTPATIENT
Start: 2022-03-16 | End: 2022-03-19 | Stop reason: HOSPADM

## 2022-03-16 RX ORDER — HYDROXYZINE HYDROCHLORIDE 10 MG/1
10 TABLET, FILM COATED ORAL ONCE
Status: COMPLETED | OUTPATIENT
Start: 2022-03-16 | End: 2022-03-16

## 2022-03-16 RX ADMIN — ASPIRIN 81 MG: 81 TABLET, COATED ORAL at 10:14

## 2022-03-16 RX ADMIN — INSULIN LISPRO 10 UNITS: 100 INJECTION, SOLUTION INTRAVENOUS; SUBCUTANEOUS at 14:37

## 2022-03-16 RX ADMIN — Medication 10 MG: at 20:30

## 2022-03-16 RX ADMIN — BUDESONIDE 0.5 MG: 0.5 SUSPENSION RESPIRATORY (INHALATION) at 19:49

## 2022-03-16 RX ADMIN — INSULIN LISPRO 6 UNITS: 100 INJECTION, SOLUTION INTRAVENOUS; SUBCUTANEOUS at 10:23

## 2022-03-16 RX ADMIN — ACETAMINOPHEN 650 MG: 325 TABLET ORAL at 10:15

## 2022-03-16 RX ADMIN — INSULIN LISPRO 4 UNITS: 100 INJECTION, SOLUTION INTRAVENOUS; SUBCUTANEOUS at 17:56

## 2022-03-16 RX ADMIN — IPRATROPIUM BROMIDE AND ALBUTEROL SULFATE 3 ML: 2.5; .5 SOLUTION RESPIRATORY (INHALATION) at 12:29

## 2022-03-16 RX ADMIN — CARVEDILOL 12.5 MG: 12.5 TABLET, FILM COATED ORAL at 10:15

## 2022-03-16 RX ADMIN — DOCUSATE SODIUM 100 MG: 100 CAPSULE, LIQUID FILLED ORAL at 10:15

## 2022-03-16 RX ADMIN — PRAVASTATIN SODIUM 20 MG: 20 TABLET ORAL at 10:14

## 2022-03-16 RX ADMIN — ACETAMINOPHEN 650 MG: 325 TABLET ORAL at 02:52

## 2022-03-16 RX ADMIN — GUAIFENESIN 1200 MG: 600 TABLET, EXTENDED RELEASE ORAL at 10:14

## 2022-03-16 RX ADMIN — LEVOTHYROXINE SODIUM 50 MCG: 50 TABLET ORAL at 04:53

## 2022-03-16 RX ADMIN — HYDROXYZINE HYDROCHLORIDE 10 MG: 10 TABLET ORAL at 04:53

## 2022-03-16 RX ADMIN — BENZONATATE 100 MG: 100 CAPSULE ORAL at 18:21

## 2022-03-16 RX ADMIN — BUDESONIDE 0.5 MG: 0.5 SUSPENSION RESPIRATORY (INHALATION) at 07:45

## 2022-03-16 RX ADMIN — IPRATROPIUM BROMIDE AND ALBUTEROL SULFATE 3 ML: 2.5; .5 SOLUTION RESPIRATORY (INHALATION) at 19:49

## 2022-03-16 RX ADMIN — PANTOPRAZOLE SODIUM 40 MG: 40 TABLET, DELAYED RELEASE ORAL at 10:14

## 2022-03-16 RX ADMIN — GUAIFENESIN 100 MG: 200 SOLUTION ORAL at 17:55

## 2022-03-16 RX ADMIN — INSULIN LISPRO 10 UNITS: 100 INJECTION, SOLUTION INTRAVENOUS; SUBCUTANEOUS at 22:01

## 2022-03-16 RX ADMIN — ACETAMINOPHEN 650 MG: 325 TABLET ORAL at 18:21

## 2022-03-16 RX ADMIN — GUAIFENESIN 1200 MG: 600 TABLET, EXTENDED RELEASE ORAL at 20:28

## 2022-03-16 RX ADMIN — CARVEDILOL 12.5 MG: 12.5 TABLET, FILM COATED ORAL at 17:54

## 2022-03-16 RX ADMIN — DOCUSATE SODIUM 100 MG: 100 CAPSULE, LIQUID FILLED ORAL at 20:31

## 2022-03-16 RX ADMIN — IPRATROPIUM BROMIDE AND ALBUTEROL SULFATE 3 ML: 2.5; .5 SOLUTION RESPIRATORY (INHALATION) at 07:46

## 2022-03-16 RX ADMIN — ACETAMINOPHEN 650 MG: 325 TABLET ORAL at 20:34

## 2022-03-16 RX ADMIN — IPRATROPIUM BROMIDE AND ALBUTEROL SULFATE 3 ML: 2.5; .5 SOLUTION RESPIRATORY (INHALATION) at 17:25

## 2022-03-16 RX ADMIN — INSULIN LISPRO 10 UNITS: 100 INJECTION, SOLUTION INTRAVENOUS; SUBCUTANEOUS at 12:28

## 2022-03-16 RX ADMIN — GUAIFENESIN 100 MG: 200 SOLUTION ORAL at 22:02

## 2022-03-16 RX ADMIN — GUAIFENESIN 100 MG: 200 SOLUTION ORAL at 10:22

## 2022-03-16 NOTE — PROGRESS NOTES
"                                 [facility] Progress Note      LOS: 6 days   Patient Care Team:  Omayra Alcazar DO as PCP - General (Internal Medicine)    Chief Complaint:    Chief Complaint   Patient presents with   • Shortness of Breath   • Cough       Subjective     Interval History: Quiet night    Patient Complaints: Feels much better with chest catheter for drainage of Right pleural effusion.        Review of Systems:   A 14 point review of systems was negative except as was stated in the HPI      Objective     Vital Sign Min/Max for last 24 hours  Temp  Min: 97.6 °F (36.4 °C)  Max: 98.2 °F (36.8 °C)   BP  Min: 94/61  Max: 140/80   Pulse  Min: 68  Max: 114   Resp  Min: 18  Max: 22   SpO2  Min: 93 %  Max: 100 %   Flow (L/min)  Min: 2  Max: 2   Weight  Min: 69.6 kg (153 lb 8 oz)  Max: 69.6 kg (153 lb 8 oz)     Flowsheet Rows    Flowsheet Row First Filed Value   Admission Height 160 cm (63\") Documented at 03/10/2022 2019   Admission Weight 63.5 kg (140 lb) Documented at 03/10/2022 2019          Physical Exam:  General Appearance: Alert, appears stated age and cooperative  Lungs: Diffuse bilateral rhonchi/wheeze  Heart:: Irregular with distant heart tones  Abdomen: Soft and nontender with adequate bowel sounds.  No organomegaly  Extremities: No cyanosis, clubbing or edema  Pulses: Pulses palpable and equal bilaterally  Skin: Warm and dry with no rash  Psych: Normal     Results Review:     I reviewed the patient's new clinical results.  Results from last 7 days   Lab Units 03/16/22  0635 03/15/22  0353 03/14/22  0401   SODIUM mmol/L 128* 134* 132*   POTASSIUM mmol/L 5.1 4.6 4.0   CHLORIDE mmol/L 88* 92* 93*   CO2 mmol/L 30.0* 33.0* 32.0*   BUN mg/dL 35* 26* 19   CREATININE mg/dL 1.03* 1.02* 1.06*   GLUCOSE mg/dL 511* 222* 190*   CALCIUM mg/dL 8.7 8.6 8.4     Results from last 7 days   Lab Units 03/16/22  0635 03/15/22  0353 03/14/22  0401   WBC 10*3/mm3 6.84 8.59 7.82   HEMOGLOBIN g/dL 8.4* " 8.8* 8.7*   HEMATOCRIT % 26.1* 27.1* 27.9*   PLATELETS 10*3/mm3 246 277 247     Lab Results   Lab Value Date/Time    TROPONINT 0.025 03/10/2022 2121    TROPONINT 0.012 10/23/2020 2138    TROPONINT 0.011 10/20/2020 1525         Results from last 7 days   Lab Units 03/14/22  0401 03/11/22  0822   INR  1.19* 1.25*               Medication Review: yes  Current Facility-Administered Medications   Medication Dose Route Frequency Provider Last Rate Last Admin   • acetaminophen (TYLENOL) tablet 650 mg  650 mg Oral Q4H PRN Karlee Billingsley PA-C   650 mg at 03/16/22 1015   • aspirin EC tablet 81 mg  81 mg Oral Daily Félix Hernandez MD   81 mg at 03/16/22 1014   • benzocaine-menthol (CEPACOL) lozenge 1 lozenge  1 lozenge Mouth/Throat TID PRN Del, Fransisca, APRN       • benzonatate (TESSALON) capsule 100 mg  100 mg Oral Q4H PRN Félix Hernandez MD   100 mg at 03/15/22 1140   • budesonide (PULMICORT) nebulizer solution 0.5 mg  0.5 mg Nebulization BID - RT Los Fan MD   0.5 mg at 03/16/22 0745   • carvedilol (COREG) tablet 12.5 mg  12.5 mg Oral BID With Meals Karlee Billingsley PA-C   12.5 mg at 03/16/22 1015   • dextrose (D50W) (25 g/50 mL) IV injection 25 g  25 g Intravenous Q15 Min PRN Dewayne Villalobos MD       • dextrose (GLUTOSE) oral gel 15 g  15 g Oral Q15 Min PRN Dewayne Villalobos MD       • docusate sodium (COLACE) capsule 100 mg  100 mg Oral BID Del, Fransisca, APRN   100 mg at 03/16/22 1015   • glucagon (human recombinant) (GLUCAGEN DIAGNOSTIC) injection 1 mg  1 mg Intramuscular Q15 Min PRN Dewayne Villalobos MD       • guaiFENesin (MUCINEX) 12 hr tablet 1,200 mg  1,200 mg Oral Q12H Dewayne Villalobos MD   1,200 mg at 03/16/22 1014   • guaiFENesin (ROBITUSSIN) 100 MG/5ML oral solution 100 mg  100 mg Oral Q4H PRN Félix Hernandez MD   100 mg at 03/16/22 1022   • insulin lispro (humaLOG) injection 0-7 Units  0-7 Units Subcutaneous TID AC Dewayen Villalobos MD   6 Units at 03/16/22 1023   • insulin lispro (humaLOG)  injection 10 Units  10 Units Subcutaneous Once Dewayne Villalobos MD       • ipratropium-albuterol (DUO-NEB) nebulizer solution 3 mL  3 mL Nebulization Q4H PRN Félix Hernandez MD   3 mL at 03/15/22 1027   • ipratropium-albuterol (DUO-NEB) nebulizer solution 3 mL  3 mL Nebulization 4x Daily - RT Sheyla Lacy APRN   3 mL at 03/16/22 0746   • isosorbide mononitrate (IMDUR) 24 hr tablet 30 mg  30 mg Oral Daily Félix Hernandez MD   30 mg at 03/15/22 0809   • levothyroxine (SYNTHROID, LEVOTHROID) tablet 50 mcg  50 mcg Oral Daily Karlee Billingsley PA-TOÑO   50 mcg at 03/16/22 0453   • melatonin tablet 10 mg  10 mg Oral Nightly PRN Antony Mathisa, APRN   10 mg at 03/15/22 2041   • ondansetron (ZOFRAN) injection 4 mg  4 mg Intravenous Q6H PRN Karlee Billingsley PA-C   4 mg at 03/13/22 1337   • pantoprazole (PROTONIX) EC tablet 40 mg  40 mg Oral QAM Karlee Billingsley PA-C   40 mg at 03/16/22 1014   • Pharmacy Consult - MTM   Does not apply Daily Aimee Tobias, PharmD       • phenol (CHLORASEPTIC) 1.4 % liquid 1 spray  1 spray Mouth/Throat Q2H PRN Del Fransisca, APRN       • pravastatin (PRAVACHOL) tablet 20 mg  20 mg Oral Daily Karlee Billingsley PA-C   20 mg at 03/16/22 1014   • sodium chloride 0.9 % flush 10 mL  10 mL Intravenous PRN Iván Taylor MD       • sodium chloride 0.9 % flush 10 mL  10 mL Intravenous Q12H Karlee Billingsley PA-C   10 mL at 03/15/22 1015   • sodium chloride 0.9 % flush 10 mL  10 mL Intravenous PRN Karlee Billingsley PA-C       • torsemide (DEMADEX) tablet 20 mg  20 mg Oral Daily Karlee Billingsley PA-C   20 mg at 03/15/22 0810         Acute respiratory failure with hypoxia (HCC)    Presence of cardiac pacemaker    Atrial fibrillation (HCC)    Hypothyroidism (acquired)    HTN (hypertension)    HLD (hyperlipidemia)    CHF (congestive heart failure) (HCC)    Anemia    CHAPARRO (acute kidney injury) (HCC)    Acute hypoxemic respiratory failure (HCC)    Hyponatremia    Pleural  effusion on right        Impression      -Hypoxic respiratory failure  -Right pleural effusion status post ultrasound-guided thoracentesis 3/11/2022  -Reaccumulating large right pleural effusion on chest x-ray 3/15/2022  -Right chest tube/catheter placed 3/15/22    -Permanent atrial fibrillation  -Sick sinus syndrome with [place] single-chamber pacemaker  -Normal LV systolic function with mild AI by echo this admission  -Hypothyroidism  -Anemia    Plan     Continue to monitor  Continue Coreg, LA nitrate and diuretic      Chilango King MD   03/16/22  12:23 EDT

## 2022-03-16 NOTE — PROGRESS NOTES
[facility] Medicine Services  PROGRESS NOTE    Patient Name: Syeda Gomez  : 1927  MRN: [id number]    Date of Admission: 3/10/2022  Primary Care Physician: Omayra Alcazar,     Subjective   Subjective     CC:   cough    HPI:      S/p CT yesterday and 1600mL was drained immediately.  Feels much better.  Breathing better.       ROS:  Gen- No fevers, chills  CV- No chest pain, palpitations  Resp- +persistent cough, + dyspnea  GI- No N/V/D, abd pain      Objective   Objective     Vital Signs:   Temp:  [97.6 °F (36.4 °C)-98.2 °F (36.8 °C)] 97.8 °F (36.6 °C)  Heart Rate:  [] 68  Resp:  [18-22] 18  BP: ()/(50-80) 130/58  Flow (L/min):  [2] 2     Physical Exam:  Constitutional: No acute distress, awake, alert, sitting up in bed.    HENT: NCAT, mucous membranes moist  Respiratory: decreased BS in the bases, right CT  Cardiovascular: RRR, no murmurs, rubs, or gallops  Gastrointestinal: Positive bowel sounds, soft, nontender, nondistended  Musculoskeletal: No bilateral ankle edema  Psychiatric: Appropriate affect, cooperative  Neurologic: Oriented x 3, strength symmetric in all extremities, Cranial Nerves grossly intact to confrontation, speech clear  Skin: No rashes      Results Reviewed:  LAB RESULTS:      Lab 22  0635 03/15/22  0353 22  0401 22  0822 03/10/22  2121   WBC 6.84 8.59 7.82 6.50 7.00   HEMOGLOBIN 8.4* 8.8* 8.7* 9.0* 8.8*   HEMATOCRIT 26.1* 27.1* 27.9* 28.9* 27.5*   PLATELETS 246 277 247 267 283   NEUTROS ABS 6.23  --  6.31  --  5.48   IMMATURE GRANS (ABS) 0.07*  --  0.06*  --  0.03   LYMPHS ABS 0.29*  --  0.57*  --  0.70   MONOS ABS 0.24  --  0.56  --  0.55   EOS ABS 0.00  --  0.28  --  0.20   MCV 92.6 91.2 93.0 96.0 92.3   LACTATE  --   --   --   --  0.9   LDH  --   --   --  161  --    PROTIME  --   --  14.8* 15.3*  --          Lab 03/15/22  0353 22  0401 22  0342 22  0822 03/10/22  2121   SODIUM 134* 132* 136 136 133*    POTASSIUM 4.6 4.0 3.9 3.5 3.8   CHLORIDE 92* 93* 95* 95* 93*   CO2 33.0* 32.0* 32.0* 30.0* 30.0*   ANION GAP 9.0 7.0 9.0 11.0 10.0   BUN 26* 19 21 26* 26*   CREATININE 1.02* 1.06* 1.05* 1.16* 1.59*   EGFR 50.8* 48.5* 49.0* 43.5* 29.8*   GLUCOSE 222* 190* 164* 187* 231*   CALCIUM 8.6 8.4 8.3 8.7 8.8   MAGNESIUM  --  2.0  --   --   --    PHOSPHORUS  --  3.0  --   --   --    HEMOGLOBIN A1C  --   --   --  6.20*  --    TSH  --   --   --  11.330*  --          Lab 03/14/22  0401 03/10/22  2121   TOTAL PROTEIN 5.4* 5.8*   ALBUMIN 3.40* 3.90   GLOBULIN 2.0 1.9   ALT (SGPT) 13 9   AST (SGOT) 17 12   BILIRUBIN 0.3 0.3   ALK PHOS 112 132*         Lab 03/14/22  0401 03/11/22  0822 03/10/22  2121   PROBNP 2,358.0*  --  2,352.0*   TROPONIN T  --   --  0.025   PROTIME 14.8* 15.3*  --    INR 1.19* 1.25*  --              Lab 03/10/22  2203   IRON 36*   IRON SATURATION 12*   TIBC 308   TRANSFERRIN 207   FERRITIN 832.20*         Brief Urine Lab Results  (Last result in the past 365 days)      Color   Clarity   Blood   Leuk Est   Nitrite   Protein   CREAT   Urine HCG        03/11/22 0234 Yellow   Clear   Negative   Small (1+)   Negative   Negative                 Microbiology Results Abnormal     Procedure Component Value - Date/Time    Anaerobic Culture - Pleural Fluid, Pleural Cavity [[id number]] Collected: 03/11/22 1110    Lab Status: Final result Specimen: Pleural Fluid from Pleural Cavity Updated: 03/16/22 0641     Anaerobic Culture No anaerobes isolated at 5 days    Blood Culture - Blood, Arm, Left [[id number]]  (Normal) Collected: 03/10/22 2200    Lab Status: Final result Specimen: Blood from Arm, Left Updated: 03/15/22 2332     Blood Culture No growth at 5 days    Blood Culture - Blood, Arm, Right [[id number]]  (Normal) Collected: 03/10/22 2155    Lab Status: Final result Specimen: Blood from Arm, Right Updated: 03/15/22 2332     Blood Culture No growth at 5 days    Body Fluid Culture - Body Fluid, Pleural Cavity [[id number]]  Collected: 03/11/22 1110    Lab Status: Final result Specimen: Body Fluid from Pleural Cavity Updated: 03/14/22 0922     Body Fluid Culture No growth at 3 days     Gram Stain Moderate (3+) WBCs seen      No organisms seen    COVID PRE-OP / PRE-PROCEDURE SCREENING ORDER (NO ISOLATION) - Swab, Nasopharynx [[id number]]  (Normal) Collected: 03/11/22 0829    Lab Status: Final result Specimen: Swab from Nasopharynx Updated: 03/11/22 0831    Narrative:      The following orders were created for panel order COVID PRE-OP / PRE-PROCEDURE SCREENING ORDER (NO ISOLATION) - Swab, Nasopharynx.  Procedure                               Abnormality         Status                     ---------                               -----------         ------                     COVID-19 and FLU A/B PCR...[[id number]]  Normal              Final result                 Please view results for these tests on the individual orders.    COVID-19 and FLU A/B PCR - Swab, Nasopharynx [[id number]]  (Normal) Collected: 03/11/22 0829    Lab Status: Final result Specimen: Swab from Nasopharynx Updated: 03/11/22 0831     COVID19 Not Detected     Influenza A PCR Not Detected     Influenza B PCR Not Detected    Narrative:      Fact sheet for providers: https://www.fda.gov/media/898363/download    Fact sheet for patients: https://www.fda.gov/media/875810/download    Test performed by PCR.    Respiratory Panel PCR w/COVID-19(SARS-CoV-2) VINICIO/RAGINI/YVETTE/PAD/COR/MAD/ASHKAN In-House, NP Swab in UTM/VTM, 3-4 HR TAT - Swab, Nasopharynx [[id number]]  (Normal) Collected: 03/10/22 0103    Lab Status: Final result Specimen: Swab from Nasopharynx Updated: 03/11/22 0321     ADENOVIRUS, PCR Not Detected     Coronavirus 229E Not Detected     Coronavirus HKU1 Not Detected     Coronavirus NL63 Not Detected     Coronavirus OC43 Not Detected     COVID19 Not Detected     Human Metapneumovirus Not Detected     Human Rhinovirus/Enterovirus Not Detected     Influenza A PCR Not Detected      Influenza B PCR Not Detected     Parainfluenza Virus 1 Not Detected     Parainfluenza Virus 2 Not Detected     Parainfluenza Virus 3 Not Detected     Parainfluenza Virus 4 Not Detected     RSV, PCR Not Detected     Bordetella pertussis pcr Not Detected     Bordetella parapertussis PCR Not Detected     Chlamydophila pneumoniae PCR Not Detected     Mycoplasma pneumo by PCR Not Detected    Narrative:      In the setting of a positive respiratory panel with a viral infection PLUS a negative procalcitonin without other underlying concern for bacterial infection, consider observing off antibiotics or discontinuation of antibiotics and continue supportive care. If the respiratory panel is positive for atypical bacterial infection (Bordetella pertussis, Chlamydophila pneumoniae, or Mycoplasma pneumoniae), consider antibiotic de-escalation to target atypical bacterial infection.          XR Chest 1 View    Result Date: 3/15/2022  DATE OF EXAM: 3/15/2022 12:09 PM  PROCEDURE: XR CHEST 1 VW-  INDICATIONS: increased soa/cough; R09.02-Hypoxemia; M62-Bwshaqz effusion, not elsewhere classified; D64.9-Anemia, unspecified; I48.11-Longstanding persistent atrial fibrillation; Z95.0-Presence of cardiac pacemaker  COMPARISON: March 14, 2022  TECHNIQUE: Single radiographic AP view of the chest was obtained.  FINDINGS: A port catheter is noted with its tip in the superior vena cava. The heart is enlarged. Cardiac pacemaker device is present. There remains a right pleural effusion and possibly atelectasis or consolidation in the right lower lobe. There is slight prominence of interstitial markings. Some edema cannot be excluded. There is a curvature to the thoracolumbar spine.      Impression: 1.  Right pleural effusion. Some atelectasis or consolidation in this area not excluded. This has been noted. 2.  Prominence of interstitial markings that may be reflective of edema. 3.  Cardiomegaly.  This report was finalized on 3/15/2022 12:24 PM  by Ariel Burton MD.      XR Chest PA & Lateral    Result Date: 3/14/2022  DATE OF EXAM: 3/14/2022 11:21 AM  PROCEDURE: XR CHEST PA AND LATERAL-  INDICATIONS: Cough, dyspnea, pleural effusion; R09.02-Hypoxemia; C13-Njnnidm effusion, not elsewhere classified; D64.9-Anemia, unspecified; I48.11-Longstanding persistent atrial fibrillation; Z95.0-Presence of cardiac pacemaker  COMPARISON: 3/13/2022  TECHNIQUE: Two radiologic views of the chest , PA and lateral were obtained.  FINDINGS: Left-sided Port-A-Cath and right-sided dual-lead pacemaker are again noted. The heart remains enlarged and the vasculature is mildly cephalized. Mild diffuse interstitial lung changes, perhaps early interstitial edema, appear similar to prior study. Right basilar opacity appears stable or minimally increased, apparently a combination of atelectasis and effusion. Effusion is apparently reaccumulating after the patient's 3/11/2022 thoracentesis. No pneumothorax is seen.      Impression: Reaccumulating large right pleural effusion and associated atelectasis, with mild interval worsening compared to yesterday's study. Stable pulmonary vascular congestion elsewhere.  This report was finalized on 3/14/2022 12:41 PM by Dr. Devonte Darnell MD.        Results for orders placed during the hospital encounter of 03/10/22    Adult Transthoracic Echo Complete w/ Color, Spectral and Contrast if necessary per protocol    Interpretation Summary  · Left ventricular ejection fraction appears to be 51 - 55%. Left ventricular systolic function is normal.  · Left ventricular wall thickness is consistent with severe concentric hypertrophy.  · Mildly reduced right ventricular systolic function noted.  · The right ventricular cavity is borderline dilated.  · The right atrial cavity is moderately dilated.  · There is mild calcification of the aortic valve.  · Mild aortic valve regurgitation is present.  · Moderate to severe tricuspid valve regurgitation is present.  ·  Estimated right ventricular systolic pressure from tricuspid regurgitation is moderately elevated (45-55 mmHg).      I have reviewed the medications:  Scheduled Meds:aspirin, 81 mg, Oral, Daily  budesonide, 0.5 mg, Nebulization, BID - RT  carvedilol, 12.5 mg, Oral, BID With Meals  docusate sodium, 100 mg, Oral, BID  guaiFENesin, 1,200 mg, Oral, Q12H  ipratropium-albuterol, 3 mL, Nebulization, 4x Daily - RT  isosorbide mononitrate, 30 mg, Oral, Daily  levothyroxine, 50 mcg, Oral, Daily  pantoprazole, 40 mg, Oral, QAM  pharmacy consult - [place], , Does not apply, Daily  pravastatin, 20 mg, Oral, Daily  sodium chloride, 10 mL, Intravenous, Q12H  torsemide, 20 mg, Oral, Daily      Continuous Infusions:   PRN Meds:.•  acetaminophen  •  benzocaine-menthol  •  benzonatate  •  guaiFENesin  •  ipratropium-albuterol  •  melatonin  •  ondansetron  •  phenol  •  sodium chloride  •  sodium chloride    Assessment/Plan   Assessment & Plan     Active Hospital Problems    Diagnosis  POA   • **Acute respiratory failure with hypoxia (HCC) [J96.01]  Yes   • CHF (congestive heart failure) (HCC) [I50.9]  Yes   • Anemia [D64.9]  Yes   • CHAPARRO (acute kidney injury) (HCC) [N17.9]  Yes   • Acute hypoxemic respiratory failure (HCC) [J96.01]  Yes   • Hyponatremia [E87.1]  Yes   • Pleural effusion on right [J90]  Yes   • Hypothyroidism (acquired) [E03.9]  Yes   • HTN (hypertension) [I10]  Yes   • HLD (hyperlipidemia) [E78.5]  Yes   • Presence of cardiac pacemaker [Z95.0]  Yes   • Atrial fibrillation (HCC) [I48.91]  Yes      Resolved Hospital Problems   No resolved problems to display.        Brief Hospital Course to date:    Syeda Gomez is a 89 y.o. female with a past medical history of breast cancer s/p left mastectomy 33 years ago, atrial fibrillation, HTN, HLD, CHF s/p PPM, hypothyroidism, and chronic idiopathic iron deficiency anemia that presented to the ED complaining of 1 week of ongoing shortness of air. She states she has also been more  fatigued than her baseline. The patient's daughter is at the bedside and states the patient has required multiple iron and blood transfusions due to anemia over the past year. She states they thought her shortness of air was due to worsened anemia again. The patient was, however, found to have a large pleural effusion in the ED.    This patient's problems and plans were partially entered by my partner and updated as appropriate by me 03/16/22.    Assessment/Plan:    Acute Respiratory Failure with Hypoxia  Large right pleural effusion  Acute on Chronic CHF  Bronchospasm  pHTN  - thoracentesis done 3/11 by Dr. Persaud by US guidance  - echocardiogram shows EF 51-55%, severe LVH, RV borderline dilated, RA moderately dilated, mild calcification of the AV, mild AR, moderate to severe TR, RVSP 45-55mmHg  - decreased DuoNebs to as needed only yesterday 3/14.  Worsening rhonchi and wheezing overnight.  Resume scheduled and as needed.  --One-time dose of Solu-Medrol 60 mg IV.  - low volume on IS assessment ~ 500 cc  - some atelectasis, encouraged to continue to work hard on incentive spirometer  - added mucinex 3/14  - cultures no growth to date  - fluid cytology pending  --on 3/15, repeat Chest x-ray yesterday revealed worsening right effusion again.    --Worsening right effusion after thoracentesis 3/11 by Dr. Persaud with IR.  Due to recurrence so quickly, CTS now following and patient now s/p right pigtail cathetor placement per IR on 3/15--1600mL drained yesterday.  [facility] following recs to likely keep CT at least another 48 hours.     Hyperglycemia  -- exacerbated by steroids today.  Note that glucose was 222 on yesterday's BMP.  Will add low dose ssi  --HbA1C was 6.20 on 3/11 so only prediabetic.  Hopefully will improve soon as not on any further steroids    Hypotension  - blood pressure improved on less Imdur  --Stable    Anemia:  - appears stable     CHAPARRO:  - improved with diuresis  - holding lisinopril.   Stable     Hyponatremia:  - monitor  - sodium improved    Atrial fibrillation:  - stable and rate controlled  - aspirin 81 mg daily      HTN/HLD:  - continue statin, imdur     Hypothyroidism:  - free T4 1.31      DVT prophylaxis:  Mechanical DVT prophylaxis orders are present.       AM-PAC 6 Clicks Score (PT): 16 (03/15/22 2000)    Disposition: I expect the patient to be discharged TBD.          CODE STATUS:   Code Status and Medical Interventions:   Ordered at: 03/10/22 1106     Medical Intervention Limits:    NO intubation (DNI)     Level Of Support Discussed With:    Patient     Code Status (Patient has no pulse and is not breathing):    No CPR (Do Not Attempt to Resuscitate)     Medical Interventions (Patient has pulse or is breathing):    Limited Support       Dewayne Villalobos MD  03/16/22

## 2022-03-16 NOTE — CASE MANAGEMENT/SOCIAL WORK
Continued Stay Note  [facility]     Patient Name: Syeda Gomez  MRN: [id number]  Today's Date: 3/16/2022    Admit Date: 3/10/2022     Discharge Plan     Row Name 03/16/22 1406       Plan    Plan discharge plan    Plan Comments Per discussion in MDR, pt has a chest tube and will continue to monitor another 48 hours. Pt is having a xray in a.m. CM will cont to follow.               Discharge Codes    No documentation.               Expected Discharge Date and Time     Expected Discharge Date Expected Discharge Time    Mar 19, 2022             Kim Phillpis RN

## 2022-03-16 NOTE — PLAN OF CARE
Goal Outcome Evaluation:      VSS. Chest tube to suction, minimal out since shift change. Pt remains on 2L nc. Pt not resting well and a little anxious this morning. Good UOP. Pain controlled. Will continue to monitor.

## 2022-03-16 NOTE — PROGRESS NOTES
CTS Progress Note       LOS: 6 days   Patient Care Team:  Omayra Alcazar DO as PCP - General (Internal Medicine)    Chief Complaint: Acute respiratory failure with hypoxia (HCC)    Vital Signs:  Temp:  [97.6 °F (36.4 °C)-98.2 °F (36.8 °C)] 97.8 °F (36.6 °C)  Heart Rate:  [] 109  Resp:  [18-22] 20  BP: ()/(50-80) 130/58    Physical Exam: Breathing unlabored now       Results:   Results from last 7 days   Lab Units 03/15/22  0353   WBC 10*3/mm3 8.59   HEMOGLOBIN g/dL 8.8*   HEMATOCRIT % 27.1*   PLATELETS 10*3/mm3 277     Results from last 7 days   Lab Units 03/15/22  0353   SODIUM mmol/L 134*   POTASSIUM mmol/L 4.6   CHLORIDE mmol/L 92*   CO2 mmol/L 33.0*   BUN mg/dL 26*   CREATININE mg/dL 1.02*   GLUCOSE mg/dL 222*   CALCIUM mg/dL 8.6           Imaging Results (Last 24 Hours)     Procedure Component Value Units Date/Time    XR Chest 1 View [[id number]] Resulted: 03/16/22 0533     Updated: 03/16/22 0533    US Tube Placement [[id number]] Resulted: 03/15/22 1726     Updated: 03/15/22 1732    XR Chest 1 View [[id number]] Collected: 03/15/22 1222     Updated: 03/15/22 1227    Narrative:      DATE OF EXAM: 3/15/2022 12:09 PM     PROCEDURE: XR CHEST 1 VW-     INDICATIONS: increased soa/cough; R09.02-Hypoxemia; Z36-Yjubohz  effusion, not elsewhere classified; D64.9-Anemia, unspecified;  I48.11-Longstanding persistent atrial fibrillation; Z95.0-Presence of  cardiac pacemaker     COMPARISON: March 14, 2022     TECHNIQUE: Single radiographic AP view of the chest was obtained.     FINDINGS:  A port catheter is noted with its tip in the superior vena cava. The  heart is enlarged. Cardiac pacemaker device is present. There remains a  right pleural effusion and possibly atelectasis or consolidation in the  right lower lobe. There is slight prominence of interstitial markings.  Some edema cannot be excluded. There is a curvature to the thoracolumbar  spine.        Impression:      1.  Right pleural effusion. Some  atelectasis or consolidation in this  area not excluded. This has been noted.  2.  Prominence of interstitial markings that may be reflective of edema.  3.  Cardiomegaly.     This report was finalized on 3/15/2022 12:24 PM by Ariel Burton MD.             Assessment      Acute respiratory failure with hypoxia (HCC)    Presence of cardiac pacemaker    Atrial fibrillation (HCC)    Hypothyroidism (acquired)    HTN (hypertension)    HLD (hyperlipidemia)    CHF (congestive heart failure) (HCC)    Anemia    CHAPARRO (acute kidney injury) (HCC)    Acute hypoxemic respiratory failure (HCC)    Hyponatremia    Pleural effusion on right    Over 1600 mL fluid returned from right-sided pigtail catheter since its insertion yesterday.  I would be very slow to remove this tube I expect I will leave it at least another 48 hours    Plan   Chest x-ray in the a.m.    Please note that portions of this note were completed with a voice recognition program. Efforts were made to edit the dictations, but occasionally words are mistranscribed.    Reggie Arboleda MD  03/16/22  07:11 EDT

## 2022-03-17 ENCOUNTER — APPOINTMENT (OUTPATIENT)
Dept: GENERAL RADIOLOGY | Facility: HOSPITAL | Age: 87
End: 2022-03-17

## 2022-03-17 LAB
ANION GAP SERPL CALCULATED.3IONS-SCNC: 7 MMOL/L (ref 5–15)
BUN SERPL-MCNC: 40 MG/DL (ref 8–23)
BUN/CREAT SERPL: 37.7 (ref 7–25)
CALCIUM SPEC-SCNC: 8.8 MG/DL (ref 8.2–9.6)
CHLORIDE SERPL-SCNC: 94 MMOL/L (ref 98–107)
CO2 SERPL-SCNC: 31 MMOL/L (ref 22–29)
CREAT SERPL-MCNC: 1.06 MG/DL (ref 0.57–1)
EGFRCR SERPLBLD CKD-EPI 2021: 48.5 ML/MIN/1.73
GLUCOSE BLDC GLUCOMTR-MCNC: 124 MG/DL (ref 70–130)
GLUCOSE BLDC GLUCOMTR-MCNC: 179 MG/DL (ref 70–130)
GLUCOSE BLDC GLUCOMTR-MCNC: 192 MG/DL (ref 70–130)
GLUCOSE BLDC GLUCOMTR-MCNC: 226 MG/DL (ref 70–130)
GLUCOSE BLDC GLUCOMTR-MCNC: 302 MG/DL (ref 70–130)
GLUCOSE BLDC GLUCOMTR-MCNC: 311 MG/DL (ref 70–130)
GLUCOSE BLDC GLUCOMTR-MCNC: 359 MG/DL (ref 70–130)
GLUCOSE SERPL-MCNC: 191 MG/DL (ref 65–99)
POTASSIUM SERPL-SCNC: 5.2 MMOL/L (ref 3.5–5.2)
SODIUM SERPL-SCNC: 132 MMOL/L (ref 136–145)

## 2022-03-17 PROCEDURE — 97530 THERAPEUTIC ACTIVITIES: CPT

## 2022-03-17 PROCEDURE — 94799 UNLISTED PULMONARY SVC/PX: CPT

## 2022-03-17 PROCEDURE — 97110 THERAPEUTIC EXERCISES: CPT

## 2022-03-17 PROCEDURE — 80048 BASIC METABOLIC PNL TOTAL CA: CPT | Performed by: INTERNAL MEDICINE

## 2022-03-17 PROCEDURE — 63710000001 INSULIN LISPRO (HUMAN) PER 5 UNITS: Performed by: INTERNAL MEDICINE

## 2022-03-17 PROCEDURE — 82962 GLUCOSE BLOOD TEST: CPT

## 2022-03-17 PROCEDURE — 99232 SBSQ HOSP IP/OBS MODERATE 35: CPT | Performed by: INTERNAL MEDICINE

## 2022-03-17 PROCEDURE — 99231 SBSQ HOSP IP/OBS SF/LOW 25: CPT | Performed by: THORACIC SURGERY (CARDIOTHORACIC VASCULAR SURGERY)

## 2022-03-17 PROCEDURE — 71045 X-RAY EXAM CHEST 1 VIEW: CPT

## 2022-03-17 RX ORDER — AMOXICILLIN 250 MG
1 CAPSULE ORAL 2 TIMES DAILY
Status: DISCONTINUED | OUTPATIENT
Start: 2022-03-17 | End: 2022-03-19 | Stop reason: HOSPADM

## 2022-03-17 RX ORDER — BISACODYL 10 MG
10 SUPPOSITORY, RECTAL RECTAL DAILY
Status: DISCONTINUED | OUTPATIENT
Start: 2022-03-17 | End: 2022-03-17

## 2022-03-17 RX ORDER — BENZONATATE 100 MG/1
100 CAPSULE ORAL 3 TIMES DAILY
Status: DISCONTINUED | OUTPATIENT
Start: 2022-03-17 | End: 2022-03-19 | Stop reason: HOSPADM

## 2022-03-17 RX ORDER — GUAIFENESIN/DEXTROMETHORPHAN 100-10MG/5
5 SYRUP ORAL EVERY 4 HOURS PRN
Status: DISCONTINUED | OUTPATIENT
Start: 2022-03-17 | End: 2022-03-19 | Stop reason: HOSPADM

## 2022-03-17 RX ADMIN — GUAIFENESIN 1200 MG: 600 TABLET, EXTENDED RELEASE ORAL at 09:24

## 2022-03-17 RX ADMIN — GUAIFENESIN 1200 MG: 600 TABLET, EXTENDED RELEASE ORAL at 20:32

## 2022-03-17 RX ADMIN — IPRATROPIUM BROMIDE AND ALBUTEROL SULFATE 3 ML: 2.5; .5 SOLUTION RESPIRATORY (INHALATION) at 20:10

## 2022-03-17 RX ADMIN — BENZONATATE 100 MG: 100 CAPSULE ORAL at 16:40

## 2022-03-17 RX ADMIN — GUAIFENESIN AND DEXTROMETHORPHAN 5 ML: 100; 10 SYRUP ORAL at 15:12

## 2022-03-17 RX ADMIN — BENZONATATE 100 MG: 100 CAPSULE ORAL at 11:39

## 2022-03-17 RX ADMIN — INSULIN LISPRO 3 UNITS: 100 INJECTION, SOLUTION INTRAVENOUS; SUBCUTANEOUS at 09:29

## 2022-03-17 RX ADMIN — INSULIN LISPRO 2 UNITS: 100 INJECTION, SOLUTION INTRAVENOUS; SUBCUTANEOUS at 16:39

## 2022-03-17 RX ADMIN — IPRATROPIUM BROMIDE AND ALBUTEROL SULFATE 3 ML: 2.5; .5 SOLUTION RESPIRATORY (INHALATION) at 13:23

## 2022-03-17 RX ADMIN — ASPIRIN 81 MG: 81 TABLET, COATED ORAL at 09:24

## 2022-03-17 RX ADMIN — BENZONATATE 100 MG: 100 CAPSULE ORAL at 20:32

## 2022-03-17 RX ADMIN — Medication 10 ML: at 20:32

## 2022-03-17 RX ADMIN — TORSEMIDE 20 MG: 20 TABLET ORAL at 09:24

## 2022-03-17 RX ADMIN — PANTOPRAZOLE SODIUM 40 MG: 40 TABLET, DELAYED RELEASE ORAL at 09:24

## 2022-03-17 RX ADMIN — SENNOSIDES AND DOCUSATE SODIUM 1 TABLET: 50; 8.6 TABLET ORAL at 20:32

## 2022-03-17 RX ADMIN — CARVEDILOL 12.5 MG: 12.5 TABLET, FILM COATED ORAL at 09:25

## 2022-03-17 RX ADMIN — DOCUSATE SODIUM 100 MG: 100 CAPSULE, LIQUID FILLED ORAL at 20:32

## 2022-03-17 RX ADMIN — BENZONATATE 100 MG: 100 CAPSULE ORAL at 15:13

## 2022-03-17 RX ADMIN — GUAIFENESIN 100 MG: 200 SOLUTION ORAL at 09:23

## 2022-03-17 RX ADMIN — PRAVASTATIN SODIUM 20 MG: 20 TABLET ORAL at 09:25

## 2022-03-17 RX ADMIN — BUDESONIDE 0.5 MG: 0.5 SUSPENSION RESPIRATORY (INHALATION) at 20:10

## 2022-03-17 RX ADMIN — INSULIN LISPRO 5 UNITS: 100 INJECTION, SOLUTION INTRAVENOUS; SUBCUTANEOUS at 11:40

## 2022-03-17 RX ADMIN — BUDESONIDE 0.5 MG: 0.5 SUSPENSION RESPIRATORY (INHALATION) at 08:47

## 2022-03-17 RX ADMIN — GUAIFENESIN AND DEXTROMETHORPHAN 5 ML: 100; 10 SYRUP ORAL at 20:32

## 2022-03-17 RX ADMIN — Medication 10 MG: at 09:34

## 2022-03-17 RX ADMIN — LEVOTHYROXINE SODIUM 50 MCG: 50 TABLET ORAL at 06:47

## 2022-03-17 RX ADMIN — ISOSORBIDE MONONITRATE 30 MG: 30 TABLET, EXTENDED RELEASE ORAL at 09:25

## 2022-03-17 RX ADMIN — IPRATROPIUM BROMIDE AND ALBUTEROL SULFATE 3 ML: 2.5; .5 SOLUTION RESPIRATORY (INHALATION) at 08:38

## 2022-03-17 RX ADMIN — IPRATROPIUM BROMIDE AND ALBUTEROL SULFATE 3 ML: 2.5; .5 SOLUTION RESPIRATORY (INHALATION) at 16:53

## 2022-03-17 NOTE — PROGRESS NOTES
"                                 [facility] Progress Note      LOS: 7 days   Patient Care Team:  Omayra Alcazar DO as PCP - General (Internal Medicine)    Chief Complaint:    Chief Complaint   Patient presents with   • Shortness of Breath   • Cough       Subjective     Interval History: Quiet night    Patient Complaints: Feels much better with chest catheter for drainage of Right pleural effusion.  Still some congestion and cough      Review of Systems:   A 14 point review of systems was negative except as was stated in the HPI      Objective     Vital Sign Min/Max for last 24 hours  Temp  Min: 97.6 °F (36.4 °C)  Max: 97.9 °F (36.6 °C)   BP  Min: 100/84  Max: 117/67   Pulse  Min: 65  Max: 90   Resp  Min: 16  Max: 20   SpO2  Min: 94 %  Max: 99 %   No data recorded   Weight  Min: 70 kg (154 lb 6 oz)  Max: 70 kg (154 lb 6 oz)     Flowsheet Rows    Flowsheet Row First Filed Value   Admission Height 160 cm (63\") Documented at 03/10/2022 2019   Admission Weight 63.5 kg (140 lb) Documented at 03/10/2022 2019          Physical Exam:  General Appearance: Alert, appears stated age and cooperative  Lungs: Diffuse bilateral rhonchi/wheeze  Heart:: Irregular with distant heart tones  Abdomen: Soft and nontender with adequate bowel sounds.  No organomegaly  Extremities: No cyanosis, clubbing or edema  Pulses: Pulses palpable and equal bilaterally  Skin: Warm and dry with no rash  Psych: Normal     Results Review:     I reviewed the patient's new clinical results.  Results from last 7 days   Lab Units 03/17/22  0650 03/16/22  1325 03/16/22  0635   SODIUM mmol/L 132* 128* 128*   POTASSIUM mmol/L 5.2 4.9 5.1   CHLORIDE mmol/L 94* 90* 88*   CO2 mmol/L 31.0* 29.0 30.0*   BUN mg/dL 40* 37* 35*   CREATININE mg/dL 1.06* 1.03* 1.03*   GLUCOSE mg/dL 191* 494* 511*   CALCIUM mg/dL 8.8 8.8 8.7     Results from last 7 days   Lab Units 03/16/22  0635 03/15/22  0353 03/14/22  0401   WBC 10*3/mm3 6.84 8.59 7.82 "   HEMOGLOBIN g/dL 8.4* 8.8* 8.7*   HEMATOCRIT % 26.1* 27.1* 27.9*   PLATELETS 10*3/mm3 246 277 247     Lab Results   Lab Value Date/Time    TROPONINT 0.025 03/10/2022 2121    TROPONINT 0.012 10/23/2020 2138    TROPONINT 0.011 10/20/2020 1525         Results from last 7 days   Lab Units 03/14/22  0401 03/11/22  0822   INR  1.19* 1.25*               Medication Review: yes  Current Facility-Administered Medications   Medication Dose Route Frequency Provider Last Rate Last Admin   • acetaminophen (TYLENOL) tablet 650 mg  650 mg Oral Q4H PRN Karlee Billingsley PA-C   650 mg at 03/16/22 2034   • aspirin EC tablet 81 mg  81 mg Oral Daily Félix Hernandez MD   81 mg at 03/17/22 0924   • benzocaine-menthol (CEPACOL) lozenge 1 lozenge  1 lozenge Mouth/Throat TID PRN Fransisca Mathis APRN       • benzonatate (TESSALON) capsule 100 mg  100 mg Oral Q4H PRN Félix Hernandez MD   100 mg at 03/16/22 1821   • bisacodyl (DULCOLAX) suppository 10 mg  10 mg Rectal Daily Dewayne Villalobos MD   10 mg at 03/17/22 0934   • budesonide (PULMICORT) nebulizer solution 0.5 mg  0.5 mg Nebulization BID - RT Los Fan MD   0.5 mg at 03/17/22 0847   • carvedilol (COREG) tablet 12.5 mg  12.5 mg Oral BID With Meals Karlee Billingsley PA-C   12.5 mg at 03/17/22 0925   • dextrose (D50W) (25 g/50 mL) IV injection 25 g  25 g Intravenous Q15 Min PRN Dewayne Villalboos MD       • dextrose (GLUTOSE) oral gel 15 g  15 g Oral Q15 Min PRN Dewayne Villalobos MD       • docusate sodium (COLACE) capsule 100 mg  100 mg Oral BID Fransisca Mathis APRN   100 mg at 03/16/22 2031   • glucagon (human recombinant) (GLUCAGEN DIAGNOSTIC) injection 1 mg  1 mg Intramuscular Q15 Min PRN Dewayne Villalobos MD       • guaiFENesin (MUCINEX) 12 hr tablet 1,200 mg  1,200 mg Oral Q12H Dewayne Villalobos MD   1,200 mg at 03/17/22 0924   • guaiFENesin (ROBITUSSIN) 100 MG/5ML oral solution 100 mg  100 mg Oral Q4H PRN Félix Hernandez MD   100 mg at 03/17/22 0923   • insulin lispro  (humaLOG) injection 0-7 Units  0-7 Units Subcutaneous TID AC Dewayne Villalobos MD   3 Units at 03/17/22 0929   • ipratropium-albuterol (DUO-NEB) nebulizer solution 3 mL  3 mL Nebulization Q4H PRN Félix Hernandez MD   3 mL at 03/15/22 1027   • ipratropium-albuterol (DUO-NEB) nebulizer solution 3 mL  3 mL Nebulization 4x Daily - RT Sheyla Lacy APRN   3 mL at 03/17/22 0838   • isosorbide mononitrate (IMDUR) 24 hr tablet 30 mg  30 mg Oral Daily Félix Hernandez MD   30 mg at 03/17/22 0925   • levothyroxine (SYNTHROID, LEVOTHROID) tablet 50 mcg  50 mcg Oral Daily Karlee Billingsley PA-C   50 mcg at 03/17/22 0647   • melatonin tablet 10 mg  10 mg Oral Nightly PRN Fransisca Mathis APRN   10 mg at 03/16/22 2030   • ondansetron (ZOFRAN) injection 4 mg  4 mg Intravenous Q6H PRN Karlee Billingsley PA-C   4 mg at 03/13/22 1337   • pantoprazole (PROTONIX) EC tablet 40 mg  40 mg Oral QAM Karlee Billingsley PA-C   40 mg at 03/17/22 0924   • Pharmacy Consult - MTM   Does not apply Daily Aimee Tobias, PharmD       • phenol (CHLORASEPTIC) 1.4 % liquid 1 spray  1 spray Mouth/Throat Q2H PRN Fransisca Mathis, APRN       • pravastatin (PRAVACHOL) tablet 20 mg  20 mg Oral Daily Karlee Billingsley PA-C   20 mg at 03/17/22 0925   • sodium chloride 0.9 % flush 10 mL  10 mL Intravenous PRN Iván Taylor MD       • sodium chloride 0.9 % flush 10 mL  10 mL Intravenous Q12H Karlee Billingsley PA-C   10 mL at 03/15/22 1015   • sodium chloride 0.9 % flush 10 mL  10 mL Intravenous PRN Karlee Billingsley PA-C       • torsemide (DEMADEX) tablet 20 mg  20 mg Oral Daily Karlee Billingsley PA-C   20 mg at 03/17/22 0924         Acute respiratory failure with hypoxia (HCC)    Presence of cardiac pacemaker    Atrial fibrillation (HCC)    Hypothyroidism (acquired)    HTN (hypertension)    HLD (hyperlipidemia)    CHF (congestive heart failure) (HCC)    Anemia    [place]APARRO (acute kidney injury) (HCC)    Acute hypoxemic respiratory failure  (HCC)    Hyponatremia    Pleural effusion on right    Repeat chest x-ray reviewed today    Impression      -Hypoxic respiratory failure  -Right pleural effusion status post ultrasound-guided thoracentesis 3/11/2022  -Reaccumulating large right pleural effusion on chest x-ray 3/15/2022  -Right chest tube/catheter placed 3/15/22    -Permanent atrial fibrillation  -Sick sinus syndrome with [place] single-chamber pacemaker  -Normal LV systolic function with mild AI by echo this admission  -Hypothyroidism  -Anemia    Plan     Continue to monitor  Continue Coreg, LA nitrate and diuretic  Pigtail catheter per CT surgery.      Chilango King MD   03/17/22  10:50 EDT

## 2022-03-17 NOTE — PLAN OF CARE
Goal Outcome Evaluation:  Plan of Care Reviewed With: patient        Progress: no change  Outcome Evaluation: Performed LE ther exer in sup, scooted to HOB w/ drawsheet & Dep 2A, respirex & splinted coughing, but decl. transf/gt d/t signif pain R C.T. site & (F) coughing spells.Desat 93% on 2 L, HR 84 & decr BP pre & s/p.

## 2022-03-17 NOTE — CASE MANAGEMENT/SOCIAL WORK
Continued Stay Note  [facility]     Patient Name: Syeda Gomez  MRN: [id number]  Today's Date: 3/17/2022    Admit Date: 3/10/2022     Discharge Plan     Row Name 03/17/22 1610       Plan    Plan discharge plan    Plan Comments CM had a conversation with pt and then daughter, Betty. over phone regarding discharge plan.  Pt adamant about not having any HH or IPR. Daughter states her mother won't go to rehab and if she had HH, she would have to access the stairs to let the HH nurse in and she does not want that.  Her daughter is there at night and for now, her grandson is there during day.   If home O2 needed at discharge, pt has no preference to [employer], but  requests small portable concentrator. CM will cont to follow.    Final Discharge Disposition Code 01 - home or self-care               Discharge Codes    No documentation.               Expected Discharge Date and Time     Expected Discharge Date Expected Discharge Time    Mar 19, 2022             Kim Phillips RN

## 2022-03-17 NOTE — PLAN OF CARE
Goal Outcome Evaluation:  Plan of Care Reviewed With: patient, daughter        Progress: improving  Outcome Evaluation: OT provided encouragement for pt to get oob and she agreed.  Bed mob has improved to supervision level with STS at [facility].  Pt has low endurance and needs frequent rest breaks and had several episodes of coughing.  Able to perform reps of 5 for ub and lb exercises before requiring a rest break.  Overall making improvements but continues to have weakness.

## 2022-03-17 NOTE — PROGRESS NOTES
Psychiatric Medicine Services  PROGRESS NOTE    Patient Name: Syeda Gomez  : 1927  MRN: [id number]    Date of Admission: 3/10/2022  Primary Care Physician: Omayra Alcazar,     Subjective   Subjective     CC:   cough    HPI:      No BM in 3 days, gave dulcolax suppository this AM with results.  Breathing ok.  Only 85cc out of CT in last 24 hours.  Does c/o persistent cough and asks for cough medicine.      ROS:  Gen- No fevers, chills  CV- No chest pain, palpitations  Resp- +persistent cough, + dyspnea  GI- No N/V/D, abd pain      Objective   Objective     Vital Signs:   Temp:  [97.6 °F (36.4 °C)-97.9 °F (36.6 °C)] 97.7 °F (36.5 °C)  Heart Rate:  [65-90] 80  Resp:  [16-20] 18  BP: (100-117)/(47-84) 100/84     Physical Exam:  Constitutional: No acute distress, awake, alert, sitting up in bed, daughter at bedside  HENT: NCAT, mucous membranes moist  Respiratory: decreased BS in the bases, right CT, persistent cough  Cardiovascular: RRR, no murmurs, rubs, or gallops  Gastrointestinal: Positive bowel sounds, soft, nontender, nondistended  Musculoskeletal: No bilateral ankle edema  Psychiatric: Appropriate affect, cooperative  Neurologic: Oriented x 3, strength symmetric in all extremities, Cranial Nerves grossly intact to confrontation, speech clear  Skin: No rashes      Results Reviewed:  LAB RESULTS:      Lab 22  0635 03/15/22  0353 22  0401 22  0822 03/10/22  2121   WBC 6.84 8.59 7.82 6.50 7.00   HEMOGLOBIN 8.4* 8.8* 8.7* 9.0* 8.8*   HEMATOCRIT 26.1* 27.1* 27.9* 28.9* 27.5*   PLATELETS 246 277 247 267 283   NEUTROS ABS 6.23  --  6.31  --  5.48   IMMATURE GRANS (ABS) 0.07*  --  0.06*  --  0.03   LYMPHS ABS 0.29*  --  0.57*  --  0.70   MONOS ABS 0.24  --  0.56  --  0.55   EOS ABS 0.00  --  0.28  --  0.20   MCV 92.6 91.2 93.0 96.0 92.3   LACTATE  --   --   --   --  0.9   LDH  --   --   --  161  --    PROTIME  --   --  14.8* 15.3*  --          Lab  Medication History completed:    New medications: none    Medications discontinued: guaifenesin, lidocaine cream, Bengay    Changes to dosing: carvedilol changed to as needed per patient report    Stated allergies: As listed    Other pertinent information: Medications confirmed with [facility]. The patient also uses medical marijuana edibles and did take a dose yesterday.      Thank you,  Mariya Olmstead, PharmD  [phone] 03/17/22  0650 03/16/22  1325 03/16/22  0635 03/15/22  0353 03/14/22  0401 03/12/22  0342 03/11/22 0822   SODIUM 132* 128* 128* 134* 132*   < > 136   POTASSIUM 5.2 4.9 5.1 4.6 4.0   < > 3.5   CHLORIDE 94* 90* 88* 92* 93*   < > 95*   CO2 31.0* 29.0 30.0* 33.0* 32.0*   < > 30.0*   ANION GAP 7.0 9.0 10.0 9.0 7.0   < > 11.0   BUN 40* 37* 35* 26* 19   < > 26*   CREATININE 1.06* 1.03* 1.03* 1.02* 1.06*   < > 1.16*   EGFR 48.5* 50.2* 50.2* 50.8* 48.5*   < > 43.5*   GLUCOSE 191* 494* 511* 222* 190*   < > 187*   CALCIUM 8.8 8.8 8.7 8.6 8.4   < > 8.7   MAGNESIUM  --   --   --   --  2.0  --   --    PHOSPHORUS  --   --   --   --  3.0  --   --    HEMOGLOBIN A1C  --   --   --   --   --   --  6.20*   TSH  --   --   --   --   --   --  11.330*    < > = values in this interval not displayed.         Lab 03/14/22  0401 03/10/22  2121   TOTAL PROTEIN 5.4* 5.8*   ALBUMIN 3.40* 3.90   GLOBULIN 2.0 1.9   ALT (SGPT) 13 9   AST (SGOT) 17 12   BILIRUBIN 0.3 0.3   ALK PHOS 112 132*         Lab 03/14/22  0401 03/11/22  0822 03/10/22  2121   PROBNP 2,358.0*  --  2,352.0*   TROPONIN T  --   --  0.025   PROTIME 14.8* 15.3*  --    INR 1.19* 1.25*  --              Lab 03/10/22  2203   IRON 36*   IRON SATURATION 12*   TIBC 308   TRANSFERRIN 207   FERRITIN 832.20*         Brief Urine Lab Results  (Last result in the past 365 days)      Color   Clarity   Blood   Leuk Est   Nitrite   Protein   CREAT   Urine HCG        03/11/22 0234 Yellow   Clear   Negative   Small (1+)   Negative   Negative                 Microbiology Results Abnormal     Procedure Component Value - Date/Time    Anaerobic Culture - Pleural Fluid, Pleural Cavity [[id number]] Collected: 03/11/22 1110    Lab Status: Final result Specimen: Pleural Fluid from Pleural Cavity Updated: 03/16/22 0641     Anaerobic Culture No anaerobes isolated at 5 days    Blood Culture - Blood, Arm, Left [[id number]]  (Normal) Collected: 03/10/22 2200    Lab Status: Final result Specimen: Blood from Arm, Left  Updated: 03/15/22 2332     Blood Culture No growth at 5 days    Blood Culture - Blood, Arm, Right [[id number]]  (Normal) Collected: 03/10/22 2155    Lab Status: Final result Specimen: Blood from Arm, Right Updated: 03/15/22 2332     Blood Culture No growth at 5 days    Body Fluid Culture - Body Fluid, Pleural Cavity [[id number]] Collected: 03/11/22 1110    Lab Status: Final result Specimen: Body Fluid from Pleural Cavity Updated: 03/14/22 0922     Body Fluid Culture No growth at 3 days     Gram Stain Moderate (3+) WBCs seen      No organisms seen    COVID PRE-OP / PRE-PROCEDURE SCREENING ORDER (NO ISOLATION) - Swab, Nasopharynx [[id number]]  (Normal) Collected: 03/11/22 0829    Lab Status: Final result Specimen: Swab from Nasopharynx Updated: 03/11/22 0831    Narrative:      The following orders were created for panel order COVID PRE-OP / PRE-PROCEDURE SCREENING ORDER (NO ISOLATION) - Swab, Nasopharynx.  Procedure                               Abnormality         Status                     ---------                               -----------         ------                     COVID-19 and FLU A/B PCR...[[id number]]  Normal              Final result                 Please view results for these tests on the individual orders.    COVID-19 and FLU A/B PCR - Swab, Nasopharynx [[id number]]  (Normal) Collected: 03/11/22 0829    Lab Status: Final result Specimen: Swab from Nasopharynx Updated: 03/11/22 0831     COVID19 Not Detected     Influenza A PCR Not Detected     Influenza B PCR Not Detected    Narrative:      Fact sheet for providers: https://www.fda.gov/media/551686/download    Fact sheet for patients: https://www.fda.gov/media/267950/download    Test performed by PCR.    Respiratory Panel PCR w/COVID-19(SARS-CoV-2) VINICIO/RAGINI/YVETTE/PAD/COR/MAD/ASHKAN In-House, NP Swab in UTM/VTM, 3-4 HR TAT - Swab, Nasopharynx [[id number]]  (Normal) Collected: 03/10/22 2334    Lab Status: Final result Specimen: Swab from Nasopharynx Updated:  03/11/22 0321     ADENOVIRUS, PCR Not Detected     Coronavirus 229E Not Detected     Coronavirus HKU1 Not Detected     Coronavirus NL63 Not Detected     Coronavirus OC43 Not Detected     COVID19 Not Detected     Human Metapneumovirus Not Detected     Human Rhinovirus/Enterovirus Not Detected     Influenza A PCR Not Detected     Influenza B PCR Not Detected     Parainfluenza Virus 1 Not Detected     Parainfluenza Virus 2 Not Detected     Parainfluenza Virus 3 Not Detected     Parainfluenza Virus 4 Not Detected     RSV, PCR Not Detected     Bordetella pertussis pcr Not Detected     Bordetella parapertussis PCR Not Detected     Chlamydophila pneumoniae PCR Not Detected     Mycoplasma pneumo by PCR Not Detected    Narrative:      In the setting of a positive respiratory panel with a viral infection PLUS a negative procalcitonin without other underlying concern for bacterial infection, consider observing off antibiotics or discontinuation of antibiotics and continue supportive care. If the respiratory panel is positive for atypical bacterial infection (Bordetella pertussis, Chlamydophila pneumoniae, or Mycoplasma pneumoniae), consider antibiotic de-escalation to target atypical bacterial infection.          XR Chest 1 View    Result Date: 3/17/2022  DATE OF EXAM: 3/17/2022 3:18 AM  PROCEDURE: XR CHEST 1 VW-  INDICATIONS: right pleural effusion; R09.02-Hypoxemia; P36-Puxwlaz effusion, not elsewhere classified; D64.9-Anemia, unspecified; I48.11-Longstanding persistent atrial fibrillation; Z95.0-Presence of cardiac pacemaker  COMPARISON: 3/16/2022  TECHNIQUE: Single radiographic AP view of the chest was obtained.  FINDINGS: Right pleural catheter remains in place. No pneumothorax demonstrated. No costophrenic angle blunting. Cardiomegaly. Persistent strandy opacity in the left lung base. No new pulmonary abnormality      Impression:  1. No significant pleural effusion or pneumothorax 2. Stable chest demonstrate cardiomegaly  and left lower lobe atelectasis  This report was finalized on 3/17/2022 7:43 AM by Yusuf Vallejo.      XR Chest 1 View    Result Date: 3/16/2022  DATE OF EXAM: 3/16/2022 3:43 AM  PROCEDURE: XR CHEST 1 VW-  INDICATIONS: right pleural effusion; R09.02-Hypoxemia; U08-Grrsxto effusion, not elsewhere classified; D64.9-Anemia, unspecified; I48.11-Longstanding persistent atrial fibrillation; Z95.0-Presence of cardiac pacemaker  COMPARISON: 3/15/2022  TECHNIQUE: Single radiographic AP view of the chest was obtained.  FINDINGS: Interval placement of a right pleural catheter in the lower thorax. Interval resolution of right pleural effusion. No pneumothorax. Cardiomegaly. Pulmonary vasculature appears within normal limits. Strandy opacity in the retrocardiac left lower lobe      Impression:  1. Interval resolution of right pleural effusion status post pleural catheter placement 2. Cardiomegaly with left lower lobe atelectasis  This report was finalized on 3/16/2022 8:23 AM by Yusuf Vallejo.      XR Chest 1 View    Result Date: 3/15/2022  DATE OF EXAM: 3/15/2022 12:09 PM  PROCEDURE: XR CHEST 1 VW-  INDICATIONS: increased soa/cough; R09.02-Hypoxemia; Y82-Huzgwrb effusion, not elsewhere classified; D64.9-Anemia, unspecified; I48.11-Longstanding persistent atrial fibrillation; Z95.0-Presence of cardiac pacemaker  COMPARISON: March 14, 2022  TECHNIQUE: Single radiographic AP view of the chest was obtained.  FINDINGS: A port catheter is noted with its tip in the superior vena cava. The heart is enlarged. Cardiac pacemaker device is present. There remains a right pleural effusion and possibly atelectasis or consolidation in the right lower lobe. There is slight prominence of interstitial markings. Some edema cannot be excluded. There is a curvature to the thoracolumbar spine.      Impression: 1.  Right pleural effusion. Some atelectasis or consolidation in this area not excluded. This has been noted. 2.  Prominence of interstitial  markings that may be reflective of edema. 3.  Cardiomegaly.  This report was finalized on 3/15/2022 12:24 PM by Ariel Burton MD.        Results for orders placed during the hospital encounter of 03/10/22    Adult Transthoracic Echo Complete w/ Color, Spectral and Contrast if necessary per protocol    Interpretation Summary  · Left ventricular ejection fraction appears to be 51 - 55%. Left ventricular systolic function is normal.  · Left ventricular wall thickness is consistent with severe concentric hypertrophy.  · Mildly reduced right ventricular systolic function noted.  · The right ventricular cavity is borderline dilated.  · The right atrial cavity is moderately dilated.  · There is mild calcification of the aortic valve.  · Mild aortic valve regurgitation is present.  · Moderate to severe tricuspid valve regurgitation is present.  · Estimated right ventricular systolic pressure from tricuspid regurgitation is moderately elevated (45-55 mmHg).      I have reviewed the medications:  Scheduled Meds:aspirin, 81 mg, Oral, Daily  budesonide, 0.5 mg, Nebulization, BID - RT  carvedilol, 12.5 mg, Oral, BID With Meals  docusate sodium, 100 mg, Oral, BID  guaiFENesin, 1,200 mg, Oral, Q12H  insulin lispro, 0-7 Units, Subcutaneous, TID AC  ipratropium-albuterol, 3 mL, Nebulization, 4x Daily - RT  isosorbide mononitrate, 30 mg, Oral, Daily  levothyroxine, 50 mcg, Oral, Daily  pantoprazole, 40 mg, Oral, QAM  pharmacy consult - [place], , Does not apply, Daily  pravastatin, 20 mg, Oral, Daily  sodium chloride, 10 mL, Intravenous, Q12H  torsemide, 20 mg, Oral, Daily      Continuous Infusions:   PRN Meds:.•  acetaminophen  •  benzocaine-menthol  •  benzonatate  •  dextrose  •  dextrose  •  glucagon (human recombinant)  •  guaiFENesin  •  ipratropium-albuterol  •  melatonin  •  ondansetron  •  phenol  •  sodium chloride  •  sodium chloride    Assessment/Plan   Assessment & Plan     Active Hospital Problems    Diagnosis  POA   •  **Acute respiratory failure with hypoxia (HCC) [J96.01]  Yes   • CHF (congestive heart failure) (HCC) [I50.9]  Yes   • Anemia [D64.9]  Yes   • CHAPARRO (acute kidney injury) (HCC) [N17.9]  Yes   • Acute hypoxemic respiratory failure (HCC) [J96.01]  Yes   • Hyponatremia [E87.1]  Yes   • Pleural effusion on right [J90]  Yes   • Hypothyroidism (acquired) [E03.9]  Yes   • HTN (hypertension) [I10]  Yes   • HLD (hyperlipidemia) [E78.5]  Yes   • Presence of cardiac pacemaker [Z95.0]  Yes   • Atrial fibrillation (HCC) [I48.91]  Yes      Resolved Hospital Problems   No resolved problems to display.        Brief Hospital Course to date:    Syeda Gomez is a 89 y.o. female with a past medical history of breast cancer s/p left mastectomy 33 years ago, atrial fibrillation, HTN, HLD, CHF s/p PPM, hypothyroidism, and chronic idiopathic iron deficiency anemia that presented to the ED complaining of 1 week of ongoing shortness of air. She states she has also been more fatigued than her baseline. The patient's daughter is at the bedside and states the patient has required multiple iron and blood transfusions due to anemia over the past year. She states they thought her shortness of air was due to worsened anemia again. The patient was, however, found to have a large pleural effusion in the ED.    This patient's problems and plans were partially entered by my partner and updated as appropriate by me 03/17/22.    Assessment/Plan:    Acute Respiratory Failure with Hypoxia  Large right pleural effusion  Acute on Chronic CHF  Bronchospasm  pHTN  - thoracentesis done 3/11 by Dr. Persaud by US guidance  - echocardiogram shows EF 51-55%, severe LVH, RV borderline dilated, RA moderately dilated, mild calcification of the AV, mild AR, moderate to severe TR, RVSP 45-55mmHg  - continue nebs  - some atelectasis, encouraged to continue to work hard on incentive spirometer  - added mucinex 3/14  - cultures no growth to date  - fluid cytology  pending  --on 3/15, repeat Chest x-ray yesterday revealed worsening right effusion again.    --Worsening right effusion after thoracentesis 3/11 by Dr. Persaud with IR.  Due to recurrence so quickly, CTS now following and patient now s/p right pigtail cathetor placement per IR on 3/15--1600mL drained yesterday.  Only 85cc of fluid drained yesterday so [facility] following recs will  likely remove CT tomorrow    Persistent Cough  --schedule tessaslon perles TID.  Change to robitussin DM cough syrup prn    Hyperglycemia  -- exacerbated by steroids today.  Note that glucose was 222 on yesterday's BMP.  added low dose ssi  --HbA1C was 6.20 on 3/11 so only prediabetic.  Improved today    Hypotension  - blood pressure improved on less Imdur  --Stable    Anemia:  - appears stable     CHAPARRO:  - improved with diuresis  - holding lisinopril.  Stable     Hyponatremia:  - monitor  - sodium improved    Atrial fibrillation:  - stable and rate controlled  - aspirin 81 mg daily      HTN/HLD:  - continue statin, imdur     Hypothyroidism:  - free T4 1.31      DVT prophylaxis:  Mechanical DVT prophylaxis orders are present.       AM-PAC 6 Clicks Score (PT): 16 (03/16/22 0800)    Disposition: I expect the patient to be discharged TBD, possibly on Saturday if does ok without fluid re-accumulation of CT pulled tomorrow.    OOBTC today          CODE STATUS:   Code Status and Medical Interventions:   Ordered at: 03/10/22 1040     Medical Intervention Limits:    NO intubation (DNI)     Level Of Support Discussed With:    Patient     Code Status (Patient has no pulse and is not breathing):    No CPR (Do Not Attempt to Resuscitate)     Medical Interventions (Patient has pulse or is breathing):    Limited Support       Dewayne Villalobos MD  03/17/22

## 2022-03-17 NOTE — THERAPY TREATMENT NOTE
Patient Name: Syeda Gomez  : 1927    MRN: [id number]                              Today's Date: 3/17/2022       Admit Date: 3/10/2022    Visit Dx:     ICD-10-CM ICD-9-CM   1. Hypoxia  R09.02 799.02   2. Pleural effusion, right  J90 511.9   3. Anemia, unspecified type  D64.9 285.9   4. Longstanding persistent atrial fibrillation (HCC)  I48.11 427.31   5. Presence of cardiac pacemaker  Z95.0 V45.01     Patient Active Problem List   Diagnosis   • Pneumonia due to COVID-19 virus   • Pancytopenia (HCC)   • Presence of cardiac pacemaker   • Atrial fibrillation (HCC)   • Prolonged QTc interval on ECG   • Hypothyroidism (acquired)   • HTN (hypertension)   • HLD (hyperlipidemia)   • Acute respiratory failure with hypoxia (HCC)   • Elective replacement indicated for pacemaker   • CHF (congestive heart failure) (HCC)   • Anemia   • CHAPARRO (acute kidney injury) (HCC)   • Acute hypoxemic respiratory failure (HCC)   • Hyponatremia   • Pleural effusion on right     Past Medical History:   Diagnosis Date   • A-fib (HCC)    • Cancer (HCC)    • Carpal tunnel syndrome    • CHF (congestive heart failure) (HCC)    • Disease of thyroid gland    • Hypertension      Past Surgical History:   Procedure Laterality Date   • APPENDECTOMY     • CARDIAC ELECTROPHYSIOLOGY PROCEDURE N/A 2021    Procedure: DEVICE IMPLANT;  Surgeon: Calvin Ventura MD;  Location: [facility] INVASIVE LOCATION;  Service: Cardiovascular;  Laterality: N/A;   • CATARACT EXTRACTION, BILATERAL     • CHOLECYSTECTOMY     • HYSTERECTOMY     • MASTECTOMY Left    • REPLACEMENT TOTAL KNEE BILATERAL     • RETINAL DETACHMENT SURGERY     • SHOULDER ROTATOR CUFF REPAIR Right    • TOTAL HIP ARTHROPLASTY Right       General Information     Row Name 22 1540          Physical Therapy Time and Intention    Document Type therapy note (daily note)  -DM     Mode of Treatment physical therapy  -DM     Row Name 22 1540          General Information    Existing  Precautions/Restrictions fall;oxygen therapy device and L/min;other (see comments)  R C.T. ;h/o Covid PNA 10/'20  -DM           User Key  (r) = Recorded By, (t) = Taken By, (c) = Cosigned By    Initials Name Provider Type    Ricarda Causey, PT Physical Therapist               Mobility     Row Name 03/17/22 1540          Bed Mobility    Scooting/Bridging [place] (Bed Mobility) dependent (less than 25% patient effort);2 person assist  scooting to HOB w/ drawsheet  -DM     Comment, (Bed Mobility) issued mask; made splinting pillow w/ folded towels in pillowcase d/t moaning w/ freq coughing,& instructed in use at site of R C.T.;nsg gave tessalon pearls & new cough syrup ordered;respirex 500-600 cc, w/ resultant non-prod cough  -DM     Row Name 03/17/22 1540          Transfers    Comment, (Transfers) decl./moaning to protest attempting transf  -DM     Row Name 03/17/22 1540          Bed-Chair Transfer    Bed-Chair [place] (Transfers) not tested  -DM     Row Name 03/17/22 1540          Sit-Stand Transfer    Sit-Stand [place] (Transfers) not tested  -DM     Row Name 03/17/22 1540          Gait/Stairs (Locomotion)    [place] Level (Gait) not tested  -DM           User Key  (r) = Recorded By, (t) = Taken By, (c) = Cosigned By    Initials Name Provider Type    Ricarda Causey, PT Physical Therapist               Obj/Interventions     Row Name 03/17/22 1540          Motor Skills    Therapeutic Exercise knee;hip;ankle  -DM     Row Name 03/17/22 1540          Hip (Therapeutic Exercise)    Hip (Therapeutic Exercise) AROM (active range of motion);AAROM (active assistive range of motion);isometric exercises  -DM     Hip AROM (Therapeutic Exercise) bilateral;external rotation;internal rotation;10 repetitions;other (see comments)  & B BKFO  -DM     Hip AAROM (Therapeutic Exercise) bilateral;flexion;extension;aBduction;aDduction  -DM     Hip Isometrics (Therapeutic Exercise) gluteal sets;supine;3  repetitions  -DM     Row Name 03/17/22 1540          Knee (Therapeutic Exercise)    Knee (Therapeutic Exercise) AROM (active range of motion);AAROM (active assistive range of motion);isometric exercises  -DM     Knee AROM (Therapeutic Exercise) bilateral;flexion;extension;SAQ (short arc quad);LAQ (long arc quad);heel slides  -DM     Knee AAROM (Therapeutic Exercise) bilateral;flexion;extension;supine;10 repetitions  min A for flex phase h.slides & ext phase SAQ  -DM     Knee Isometrics (Therapeutic Exercise) hamstring sets;quad sets;10 repetitions  -DM     Row Name 03/17/22 1540          Ankle (Therapeutic Exercise)    Ankle (Therapeutic Exercise) AROM (active range of motion);AAROM (active assistive range of motion)  -DM     Ankle AROM (Therapeutic Exercise) bilateral;dorsiflexion;plantarflexion;supine;10 repetitions  -DM     Ankle AAROM (Therapeutic Exercise) bilateral;supine;10 repetitions;other (see comments)  AC  -DM           User Key  (r) = Recorded By, (t) = Taken By, (c) = Cosigned By    Initials Name Provider Type    Ricarda Causey, PT Physical Therapist               Goals/Plan    No documentation.                Clinical Impression     Row Name 03/17/22 1540          Pain    Pretreatment Pain Rating 4/10  -DM     Posttreatment Pain Rating 5/10  -DM     Pain Location - Side/Orientation Right  -DM     Pain Location lateral  R C.T.  -DM     Pain Location - chest  -DM     Pain Intervention(s) Medication (See MAR);Repositioned;Elevated;Rest  -DM     Row Name 03/17/22 1540          Plan of Care Review    Plan of Care Reviewed With patient  -DM     Progress no change  -DM     Outcome Evaluation Performed LE ther exer in sup, scooted to HOB w/ drawsheet & Dep 2A, respirex & splinted coughing, but decl. transf/gt d/t signif pain R C.T. site & (F) coughing spells.Desat 93% on 2 L, HR 84 & decr BP pre & s/p.  -DM     Row Name 03/17/22 1540          Vital Signs    Pre Systolic BP Rehab 98  -DM     Pre Treatment  Diastolic BP 50  -DM     Post Systolic BP Rehab 105  -DM     Post Treatment Diastolic BP 56  -DM     Pretreatment Heart Rate (beats/min) 71  -DM     Intratreatment Heart Rate (beats/min) 84  -DM     Posttreatment Heart Rate (beats/min) 62  -DM     Pre SpO2 (%) 97  -DM     O2 Delivery Pre Treatment supplemental O2  -DM     Intra SpO2 (%) 93  -DM     O2 Delivery Intra Treatment supplemental O2  -DM     Post SpO2 (%) 99  -DM     O2 Delivery Post Treatment supplemental O2  -DM     Pre Patient Position Supine  -DM     Intra Patient Position Supine  -DM     Post Patient Position Supine  -DM     Row Name 03/17/22 1540          Positioning and Restraints    Pre-Treatment Position in bed  -DM     Post Treatment Position bed  -DM     In Bed notified nsg;call light within reach;encouraged to call for assist;exit alarm on;heels elevated  R flank offloaded  -DM           User Key  (r) = Recorded By, (t) = Taken By, (c) = Cosigned By    Initials Name Provider Type    Ricarda Causey, PT Physical Therapist               Outcome Measures     Row Name 03/17/22 1540 03/17/22 0800       How much help from another person do you currently need...    Turning from your back to your side while in flat bed without using bedrails? 3  -DM 3  -EARLE    Moving from lying on back to sitting on the side of a flat bed without bedrails? 2  -DM 3  -EARLE    Moving to and from a bed to a chair (including a wheelchair)? 2  -DM 2  -EARLE    Standing up from a chair using your arms (e.g., wheelchair, bedside chair)? 2  -DM 3  -EARLE    Climbing 3-5 steps with a railing? 2  -DM 2  -EARLE    To walk in hospital room? 3  -DM 3  -EARLE    AM-PAC 6 Clicks Score (PT) 14  -DM 16  -EARLE    Row Name 03/17/22 1540 03/17/22 1157       Functional Assessment    Outcome Measure Options AM-PAC 6 Clicks Basic Mobility (PT)  -DM AM-PAC 6 Clicks Daily Activity (OT)  -SW          User Key  (r) = Recorded By, (t) = Taken By, (c) = Cosigned By    Initials Name Provider Type    MARINE Ashford  Ricarda WARNER, PT Physical Therapist    Kevin Nunez, URSULAN Licensed Nurse    Bailee Rueda, OT Occupational Therapist                             Physical Therapy Education                 Title: PT OT SLP Therapies (In Progress)     Topic: Physical Therapy (In Progress)     Point: Mobility training (In Progress)     Learning Progress Summary           Patient Acceptance, E,D, NR by DM at 3/17/2022 1617    Acceptance, E, NR by AS at 3/15/2022 1013    Acceptance, E, NR by AS at 3/14/2022 0856    Acceptance, E, VU,NR by LO at 3/11/2022 1505    Comment: Patient education regarding sequencing for bed mobilty and transfers.                   Point: Home exercise program (In Progress)     Learning Progress Summary           Patient Acceptance, E,D, NR by DM at 3/17/2022 1617    Acceptance, E, NR by AS at 3/15/2022 1013    Acceptance, E, NR by AS at 3/14/2022 0856    Acceptance, E, VU,NR by LO at 3/11/2022 1505    Comment: Patient education regarding sequencing for bed mobilty and transfers.                   Point: Body mechanics (In Progress)     Learning Progress Summary           Patient Acceptance, E,D, NR by DM at 3/17/2022 1617    Acceptance, E, NR by AS at 3/15/2022 1013    Acceptance, E, NR by AS at 3/14/2022 0856    Acceptance, E, VU,NR by LO at 3/11/2022 1505    Comment: Patient education regarding sequencing for bed mobilty and transfers.                   Point: Precautions (In Progress)     Learning Progress Summary           Patient Acceptance, E,D, NR by DM at 3/17/2022 1617    Acceptance, E, NR by AS at 3/15/2022 1013    Acceptance, E, NR by AS at 3/14/2022 0856    Acceptance, E, VU,NR by LO at 3/11/2022 1505    Comment: Patient education regarding sequencing for bed mobilty and transfers.                               User Key     Initials Effective Dates Name Provider Type Discipline    DM 06/16/21 -  Ricarda Ashford, PT Physical Therapist PT    AS 06/16/21 -  Jania Junior, PTA Physical Therapy  Assistant PT    LO 06/16/21 -  Katia Clemente, PT Physical Therapist PT              PT Recommendation and Plan     Plan of Care Reviewed With: patient  Progress: no change  Outcome Evaluation: Performed LE ther exer in sup, scooted to HOB w/ drawsheet & Dep 2A, respirex & splinted coughing, but decl. transf/gt d/t signif pain R C.T. site & (F) coughing spells.Desat 93% on 2 L, HR 84 & decr BP pre & s/p.     Time Calculation:    PT Charges     Row Name 03/17/22 1618             Time Calculation    Start Time 1540  -DM      PT Received On 03/17/22  -DM      PT Goal Re-Cert Due Date 03/21/22  -DM              Time Calculation- PT    Total Timed Code Minutes- PT 23 minute(s)  -DM              Timed Charges    95716 - PT Therapeutic Exercise Minutes 13  -DM      60333 - PT Therapeutic Activity Minutes 10  -DM              Total Minutes    Timed Charges Total Minutes 23  -DM       Total Minutes 23  -DM            User Key  (r) = Recorded By, (t) = Taken By, (c) = Cosigned By    Initials Name Provider Type    Ricarda Causey, PT Physical Therapist              Therapy Charges for Today     Code Description Service Date Service Provider Modifiers Qty    37406816733 HC PT THER PROC EA 15 MIN 3/17/2022 Ricarda Ashford, PT GP 1    63109079577 HC PT THERAPEUTIC ACT EA 15 MIN 3/17/2022 Ricarda Ashford, PT GP 1          PT G-Codes  Outcome Measure Options: AM-PAC 6 Clicks Basic Mobility (PT)  AM-PAC 6 Clicks Score (PT): 14  AM-PAC 6 Clicks Score (OT): 17    Ricarda Ashford PT  3/17/2022

## 2022-03-17 NOTE — THERAPY TREATMENT NOTE
Patient Name: Syeda Gomez  : 1927    MRN: [id number]                              Today's Date: 3/17/2022       Admit Date: 3/10/2022    Visit Dx:     ICD-10-CM ICD-9-CM   1. Hypoxia  R09.02 799.02   2. Pleural effusion, right  J90 511.9   3. Anemia, unspecified type  D64.9 285.9   4. Longstanding persistent atrial fibrillation (HCC)  I48.11 427.31   5. Presence of cardiac pacemaker  Z95.0 V45.01     Patient Active Problem List   Diagnosis   • Pneumonia due to COVID-19 virus   • Pancytopenia (HCC)   • Presence of cardiac pacemaker   • Atrial fibrillation (HCC)   • Prolonged QTc interval on ECG   • Hypothyroidism (acquired)   • HTN (hypertension)   • HLD (hyperlipidemia)   • Acute respiratory failure with hypoxia (HCC)   • Elective replacement indicated for pacemaker   • CHF (congestive heart failure) (HCC)   • Anemia   • CHAPARRO (acute kidney injury) (HCC)   • Acute hypoxemic respiratory failure (HCC)   • Hyponatremia   • Pleural effusion on right     Past Medical History:   Diagnosis Date   • A-fib (HCC)    • Cancer (HCC)    • Carpal tunnel syndrome    • CHF (congestive heart failure) (HCC)    • Disease of thyroid gland    • Hypertension      Past Surgical History:   Procedure Laterality Date   • APPENDECTOMY     • CARDIAC ELECTROPHYSIOLOGY PROCEDURE N/A 2021    Procedure: DEVICE IMPLANT;  Surgeon: Calvin Ventura MD;  Location: [facility] INVASIVE LOCATION;  Service: Cardiovascular;  Laterality: N/A;   • CATARACT EXTRACTION, BILATERAL     • CHOLECYSTECTOMY     • HYSTERECTOMY     • MASTECTOMY Left    • REPLACEMENT TOTAL KNEE BILATERAL     • RETINAL DETACHMENT SURGERY     • SHOULDER ROTATOR CUFF REPAIR Right    • TOTAL HIP ARTHROPLASTY Right       General Information     Row Name 22 1035          OT Time and Intention    Document Type therapy note (daily note)  -SW     Mode of Treatment occupational therapy  -SW     Row Name 22 1035          General Information    Patient Profile Reviewed  yes  -     Existing Precautions/Restrictions fall;oxygen therapy device and L/min  -     Row Name 03/17/22 1035          Cognition    Orientation Status (Cognition) oriented x 4  -     Row Name 03/17/22 1035          Safety Issues, Functional Mobility    Impairments Affecting Function (Mobility) balance;endurance/activity tolerance;shortness of breath;strength  -           User Key  (r) = Recorded By, (t) = Taken By, (c) = Cosigned By    Initials Name Provider Type    Bailee Rueda OT Occupational Therapist                 Mobility/ADL's     Row Name 03/17/22 1035          Bed Mobility    Bed Mobility scooting/bridging;supine-sit  -     Scooting/Bridging [place] (Bed Mobility) supervision;verbal cues  -     Supine-Sit [place] (Bed Mobility) supervision;verbal cues  -     Assistive Device (Bed Mobility) bed rails;head of bed elevated  -     Row Name 03/17/22 1035          Transfers    Transfers sit-stand transfer;stand-sit transfer;bed-chair transfer  -     Bed-Chair [place] (Transfers) contact guard  -     Assistive Device (Bed-Chair Transfers) other (see comments)  bed rail, chair rail  -     Sit-Stand [place] (Transfers) contact guard  -     Row Name 03/17/22 1035          Functional Mobility    Functional Mobility- Ind. Level contact guard assist  -     Functional Mobility-Distance (Feet) 3  -           User Key  (r) = Recorded By, (t) = Taken By, (c) = Cosigned By    Initials Name Provider Type    Bailee Rueda OT Occupational Therapist               Obj/Interventions     Row Name 03/17/22 1035          Shoulder (Therapeutic Exercise)    Shoulder (Therapeutic Exercise) AROM (active range of motion)  -     Shoulder AROM (Therapeutic Exercise) bilateral;flexion;extension;5 repetitions;sitting  -     Row Name 03/17/22 1035          Elbow/Forearm (Therapeutic Exercise)    Elbow/Forearm (Therapeutic Exercise) AROM (active range of motion)  -     Elbow/Forearm  AROM (Therapeutic Exercise) bilateral;flexion;extension;sitting;5 repetitions  -     Row Name 03/17/22 1035          Motor Skills    Therapeutic Exercise shoulder;elbow/forearm  UB and LB AROM  -     Row Name 03/17/22 1035          Balance    Balance Assessment sitting static balance;sitting dynamic balance;sit to stand dynamic balance;standing dynamic balance;standing static balance  -     Static Sitting Balance supervision  -     Dynamic Sitting Balance supervision  -     Position, Sitting Balance unsupported;sitting edge of bed  -     Sit to Stand Dynamic Balance contact guard  -     Static Standing Balance contact guard  -SW     Dynamic Standing Balance contact guard  -SW     Position/Device Used, Standing Balance supported  -     Balance Interventions sitting;standing;sit to stand;supported;static;dynamic;minimal challenge;occupation based/functional task  -           User Key  (r) = Recorded By, (t) = Taken By, (c) = Cosigned By    Initials Name Provider Type    Bailee Rueda OT Occupational Therapist               Goals/Plan    No documentation.                Clinical Impression     Row Name 03/17/22 1035          Pain Assessment    Pretreatment Pain Rating 0/10 - no pain  -     Posttreatment Pain Rating 0/10 - no pain  -[facility] Name 03/17/22 1035          Plan of Care Review    Plan of Care Reviewed With patient;daughter  -     Progress improving  -     Outcome Evaluation OT provided encouragement for pt to get oob and she agreed.  Bed mob has improved to supervision level with STS at cga.  Pt has low endurance and needs frequent rest breaks and had several episodes of coughing.  Able to perform reps of 5 for ub and lb exercises before requiring a rest break.  Overall making improvements but continues to have weakness.  -     Row Name 03/17/22 1035          Vital Signs    Pre Systolic BP Rehab 115  -SW     Pre Treatment Diastolic BP 51  -     Pretreatment Heart Rate  (beats/min) 77  -SW     Pre SpO2 (%) 96  -SW     O2 Delivery Pre Treatment supplemental O2  -SW     O2 Delivery Intra Treatment supplemental O2  -SW     O2 Delivery Post Treatment supplemental O2  -SW     Pre Patient Position Supine  -SW     Intra Patient Position Standing  -SW     Post Patient Position Sitting  -SW     Row Name 03/17/22 1035          Positioning and Restraints    Pre-Treatment Position in bed  -SW     Post Treatment Position chair  -SW     In Chair notified nsg;reclined;sitting;call light within reach;encouraged to call for assist;with family/caregiver;waffle cushion;legs elevated  -SW           User Key  (r) = Recorded By, (t) = Taken By, (c) = Cosigned By    Initials Name Provider Type    Bailee Rueda OT Occupational Therapist               Outcome Measures     Row Name 03/17/22 1157          How much help from another is currently needed...    Putting on and taking off regular lower body clothing? 2  -SW     Bathing (including washing, rinsing, and drying) 2  -SW     Toileting (which includes using toilet bed pan or urinal) 3  -SW     Putting on and taking off regular upper body clothing 3  -SW     Taking care of personal grooming (such as brushing teeth) 3  -SW     Eating meals 4  -SW     AM-PAC 6 Clicks Score (OT) 17  -SW     Row Name 03/17/22 1157          Functional Assessment    Outcome Measure Options AM-PAC 6 Clicks Daily Activity (OT)  -SW           User Key  (r) = Recorded By, (t) = Taken By, (c) = Cosigned By    Initials Name Provider Type    Bailee Rueda OT Occupational Therapist                Occupational Therapy Education                 Title: PT OT SLP Therapies (In Progress)     Topic: Occupational Therapy (In Progress)     Point: ADL training (Not Started)     Description:   Instruct learner(s) on proper safety adaptation and remediation techniques during self care or transfers.   Instruct in proper use of assistive devices.              Learner Progress:  Not documented  in this visit.          Point: Home exercise program (Done)     Description:   Instruct learner(s) on appropriate technique for monitoring, assisting and/or progressing therapeutic exercises/activities.              Learning Progress Summary           Patient Acceptance, E, VU by  at 3/17/2022 1158   Family Acceptance, E, VU by SW at 3/17/2022 1158                   Point: Precautions (Done)     Description:   Instruct learner(s) on prescribed precautions during self-care and functional transfers.              Learning Progress Summary           Patient Acceptance, E, VU by SW at 3/17/2022 1158    Acceptance, E, VU,NR by TA at 3/12/2022 1058   Family Acceptance, E, VU by SW at 3/17/2022 1158                   Point: Body mechanics (Done)     Description:   Instruct learner(s) on proper positioning and spine alignment during self-care, functional mobility activities and/or exercises.              Learning Progress Summary           Patient Acceptance, E, VU,NR by TA at 3/12/2022 1058                               User Key     Initials Effective Dates Name Provider Type Discipline     06/16/21 -  Hugo Viveros OT Occupational Therapist OT     06/16/21 -  Bailee Wolf OT Occupational Therapist OT              OT Recommendation and Plan     Plan of Care Review  Plan of Care Reviewed With: patient, daughter  Progress: improving  Outcome Evaluation: OT provided encouragement for pt to get oob and she agreed.  Bed mob has improved to supervision level with STS at cga.  Pt has low endurance and needs frequent rest breaks and had several episodes of coughing.  Able to perform reps of 5 for ub and lb exercises before requiring a rest break.  Overall making improvements but continues to have weakness.     Time Calculation:    Time Calculation- OT     Row Name 03/17/22 1035             Time Calculation- OT    OT Start Time 1035  -      OT Received On 03/17/22  -              Timed Charges    24131 - OT  Therapeutic Exercise Minutes 10  -SW      34962 - OT Therapeutic Activity Minutes 15  -SW              Total Minutes    Timed Charges Total Minutes 25  -SW       Total Minutes 25  -SW            User Key  (r) = Recorded By, (t) = Taken By, (c) = Cosigned By    Initials Name Provider Type    Bailee Rueda OT Occupational Therapist              Therapy Charges for Today     Code Description Service Date Service Provider Modifiers Qty    53832693429  OT THER PROC EA 15 MIN 3/17/2022 Bailee Wolf OT GO 1    [id number]  OT THERAPEUTIC ACT EA 15 MIN 3/17/2022 Bailee Wolf OT GO 1               Bailee Wolf OT  3/17/2022

## 2022-03-17 NOTE — PLAN OF CARE
Pt resting well this shift and stable vitals. Pt glucose 330 early this shift. Pt given insulin per order. Pt glucose has lowered to 179 this 4am check.   Problem: Activity Intolerance (Pulmonary Impairment)  Goal: Improved Activity Tolerance  Outcome: Ongoing, Progressing     Problem: Airway Clearance Ineffective (Pulmonary Impairment)  Goal: Effective Airway Clearance  Outcome: Ongoing, Progressing     Problem: Gas Exchange Impaired (Pulmonary Impairment)  Goal: Optimal Gas Exchange  Outcome: Ongoing, Progressing     Problem: Adult Inpatient Plan of Care  Goal: Plan of Care Review  Outcome: Ongoing, Progressing  Goal: Patient-Specific Goal (Individualized)  Outcome: Ongoing, Progressing  Goal: Absence of Hospital-Acquired Illness or Injury  Outcome: Ongoing, Progressing  Intervention: Identify and Manage Fall Risk  Recent Flowsheet Documentation  Taken 3/17/2022 0400 by Tony Stone, RN  Safety Promotion/Fall Prevention:   activity supervised   assistive device/personal items within reach   clutter free environment maintained   fall prevention program maintained   lighting adjusted   nonskid shoes/slippers when out of bed   room organization consistent   safety round/check completed  Taken 3/17/2022 0200 by Tony Stone, RN  Safety Promotion/Fall Prevention:   activity supervised   assistive device/personal items within reach   clutter free environment maintained   fall prevention program maintained   lighting adjusted   nonskid shoes/slippers when out of bed   safety round/check completed   room organization consistent  Taken 3/17/2022 0000 by Tony Stone, RN  Safety Promotion/Fall Prevention:   activity supervised   clutter free environment maintained   assistive device/personal items within reach   fall prevention program maintained   lighting adjusted   nonskid shoes/slippers when out of bed   room organization consistent   safety round/check completed  Taken 3/16/2022 2200 by Tony Stone, RN  Safety  Promotion/Fall Prevention:   activity supervised   assistive device/personal items within reach   clutter free environment maintained   fall prevention program maintained   lighting adjusted   nonskid shoes/slippers when out of bed   room organization consistent   safety round/check completed  Taken 3/16/2022 2000 by Tony Stnoe RN  Safety Promotion/Fall Prevention:   activity supervised   assistive device/personal items within reach   clutter free environment maintained   fall prevention program maintained   lighting adjusted   nonskid shoes/slippers when out of bed   room organization consistent   safety round/check completed  Intervention: Prevent Skin Injury  Recent Flowsheet Documentation  Taken 3/17/2022 0400 by Tony Stone RN  Body Position: weight shifting  Taken 3/17/2022 0200 by Tony Stone RN  Body Position: weight shifting  Taken 3/17/2022 0000 by Tony Stone RN  Body Position: weight shifting  Skin Protection:   adhesive use limited   incontinence pads utilized   tubing/devices free from skin contact   transparent dressing maintained  Taken 3/16/2022 2000 by Tony Stone RN  Body Position: weight shifting  Skin Protection:   adhesive use limited   tubing/devices free from skin contact   transparent dressing maintained   incontinence pads utilized  Intervention: Prevent and Manage VTE (Venous Thromboembolism) Risk  Recent Flowsheet Documentation  Taken 3/17/2022 0400 by Tony Stone, RN  Activity Management: activity adjusted per tolerance  Taken 3/17/2022 0200 by Tony Stone RN  Activity Management: activity adjusted per tolerance  Taken 3/17/2022 0000 by Tony Stone RN  Activity Management: activity adjusted per tolerance  Taken 3/16/2022 2200 by Tony Stone, RN  Activity Management: activity adjusted per tolerance  Taken 3/16/2022 2000 by Tony Stone RN  Activity Management: activity adjusted per tolerance  Intervention: Prevent Infection  Recent Flowsheet  Documentation  Taken 3/17/2022 0200 by Tony Stone RN  Infection Prevention: environmental surveillance performed  Taken 3/16/2022 2000 by Tony Stone RN  Infection Prevention: environmental surveillance performed  Goal: Optimal Comfort and Wellbeing  Outcome: Ongoing, Progressing  Intervention: Provide Person-Centered Care  Recent Flowsheet Documentation  Taken 3/16/2022 2000 by Tony Stone RN  Trust Relationship/Rapport: care explained  Goal: Readiness for Transition of Care  Outcome: Ongoing, Progressing     Problem: Hypertension Comorbidity  Goal: Blood Pressure in Desired Range  Outcome: Ongoing, Progressing  Intervention: Maintain Blood Pressure Management  Recent Flowsheet Documentation  Taken 3/16/2022 2000 by Tony Stone RN  Medication Review/Management: medications reviewed     Problem: Fall Injury Risk  Goal: Absence of Fall and Fall-Related Injury  Outcome: Ongoing, Progressing  Intervention: Identify and Manage Contributors  Recent Flowsheet Documentation  Taken 3/16/2022 2000 by Tony Stone RN  Medication Review/Management: medications reviewed  Intervention: Promote Injury-Free Environment  Recent Flowsheet Documentation  Taken 3/17/2022 0400 by Tony Stone RN  Safety Promotion/Fall Prevention:   activity supervised   assistive device/personal items within reach   clutter free environment maintained   fall prevention program maintained   lighting adjusted   nonskid shoes/slippers when out of bed   room organization consistent   safety round/check completed  Taken 3/17/2022 0200 by Tony Stone RN  Safety Promotion/Fall Prevention:   activity supervised   assistive device/personal items within reach   clutter free environment maintained   fall prevention program maintained   lighting adjusted   nonskid shoes/slippers when out of bed   safety round/check completed   room organization consistent  Taken 3/17/2022 0000 by Tony Stone RN  Safety Promotion/Fall Prevention:    activity supervised   clutter free environment maintained   assistive device/personal items within reach   fall prevention program maintained   lighting adjusted   nonskid shoes/slippers when out of bed   room organization consistent   safety round/check completed  Taken 3/16/2022 2200 by Tony Stone RN  Safety Promotion/Fall Prevention:   activity supervised   assistive device/personal items within reach   clutter free environment maintained   fall prevention program maintained   lighting adjusted   nonskid shoes/slippers when out of bed   room organization consistent   safety round/check completed  Taken 3/16/2022 2000 by Tony Stone RN  Safety Promotion/Fall Prevention:   activity supervised   assistive device/personal items within reach   clutter free environment maintained   fall prevention program maintained   lighting adjusted   nonskid shoes/slippers when out of bed   room organization consistent   safety round/check completed     Problem: Skin Injury Risk Increased  Goal: Skin Health and Integrity  Outcome: Ongoing, Progressing  Intervention: Optimize Skin Protection  Recent Flowsheet Documentation  Taken 3/17/2022 0400 by Tony Stone RN  Head of Bed (HOB) Positioning: HOB elevated  Taken 3/17/2022 0200 by Tony Stone RN  Head of Bed (HOB) Positioning: HOB elevated  Taken 3/17/2022 0000 by Tony Stone RN  Pressure Reduction Techniques: weight shift assistance provided  Head of Bed (HOB) Positioning: HOB elevated  Pressure Reduction Devices: pressure-redistributing mattress utilized  Skin Protection:   adhesive use limited   incontinence pads utilized   tubing/devices free from skin contact   transparent dressing maintained  Taken 3/16/2022 2000 by Tony Stone RN  Pressure Reduction Techniques:   frequent weight shift encouraged   weight shift assistance provided  Head of Bed (HOB) Positioning: [facility] elevated  Pressure Reduction Devices: pressure-redistributing mattress utilized  Skin  Protection:   adhesive use limited   tubing/devices free from skin contact   transparent dressing maintained   incontinence pads utilized   Goal Outcome Evaluation:

## 2022-03-17 NOTE — PLAN OF CARE
Problem: Skin Injury Risk Increased  Goal: Skin Health and Integrity  Intervention: Optimize Skin Protection  Description: Perform a full pressure injury risk assessment, as indicated by screening, upon admission to care unit.  Reassess skin (injury risk, full inspection) frequently (e.g., scheduled interval, with change in condition) to provide optimal early detection and prevention.  Maintain adequate tissue perfusion (e.g., encourage fluid balance; avoid crossing legs, constrictive clothing or devices) to promote tissue oxygenation.  Maintain head of bed at lowest degree of elevation tolerated, considering medical condition and other restrictions.  Avoid positioning onto an area that remains reddened.  Minimize incontinence and moisture (e.g., toileting schedule; moisture-wicking pad, diaper or incontinence collection device; skin moisture barrier).  Cleanse skin promptly and gently when soiled utilizing a pH-balanced cleanser.  Relieve and redistribute pressure (e.g., scheduled position changes, weight shifts, use of support surface, medical device repositioning, protective dressing application, use of positioning device, microclimate control, use of pressure-injury-monitor  Encourage increased activity, such as sitting in a chair at the bedside or early mobilization, when able to tolerate.  Recent Flowsheet Documentation  Taken 3/17/2022 1400 by Kevin Colvin LPN  Head of Bed (HOB) Positioning: HOB at 30 degrees  Taken 3/17/2022 0800 by Kevin Colvin LPN  Head of Bed (HOB) Positioning: HOB at 30 degrees     Problem: Gas Exchange Impaired (Pulmonary Impairment)  Goal: Optimal Gas Exchange  Outcome: Ongoing, Progressing     Problem: Activity Intolerance (Pulmonary Impairment)  Goal: Improved Activity Tolerance  Outcome: Ongoing, Progressing     Problem: Airway Clearance Ineffective (Pulmonary Impairment)  Goal: Effective Airway Clearance  Outcome: Ongoing, Progressing   Goal Outcome Evaluation:  Plan  of Care Reviewed With: patient

## 2022-03-17 NOTE — PROGRESS NOTES
CTS Progress Note       LOS: 7 days   Patient Care Team:  Omayra Alcazar DO as PCP - General (Internal Medicine)    Chief Complaint: Acute respiratory failure with hypoxia (HCC)    Vital Signs:  Temp:  [97.6 °F (36.4 °C)-97.9 °F (36.6 °C)] 97.7 °F (36.5 °C)  Heart Rate:  [65-90] 80  Resp:  [16-20] 18  BP: (100-117)/(47-84) 100/84    Physical Exam:       Results:   Results from last 7 days   Lab Units 03/16/22  0635   WBC 10*3/mm3 6.84   HEMOGLOBIN g/dL 8.4*   HEMATOCRIT % 26.1*   PLATELETS 10*3/mm3 246     Results from last 7 days   Lab Units 03/16/22  1325   SODIUM mmol/L 128*   POTASSIUM mmol/L 4.9   CHLORIDE mmol/L 90*   CO2 mmol/L 29.0   BUN mg/dL 37*   CREATININE mg/dL 1.03*   GLUCOSE mg/dL 494*   CALCIUM mg/dL 8.8           Imaging Results (Last 24 Hours)     Procedure Component Value Units Date/Time    XR Chest 1 View [[id number]] Resulted: 03/17/22 0604     Updated: 03/17/22 0605    XR Chest 1 View [[id number]] Collected: 03/16/22 0822     Updated: 03/16/22 0826    Narrative:      DATE OF EXAM: 3/16/2022 3:43 AM     PROCEDURE: XR CHEST 1 VW-     INDICATIONS: right pleural effusion; R09.02-Hypoxemia; H41-Vmdjgdr  effusion, not elsewhere classified; D64.9-Anemia, unspecified;  I48.11-Longstanding persistent atrial fibrillation; Z95.0-Presence of  cardiac pacemaker     COMPARISON: 3/15/2022     TECHNIQUE: Single radiographic AP view of the chest was obtained.     FINDINGS:  Interval placement of a right pleural catheter in the lower thorax.  Interval resolution of right pleural effusion. No pneumothorax.  Cardiomegaly. Pulmonary vasculature appears within normal limits.  Strandy opacity in the retrocardiac left lower lobe        Impression:         1. Interval resolution of right pleural effusion status post pleural  catheter placement  2. Cardiomegaly with left lower lobe atelectasis     This report was finalized on 3/16/2022 8:23 AM by Yusuf Vallejo.             Assessment      Acute respiratory  failure with hypoxia (HCC)    Presence of cardiac pacemaker    Atrial fibrillation (HCC)    Hypothyroidism (acquired)    HTN (hypertension)    HLD (hyperlipidemia)    CHF (congestive heart failure) (HCC)    Anemia    CHAPARRO (acute kidney injury) (HCC)    Acute hypoxemic respiratory failure (HCC)    Hyponatremia    Pleural effusion on right    Continued improvement on chest x-ray.  Anticipate removal of pigtail catheter tomorrow    Plan   Chest x-ray in the a.m.    Please note that portions of this note were completed with a voice recognition program. Efforts were made to edit the dictations, but occasionally words are mistranscribed.    Reggie Arboleda MD  03/17/22  06:21 EDT

## 2022-03-18 ENCOUNTER — APPOINTMENT (OUTPATIENT)
Dept: GENERAL RADIOLOGY | Facility: HOSPITAL | Age: 87
End: 2022-03-18

## 2022-03-18 LAB
ANION GAP SERPL CALCULATED.3IONS-SCNC: 7 MMOL/L (ref 5–15)
BUN SERPL-MCNC: 39 MG/DL (ref 8–23)
BUN/CREAT SERPL: 37.5 (ref 7–25)
CA-I SERPL ISE-MCNC: 1.22 MMOL/L (ref 1.12–1.32)
CALCIUM SPEC-SCNC: 8.5 MG/DL (ref 8.2–9.6)
CHLORIDE SERPL-SCNC: 91 MMOL/L (ref 98–107)
CO2 SERPL-SCNC: 30 MMOL/L (ref 22–29)
CREAT SERPL-MCNC: 1.04 MG/DL (ref 0.57–1)
EGFRCR SERPLBLD CKD-EPI 2021: 49.6 ML/MIN/1.73
GLUCOSE BLDC GLUCOMTR-MCNC: 198 MG/DL (ref 70–130)
GLUCOSE BLDC GLUCOMTR-MCNC: 221 MG/DL (ref 70–130)
GLUCOSE BLDC GLUCOMTR-MCNC: 228 MG/DL (ref 70–130)
GLUCOSE BLDC GLUCOMTR-MCNC: 228 MG/DL (ref 70–130)
GLUCOSE BLDC GLUCOMTR-MCNC: 365 MG/DL (ref 70–130)
GLUCOSE SERPL-MCNC: 235 MG/DL (ref 65–99)
MAGNESIUM SERPL-MCNC: 2.1 MG/DL (ref 1.7–2.3)
PHOSPHATE SERPL-MCNC: 3.3 MG/DL (ref 2.5–4.5)
POTASSIUM SERPL-SCNC: 4.3 MMOL/L (ref 3.5–5.2)
SODIUM SERPL-SCNC: 128 MMOL/L (ref 136–145)

## 2022-03-18 PROCEDURE — 82330 ASSAY OF CALCIUM: CPT | Performed by: INTERNAL MEDICINE

## 2022-03-18 PROCEDURE — 94761 N-INVAS EAR/PLS OXIMETRY MLT: CPT

## 2022-03-18 PROCEDURE — 99231 SBSQ HOSP IP/OBS SF/LOW 25: CPT | Performed by: THORACIC SURGERY (CARDIOTHORACIC VASCULAR SURGERY)

## 2022-03-18 PROCEDURE — 80048 BASIC METABOLIC PNL TOTAL CA: CPT | Performed by: INTERNAL MEDICINE

## 2022-03-18 PROCEDURE — 83735 ASSAY OF MAGNESIUM: CPT | Performed by: INTERNAL MEDICINE

## 2022-03-18 PROCEDURE — 71045 X-RAY EXAM CHEST 1 VIEW: CPT

## 2022-03-18 PROCEDURE — 99232 SBSQ HOSP IP/OBS MODERATE 35: CPT | Performed by: INTERNAL MEDICINE

## 2022-03-18 PROCEDURE — 94799 UNLISTED PULMONARY SVC/PX: CPT

## 2022-03-18 PROCEDURE — 82962 GLUCOSE BLOOD TEST: CPT

## 2022-03-18 PROCEDURE — 63710000001 INSULIN LISPRO (HUMAN) PER 5 UNITS: Performed by: INTERNAL MEDICINE

## 2022-03-18 PROCEDURE — 84100 ASSAY OF PHOSPHORUS: CPT | Performed by: INTERNAL MEDICINE

## 2022-03-18 RX ORDER — TRAMADOL HYDROCHLORIDE 50 MG/1
50 TABLET ORAL ONCE
Status: COMPLETED | OUTPATIENT
Start: 2022-03-18 | End: 2022-03-18

## 2022-03-18 RX ADMIN — INSULIN LISPRO 3 UNITS: 100 INJECTION, SOLUTION INTRAVENOUS; SUBCUTANEOUS at 16:29

## 2022-03-18 RX ADMIN — SENNOSIDES AND DOCUSATE SODIUM 1 TABLET: 50; 8.6 TABLET ORAL at 07:58

## 2022-03-18 RX ADMIN — GUAIFENESIN AND DEXTROMETHORPHAN 5 ML: 100; 10 SYRUP ORAL at 16:28

## 2022-03-18 RX ADMIN — BENZONATATE 100 MG: 100 CAPSULE ORAL at 20:45

## 2022-03-18 RX ADMIN — CARVEDILOL 12.5 MG: 12.5 TABLET, FILM COATED ORAL at 18:34

## 2022-03-18 RX ADMIN — GUAIFENESIN 1200 MG: 600 TABLET, EXTENDED RELEASE ORAL at 20:45

## 2022-03-18 RX ADMIN — IPRATROPIUM BROMIDE AND ALBUTEROL SULFATE 3 ML: 2.5; .5 SOLUTION RESPIRATORY (INHALATION) at 15:27

## 2022-03-18 RX ADMIN — GUAIFENESIN AND DEXTROMETHORPHAN 5 ML: 100; 10 SYRUP ORAL at 08:05

## 2022-03-18 RX ADMIN — DOCUSATE SODIUM 100 MG: 100 CAPSULE, LIQUID FILLED ORAL at 08:01

## 2022-03-18 RX ADMIN — ISOSORBIDE MONONITRATE 30 MG: 30 TABLET, EXTENDED RELEASE ORAL at 08:00

## 2022-03-18 RX ADMIN — DOCUSATE SODIUM 100 MG: 100 CAPSULE, LIQUID FILLED ORAL at 20:45

## 2022-03-18 RX ADMIN — GUAIFENESIN AND DEXTROMETHORPHAN 5 ML: 100; 10 SYRUP ORAL at 00:46

## 2022-03-18 RX ADMIN — Medication 10 ML: at 08:08

## 2022-03-18 RX ADMIN — BUDESONIDE 0.5 MG: 0.5 SUSPENSION RESPIRATORY (INHALATION) at 19:28

## 2022-03-18 RX ADMIN — INSULIN LISPRO 6 UNITS: 100 INJECTION, SOLUTION INTRAVENOUS; SUBCUTANEOUS at 12:05

## 2022-03-18 RX ADMIN — CARVEDILOL 12.5 MG: 12.5 TABLET, FILM COATED ORAL at 08:00

## 2022-03-18 RX ADMIN — SENNOSIDES AND DOCUSATE SODIUM 1 TABLET: 50; 8.6 TABLET ORAL at 20:45

## 2022-03-18 RX ADMIN — BENZONATATE 100 MG: 100 CAPSULE ORAL at 07:53

## 2022-03-18 RX ADMIN — GUAIFENESIN AND DEXTROMETHORPHAN 5 ML: 100; 10 SYRUP ORAL at 21:22

## 2022-03-18 RX ADMIN — LEVOTHYROXINE SODIUM 50 MCG: 50 TABLET ORAL at 05:48

## 2022-03-18 RX ADMIN — PRAVASTATIN SODIUM 20 MG: 20 TABLET ORAL at 08:00

## 2022-03-18 RX ADMIN — IPRATROPIUM BROMIDE AND ALBUTEROL SULFATE 3 ML: 2.5; .5 SOLUTION RESPIRATORY (INHALATION) at 19:27

## 2022-03-18 RX ADMIN — ASPIRIN 81 MG: 81 TABLET, COATED ORAL at 08:01

## 2022-03-18 RX ADMIN — GUAIFENESIN 1200 MG: 600 TABLET, EXTENDED RELEASE ORAL at 07:59

## 2022-03-18 RX ADMIN — IPRATROPIUM BROMIDE AND ALBUTEROL SULFATE 3 ML: 2.5; .5 SOLUTION RESPIRATORY (INHALATION) at 08:11

## 2022-03-18 RX ADMIN — Medication 10 ML: at 20:45

## 2022-03-18 RX ADMIN — TORSEMIDE 20 MG: 20 TABLET ORAL at 08:00

## 2022-03-18 RX ADMIN — INSULIN LISPRO 3 UNITS: 100 INJECTION, SOLUTION INTRAVENOUS; SUBCUTANEOUS at 08:04

## 2022-03-18 RX ADMIN — TRAMADOL HYDROCHLORIDE 50 MG: 50 TABLET, COATED ORAL at 09:29

## 2022-03-18 RX ADMIN — BUDESONIDE 0.5 MG: 0.5 SUSPENSION RESPIRATORY (INHALATION) at 08:11

## 2022-03-18 RX ADMIN — PANTOPRAZOLE SODIUM 40 MG: 40 TABLET, DELAYED RELEASE ORAL at 08:00

## 2022-03-18 RX ADMIN — GUAIFENESIN AND DEXTROMETHORPHAN 5 ML: 100; 10 SYRUP ORAL at 12:06

## 2022-03-18 RX ADMIN — BENZONATATE 100 MG: 100 CAPSULE ORAL at 16:27

## 2022-03-18 NOTE — PROGRESS NOTES
CTS Progress Note       LOS: 8 days   Patient Care Team:  Omayra Alcazar DO as PCP - General (Internal Medicine)    Chief Complaint: Acute respiratory failure with hypoxia (HCC)    Vital Signs:  Temp:  [97.8 °F (36.6 °C)-98.7 °F (37.1 °C)] 97.9 °F (36.6 °C)  Heart Rate:  [61-98] 61  Resp:  [17-20] 17  BP: (100-120)/(42-83) 120/54    Physical Exam:  Unchanged     Results:   Results from last 7 days   Lab Units 03/16/22  0635   WBC 10*3/mm3 6.84   HEMOGLOBIN g/dL 8.4*   HEMATOCRIT % 26.1*   PLATELETS 10*3/mm3 246     Results from last 7 days   Lab Units 03/18/22  0510   SODIUM mmol/L 128*   POTASSIUM mmol/L 4.3   CHLORIDE mmol/L 91*   CO2 mmol/L 30.0*   BUN mg/dL 39*   CREATININE mg/dL 1.04*   GLUCOSE mg/dL 235*   CALCIUM mg/dL 8.5           Imaging Results (Last 24 Hours)     Procedure Component Value Units Date/Time    XR Chest 1 View [[id number]] Resulted: 03/18/22 0334     Updated: 03/18/22 0615    XR Chest 1 View [[id number]] Collected: 03/17/22 0741     Updated: 03/17/22 0746    Narrative:      DATE OF EXAM: 3/17/2022 3:18 AM     PROCEDURE: XR CHEST 1 VW-     INDICATIONS: right pleural effusion; R09.02-Hypoxemia; Z12-Iktehij  effusion, not elsewhere classified; D64.9-Anemia, unspecified;  I48.11-Longstanding persistent atrial fibrillation; Z95.0-Presence of  cardiac pacemaker     COMPARISON: 3/16/2022     TECHNIQUE: Single radiographic AP view of the chest was obtained.     FINDINGS:  Right pleural catheter remains in place. No pneumothorax demonstrated.  No costophrenic angle blunting. Cardiomegaly. Persistent strandy opacity  in the left lung base. No new pulmonary abnormality        Impression:         1. No significant pleural effusion or pneumothorax  2. Stable chest demonstrate cardiomegaly and left lower lobe atelectasis     This report was finalized on 3/17/2022 7:43 AM by Yusuf Vallejo.             Assessment      Acute respiratory failure with hypoxia (HCC)    Presence of cardiac pacemaker     Atrial fibrillation (HCC)    Hypothyroidism (acquired)    HTN (hypertension)    HLD (hyperlipidemia)    CHF (congestive heart failure) (HCC)    Anemia    CHAPARRO (acute kidney injury) (HCC)    Acute hypoxemic respiratory failure (HCC)    Hyponatremia    Pleural effusion on right    Pleural drainage has a significantly decreased.  Chest x-ray shows no residual pleural fluid.  We will discontinue pigtail catheter today.  If fluid recurs, we would proceed with pleurodesis.    Plan   Physicians assistant to remove pigtail catheter  Schedule follow-up in my office in 10 to 12 days with a chest x-ray and nurse practitioner visit  We will sign off today after removal of pigtail catheter and follow-up has been scheduled    Please note that portions of this note were completed with a voice recognition program. Efforts were made to edit the dictations, but occasionally words are mistranscribed.    Reggie Arboleda MD  03/18/22  06:41 EDT

## 2022-03-18 NOTE — PROGRESS NOTES
[facility] Medicine Services  PROGRESS NOTE    Patient Name: Syeda Gomez  : 1927  MRN: [id number]    Date of Admission: 3/10/2022  Primary Care Physician: Omayra Alcazar DO    Subjective   Subjective     CC:   cough    HPI:      CT pulled this AM per [facility] and given tramadol x1, patient still with pain not feeling well.  States that her cough is better.       ROS:  Gen- No fevers, chills  CV- No chest pain, palpitations  Resp- +persistent cough, + dyspnea  GI- No N/V/D, abd pain      Objective   Objective     Vital Signs:   Temp:  [97.8 °F (36.6 °C)-98.7 °F (37.1 °C)] 97.9 °F (36.6 °C)  Heart Rate:  [61-98] 98  Resp:  [17-20] 18  BP: (100-120)/(42-83) 120/54  Flow (L/min):  [2] 2     Physical Exam:  Constitutional: No acute distress, awake, alert, laying in bed, looks to feel worse this AM  HENT: NCAT, mucous membranes moist  Respiratory: decreased BS in the bases, CT removed  Cardiovascular: RRR, no murmurs, rubs, or gallops  Gastrointestinal: Positive bowel sounds, soft, nontender, nondistended  Musculoskeletal: No bilateral ankle edema  Psychiatric: Appropriate affect, cooperative  Neurologic: Oriented x 3, strength symmetric in all extremities, Cranial Nerves grossly intact to confrontation, speech clear  Skin: No rashes      Results Reviewed:  LAB RESULTS:      Lab 22  0635 03/15/22  0353 22  0401 22  0822   WBC 6.84 8.59 7.82 6.50   HEMOGLOBIN 8.4* 8.8* 8.7* 9.0*   HEMATOCRIT 26.1* 27.1* 27.9* 28.9*   PLATELETS 246 277 247 267   NEUTROS ABS 6.23  --  6.31  --    IMMATURE GRANS (ABS) 0.07*  --  0.06*  --    LYMPHS ABS 0.29*  --  0.57*  --    MONOS ABS 0.24  --  0.56  --    EOS ABS 0.00  --  0.28  --    MCV 92.6 91.2 93.0 96.0   LDH  --   --   --  161   PROTIME  --   --  14.8* 15.3*         Lab 22  0510 22  0650 22  1325 22  0635 03/15/22  0353 22  0401 22  0342 22  0822   SODIUM 128* 132* 128* 128* 134* 132*    < > 136   POTASSIUM 4.3 5.2 4.9 5.1 4.6 4.0   < > 3.5   CHLORIDE 91* 94* 90* 88* 92* 93*   < > 95*   CO2 30.0* 31.0* 29.0 30.0* 33.0* 32.0*   < > 30.0*   ANION GAP 7.0 7.0 9.0 10.0 9.0 7.0   < > 11.0   BUN 39* 40* 37* 35* 26* 19   < > 26*   CREATININE 1.04* 1.06* 1.03* 1.03* 1.02* 1.06*   < > 1.16*   EGFR 49.6* 48.5* 50.2* 50.2* 50.8* 48.5*   < > 43.5*   GLUCOSE 235* 191* 494* 511* 222* 190*   < > 187*   CALCIUM 8.5 8.8 8.8 8.7 8.6 8.4   < > 8.7   IONIZED CALCIUM 1.22  --   --   --   --   --   --   --    MAGNESIUM 2.1  --   --   --   --  2.0  --   --    PHOSPHORUS 3.3  --   --   --   --  3.0  --   --    HEMOGLOBIN A1C  --   --   --   --   --   --   --  6.20*   TSH  --   --   --   --   --   --   --  11.330*    < > = values in this interval not displayed.         Lab 03/14/22  0401   TOTAL PROTEIN 5.4*   ALBUMIN 3.40*   GLOBULIN 2.0   ALT (SGPT) 13   AST (SGOT) 17   BILIRUBIN 0.3   ALK PHOS 112         Lab 03/14/22  0401 03/11/22  0822   PROBNP 2,358.0*  --    PROTIME 14.8* 15.3*   INR 1.19* 1.25*                 Brief Urine Lab Results  (Last result in the past 365 days)      Color   Clarity   Blood   Leuk Est   Nitrite   Protein   CREAT   Urine HCG        03/11/22 0234 Yellow   Clear   Negative   Small (1+)   Negative   Negative                 Microbiology Results Abnormal     Procedure Component Value - Date/Time    Anaerobic Culture - Pleural Fluid, Pleural Cavity [[id number]] Collected: 03/11/22 1110    Lab Status: Final result Specimen: Pleural Fluid from Pleural Cavity Updated: 03/16/22 0641     Anaerobic Culture No anaerobes isolated at 5 days    Blood Culture - Blood, Arm, Left [[id number]]  (Normal) Collected: 03/10/22 2200    Lab Status: Final result Specimen: Blood from Arm, Left Updated: 03/15/22 2332     Blood Culture No growth at 5 days    Blood Culture - Blood, Arm, Right [[id number]]  (Normal) Collected: 03/10/22 2155    Lab Status: Final result Specimen: Blood from Arm, Right Updated: 03/15/22 2332      Blood Culture No growth at 5 days    Body Fluid Culture - Body Fluid, Pleural Cavity [[id number]] Collected: 03/11/22 1110    Lab Status: Final result Specimen: Body Fluid from Pleural Cavity Updated: 03/14/22 0922     Body Fluid Culture No growth at 3 days     Gram Stain Moderate (3+) WBCs seen      No organisms seen    COVID PRE-OP / PRE-PROCEDURE SCREENING ORDER (NO ISOLATION) - Swab, Nasopharynx [[id number]]  (Normal) Collected: 03/11/22 0829    Lab Status: Final result Specimen: Swab from Nasopharynx Updated: 03/11/22 0831    Narrative:      The following orders were created for panel order COVID PRE-OP / PRE-PROCEDURE SCREENING ORDER (NO ISOLATION) - Swab, Nasopharynx.  Procedure                               Abnormality         Status                     ---------                               -----------         ------                     COVID-19 and FLU A/B PCR...[[id number]]  Normal              Final result                 Please view results for these tests on the individual orders.    COVID-19 and FLU A/B PCR - Swab, Nasopharynx [[id number]]  (Normal) Collected: 03/11/22 0829    Lab Status: Final result Specimen: Swab from Nasopharynx Updated: 03/11/22 0831     COVID19 Not Detected     Influenza A PCR Not Detected     Influenza B PCR Not Detected    Narrative:      Fact sheet for providers: https://www.fda.gov/media/569386/download    Fact sheet for patients: https://www.fda.gov/media/327440/download    Test performed by PCR.    Respiratory Panel PCR w/COVID-19(SARS-CoV-2) VINICIO/RAGINI/YVETTE/PAD/COR/MAD/ASHKAN In-House, NP Swab in UTM/VTM, 3-4 HR TAT - Swab, Nasopharynx [[id number]]  (Normal) Collected: 03/10/22 2330    Lab Status: Final result Specimen: Swab from Nasopharynx Updated: 03/11/22 0321     ADENOVIRUS, PCR Not Detected     Coronavirus 229E Not Detected     Coronavirus HKU1 Not Detected     Coronavirus NL63 Not Detected     Coronavirus OC43 Not Detected     COVID19 Not Detected     Human Metapneumovirus  Not Detected     Human Rhinovirus/Enterovirus Not Detected     Influenza A PCR Not Detected     Influenza B PCR Not Detected     Parainfluenza Virus 1 Not Detected     Parainfluenza Virus 2 Not Detected     Parainfluenza Virus 3 Not Detected     Parainfluenza Virus 4 Not Detected     RSV, PCR Not Detected     Bordetella pertussis pcr Not Detected     Bordetella parapertussis PCR Not Detected     Chlamydophila pneumoniae PCR Not Detected     Mycoplasma pneumo by PCR Not Detected    Narrative:      In the setting of a positive respiratory panel with a viral infection PLUS a negative procalcitonin without other underlying concern for bacterial infection, consider observing off antibiotics or discontinuation of antibiotics and continue supportive care. If the respiratory panel is positive for atypical bacterial infection (Bordetella pertussis, Chlamydophila pneumoniae, or Mycoplasma pneumoniae), consider antibiotic de-escalation to target atypical bacterial infection.          XR Chest 1 View    Result Date: 3/18/2022  DATE OF EXAM: 03/18/2022 5:10 AM  PROCEDURE: XR CHEST 1 VIEW-  INDICATIONS: right pleural effusion; R09.02-Hypoxemia; O21-Mnvtfip effusion, not elsewhere classified; D64.9-Anemia, unspecified; I48.11-Longstanding persistent atrial fibrillation; Z95.0-Presence of cardiac pacemaker  COMPARISON: 03/17/2022  TECHNIQUE: Single radiographic AP view of the chest was obtained.  FINDINGS: Right-sided dual-lead pacemaker is noted. Left-sided Port-A-Cath is seen with tip in the SVC. Right pigtail catheter remains in place. Heart is enlarged. Vasculature appears upper limits of normal in size. Mildly increased interstitial prominence of the lungs on today's study may represent lighter film technique. No lung consolidation, effusion or pneumothorax is seen.      Impression: Slightly increased basilar interstitial markings, which may reflect film technique. No significant new chest disease is suspected. No evidence of  residual right pleural effusion.       XR Chest 1 View    Result Date: 3/17/2022  DATE OF EXAM: 3/17/2022 3:18 AM  PROCEDURE: XR CHEST 1 VW-  INDICATIONS: right pleural effusion; R09.02-Hypoxemia; J13-Hdzmani effusion, not elsewhere classified; D64.9-Anemia, unspecified; I48.11-Longstanding persistent atrial fibrillation; Z95.0-Presence of cardiac pacemaker  COMPARISON: 3/16/2022  TECHNIQUE: Single radiographic AP view of the chest was obtained.  FINDINGS: Right pleural catheter remains in place. No pneumothorax demonstrated. No costophrenic angle blunting. Cardiomegaly. Persistent strandy opacity in the left lung base. No new pulmonary abnormality      Impression:  1. No significant pleural effusion or pneumothorax 2. Stable chest demonstrate cardiomegaly and left lower lobe atelectasis  This report was finalized on 3/17/2022 7:43 AM by Yusuf Vallejo.        Results for orders placed during the hospital encounter of 03/10/22    Adult Transthoracic Echo Complete w/ Color, Spectral and Contrast if necessary per protocol    Interpretation Summary  · Left ventricular ejection fraction appears to be 51 - 55%. Left ventricular systolic function is normal.  · Left ventricular wall thickness is consistent with severe concentric hypertrophy.  · Mildly reduced right ventricular systolic function noted.  · The right ventricular cavity is borderline dilated.  · The right atrial cavity is moderately dilated.  · There is mild calcification of the aortic valve.  · Mild aortic valve regurgitation is present.  · Moderate to severe tricuspid valve regurgitation is present.  · Estimated right ventricular systolic pressure from tricuspid regurgitation is moderately elevated (45-55 mmHg).      I have reviewed the medications:  Scheduled Meds:aspirin, 81 mg, Oral, Daily  benzonatate, 100 mg, Oral, TID  budesonide, 0.5 mg, Nebulization, BID - RT  carvedilol, 12.5 mg, Oral, BID With Meals  docusate sodium, 100 mg, Oral, BID  guaiFENesin,  1,200 mg, Oral, Q12H  insulin lispro, 0-7 Units, Subcutaneous, TID AC  ipratropium-albuterol, 3 mL, Nebulization, 4x Daily - RT  isosorbide mononitrate, 30 mg, Oral, Daily  levothyroxine, 50 mcg, Oral, Daily  pantoprazole, 40 mg, Oral, QAM  pharmacy consult - [facility], , Does not apply, Daily  pravastatin, 20 mg, Oral, Daily  senna-docusate sodium, 1 tablet, Oral, BID  sodium chloride, 10 mL, Intravenous, Q12H  torsemide, 20 mg, Oral, Daily      Continuous Infusions:   PRN Meds:.•  acetaminophen  •  benzocaine-menthol  •  benzonatate  •  dextrose  •  dextrose  •  glucagon (human recombinant)  •  guaiFENesin-dextromethorphan  •  ipratropium-albuterol  •  melatonin  •  ondansetron  •  phenol  •  sodium chloride  •  sodium chloride    Assessment/Plan   Assessment & Plan     Active Hospital Problems    Diagnosis  POA   • **Acute respiratory failure with hypoxia (HCC) [J96.01]  Yes   • CHF (congestive heart failure) (HCC) [I50.9]  Yes   • Anemia [D64.9]  Yes   • CHAPARRO (acute kidney injury) (HCC) [N17.9]  Yes   • Acute hypoxemic respiratory failure (HCC) [J96.01]  Yes   • Hyponatremia [E87.1]  Yes   • Pleural effusion on right [J90]  Yes   • Hypothyroidism (acquired) [E03.9]  Yes   • HTN (hypertension) [I10]  Yes   • HLD (hyperlipidemia) [E78.5]  Yes   • Presence of cardiac pacemaker [Z95.0]  Yes   • Atrial fibrillation (HCC) [I48.91]  Yes      Resolved Hospital Problems   No resolved problems to display.        Brief Hospital Course to date:    Syeda Gomez is a 89 y.o. female with a past medical history of breast cancer s/p left mastectomy 33 years ago, atrial fibrillation, HTN, HLD, CHF s/p PPM, hypothyroidism, and chronic idiopathic iron deficiency anemia that presented to the ED complaining of 1 week of ongoing shortness of air. She states she has also been more fatigued than her baseline. The patient's daughter is at the bedside and states the patient has required multiple iron and blood transfusions due to anemia over the  past year. She states they thought her shortness of air was due to worsened anemia again. The patient was, however, found to have a large pleural effusion in the ED.    This patient's problems and plans were partially entered by my partner and updated as appropriate by me 03/18/22.    Assessment/Plan:    Acute Respiratory Failure with Hypoxia  Large right pleural effusion  Acute on Chronic CHF  Bronchospasm  pHTN  - thoracentesis done 3/11 by Dr. Persaud by US guidance  - echocardiogram shows EF 51-55%, severe LVH, RV borderline dilated, RA moderately dilated, mild calcification of the AV, mild AR, moderate to severe TR, RVSP 45-55mmHg  - continue nebs and muxinex  - cultures no growth to date  - fluid cytology pending  --on 3/15, repeat Chest x-ray yesterday revealed worsening right effusion again.    --Worsening right effusion after thoracentesis 3/11 by Dr. Persaud with IR.  Due to recurrence so quickly, CTS now following and patient now s/p right pigtail cathetor placement per IR on 3/15--1600mL drained immediately when placed, very little drainage the past couple of days so [facility] removed CT today.  She feels rough this AM from CT removal.  Will watch overnight and hope that will not have fluid reaccumulation.  Will repeat CXR in AM    Persistent Cough  --schedule tessaslon perles TID.  continue robitussin DM cough syrup prn    Hyponatremia  --advised to drink plenty of fluids today.  Repeat in AM.  Note is on torsemide, will continue for now    Hyperglycemia  -- exacerbated by steroids today.  Note that glucose was 222 on yesterday's BMP.  added low dose ssi  --HbA1C was 6.20 on 3/11 so only prediabetic.  Improved today but still in the 200's when prediabetic.  Change supplements to low glucose supplements.     Hypotension  - blood pressure improved on less Imdur  --Stable    Anemia:  - appears stable     CHAPARRO:  - improved with diuresis  - holding lisinopril.  Stable     Hyponatremia:  - monitor  - sodium  improved    Atrial fibrillation:  - stable and rate controlled  - aspirin 81 mg daily      HTN/HLD:  - continue statin, imdur     Hypothyroidism:  - free T4 1.31      DVT prophylaxis:  Mechanical DVT prophylaxis orders are present.       AM-PAC 6 Clicks Score (PT): 14 (03/17/22 2000)    Disposition: I expect the patient to be possible d/c home in AM if cxr in AM and pending sodium ok.           CODE STATUS:   Code Status and Medical Interventions:   Ordered at: 03/10/22 6746     Medical Intervention Limits:    NO intubation (DNI)     Level Of Support Discussed With:    Patient     Code Status (Patient has no pulse and is not breathing):    No CPR (Do Not Attempt to Resuscitate)     Medical Interventions (Patient has pulse or is breathing):    Limited Support       Dewayne Villalobos MD  03/18/22

## 2022-03-18 NOTE — PROGRESS NOTES
"                                 [facility] Progress Note      LOS: 8 days   Patient Care Team:  Omayra Alczaar DO as PCP - General (Internal Medicine)    Chief Complaint:    Chief Complaint   Patient presents with   • Shortness of Breath   • Cough       Subjective     Interval History: Quiet night    Patient Complaints: Feels much better after chest catheter for drainage of Right pleural effusion.  Still some congestion and cough      Review of Systems:   A 14 point review of systems was negative except as was stated in the HPI      Objective     Vital Sign Min/Max for last 24 hours  Temp  Min: 97.8 °F (36.6 °C)  Max: 98.7 °F (37.1 °C)   BP  Min: 102/47  Max: 120/54   Pulse  Min: 61  Max: 98   Resp  Min: 17  Max: 20   SpO2  Min: 96 %  Max: 100 %   Flow (L/min)  Min: 2  Max: 2   No data recorded     Flowsheet Rows    Flowsheet Row First Filed Value   Admission Height 160 cm (63\") Documented at 03/10/2022 2019   Admission Weight 63.5 kg (140 lb) Documented at 03/10/2022 2019          Physical Exam:  General Appearance: Alert, appears stated age and cooperative  Lungs: Diffuse bilateral rhonchi/wheeze  Heart:: Irregular with distant heart tones  Abdomen: Soft and nontender with adequate bowel sounds.  No organomegaly  Extremities: No cyanosis, clubbing or edema  Pulses: Pulses palpable and equal bilaterally  Skin: Warm and dry with no rash  Psych: Normal     Results Review:     I reviewed the patient's new clinical results.  Results from last 7 days   Lab Units 03/18/22  0510 03/17/22  0650 03/16/22  1325   SODIUM mmol/L 128* 132* 128*   POTASSIUM mmol/L 4.3 5.2 4.9   CHLORIDE mmol/L 91* 94* 90*   CO2 mmol/L 30.0* 31.0* 29.0   BUN mg/dL 39* 40* 37*   CREATININE mg/dL 1.04* 1.06* 1.03*   GLUCOSE mg/dL 235* 191* 494*   CALCIUM mg/dL 8.5 8.8 8.8     Results from last 7 days   Lab Units 03/16/22  0635 03/15/22  0353 03/14/22  0401   WBC 10*3/mm3 6.84 8.59 7.82   HEMOGLOBIN g/dL 8.4* 8.8* 8.7* "   HEMATOCRIT % 26.1* 27.1* 27.9*   PLATELETS 10*3/mm3 246 277 247     Lab Results   Lab Value Date/Time    TROPONINT 0.025 03/10/2022 2121    TROPONINT 0.012 10/23/2020 2138    TROPONINT 0.011 10/20/2020 1525         Results from last 7 days   Lab Units 03/14/22  0401   INR  1.19*               Medication Review: yes  Current Facility-Administered Medications   Medication Dose Route Frequency Provider Last Rate Last Admin   • acetaminophen (TYLENOL) tablet 650 mg  650 mg Oral Q4H PRN Karlee Billingsley PA-C   650 mg at 03/16/22 2034   • aspirin EC tablet 81 mg  81 mg Oral Daily Félix Hernandez MD   81 mg at 03/18/22 0801   • benzocaine-menthol (CEPACOL) lozenge 1 lozenge  1 lozenge Mouth/Throat TID PRN Fransisca Mathis, APRN       • benzonatate (TESSALON) capsule 100 mg  100 mg Oral Q4H PRN Félix Hernandez MD   100 mg at 03/17/22 1640   • benzonatate (TESSALON) capsule 100 mg  100 mg Oral TID Dewayne Villalobos MD   100 mg at 03/18/22 0753   • budesonide (PULMICORT) nebulizer solution 0.5 mg  0.5 mg Nebulization BID - RT Los Fan MD   0.5 mg at 03/18/22 0811   • carvedilol (COREG) tablet 12.5 mg  12.5 mg Oral BID With Meals Karlee Billingsley PA-C   12.5 mg at 03/18/22 0800   • dextrose (D50W) (25 g/50 mL) IV injection 25 g  25 g Intravenous Q15 Min PRN Dewayne Villalobos MD       • dextrose (GLUTOSE) oral gel 15 g  15 g Oral Q15 Min PRN Dewayne Villalobos MD       • docusate sodium (COLACE) capsule 100 mg  100 mg Oral BID Fransisca Mathis, APRN   100 mg at 03/18/22 0801   • glucagon (human recombinant) (GLUCAGEN DIAGNOSTIC) injection 1 mg  1 mg Intramuscular Q15 Min PRN Dewayne Villalobos MD       • guaiFENesin (MUCINEX) 12 hr tablet 1,200 mg  1,200 mg Oral Q12H Dewayne Villalobos MD   1,200 mg at 03/18/22 0759   • guaiFENesin-dextromethorphan (ROBITUSSIN DM) 100-10 MG/5ML syrup 5 mL  5 mL Oral Q4H PRN Dewayne Villalobos MD   5 mL at 03/18/22 0805   • insulin lispro (humaLOG) injection 0-7 Units  0-7 Units Subcutaneous  TID AC Dewayne Villalobos MD   3 Units at 03/18/22 0804   • ipratropium-albuterol (DUO-NEB) nebulizer solution 3 mL  3 mL Nebulization Q4H PRN Félix Hernandez MD   3 mL at 03/15/22 1027   • ipratropium-albuterol (DUO-NEB) nebulizer solution 3 mL  3 mL Nebulization 4x Daily - RT Sheyla Lacy APRN   3 mL at 03/18/22 0811   • isosorbide mononitrate (IMDUR) 24 hr tablet 30 mg  30 mg Oral Daily Félix Hernandez MD   30 mg at 03/18/22 0800   • levothyroxine (SYNTHROID, LEVOTHROID) tablet 50 mcg  50 mcg Oral Daily Karlee Billingsley PA-C   50 mcg at 03/18/22 0548   • melatonin tablet 10 mg  10 mg Oral Nightly PRN Fransisca Mathis APRN   10 mg at 03/16/22 2030   • ondansetron (ZOFRAN) injection 4 mg  4 mg Intravenous Q6H PRN Karlee Billingsley PA-C   4 mg at 03/13/22 1337   • pantoprazole (PROTONIX) EC tablet 40 mg  40 mg Oral QAM Karlee Billingsley PA-C   40 mg at 03/18/22 0800   • Pharmacy Consult - MTM   Does not apply Daily Aimee Tobias, PharmD       • phenol (CHLORASEPTIC) 1.4 % liquid 1 spray  1 spray Mouth/Throat Q2H PRN Antony Mathisa, APRN       • pravastatin (PRAVACHOL) tablet 20 mg  20 mg Oral Daily Karlee Billingsley PA-C   20 mg at 03/18/22 0800   • sennosides-docusate (PERICOLACE) 8.6-50 MG per tablet 1 tablet  1 tablet Oral BID Dewayne Villalobos MD   1 tablet at 03/18/22 0758   • sodium chloride 0.9 % flush 10 mL  10 mL Intravenous PRN Iván Taylor MD       • sodium chloride 0.9 % flush 10 mL  10 mL Intravenous Q12H Karlee Billingsley PA-C   10 mL at 03/18/22 0808   • sodium chloride 0.9 % flush 10 mL  10 mL Intravenous PRN Karlee Billingsley PA-C       • torsemide (DEMADEX) tablet 20 mg  20 mg Oral Daily Karlee Billingsley PA-C   20 mg at 03/18/22 0800         Acute respiratory failure with hypoxia (HCC)    Presence of cardiac pacemaker    Atrial fibrillation (HCC)    Hypothyroidism (acquired)    HTN (hypertension)    HLD (hyperlipidemia)    CHF (congestive heart failure) (HCC)     Anemia    CHAPARRO (acute kidney injury) (HCC)    Acute hypoxemic respiratory failure (HCC)    Hyponatremia    Pleural effusion on right    Repeat chest x-ray reviewed today    Impression      -Hypoxic respiratory failure  -Right pleural effusion status post ultrasound-guided thoracentesis 3/11/2022  -Reaccumulating large right pleural effusion on chest x-ray 3/15/2022  -Right chest tube/catheter placed 3/15/22  -Right pigtail catheter removed this morning    -Permanent atrial fibrillation  -Sick sinus syndrome with [place] single-chamber pacemaker  -Normal LV systolic function with mild AI by echo this admission  -Hypothyroidism  -Anemia    Plan     Continue to monitor  Continue Coreg, LA nitrate and diuretic  CT follow-up arrangement noted  We will schedule office follow-up with us in 1 month      Chilango King MD   03/18/22  11:51 EDT

## 2022-03-18 NOTE — CASE MANAGEMENT/SOCIAL WORK
Continued Stay Note  [facility]     Patient Name: Syeda Gomez  MRN: [id number]  Today's Date: 3/18/2022    Admit Date: 3/10/2022     Discharge Plan     Row Name 03/18/22 1338       Plan    Plan discharge plan    Plan Comments Pt requiring home O2 arranged and does not have a preference to DME company. Pt does want a small portable concentrator. Referral made to [facility] and they will retrieve order, clinical information and qualifying sao2 from [facility]. Plan remains home with family to assist. CM will cont to follow.    Final Discharge Disposition Code 01 - home or self-care               Discharge Codes    No documentation.               Expected Discharge Date and Time     Expected Discharge Date Expected Discharge Time    Mar 19, 2022             Kim Phillips RN

## 2022-03-19 ENCOUNTER — READMISSION MANAGEMENT (OUTPATIENT)
Dept: CALL CENTER | Facility: HOSPITAL | Age: 87
End: 2022-03-19

## 2022-03-19 ENCOUNTER — APPOINTMENT (OUTPATIENT)
Dept: GENERAL RADIOLOGY | Facility: HOSPITAL | Age: 87
End: 2022-03-19

## 2022-03-19 VITALS
HEART RATE: 83 BPM | TEMPERATURE: 97.8 F | RESPIRATION RATE: 24 BRPM | OXYGEN SATURATION: 97 % | BODY MASS INDEX: 27.46 KG/M2 | WEIGHT: 155 LBS | HEIGHT: 63 IN | SYSTOLIC BLOOD PRESSURE: 115 MMHG | DIASTOLIC BLOOD PRESSURE: 68 MMHG

## 2022-03-19 PROBLEM — J96.01 ACUTE HYPOXEMIC RESPIRATORY FAILURE (HCC): Status: RESOLVED | Noted: 2022-03-10 | Resolved: 2022-03-19

## 2022-03-19 PROBLEM — J90 PLEURAL EFFUSION ON RIGHT: Status: RESOLVED | Noted: 2022-03-10 | Resolved: 2022-03-19

## 2022-03-19 PROBLEM — J96.01 ACUTE RESPIRATORY FAILURE WITH HYPOXIA (HCC): Status: RESOLVED | Noted: 2020-10-25 | Resolved: 2022-03-19

## 2022-03-19 PROBLEM — E87.1 HYPONATREMIA: Status: RESOLVED | Noted: 2022-03-10 | Resolved: 2022-03-19

## 2022-03-19 PROBLEM — N17.9 AKI (ACUTE KIDNEY INJURY) (HCC): Status: RESOLVED | Noted: 2022-03-10 | Resolved: 2022-03-19

## 2022-03-19 LAB
ANION GAP SERPL CALCULATED.3IONS-SCNC: 9 MMOL/L (ref 5–15)
BUN SERPL-MCNC: 28 MG/DL (ref 8–23)
BUN/CREAT SERPL: 29.5 (ref 7–25)
CALCIUM SPEC-SCNC: 8.7 MG/DL (ref 8.2–9.6)
CHLORIDE SERPL-SCNC: 90 MMOL/L (ref 98–107)
CO2 SERPL-SCNC: 29 MMOL/L (ref 22–29)
CREAT SERPL-MCNC: 0.95 MG/DL (ref 0.57–1)
EGFRCR SERPLBLD CKD-EPI 2021: 55.3 ML/MIN/1.73
GLUCOSE BLDC GLUCOMTR-MCNC: 230 MG/DL (ref 70–130)
GLUCOSE BLDC GLUCOMTR-MCNC: 241 MG/DL (ref 70–130)
GLUCOSE SERPL-MCNC: 198 MG/DL (ref 65–99)
POTASSIUM SERPL-SCNC: 4.2 MMOL/L (ref 3.5–5.2)
SODIUM SERPL-SCNC: 128 MMOL/L (ref 136–145)

## 2022-03-19 PROCEDURE — 80048 BASIC METABOLIC PNL TOTAL CA: CPT | Performed by: INTERNAL MEDICINE

## 2022-03-19 PROCEDURE — 82962 GLUCOSE BLOOD TEST: CPT

## 2022-03-19 PROCEDURE — 94799 UNLISTED PULMONARY SVC/PX: CPT

## 2022-03-19 PROCEDURE — 25010000002 HEPARIN LOCK FLUSH PER 10 UNITS: Performed by: FAMILY MEDICINE

## 2022-03-19 PROCEDURE — 99239 HOSP IP/OBS DSCHRG MGMT >30: CPT | Performed by: FAMILY MEDICINE

## 2022-03-19 PROCEDURE — 71045 X-RAY EXAM CHEST 1 VIEW: CPT

## 2022-03-19 PROCEDURE — 63710000001 INSULIN LISPRO (HUMAN) PER 5 UNITS: Performed by: INTERNAL MEDICINE

## 2022-03-19 RX ORDER — ISOSORBIDE MONONITRATE 30 MG/1
30 TABLET, EXTENDED RELEASE ORAL DAILY
Qty: 30 TABLET | Refills: 1 | Status: SHIPPED | OUTPATIENT
Start: 2022-03-20

## 2022-03-19 RX ORDER — HEPARIN SODIUM (PORCINE) LOCK FLUSH IV SOLN 100 UNIT/ML 100 UNIT/ML
5 SOLUTION INTRAVENOUS AS NEEDED
Status: DISCONTINUED | OUTPATIENT
Start: 2022-03-19 | End: 2022-03-19 | Stop reason: HOSPADM

## 2022-03-19 RX ORDER — MAG HYDROX/ALUMINUM HYD/SIMETH 400-400-40
1 SUSPENSION, ORAL (FINAL DOSE FORM) ORAL ONCE
Status: COMPLETED | OUTPATIENT
Start: 2022-03-19 | End: 2022-03-19

## 2022-03-19 RX ADMIN — PANTOPRAZOLE SODIUM 40 MG: 40 TABLET, DELAYED RELEASE ORAL at 08:26

## 2022-03-19 RX ADMIN — PRAVASTATIN SODIUM 20 MG: 20 TABLET ORAL at 08:28

## 2022-03-19 RX ADMIN — INSULIN LISPRO 3 UNITS: 100 INJECTION, SOLUTION INTRAVENOUS; SUBCUTANEOUS at 08:26

## 2022-03-19 RX ADMIN — SENNOSIDES AND DOCUSATE SODIUM 1 TABLET: 50; 8.6 TABLET ORAL at 08:28

## 2022-03-19 RX ADMIN — GLYCERIN 1 SUPPOSITORY: 2 SUPPOSITORY RECTAL at 10:39

## 2022-03-19 RX ADMIN — ISOSORBIDE MONONITRATE 30 MG: 30 TABLET, EXTENDED RELEASE ORAL at 08:28

## 2022-03-19 RX ADMIN — CARVEDILOL 12.5 MG: 12.5 TABLET, FILM COATED ORAL at 08:27

## 2022-03-19 RX ADMIN — Medication 10 ML: at 08:31

## 2022-03-19 RX ADMIN — IPRATROPIUM BROMIDE AND ALBUTEROL SULFATE 3 ML: 2.5; .5 SOLUTION RESPIRATORY (INHALATION) at 07:11

## 2022-03-19 RX ADMIN — GUAIFENESIN 1200 MG: 600 TABLET, EXTENDED RELEASE ORAL at 08:28

## 2022-03-19 RX ADMIN — GUAIFENESIN AND DEXTROMETHORPHAN 5 ML: 100; 10 SYRUP ORAL at 05:09

## 2022-03-19 RX ADMIN — ASPIRIN 81 MG: 81 TABLET, COATED ORAL at 08:28

## 2022-03-19 RX ADMIN — BUDESONIDE 0.5 MG: 0.5 SUSPENSION RESPIRATORY (INHALATION) at 07:12

## 2022-03-19 RX ADMIN — TORSEMIDE 20 MG: 20 TABLET ORAL at 08:28

## 2022-03-19 RX ADMIN — DOCUSATE SODIUM 100 MG: 100 CAPSULE, LIQUID FILLED ORAL at 08:28

## 2022-03-19 RX ADMIN — LEVOTHYROXINE SODIUM 50 MCG: 50 TABLET ORAL at 05:09

## 2022-03-19 RX ADMIN — BENZONATATE 100 MG: 100 CAPSULE ORAL at 08:28

## 2022-03-19 RX ADMIN — IPRATROPIUM BROMIDE AND ALBUTEROL SULFATE 3 ML: 2.5; .5 SOLUTION RESPIRATORY (INHALATION) at 12:38

## 2022-03-19 RX ADMIN — INSULIN LISPRO 3 UNITS: 100 INJECTION, SOLUTION INTRAVENOUS; SUBCUTANEOUS at 12:10

## 2022-03-19 RX ADMIN — Medication 500 UNITS: at 13:38

## 2022-03-19 NOTE — NURSING NOTE
Discharge education and teaching done with patient and daughter at bedside. Follow up appointments discussed and confirmed. Discussed medications but there were no major changes. Daughter verbalized understanding as she takes care of patient's medications. Dressing changed performed to chest tube site with daughter observing. Discussed chest tube site care. Daughter verbalized understanding. Port was flushed with 5ml of heparin before deaccessing.  All belongings accounted for. No further questions or concerns verbalized at this time.

## 2022-03-20 NOTE — OUTREACH NOTE
Prep Survey    Flowsheet Row Responses   [facility] facility patient discharged from? [facility]   Is LACE score < 7 ? No   Emergency Room discharge w/ pulse ox? No   Eligibility Readm Mgmt   Discharge diagnosis Acute respiratory failure with hypoxia   Does the patient have one of the following disease processes/diagnoses(primary or secondary)? CHF   Does the patient have Home health ordered? No   Is there a DME ordered? Yes   What DME was ordered? Oxygen   Comments regarding appointments see AVS   Medication alerts for this patient CHANGE - Imdur,  STOP - Linsinopril   Prep survey completed? Yes          EVITA PACHECO - Registered Nurse

## 2022-03-24 ENCOUNTER — READMISSION MANAGEMENT (OUTPATIENT)
Dept: CALL CENTER | Facility: HOSPITAL | Age: 87
End: 2022-03-24

## 2022-03-24 NOTE — DISCHARGE SUMMARY
[facility] Medicine Services  DISCHARGE SUMMARY    Patient Name: Syeda Gomez  : 1927  MRN: [id number]    Date of Admission: 3/10/2022  8:21 PM  Date of Discharge:  3/19/22  Primary Care Physician: Omayra Alcazar DO    Consults     Date and Time Order Name Status Description    3/15/2022  1:40 PM Inpatient Cardiothoracic Surgery Consult Completed     3/11/2022 12:05 PM Inpatient Cardiology Consult Completed           Hospital Course     Presenting Problem:   Acute hypoxemic respiratory failure (HCC) [J96.01]    Active Hospital Problems    Diagnosis  POA   • CHF (congestive heart failure) (HCC) [I50.9]  Yes   • Anemia [D64.9]  Yes   • Hypothyroidism (acquired) [E03.9]  Yes   • HTN (hypertension) [I10]  Yes   • HLD (hyperlipidemia) [E78.5]  Yes   • Presence of cardiac pacemaker [Z95.0]  Yes   • Atrial fibrillation (HCC) [I48.91]  Yes      Resolved Hospital Problems    Diagnosis Date Resolved POA   • **Acute respiratory failure with hypoxia (HCC) [J96.01] 2022 Yes   • CHAPARRO (acute kidney injury) (HCC) [N17.9] 2022 Yes   • Acute hypoxemic respiratory failure (HCC) [J96.01] 2022 Yes   • Hyponatremia [E87.1] 2022 Yes   • Pleural effusion on right [J90] 2022 Yes          Hospital Course:  Syeda Gomez is a 89 y.o. female  with a past medical history of breast cancer s/p left mastectomy 33 years ago, atrial fibrillation, HTN, HLD, CHF s/p PPM, hypothyroidism, and chronic idiopathic iron deficiency anemia that presented to the ED complaining of 1 week of ongoing shortness of air. The patient was found to have a large pleural effusion in the ED.    Acute Respiratory Failure with Hypoxia  Large right pleural effusion  Acute on Chronic CHF  Bronchospasm  pHTN  - thoracentesis done 3/11 by Dr. Persaud by US guidance  - echocardiogram shows EF 51-55%, severe LVH, RV borderline dilated, RA moderately dilated, mild calcification of the AV, mild AR, moderate to  severe TR, RVSP 45-55mmHg    --Worsening right effusion after thoracentesis 3/11 by Dr. Persaud with IR.  Due to recurrence so quickly, CTS now following and patient now s/p right pigtail cathetor placement per IR on 3/15--1600mL drained immediately when placed, very little drainage the past couple of days so CTS removed.   -repeat CXR morning of discharge showed no effusion, follow up with CTS        Hyponatremia  --resolved      Hyperglycemia  -- exacerbated by steroids  --HbA1C was 6.20 on 3/11 .        Anemia:  -  stable     CHAPARRO:  - improved with diuresis      Atrial fibrillation:  - stable and rate controlled  - aspirin 81 mg daily      HTN/HLD:  - continue statin, imdur     Hypothyroidism:  - free T4 1.31    Discharge Follow Up Recommendations for outpatient labs/diagnostics:   PCP  CTS    Day of Discharge     HPI:   Pt without pain or shortness of breath or pain     Review of Systems  Gen- No fevers, chills  CV- No chest pain, palpitations  Resp- No cough, dyspnea  GI- No N/V/D, abd pain        Vital Signs:          Physical Exam:  Constitutional: No acute distress, awake, alert  HENT: NCAT, mucous membranes moist  Respiratory: Clear to auscultation bilaterally, respiratory effort normal   Cardiovascular: RRR, no murmurs, rubs, or gallops  Gastrointestinal: Positive bowel sounds, soft, nontender, nondistended  Musculoskeletal: No bilateral ankle edema  Psychiatric: Appropriate affect, cooperative  Neurologic: Oriented x 3, speech clear  Skin: No rashes      Pertinent  and/or Most Recent Results     LAB RESULTS:          Lab 03/19/22  0758 03/18/22  0510   SODIUM 128* 128*   POTASSIUM 4.2 4.3   CHLORIDE 90* 91*   CO2 29.0 30.0*   ANION GAP 9.0 7.0   BUN 28* 39*   CREATININE 0.95 1.04*   EGFR 55.3* 49.6*   GLUCOSE 198* 235*   CALCIUM 8.7 8.5   IONIZED CALCIUM  --  1.22   MAGNESIUM  --  2.1   PHOSPHORUS  --  3.3                         Brief Urine Lab Results  (Last result in the past 365 days)      Color    Clarity   Blood   Leuk Est   Nitrite   Protein   CREAT   Urine HCG        03/11/22 0234 Yellow   Clear   Negative   Small (1+)   Negative   Negative               Microbiology Results (last 10 days)     ** No results found for the last 240 hours. **          XR Chest 1 View    Result Date: 3/19/2022  DATE OF EXAM: 3/19/2022 5:31 AM  PROCEDURE: XR CHEST 1 VW-  INDICATIONS: following pleural effusion, s/p CT removal; R09.02-Hypoxemia; Z21-Mslvxkg effusion, not elsewhere classified; D64.9-Anemia, unspecified; I48.11-Longstanding persistent atrial fibrillation; Z95.0-Presence of cardiac pacemaker; I50.9-Heart failure, unspecified  COMPARISON: 3/18/2022  TECHNIQUE: Single radiographic AP view of the chest was obtained.  FINDINGS: The right pleural pigtail drain has been removed. Otherwise stable medical support devices. Similar diffuse interstitial prominence that evidence of focal consolidation are new chest disease. No significant pleural effusion. No evidence of pneumothorax.      Removal of right pleural pigtail drain without evidence of pneumothorax or significant interval change otherwise.  This report was finalized on 3/19/2022 7:52 AM by Salomón Paz MD.      XR Chest 1 View    Result Date: 3/18/2022  DATE OF EXAM: 03/18/2022 5:10 AM  PROCEDURE: XR CHEST 1 VIEW-  INDICATIONS: right pleural effusion; R09.02-Hypoxemia; X77-Hmckfak effusion, not elsewhere classified; D64.9-Anemia, unspecified; I48.11-Longstanding persistent atrial fibrillation; Z95.0-Presence of cardiac pacemaker  COMPARISON: 03/17/2022  TECHNIQUE: Single radiographic AP view of the chest was obtained.  FINDINGS: Right-sided dual-lead pacemaker is noted. Left-sided Port-A-Cath is seen with tip in the SVC. Right pigtail catheter remains in place. Heart is enlarged. Vasculature appears upper limits of normal in size. Mildly increased interstitial prominence of the lungs on today's study may represent lighter film technique. No lung consolidation,  effusion or pneumothorax is seen.      Slightly increased basilar interstitial markings, which may reflect film technique. No significant new chest disease is suspected. No evidence of residual right pleural effusion.  This report was finalized on 3/18/2022 10:45 AM by Dr. Devonte Darnell MD.      XR Chest 1 View    Result Date: 3/17/2022  DATE OF EXAM: 3/17/2022 3:18 AM  PROCEDURE: XR CHEST 1 VW-  INDICATIONS: right pleural effusion; R09.02-Hypoxemia; U03-Pdvunnc effusion, not elsewhere classified; D64.9-Anemia, unspecified; I48.11-Longstanding persistent atrial fibrillation; Z95.0-Presence of cardiac pacemaker  COMPARISON: 3/16/2022  TECHNIQUE: Single radiographic AP view of the chest was obtained.  FINDINGS: Right pleural catheter remains in place. No pneumothorax demonstrated. No costophrenic angle blunting. Cardiomegaly. Persistent strandy opacity in the left lung base. No new pulmonary abnormality       1. No significant pleural effusion or pneumothorax 2. Stable chest demonstrate cardiomegaly and left lower lobe atelectasis  This report was finalized on 3/17/2022 7:43 AM by Yusuf Vallejo.      XR Chest 1 View    Result Date: 3/16/2022  DATE OF EXAM: 3/16/2022 3:43 AM  PROCEDURE: XR CHEST 1 VW-  INDICATIONS: right pleural effusion; R09.02-Hypoxemia; Q05-Utjiycx effusion, not elsewhere classified; D64.9-Anemia, unspecified; I48.11-Longstanding persistent atrial fibrillation; Z95.0-Presence of cardiac pacemaker  COMPARISON: 3/15/2022  TECHNIQUE: Single radiographic AP view of the chest was obtained.  FINDINGS: Interval placement of a right pleural catheter in the lower thorax. Interval resolution of right pleural effusion. No pneumothorax. Cardiomegaly. Pulmonary vasculature appears within normal limits. Strandy opacity in the retrocardiac left lower lobe       1. Interval resolution of right pleural effusion status post pleural catheter placement 2. Cardiomegaly with left lower lobe atelectasis  This report was  finalized on 3/16/2022 8:23 AM by Yusuf Vallejo.      XR Chest 1 View    Result Date: 3/15/2022  DATE OF EXAM: 3/15/2022 12:09 PM  PROCEDURE: XR CHEST 1 VW-  INDICATIONS: increased soa/cough; R09.02-Hypoxemia; X26-Jbxqltw effusion, not elsewhere classified; D64.9-Anemia, unspecified; I48.11-Longstanding persistent atrial fibrillation; Z95.0-Presence of cardiac pacemaker  COMPARISON: March 14, 2022  TECHNIQUE: Single radiographic AP view of the chest was obtained.  FINDINGS: A port catheter is noted with its tip in the superior vena cava. The heart is enlarged. Cardiac pacemaker device is present. There remains a right pleural effusion and possibly atelectasis or consolidation in the right lower lobe. There is slight prominence of interstitial markings. Some edema cannot be excluded. There is a curvature to the thoracolumbar spine.      1.  Right pleural effusion. Some atelectasis or consolidation in this area not excluded. This has been noted. 2.  Prominence of interstitial markings that may be reflective of edema. 3.  Cardiomegaly.  This report was finalized on 3/15/2022 12:24 PM by Ariel Burton MD.      US Tube Placement    Result Date: 3/22/2022  DATE OF EXAM: 3/15/2022 5:26 PM  PROCEDURE: US TUBE PLACEMENT-  INDICATIONS: right pleural effusion; R09.02-Hypoxemia; X32-Ljksolz effusion, not elsewhere classified; D64.9-Anemia, unspecified; I48.11-Longstanding persistent atrial fibrillation; Z95.0-Presence of cardiac pacemaker  COMPARISON: No comparisons available.  TECHNIQUE: After informed consent was obtained a timeout was performed. The interventional radiology nurse monitored the patient all times. The patient was placed upright in the bed and the right chest was ultrasounded, demonstrating a large right effusion. Appropriate access site was selected and the area was prepped and draped using maximal sterile barrier technique. The skin was anesthetized with 1% lidocaine and a small skin incision was made. Next a  4 Swazi centesis needle was advanced into the right pleural space. A guidewire was manipulated through the catheter and the tract was dilated over the wire to accommodate an 8 Swazi drain which was pigtail loop within the collection. The catheter was secured to skin with Prolene. The catheter was hooked to 20 cm of H2O suction. The patient tolerated procedure well without complication.  FINDINGS: See above.       1. Successful placement of right base chest tube.  This report was finalized on 3/22/2022 7:51 AM by Tyler Kuo MD.      XR Chest PA & Lateral    Result Date: 3/14/2022  DATE OF EXAM: 3/14/2022 11:21 AM  PROCEDURE: XR CHEST PA AND LATERAL-  INDICATIONS: Cough, dyspnea, pleural effusion; R09.02-Hypoxemia; N56-Nmrwsve effusion, not elsewhere classified; D64.9-Anemia, unspecified; I48.11-Longstanding persistent atrial fibrillation; Z95.0-Presence of cardiac pacemaker  COMPARISON: 3/13/2022  TECHNIQUE: Two radiologic views of the chest , PA and lateral were obtained.  FINDINGS: Left-sided Port-A-Cath and right-sided dual-lead pacemaker are again noted. The heart remains enlarged and the vasculature is mildly cephalized. Mild diffuse interstitial lung changes, perhaps early interstitial edema, appear similar to prior study. Right basilar opacity appears stable or minimally increased, apparently a combination of atelectasis and effusion. Effusion is apparently reaccumulating after the patient's 3/11/2022 thoracentesis. No pneumothorax is seen.      Reaccumulating large right pleural effusion and associated atelectasis, with mild interval worsening compared to yesterday's study. Stable pulmonary vascular congestion elsewhere.  This report was finalized on 3/14/2022 12:41 PM by Dr. Devonte Darnell MD.                Results for orders placed during the hospital encounter of 03/10/22    Adult Transthoracic Echo Complete w/ Color, Spectral and Contrast if necessary per protocol    Interpretation Summary  · Left  ventricular ejection fraction appears to be 51 - 55%. Left ventricular systolic function is normal.  · Left ventricular wall thickness is consistent with severe concentric hypertrophy.  · Mildly reduced right ventricular systolic function noted.  · The right ventricular cavity is borderline dilated.  · The right atrial cavity is moderately dilated.  · There is mild calcification of the aortic valve.  · Mild aortic valve regurgitation is present.  · Moderate to severe tricuspid valve regurgitation is present.  · Estimated right ventricular systolic pressure from tricuspid regurgitation is moderately elevated (45-55 mmHg).      Plan for Follow-up of Pending Labs/Results:     Discharge Details        Discharge Medications      Changes to Medications      Instructions Start Date   isosorbide mononitrate 30 MG 24 hr tablet  Commonly known as: IMDUR  What changed:   · medication strength  · how much to take   30 mg, Oral, Daily         Continue These Medications      Instructions Start Date   acetaminophen 650 MG 8 hr tablet  Commonly known as: TYLENOL   650 mg, Oral, Every 8 Hours PRN      albuterol (2.5 MG/3ML) 0.083% nebulizer solution  Commonly known as: PROVENTIL   2.5 mg, Nebulization, 2 Times Daily      aspirin 81 MG EC tablet   81 mg, Oral, Daily      carvedilol 12.5 MG tablet  Commonly known as: COREG   12.5 mg, Oral, 2 Times Daily With Meals      clonazePAM 0.5 MG tablet  Commonly known as: KlonoPIN   0.25-0.5 mg, Oral, Nightly PRN      docusate sodium 100 MG capsule  Commonly known as: COLACE   200 mg, Oral, Nightly      levothyroxine 50 MCG tablet  Commonly known as: SYNTHROID, LEVOTHROID   50 mcg, Oral, Daily      loratadine 10 MG tablet  Commonly known as: CLARITIN   10 mg, Oral, Daily      magnesium oxide 400 (241.3 Mg) MG tablet tablet  Commonly known as: MAGOX   400 mg, Oral, Daily      nitroglycerin 0.4 MG SL tablet  Commonly known as: NITROSTAT   0.4 mg, Sublingual, Every 5 Minutes PRN, Take no more  "than 3 doses in 15 minutes.       omeprazole 20 MG capsule  Commonly known as: priLOSEC   20 mg, Oral, Daily      ondansetron 8 MG tablet  Commonly known as: ZOFRAN   8 mg, Oral, Every 6 Hours PRN      pravastatin 20 MG tablet  Commonly known as: PRAVACHOL   20 mg, Oral, Nightly      torsemide 20 MG tablet  Commonly known as: DEMADEX   20 mg, Oral, 2 Times Daily         Stop These Medications    lisinopril 10 MG tablet  Commonly known as: PRINIVIL,ZESTRIL            Allergies   Allergen Reactions   • Cardizem [Diltiazem] Other (See Comments)     \"unknown\"   • Codeine GI Intolerance   • Morphine Other (See Comments)     \"unknown\"   • Percocet [Oxycodone-Acetaminophen] Other (See Comments)     \"unknown\"     • Sulfa Antibiotics Unknown (See Comments)     Unknown           Discharge Disposition:  Home or Self Care    Diet:  Hospital:No active diet order      Activity:  Activity Instructions     Activity as Tolerated            Restrictions or Other Recommendations:         CODE STATUS:    Code Status and Medical Interventions:   Ordered at: 03/10/22 5486     Medical Intervention Limits:    NO intubation (DNI)     Level Of Support Discussed With:    Patient     Code Status (Patient has no pulse and is not breathing):    No CPR (Do Not Attempt to Resuscitate)     Medical Interventions (Patient has pulse or is breathing):    Limited Support       Future Appointments   Date Time Provider Department Center   3/28/2022  9:00 AM Karyn Mason APRN [facility]       Additional Instructions for the Follow-ups that You Need to Schedule     Discharge Follow-up with PCP   As directed       Currently Documented PCP:    Omayra Alcazar DO    PCP Phone Number:    [phone]     Follow Up Details: 1-2 weeks         Discharge Follow-up with Specialty: Cardiothoracic Surgery; 2 Weeks   As directed      Specialty: Cardiothoracic Surgery    Follow Up: 2 Weeks    Follow Up Details: Follow-up in 10 to 12 days in office " with x-ray with nurse practitioner         Discharge Follow-up with Specified Provider: Cardiology; 1 Month   As directed      To: Cardiology    Follow Up: 1 Month                     Mary Romero DO  03/24/22      Time Spent on Discharge:  I spent  40   minutes on this discharge activity which included: face-to-face encounter with the patient, reviewing the data in the system, coordination of the care with the nursing staff as well as consultants, documentation, and entering orders.

## 2022-03-28 ENCOUNTER — OFFICE VISIT (OUTPATIENT)
Dept: CARDIAC SURGERY | Facility: CLINIC | Age: 87
End: 2022-03-28

## 2022-03-28 VITALS
TEMPERATURE: 97.8 F | DIASTOLIC BLOOD PRESSURE: 72 MMHG | HEIGHT: 63 IN | HEART RATE: 93 BPM | OXYGEN SATURATION: 95 % | SYSTOLIC BLOOD PRESSURE: 112 MMHG | WEIGHT: 140 LBS | BODY MASS INDEX: 24.8 KG/M2

## 2022-03-28 DIAGNOSIS — J90 RECURRENT RIGHT PLEURAL EFFUSION: Primary | ICD-10-CM

## 2022-03-28 PROCEDURE — 71045 X-RAY EXAM CHEST 1 VIEW: CPT | Performed by: NURSE PRACTITIONER

## 2022-03-28 PROCEDURE — 99214 OFFICE O/P EST MOD 30 MIN: CPT | Performed by: NURSE PRACTITIONER

## 2022-03-28 NOTE — PROGRESS NOTES
[facility] Cardiothoracic Surgery Office Follow Up Note     Date of Encounter: 2022     Name: Syeda Gomez  : 1927     Referred By: No ref. provider found  PCP: Omayra Alcazar DO    Chief Complaint:    Chief Complaint   Patient presents with   • Hospital Follow Up Visit     Hosp follow up for pleural effusion- Pt states  that she does feel some better but is still very fatigued and weak. Some lower back tenderness, bilateral lower leg and feet swelling. Dizzy and from to time.        Subjective      History of Present Illness:    Syeda Gomez is a very pleasant 89 y.o. female non-smoker with history of breast cancer s/p mastectomy, HTN, HLD on statin therapy, a.fib, CHF (EF 51-55%), PPM, Covid-, and recurrent right pleural effusion with inpatient consult by Dr Arboleda 3/15/2022.  Patient underwent thoracentesis 3/11 with 1.6 L with recurrence and subsequent IR placed pigtail catheter. This was removed 3/18 with repeat CXR showing no recurrent effusion and she was discharged 3/19 with no readmissions.  She presents today in clinic in follow-up for repeat CXR.  She says she initially felt better, cough had resolved, but this morning when getting dressed she required her supplemental oxygen and arrives today with 2L per nasal cannula.  She does report some right posterior chest wall discomfort that was similar to her presentation prior to undergoing thoracentesis.  She denies orthopnea but reports that she always reclines on a couple pillows, has not required more pillows.  No fever, chills, or body aches.    Review of Systems:  Review of Systems   Constitutional: Positive for malaise/fatigue. Negative for chills, decreased appetite, diaphoresis, fever, night sweats, weight gain and weight loss.   HENT: Positive for hearing loss. Negative for hoarse voice.    Eyes: Negative for blurred vision, double vision and visual disturbance.   Cardiovascular: Positive for dyspnea on exertion  "and leg swelling. Negative for chest pain, claudication, irregular heartbeat, near-syncope, orthopnea, palpitations, paroxysmal nocturnal dyspnea and syncope.   Respiratory: Positive for shortness of breath. Negative for cough, hemoptysis, sputum production and wheezing.    Hematologic/Lymphatic: Positive for bleeding problem. Negative for adenopathy. Bruises/bleeds easily.   Skin: Negative for color change, nail changes, poor wound healing and rash.   Musculoskeletal: Positive for back pain, muscle cramps and muscle weakness. Negative for falls.   Gastrointestinal: Positive for bloating, abdominal pain, constipation and nausea. Negative for dysphagia and heartburn.   Genitourinary: Positive for frequency. Negative for flank pain.   Neurological: Positive for dizziness and loss of balance (unsteady when standing to long). Negative for brief paralysis, disturbances in coordination, focal weakness, headaches, light-headedness, numbness, paresthesias, sensory change, vertigo and weakness.   Psychiatric/Behavioral: Negative for depression and suicidal ideas.   Allergic/Immunologic: Negative for persistent infections.       I have reviewed the following portions of the patient's history: allergies, current medications, past family history, past medical history, past social history, past surgical history and problem list and confirm it's accurate.    Allergies:  Allergies   Allergen Reactions   • Cardizem [Diltiazem] Other (See Comments)     \"unknown\"   • Codeine GI Intolerance   • Morphine Other (See Comments)     \"unknown\"   • Percocet [Oxycodone-Acetaminophen] Other (See Comments)     \"unknown\"     • Sulfa Antibiotics Unknown (See Comments)     Unknown         Medications:      Current Outpatient Medications:   •  acetaminophen (TYLENOL) 650 MG 8 hr tablet, Take 650 mg by mouth Every 8 (Eight) Hours As Needed for Mild Pain ., Disp: , Rfl:   •  albuterol (PROVENTIL) (2.5 MG/3ML) 0.083% nebulizer solution, Take 2.5 mg by " nebulization 2 (Two) Times a Day., Disp: , Rfl:   •  aspirin 81 MG EC tablet, Take 81 mg by mouth Daily., Disp: , Rfl:   •  carvedilol (COREG) 12.5 MG tablet, Take 12.5 mg by mouth 2 (Two) Times a Day With Meals., Disp: , Rfl:   •  clonazePAM (KlonoPIN) 0.5 MG tablet, Take 0.25-0.5 mg by mouth At Night As Needed., Disp: , Rfl:   •  docusate sodium (COLACE) 100 MG capsule, Take 200 mg by mouth Every Night., Disp: , Rfl:   •  isosorbide mononitrate (IMDUR) 30 MG 24 hr tablet, Take 1 tablet by mouth Daily., Disp: 30 tablet, Rfl: 1  •  levothyroxine (SYNTHROID, LEVOTHROID) 50 MCG tablet, Take 50 mcg by mouth Daily., Disp: , Rfl:   •  loratadine (CLARITIN) 10 MG tablet, Take 10 mg by mouth Daily., Disp: , Rfl:   •  magnesium oxide (MAGOX) 400 (241.3 Mg) MG tablet tablet, Take 400 mg by mouth Daily., Disp: , Rfl:   •  nitroglycerin (NITROSTAT) 0.4 MG SL tablet, Place 0.4 mg under the tongue Every 5 (Five) Minutes As Needed for Chest Pain. Take no more than 3 doses in 15 minutes., Disp: , Rfl:   •  omeprazole (priLOSEC) 20 MG capsule, Take 20 mg by mouth Daily., Disp: , Rfl:   •  ondansetron (ZOFRAN) 8 MG tablet, Take 8 mg by mouth Every 6 (Six) Hours As Needed for Nausea or Vomiting., Disp: , Rfl:   •  pravastatin (PRAVACHOL) 20 MG tablet, Take 20 mg by mouth Every Night., Disp: , Rfl:   •  torsemide (DEMADEX) 20 MG tablet, Take 20 mg by mouth 2 (Two) Times a Day., Disp: , Rfl:     History:   Past Medical History:   Diagnosis Date   • A-fib (HCC)    • Cancer (HCC)    • Carpal tunnel syndrome    • CHF (congestive heart failure) (HCC)    • Disease of thyroid gland    • Hypertension        Past Surgical History:   Procedure Laterality Date   • APPENDECTOMY     • CARDIAC ELECTROPHYSIOLOGY PROCEDURE N/A 6/25/2021    Procedure: DEVICE IMPLANT;  Surgeon: Calvin Ventura MD;  Location: [facility] INVASIVE LOCATION;  Service: Cardiovascular;  Laterality: N/A;   • CATARACT EXTRACTION, BILATERAL     • CHOLECYSTECTOMY     •  "HYSTERECTOMY     • MASTECTOMY Left    • REPLACEMENT TOTAL KNEE BILATERAL     • RETINAL DETACHMENT SURGERY     • SHOULDER ROTATOR CUFF REPAIR Right    • TOTAL HIP ARTHROPLASTY Right        Social History     Socioeconomic History   • Marital status:    Tobacco Use   • Smoking status: Never Smoker   • Smokeless tobacco: Never Used   Vaping Use   • Vaping Use: Never used   Substance and Sexual Activity   • Alcohol use: No   • Drug use: No   • Sexual activity: Defer        History reviewed. No pertinent family history.    Objective   Physical Exam:  Vitals:    22 0903   BP: 112/72   Pulse: 93   Temp: 97.8 °F (36.6 °C)   SpO2: 95%   Weight: 63.5 kg (140 lb)   Height: 160 cm (63\")      Body mass index is 24.8 kg/m².    Physical Exam  Vitals and nursing note reviewed.   Constitutional:       Appearance: Normal appearance. She is well-developed.      Comments: Wheelchair, 2L NC   HENT:      Head: Normocephalic and atraumatic.   Eyes:      Pupils: Pupils are equal, round, and reactive to light.   Neck:      Vascular: No carotid bruit.   Cardiovascular:      Rate and Rhythm: Normal rate and regular rhythm.      Pulses: Normal pulses.      Heart sounds: Normal heart sounds, S1 normal and S2 normal. No murmur heard.  Pulmonary:      Effort: Pulmonary effort is normal.      Breath sounds: Examination of the right-lower field reveals decreased breath sounds and rhonchi. Decreased breath sounds and rhonchi present.   Abdominal:      Palpations: Abdomen is soft.   Musculoskeletal:         General: No swelling.      Cervical back: Neck supple.      Right lower le+ Edema present.      Left lower le+ Edema present.   Skin:     General: Skin is warm and dry.      Capillary Refill: Capillary refill takes less than 2 seconds.      Coloration: Skin is pale.      Findings: No bruising.   Neurological:      General: No focal deficit present.      Mental Status: She is alert and oriented to person, place, and time. " Mental status is at baseline.      GCS: GCS eye subscore is 4. GCS verbal subscore is 5. GCS motor subscore is 6.      Motor: Motor function is intact.      Coordination: Coordination is intact.      Gait: Gait is intact.   Psychiatric:         Mood and Affect: Mood normal.         Speech: Speech normal.         Behavior: Behavior normal. Behavior is cooperative.         Cognition and Memory: Cognition normal.         Imaging/Labs:  XR Chest 03/28/2022: Right moderate effusion with no signs of pneumothorax or acute airspace consolidation noted. Cardiomegaly. Pacemaker. Personally reviewed.  Official radiology report pending    XR Chest 1 View-Result Date: 3/19/2022  Removal of right pleural pigtail drain without evidence of pneumothorax or significant interval change otherwise.  This report was finalized on 3/19/2022 7:52 AM by Salomón Paz MD.      XR Chest 1 View-Result Date: 3/16/2022   1. Interval resolution of right pleural effusion status post pleural catheter placement 2. Cardiomegaly with left lower lobe atelectasis  This report was finalized on 3/16/2022 8:23 AM by Yusuf Vallejo.      XR Chest 1 View-Result Date: 3/15/2022  1.  Right pleural effusion. Some atelectasis or consolidation in this area not excluded. This has been noted. 2.  Prominence of interstitial markings that may be reflective of edema. 3.  Cardiomegaly.  This report was finalized on 3/15/2022 12:24 PM by Ariel Burton MD.      XR Chest 1 View-Result Date: 3/13/2022    1.  New hazy hazy opacity throughout the right hemithorax which may be due to residual layering right pleural effusion or potentially reexpansion pulmonary edema as detailed above.  There are more dense areas of consolidation within the right lung base which may be due to atelectasis or pneumonia. 2.  No evidence pneumothorax. 3.  Stable cardiomegaly  This report was finalized on 3/13/2022 9:08 AM by Best Carter MD.      XR Chest 1 View-Result Date: 3/11/2022  Interval  drainage of right effusion. No evidence of pneumothorax.  This report was finalized on 3/11/2022 10:58 AM by Dr. Devonte Darnell MD.      XR Chest 1 View-Result Date: 3/10/2022  1. Large right-sided pleural effusion likely resulting in right middle and lower lobe compressive atelectasis. 2. Clear left lung.  This report was finalized on 3/10/2022 8:41 PM by Jostin Fagan MD.      US Thoracentesis-Result Date: 3/11/2022  Impression:   Successful ultrasound-guided thoracentesis of right pleural effusion with recovery of 1.6 liters of pleural fluid.                                                           Thank you for the opportunity to assist in the care of your patient.  This report was finalized on 3/11/2022 12:54 PM by Sergio Persaud MD.      US Tube Placement-Result Date: 3/22/2022   1. Successful placement of right base chest tube.  This report was finalized on 3/22/2022 7:51 AM by Tyler Kuo MD.      XR Chest PA & Lateral-Result Date: 3/14/2022  Reaccumulating large right pleural effusion and associated atelectasis, with mild interval worsening compared to yesterday's study. Stable pulmonary vascular congestion elsewhere.  This report was finalized on 3/14/2022 12:41 PM by Dr. Devonte Darnell MD.      Assessment / Plan      Assessment / Plan:  Diagnoses and all orders for this visit:    1. Recurrent right pleural effusion (Primary)       · Non-smoker with history of breast cancer s/p mastectomy, HTN, HLD on statin therapy, a.fib, CHF (EF 51-55%), PPM, Covid-2021, and recurrent right pleural effusion with inpatient consult by Dr Arboleda 3/15/2022.    · Thoracentesis 3/11 with 1.6 L with recurrence and subsequent IR placed pigtail catheter  · Pleural fluid negative for malignancy; benign mesothelial cells and acute and chronic phonatory cells within a serous background  · Removed 3/18 with repeat CXR showing no recurrent effusion and she was discharged 3/19 with no readmissions.    · Presents today in clinic in  follow-up for repeat CXR.  Requiring her supplemental oxygen with some right posterior chest wall discomfort that was similar to her presentation prior to undergoing thoracentesis.    · CXR concerning for recurrent moderate right effusion  · Case discussed with Dr Subramanian who is recommending PleurX with Dr Jon/Raul within the next week  Risks of intervention were discussed with the patient including: bleeding, infection, blood clots, lung collapse, kidney damage, stroke, heart attack, or death.  Patient understands risks and agrees to proceed.      Follow Up:   RTC for any further concerns or worsening sign and symptoms. If unable to reach us in the office please dial 911 or go to the nearest emergency department.      Karyn Mason APRGONSALO  [facility] Cardiothoracic Surgery    Time Spent: I spent 34 minutes caring for Syeda on this date of service. This time includes time spent by me in the following activities: preparing for the visit, reviewing tests, obtaining and/or reviewing a separately obtained history, performing a medically appropriate examination and/or evaluation, counseling and educating the patient/family/caregiver, ordering medications, tests, or procedures, referring and communicating with other health care professionals, documenting information in the medical record, independently interpreting results and communicating that information with the patient/family/caregiver and care coordination.

## 2022-03-30 ENCOUNTER — PREP FOR SURGERY (OUTPATIENT)
Dept: OTHER | Facility: HOSPITAL | Age: 87
End: 2022-03-30

## 2022-03-30 DIAGNOSIS — J90 RECURRENT RIGHT PLEURAL EFFUSION: Primary | ICD-10-CM

## 2022-03-31 ENCOUNTER — READMISSION MANAGEMENT (OUTPATIENT)
Dept: CALL CENTER | Facility: HOSPITAL | Age: 87
End: 2022-03-31

## 2022-03-31 NOTE — OUTREACH NOTE
CHF Week 2 Survey    Flowsheet Row Responses   [facility] patient discharged from? [facility]   Does the patient have one of the following disease processes/diagnoses(primary or secondary)? CHF   Week 2 attempt successful? Yes   Call start time 1501   Call end time 1507   Is patient permission given to speak with other caregiver? Yes   Person spoke with today (if not patient) and relationship Betty-daughter   Meds reviewed with patient/caregiver? Yes   Is the patient having any side effects they believe may be caused by any medication additions or changes? No   Does the patient have all medications ordered at discharge? Yes   Is the patient taking all medications as directed (includes completed medication regime)? Yes   Comments regarding appointments Daughter states at PCP appt now-has seen CT surgeon. States patient will have PleurX catheter inserted as outpatient on 04/04/22.   Does the patient have a primary care provider?  Yes   Does the patient have an appointment with their PCP within 7 days of discharge? Yes   Has the patient kept scheduled appointments due by today? Yes   Has home health visited the patient within 72 hours of discharge? N/A   What is the patient's perception of their health status since discharge? Returned to baseline/stable   CHF Week 2 call completed? Yes   Wrap up additional comments Daughter states fluid is collecting around patient's heart again, and will have catheter inserted on Monday. States currently at PCP for f/u appt. Denies any needs at this time.          COURT CARTER - Registered Nurse

## 2022-04-01 ENCOUNTER — PRE-ADMISSION TESTING (OUTPATIENT)
Dept: PREADMISSION TESTING | Facility: HOSPITAL | Age: 87
End: 2022-04-01

## 2022-04-01 ENCOUNTER — HOSPITAL ENCOUNTER (OUTPATIENT)
Dept: GENERAL RADIOLOGY | Facility: HOSPITAL | Age: 87
Discharge: HOME | End: 2022-04-01

## 2022-04-01 VITALS — BODY MASS INDEX: 25.2 KG/M2 | HEIGHT: 63 IN | WEIGHT: 142.2 LBS

## 2022-04-01 DIAGNOSIS — J90 RECURRENT RIGHT PLEURAL EFFUSION: ICD-10-CM

## 2022-04-01 LAB — SARS-COV-2 RNA PNL SPEC NAA+PROBE: NOT DETECTED

## 2022-04-01 PROCEDURE — 71046 X-RAY EXAM CHEST 2 VIEWS: CPT

## 2022-04-01 PROCEDURE — C9803 HOPD COVID-19 SPEC COLLECT: HCPCS

## 2022-04-01 PROCEDURE — U0004 COV-19 TEST NON-CDC HGH THRU: HCPCS

## 2022-04-01 PROCEDURE — U0005 INFEC AGEN DETEC AMPLI PROBE: HCPCS

## 2022-04-01 RX ORDER — ASCORBIC ACID 500 MG
500 TABLET ORAL DAILY
COMMUNITY
End: 2022-04-06

## 2022-04-01 NOTE — PAT
covid in pat.     Patient just had blood work at hematologist office - cbc from 3/31 on chart- message left for preop to do potassium dos if needed.     Patient viewed general PAT education video as instructed in their preoperative information received from their surgeon.  Patient stated the general PAT education video was viewed in its entirety and survey completed.  Copies of [facility] general education handouts (Incentive Spirometry, Meds to Beds Program, Patient Belongings, Pre-op skin preparation instructions, Blood Glucose testing, Visitor policy, Surgery FAQ, Code H) distributed to patient if not printed. Education related to the PAT pass and skin preparation for surgery (if applicable) completed in PAT as a reinforcement to PAT education video. Patient instructed to return PAT pass provided today as well as completed skin preparation sheet (if applicable) on the day of procedure.     Additionally if patient had not viewed video yet but intended to view it at home or in our waiting area, then referred them to the handout with QR code/link provided during PAT visit.  Instructed patient to complete survey after viewing the video in its entirety.  Encouraged patient/family to read [facility] general education handouts thoroughly and notify PAT staff with any questions or concerns. Patient verbalized understanding of all information and priority content.    Patient to apply Chlorhexadine wipes  to surgical area (as instructed) the night before procedure and the AM of procedure. Wipes provided.    Patient directed to Radiology Department for CXR after Pre Admission Testing Appointment.     ekg on chart from 3/11/22- awaiting clearance from dr greer office at this time. Office states on greer desk and will fax to [facility] later today. Pat charge to follow up.

## 2022-04-04 ENCOUNTER — APPOINTMENT (OUTPATIENT)
Dept: GENERAL RADIOLOGY | Facility: HOSPITAL | Age: 87
End: 2022-04-04

## 2022-04-04 ENCOUNTER — ANESTHESIA EVENT (OUTPATIENT)
Dept: PERIOP | Facility: HOSPITAL | Age: 87
End: 2022-04-04

## 2022-04-04 ENCOUNTER — HOSPITAL ENCOUNTER (OUTPATIENT)
Facility: HOSPITAL | Age: 87
Setting detail: HOSPITAL OUTPATIENT SURGERY
Discharge: HOME | End: 2022-04-04
Attending: THORACIC SURGERY (CARDIOTHORACIC VASCULAR SURGERY) | Admitting: THORACIC SURGERY (CARDIOTHORACIC VASCULAR SURGERY)

## 2022-04-04 ENCOUNTER — HOME HEALTH ADMISSION (OUTPATIENT)
Dept: HOME HEALTH SERVICES | Facility: HOME HEALTHCARE | Age: 87
End: 2022-04-04

## 2022-04-04 ENCOUNTER — ANESTHESIA (OUTPATIENT)
Dept: PERIOP | Facility: HOSPITAL | Age: 87
End: 2022-04-04

## 2022-04-04 VITALS
TEMPERATURE: 97.8 F | DIASTOLIC BLOOD PRESSURE: 56 MMHG | WEIGHT: 142 LBS | HEIGHT: 63 IN | RESPIRATION RATE: 20 BRPM | BODY MASS INDEX: 25.16 KG/M2 | SYSTOLIC BLOOD PRESSURE: 108 MMHG | HEART RATE: 60 BPM | OXYGEN SATURATION: 96 %

## 2022-04-04 DIAGNOSIS — J90 RECURRENT RIGHT PLEURAL EFFUSION: ICD-10-CM

## 2022-04-04 LAB
ANION GAP SERPL CALCULATED.3IONS-SCNC: 11 MMOL/L (ref 5–15)
BUN SERPL-MCNC: 21 MG/DL (ref 8–23)
BUN/CREAT SERPL: 20.8 (ref 7–25)
CALCIUM SPEC-SCNC: 8.7 MG/DL (ref 8.2–9.6)
CHLORIDE SERPL-SCNC: 97 MMOL/L (ref 98–107)
CO2 SERPL-SCNC: 29 MMOL/L (ref 22–29)
CREAT SERPL-MCNC: 1.01 MG/DL (ref 0.57–1)
DEPRECATED RDW RBC AUTO: 48.3 FL (ref 37–54)
EGFRCR SERPLBLD CKD-EPI 2021: 51.4 ML/MIN/1.73
ERYTHROCYTE [DISTWIDTH] IN BLOOD BY AUTOMATED COUNT: 14.2 % (ref 12.3–15.4)
GLUCOSE SERPL-MCNC: 189 MG/DL (ref 65–99)
HCT VFR BLD AUTO: 31.4 % (ref 34–46.6)
HGB BLD-MCNC: 9.9 G/DL (ref 12–15.9)
MCH RBC QN AUTO: 29.1 PG (ref 26.6–33)
MCHC RBC AUTO-ENTMCNC: 31.5 G/DL (ref 31.5–35.7)
MCV RBC AUTO: 92.4 FL (ref 79–97)
PLATELET # BLD AUTO: 215 10*3/MM3 (ref 140–450)
PMV BLD AUTO: 10 FL (ref 6–12)
POTASSIUM SERPL-SCNC: 3.7 MMOL/L (ref 3.5–5.2)
RBC # BLD AUTO: 3.4 10*6/MM3 (ref 3.77–5.28)
SODIUM SERPL-SCNC: 137 MMOL/L (ref 136–145)
WBC NRBC COR # BLD: 4.4 10*3/MM3 (ref 3.4–10.8)

## 2022-04-04 PROCEDURE — 85027 COMPLETE CBC AUTOMATED: CPT | Performed by: THORACIC SURGERY (CARDIOTHORACIC VASCULAR SURGERY)

## 2022-04-04 PROCEDURE — S0260 H&P FOR SURGERY: HCPCS | Performed by: THORACIC SURGERY (CARDIOTHORACIC VASCULAR SURGERY)

## 2022-04-04 PROCEDURE — 71045 X-RAY EXAM CHEST 1 VIEW: CPT

## 2022-04-04 PROCEDURE — 0 CEFUROXIME SODIUM 1.5 G RECONSTITUTED SOLUTION

## 2022-04-04 PROCEDURE — 32550 INSERT PLEURAL CATH: CPT | Performed by: THORACIC SURGERY (CARDIOTHORACIC VASCULAR SURGERY)

## 2022-04-04 PROCEDURE — 25010000002 PROPOFOL 10 MG/ML EMULSION: Performed by: NURSE ANESTHETIST, CERTIFIED REGISTERED

## 2022-04-04 PROCEDURE — C1729 CATH, DRAINAGE: HCPCS | Performed by: THORACIC SURGERY (CARDIOTHORACIC VASCULAR SURGERY)

## 2022-04-04 PROCEDURE — 25010000002 FENTANYL CITRATE (PF) 50 MCG/ML SOLUTION

## 2022-04-04 PROCEDURE — 80048 BASIC METABOLIC PNL TOTAL CA: CPT | Performed by: THORACIC SURGERY (CARDIOTHORACIC VASCULAR SURGERY)

## 2022-04-04 RX ORDER — ONDANSETRON 2 MG/ML
4 INJECTION INTRAMUSCULAR; INTRAVENOUS ONCE AS NEEDED
Status: DISCONTINUED | OUTPATIENT
Start: 2022-04-04 | End: 2022-04-04 | Stop reason: HOSPADM

## 2022-04-04 RX ORDER — LIDOCAINE HYDROCHLORIDE 10 MG/ML
0.5 INJECTION, SOLUTION EPIDURAL; INFILTRATION; INTRACAUDAL; PERINEURAL ONCE AS NEEDED
Status: COMPLETED | OUTPATIENT
Start: 2022-04-04 | End: 2022-04-04

## 2022-04-04 RX ORDER — SODIUM CHLORIDE 0.9 % (FLUSH) 0.9 %
10 SYRINGE (ML) INJECTION AS NEEDED
Status: DISCONTINUED | OUTPATIENT
Start: 2022-04-04 | End: 2022-04-04 | Stop reason: HOSPADM

## 2022-04-04 RX ORDER — FENTANYL CITRATE 50 UG/ML
INJECTION, SOLUTION INTRAMUSCULAR; INTRAVENOUS
Status: COMPLETED
Start: 2022-04-04 | End: 2022-04-04

## 2022-04-04 RX ORDER — LIDOCAINE HYDROCHLORIDE 10 MG/ML
INJECTION, SOLUTION EPIDURAL; INFILTRATION; INTRACAUDAL; PERINEURAL AS NEEDED
Status: DISCONTINUED | OUTPATIENT
Start: 2022-04-04 | End: 2022-04-04 | Stop reason: SURG

## 2022-04-04 RX ORDER — SODIUM CHLORIDE 0.9 % (FLUSH) 0.9 %
10 SYRINGE (ML) INJECTION EVERY 12 HOURS SCHEDULED
Status: DISCONTINUED | OUTPATIENT
Start: 2022-04-04 | End: 2022-04-04 | Stop reason: HOSPADM

## 2022-04-04 RX ORDER — MIDAZOLAM HYDROCHLORIDE 1 MG/ML
0.5 INJECTION INTRAMUSCULAR; INTRAVENOUS
Status: DISCONTINUED | OUTPATIENT
Start: 2022-04-04 | End: 2022-04-04 | Stop reason: HOSPADM

## 2022-04-04 RX ORDER — SODIUM CHLORIDE, SODIUM LACTATE, POTASSIUM CHLORIDE, CALCIUM CHLORIDE 600; 310; 30; 20 MG/100ML; MG/100ML; MG/100ML; MG/100ML
9 INJECTION, SOLUTION INTRAVENOUS CONTINUOUS PRN
Status: DISCONTINUED | OUTPATIENT
Start: 2022-04-04 | End: 2022-04-04 | Stop reason: HOSPADM

## 2022-04-04 RX ORDER — LIDOCAINE HYDROCHLORIDE 10 MG/ML
INJECTION, SOLUTION EPIDURAL; INFILTRATION; INTRACAUDAL; PERINEURAL AS NEEDED
Status: DISCONTINUED | OUTPATIENT
Start: 2022-04-04 | End: 2022-04-04 | Stop reason: HOSPADM

## 2022-04-04 RX ORDER — FAMOTIDINE 20 MG/1
20 TABLET, FILM COATED ORAL
Status: COMPLETED | OUTPATIENT
Start: 2022-04-04 | End: 2022-04-04

## 2022-04-04 RX ORDER — PROPOFOL 10 MG/ML
VIAL (ML) INTRAVENOUS AS NEEDED
Status: DISCONTINUED | OUTPATIENT
Start: 2022-04-04 | End: 2022-04-04 | Stop reason: SURG

## 2022-04-04 RX ORDER — IPRATROPIUM BROMIDE AND ALBUTEROL SULFATE 2.5; .5 MG/3ML; MG/3ML
3 SOLUTION RESPIRATORY (INHALATION) ONCE AS NEEDED
Status: DISCONTINUED | OUTPATIENT
Start: 2022-04-04 | End: 2022-04-04 | Stop reason: HOSPADM

## 2022-04-04 RX ORDER — FENTANYL CITRATE 50 UG/ML
25 INJECTION, SOLUTION INTRAMUSCULAR; INTRAVENOUS
Status: DISCONTINUED | OUTPATIENT
Start: 2022-04-04 | End: 2022-04-04 | Stop reason: HOSPADM

## 2022-04-04 RX ADMIN — PROPOFOL 20 MG: 10 INJECTION, EMULSION INTRAVENOUS at 07:38

## 2022-04-04 RX ADMIN — FAMOTIDINE 20 MG: 20 TABLET ORAL at 06:46

## 2022-04-04 RX ADMIN — LIDOCAINE HYDROCHLORIDE 50 MG: 10 INJECTION, SOLUTION EPIDURAL; INFILTRATION; INTRACAUDAL; PERINEURAL at 07:36

## 2022-04-04 RX ADMIN — PROPOFOL 10 MG: 10 INJECTION, EMULSION INTRAVENOUS at 07:37

## 2022-04-04 RX ADMIN — PROPOFOL 25 MCG/KG/MIN: 10 INJECTION, EMULSION INTRAVENOUS at 07:39

## 2022-04-04 RX ADMIN — PROPOFOL 20 MG: 10 INJECTION, EMULSION INTRAVENOUS at 07:36

## 2022-04-04 RX ADMIN — PROPOFOL 10 MG: 10 INJECTION, EMULSION INTRAVENOUS at 07:43

## 2022-04-04 RX ADMIN — PROPOFOL 10 MG: 10 INJECTION, EMULSION INTRAVENOUS at 07:40

## 2022-04-04 RX ADMIN — FENTANYL CITRATE 25 MCG: 50 INJECTION, SOLUTION INTRAMUSCULAR; INTRAVENOUS at 09:37

## 2022-04-04 RX ADMIN — PROPOFOL 20 MG: 10 INJECTION, EMULSION INTRAVENOUS at 07:45

## 2022-04-04 RX ADMIN — SODIUM CHLORIDE, POTASSIUM CHLORIDE, SODIUM LACTATE AND CALCIUM CHLORIDE 9 ML/HR: 600; 310; 30; 20 INJECTION, SOLUTION INTRAVENOUS at 06:46

## 2022-04-04 RX ADMIN — LIDOCAINE HYDROCHLORIDE 0.5 ML: 10 INJECTION, SOLUTION EPIDURAL; INFILTRATION; INTRACAUDAL; PERINEURAL at 06:46

## 2022-04-04 NOTE — OP NOTE
Operative Report      Syeda Gomez    : 1927  MRN: [id number]  [facility]: [id number]      Date:22      Preop Diagnosis: Recurrent right pleural effusion      Postop Diagnosis: Recurrent right pleural effusion      Procedure: Placement of a right Pleurx catheter      Surgeon: Fidel Carter MD      Assistant: Keith Roque PA-C was responsible for performing the following activities: Retraction, Suction, Irrigation, Suturing, Closing and Placing Dressing and their skilled assistance was necessary for the success of this case.        Indications: Elderly  female with recurrent right pleural effusion secondary to previous metastatic breast cancer.  Plan to proceed with Pleurx catheter placement.      Operative Findings: Pleurx catheter placed without difficulty.  1700 cc of serous fluid removed      EBL:<5cc      Operative Narrative:  The patient was brought to the operating room and prepared for surgical intervention in the usual fashion.  The patient was sedated and monitored by anesthesia.  A timeout was called.  The patient's identity, the proposed procedure, and the availability of appropriate personnel and equipment was verified.  The right shoulder was elevated with a shoulder roll.   The lateral and anterior chest wall was prepped and draped in the usual manner.  1%  Xylocaine anesthesia was obtained at pre-determined anterior  and  posterior sites.  The intervening tissue was anesthetized with 1% Xylocaine.  A one quarter inch incision was made in each anesthetized site.  The  hemithorax was cannulated at the posterior site.  A guidewire was placed through the cannula prior to cannula removal.  The PleurX catheter was passed from the anterior site to the posterior site.  A sheath over a dilator was passed over the guidewire.  The guidewire and dilator were removed.  The catheter was passed through the sheath into the hemithorax.  The sheath was removed.  The catheter was sutured in place with  silk suture.  The posterior incision was closed with 4-0 Monocryl. The patient was transferred to the recovery room in stable condition.          Fidel Carter MD  CTSurgery  04/04/22   09:29 EDT

## 2022-04-04 NOTE — H&P
"Pre-Op H&P  Syeda Gomez  [id number]  1/6/1927    Chief complaint: Shortness of breath    HPI:  Patient is a 89 y.o.female who presents with a history of A-fib and CHF.  Recent right pleural effusion treated with chest tube.  This was discontinued however the effusion has recurred.  She admits to cough and shortness of breath.    Review of Systems:  General ROS: negative for chills, fever or skin lesions;  No changes since last office visit.  Neg for recent sick exposure  Cardiovascular ROS: no chest pain or dyspnea on exertion  Respiratory ROS: positive for cough, shortness of breath    Allergies:   Allergies   Allergen Reactions   • Cardizem [Diltiazem] Other (See Comments)     \"unknown\"   • Codeine GI Intolerance   • Morphine Other (See Comments)     \"unknown\"   • Percocet [Oxycodone-Acetaminophen] Other (See Comments)     \"unknown\"     • Sulfa Antibiotics Unknown (See Comments)     Unknown         Home Meds:    No current facility-administered medications on file prior to encounter.     Current Outpatient Medications on File Prior to Encounter   Medication Sig Dispense Refill   • acetaminophen (TYLENOL) 650 MG 8 hr tablet Take 650 mg by mouth Every 8 (Eight) Hours As Needed for Mild Pain .     • albuterol (PROVENTIL) (2.5 MG/3ML) 0.083% nebulizer solution Take 2.5 mg by nebulization 2 (Two) Times a Day As Needed.     • aspirin 81 MG EC tablet Take 81 mg by mouth Daily.     • carvedilol (COREG) 12.5 MG tablet Take 12.5 mg by mouth 2 (Two) Times a Day With Meals.     • clonazePAM (KlonoPIN) 0.5 MG tablet Take 0.25-0.5 mg by mouth At Night As Needed.     • docusate sodium (COLACE) 100 MG capsule Take 200 mg by mouth As Needed.     • isosorbide mononitrate (IMDUR) 30 MG 24 hr tablet Take 1 tablet by mouth Daily. 30 tablet 1   • levothyroxine (SYNTHROID, LEVOTHROID) 50 MCG tablet Take 50 mcg by mouth Daily.     • loratadine (CLARITIN) 10 MG tablet Take 10 mg by mouth Daily.     • magnesium oxide (MAGOX) 400 (241.3 " Mg) MG tablet tablet Take 400 mg by mouth Daily.     • omeprazole (priLOSEC) 20 MG capsule Take 20 mg by mouth Daily.     • pravastatin (PRAVACHOL) 20 MG tablet Take 20 mg by mouth Every Night.     • torsemide (DEMADEX) 20 MG tablet Take 20 mg by mouth 2 (Two) Times a Day.     • nitroglycerin (NITROSTAT) 0.4 MG SL tablet Place 0.4 mg under the tongue Every 5 (Five) Minutes As Needed for Chest Pain. Take no more than 3 doses in 15 minutes.     • ondansetron (ZOFRAN) 8 MG tablet Take 8 mg by mouth Every 6 (Six) Hours As Needed for Nausea or Vomiting.         PMH:   Past Medical History:   Diagnosis Date   • A-fib (HCC)    • Arthritis    • Cancer (HCC)     1988- left breast   • Carpal tunnel syndrome    • CHF (congestive heart failure) (HCC)    • Detached retina     right side   • Disease of thyroid gland    • Dizzy    • Full dentures    • GERD (gastroesophageal reflux disease)    • Hearing aid worn     bilat   • Heart murmur    • History of transfusion     x4 just this year- never had reaction   • Elim IRA (hard of hearing)     bilat hearinhg aids   • Hypertension    • Infection     right knee- had infection 15 to 20 years ago- unsure if mrsa or not   • Rheumatic fever    • SOBOE (shortness of breath on exertion)    • UTI (urinary tract infection)    • Wears glasses      PSH:    Past Surgical History:   Procedure Laterality Date   • APPENDECTOMY     • CARDIAC ELECTROPHYSIOLOGY PROCEDURE N/A 06/25/2021    Procedure: DEVICE IMPLANT;  Surgeon: Calvin Ventura MD;  Location: [facility] INVASIVE LOCATION;  Service: Cardiovascular;  Laterality: N/A;   • CARPAL TUNNEL RELEASE Bilateral    • CATARACT EXTRACTION, BILATERAL     • CHOLECYSTECTOMY     • COLONOSCOPY     • HYSTERECTOMY     • MASTECTOMY Left    • OTHER SURGICAL HISTORY      x2 fluid removed from right side   • PORTACATH PLACEMENT Left    • REPLACEMENT TOTAL KNEE BILATERAL     • RETINAL DETACHMENT SURGERY Right     buckle in place   • SHOULDER ROTATOR CUFF REPAIR  Right    • TOTAL HIP ARTHROPLASTY Right    • TRIGGER FINGER RELEASE Right     thinks right side       Immunization History:  Influenza: yes  Pneumococcal: yes  Tetanus: unknown    Social History:   Tobacco:   Social History     Tobacco Use   Smoking Status Never Smoker   Smokeless Tobacco Never Used      Alcohol:     Social History     Substance and Sexual Activity   Alcohol Use No       Vitals:           There were no vitals taken for this visit.    Physical Exam:  General Appearance:    Alert, cooperative, no distress, appears stated age, thin   Head:    Normocephalic, without obvious abnormality, atraumatic   Lungs:     Clear to auscultation bilaterally, respirations unlabored, distant    Heart:   Systolic murmur 4/6    Abdomen:    Soft, nontender.  +bowel sounds   Breast Exam:    deferred   Genitalia:    deferred   Extremities:   Extremities normal, atraumatic, no cyanosis or edema   Skin:   Skin color, texture, turgor normal, no rashes or lesions   Neurologic:   Grossly intact   Results Review  LABS:  Lab Results   Component Value Date    WBC 6.84 03/16/2022    HGB 8.4 (L) 03/16/2022    HCT 26.1 (L) 03/16/2022    MCV 92.6 03/16/2022     03/16/2022    NEUTROABS 6.23 03/16/2022    GLUCOSE 198 (H) 03/19/2022    BUN 28 (H) 03/19/2022    CREATININE 0.95 03/19/2022    EGFRIFNONA 49 (L) 06/25/2021     (L) 03/19/2022    K 4.2 03/19/2022    CL 90 (L) 03/19/2022    CO2 29.0 03/19/2022    MG 2.1 03/18/2022    PHOS 3.3 03/18/2022    CALCIUM 8.7 03/19/2022    ALBUMIN 3.40 (L) 03/14/2022    AST 17 03/14/2022    ALT 13 03/14/2022    BILITOT 0.3 03/14/2022    INR 1.19 (H) 03/14/2022       RADIOLOGY:  Chest X-Ray PA & Lateral    Result Date: 4/1/2022  DATE OF EXAM: 4/1/2022 9:19 AM  PROCEDURE: XR CHEST PA AND LATERAL-  INDICATIONS: Pre-Op Cardiac Surgery; [id number] effusion, not elsewhere classified  COMPARISON: 3/28/2022  TECHNIQUE: Two radiologic views of the chest, PA and lateral, were obtained.  FINDINGS:  Unchanged dense right lung base opacity compatible with moderate pleural effusion and adjacent volume loss. The left lung remains essentially clear. There is no pneumothorax. Unchanged heart and mediastinal contours. ICD and left chest wall infusion port projects unchanged.      No significant interval change in moderate-sized right pleural effusion with adjacent lower lobe volume loss.  This report was finalized on 4/1/2022 9:34 AM by Calvin Olivares.         I reviewed the patient's new clinical results. Covid-neg.  Chem profile and CBC is pending    Cancer Staging (if applicable)  Cancer Patient: __ yes _x_no __unknown; If yes, clinical stage T:__ N:__M:__, stage group or __N/A    Impression: Right pleural effusion    Plan: For right pleurx catheter insertion today      RONALD Lanier   04/04/22   6:51 AM EDT

## 2022-04-04 NOTE — ANESTHESIA POSTPROCEDURE EVALUATION
Patient: Syeda Gomez    Procedure Summary     Date: 04/04/22 Room / [facility]:  [facility]    Anesthesia Start: 0733 Anesthesia Stop: 0837    Procedure: PLEURX CATHETER INSERTION (Right ) Diagnosis:       Recurrent right pleural effusion      (Recurrent right pleural effusion [J90])    Surgeons: Fidel Carter MD Provider: João Kelly MD    Anesthesia Type: MAC ASA Status: 3          Anesthesia Type: MAC    Vitals  Vitals Value Taken Time   /56 04/04/22 0840   Temp 97.2 °F (36.2 °C) 04/04/22 0825   Pulse 70 04/04/22 0842   Resp 22 04/04/22 0825   SpO2 100 % 04/04/22 0842   Vitals shown include unvalidated device data.        Post Anesthesia Care and Evaluation    Patient location during evaluation: PACU  Patient participation: complete - patient participated  Level of consciousness: sleepy but conscious and responsive to verbal stimuli  Pain score: 0  Pain management: adequate  Airway patency: patent  Anesthetic complications: No anesthetic complications  PONV Status: none  Cardiovascular status: hemodynamically stable and acceptable  Respiratory status: nonlabored ventilation, acceptable, nasal cannula and spontaneous ventilation  Hydration status: acceptable

## 2022-04-04 NOTE — ANESTHESIA PREPROCEDURE EVALUATION
Anesthesia Evaluation     Patient summary reviewed and Nursing notes reviewed   no history of anesthetic complications:  NPO Solid Status: > 8 hours  NPO Liquid Status: > 2 hours           Airway   Mallampati: II  TM distance: >3 FB  Neck ROM: limited  Possible difficult intubation  Dental    (+) edentulous    Pulmonary    (+) pneumonia worsening , pleural effusion, shortness of breath, decreased breath sounds,   Cardiovascular   Exercise tolerance: poor (<4 METS)    Rhythm: regular  Rate: normal    (+) hypertension well controlled 2 medications or greater, valvular problems/murmurs AI and TI, dysrhythmias Atrial Fib, murmur, hyperlipidemia,   (-) CHF    ROS comment: NL EF , PULM HTN    Neuro/Psych  (+) dizziness/light headedness, numbness,    GI/Hepatic/Renal/Endo    (+)  GERD well controlled,  thyroid problem hypothyroidism    ROS Comment: HYPONATREMIA    Musculoskeletal     Abdominal   (-) obese    Abdomen: soft.   Substance History      OB/GYN          Other   arthritis,    history of cancer remission                    Anesthesia Plan    ASA 3     MAC     intravenous induction     Anesthetic plan, all risks, benefits, and alternatives have been provided, discussed and informed consent has been obtained with: patient.    Plan discussed with CRNA.        CODE STATUS:

## 2022-04-04 NOTE — PROGRESS NOTES
Spoke with daughter she is agreeable to [facility]. Verified PCP. Fariba WARNER RN, [facility], Liaison

## 2022-04-04 NOTE — CASE MANAGEMENT/SOCIAL WORK
Continued Stay Note  [facility]     Patient Name: Syeda Gomez  MRN: [id number]  Today's Date: 4/4/2022    Admit Date: 4/4/2022     Discharge Plan     Row Name 04/04/22 0953       Plan    Plan SW    Plan Comments ’er faxed Physician Written order and copy of insurance card to [employer] phone [phone] fax [phone] to fill patient’s pleurX supply. ’er spoke with [facility] to make referral awaiting admission decision. ’er spoke with patients daughter Betty [phone] to discuss HH. ’er spoke with nurse requesting that extra kits be given to patient to take home.      Spoke with [facility] who has accepted patient.               Discharge Codes    No documentation.               Expected Discharge Date and Time     Expected Discharge Date Expected Discharge Time    Apr 4, 2022             OLINDA Bell (Kay)

## 2022-04-06 ENCOUNTER — TELEPHONE (OUTPATIENT)
Dept: CARDIAC SURGERY | Facility: CLINIC | Age: 87
End: 2022-04-06

## 2022-04-06 ENCOUNTER — HOME CARE VISIT (OUTPATIENT)
Dept: HOME HEALTH SERVICES | Facility: HOME HEALTHCARE | Age: 87
End: 2022-04-06

## 2022-04-06 VITALS
HEART RATE: 97 BPM | SYSTOLIC BLOOD PRESSURE: 122 MMHG | TEMPERATURE: 97.6 F | OXYGEN SATURATION: 96 % | RESPIRATION RATE: 16 BRPM | DIASTOLIC BLOOD PRESSURE: 68 MMHG

## 2022-04-06 PROCEDURE — G0495 RN CARE TRAIN/EDU IN HH: HCPCS

## 2022-04-06 NOTE — TELEPHONE ENCOUNTER
Kim, with [facility] HH, called to give report on patient per a [facility] call center RN request. She stated that patient was discharged home on 4/4 after pleurx cath insertion. The hospital drained the patient before she was discharged home on 4/4 and when patient made it home, daughter found that the bandages were very saturated, so they drained it again at home and pulled 500ccs. Kim said that she drained it again today (4/6) and pulled 225ccs and that the bandages were just a little damp. She stated that the patient's vitals were stable, the pleurx cath site looked great, the drainage was serrous, and that she did not hear any fluid on the patient's lung. She stated that she was not concerned with drainage or pleurx cath site, she just wanted to make the report per the RN call center request.     She gave patient and daughter parameters for draining. If the bandages were super saturated, they could go ahead and drain daily, but if they were only draining 50-100ccs, they could go back to draining every other day. The patient and daughter know to call our office or HH with any questions or concerns.     Let her know that I would give Dr. Raul CARRANZA on this and to call us with any further questions, concerns, or reports. She agreed.

## 2022-04-07 NOTE — HOME HEALTH
89 year old female with recurrent R sided Pleural effusion who had pleurix catheter placed on 4. 4.22 at [facility].  Pt returned home on 4.5.22 to the care of her family who are very supportive. She has a grandson who lives with her and a daughter who lives close by and comes daily to assit with adls and meds.   Pt has hx of recurrent pleural effusion,Chronic respiratory failure, HF, HTN, Hypothyroidism, A fib with a pace maker and L Breast cancer 33 years ago.  Her lungs are decreased on the R side.  Ox sat 96% on room air.  She does have oxygen which is used PRN.    Her pleurix cathter is in place and dressing intact.  Her daughter states she drained her last night due to the dressing was saturated and leaking.  She obtained almost 500ml of bloody red fluid.  RN observed daughter today and she is performing correctly.  There was 275ml of yellow drainage.  Pt tolerated well.  There was some scant drainge on the outer dressing.  Pt does have pain at the stie and is using tramadol.  Daughter is trying to limit use of this as pt likes to take it as oftern as the script allows. Daughter does not feel she always has enough paint to warrant it every 6 hours.   Pt is using a walker and needs some standby assist.  Her daughter helps wtih adls and iadls.  Pt refuses a PT or an OT eval even though she is a risk to fall.    Plan for nurse to monitor resiratory status and pleurix functioning.  Assist family as needed with use of the pleurix.

## 2022-04-08 ENCOUNTER — READMISSION MANAGEMENT (OUTPATIENT)
Dept: CALL CENTER | Facility: HOSPITAL | Age: 87
End: 2022-04-08

## 2022-04-08 ENCOUNTER — HOME CARE VISIT (OUTPATIENT)
Dept: HOME HEALTH SERVICES | Facility: HOME HEALTHCARE | Age: 87
End: 2022-04-08

## 2022-04-08 VITALS
TEMPERATURE: 97 F | SYSTOLIC BLOOD PRESSURE: 122 MMHG | OXYGEN SATURATION: 98 % | DIASTOLIC BLOOD PRESSURE: 66 MMHG | RESPIRATION RATE: 16 BRPM | HEART RATE: 68 BPM

## 2022-04-08 PROCEDURE — G0299 HHS/HOSPICE OF RN EA 15 MIN: HCPCS

## 2022-04-08 NOTE — HOME HEALTH
RN drained pleurix for 125ml of pink serous fluid.  Site intact. there was some scant wet drainage on the outer dressing.  Daughter is independent with use of pleruix and will drain pt in 2 to 3 days.   Family knows s/s of exacerbations of HF to report to RN/MD  THey are not weighing consistently.     Plan to assess resp status, check on amt of fluid from pleurix and how often they are draining her.   Educated on daily wts and low salt diet.

## 2022-04-08 NOTE — OUTREACH NOTE
CHF Week 3 Survey    Flowsheet Row Responses   [facility] patient discharged from? [place]   Does the patient have one of the following disease processes/diagnoses(primary or secondary)? CHF   Week 3 attempt successful? Yes   Call start time 1528   Rescheduled Rescheduled-pt requested  [HH is present]   Call end time 1529   Discharge diagnosis Acute respiratory failure with hypoxia   Person spoke with today (if not patient) and relationship Betty-daughter          NAN PACHECO - Registered Nurse

## 2022-04-13 ENCOUNTER — READMISSION MANAGEMENT (OUTPATIENT)
Dept: CALL CENTER | Facility: HOSPITAL | Age: 87
End: 2022-04-13

## 2022-04-13 ENCOUNTER — TELEPHONE (OUTPATIENT)
Dept: CARDIAC SURGERY | Facility: CLINIC | Age: 87
End: 2022-04-13

## 2022-04-13 NOTE — OUTREACH NOTE
CHF Week 3 Survey    Flowsheet Row Responses   [facility] patient discharged from? [place]   Does the patient have one of the following disease processes/diagnoses(primary or secondary)? CHF   Week 3 attempt successful? Yes   Call start time 1632   Call end time 1637   Discharge diagnosis Acute respiratory failure with hypoxia   Meds reviewed with patient/caregiver? Yes   Is the patient taking all medications as directed (includes completed medication regime)? Yes   Has the patient kept scheduled appointments due by today? Yes   What is the Home health agency?  Home health visits pt at home.    What DME was ordered? Oxygen   DME comments wearing home O2 prn.   Pulse Ox monitoring Intermittent   Pulse Ox device source Patient   O2 Sat comments 98% RA   Comments Pt c/o feeling full across chest, sent pic of drain incision to  nurse and MD, as redness is at site. Denies temp at this time. LE edema present yesterday, improved today.   What is the patient's perception of their health status since discharge? Same   Is the patient weighing daily? No   Does the patient have scales? Yes   CHF Week 3 call completed? Yes          NAN PACHECO - Registered Nurse

## 2022-04-13 NOTE — TELEPHONE ENCOUNTER
Mrs. Gomez's daughter called to report that she has redness and irritation around pleurx insertion site. She also has more pain than she anticipated. She denied fever/chills. Daughter is trying to avoid getting her out of the house so she emailed photo of area. See scanned photo.  Per Dr Carter: this is a little more red than he would like to see, possibly due to irritation from the stitch. He does not feel that a antibiotic is needed at this time. If redness spreads or worsens patient would need to be seen in the office.     Brandon

## 2022-04-14 ENCOUNTER — HOME CARE VISIT (OUTPATIENT)
Dept: HOME HEALTH SERVICES | Facility: HOME HEALTHCARE | Age: 87
End: 2022-04-14

## 2022-04-14 VITALS
DIASTOLIC BLOOD PRESSURE: 60 MMHG | TEMPERATURE: 96.7 F | HEART RATE: 60 BPM | OXYGEN SATURATION: 94 % | SYSTOLIC BLOOD PRESSURE: 110 MMHG | RESPIRATION RATE: 16 BRPM

## 2022-04-14 PROCEDURE — G0299 HHS/HOSPICE OF RN EA 15 MIN: HCPCS

## 2022-04-16 ENCOUNTER — HOSPITAL ENCOUNTER (EMERGENCY)
Facility: HOSPITAL | Age: 87
Discharge: HOME | End: 2022-04-16
Attending: EMERGENCY MEDICINE | Admitting: EMERGENCY MEDICINE

## 2022-04-16 VITALS
RESPIRATION RATE: 14 BRPM | DIASTOLIC BLOOD PRESSURE: 53 MMHG | HEIGHT: 63 IN | OXYGEN SATURATION: 93 % | WEIGHT: 142 LBS | SYSTOLIC BLOOD PRESSURE: 121 MMHG | TEMPERATURE: 98 F | BODY MASS INDEX: 25.16 KG/M2 | HEART RATE: 94 BPM

## 2022-04-16 DIAGNOSIS — R31.9 HEMATURIA, UNSPECIFIED TYPE: ICD-10-CM

## 2022-04-16 DIAGNOSIS — N39.0 ACUTE UTI: Primary | ICD-10-CM

## 2022-04-16 LAB
ALBUMIN SERPL-MCNC: 4.1 G/DL (ref 3.5–5.2)
ALBUMIN/GLOB SERPL: 2.4 G/DL
ALP SERPL-CCNC: 133 U/L (ref 39–117)
ALT SERPL W P-5'-P-CCNC: 14 U/L (ref 1–33)
ANION GAP SERPL CALCULATED.3IONS-SCNC: 10 MMOL/L (ref 5–15)
AST SERPL-CCNC: 18 U/L (ref 1–32)
BACTERIA UR QL AUTO: ABNORMAL /HPF
BASOPHILS # BLD AUTO: 0.05 10*3/MM3 (ref 0–0.2)
BASOPHILS NFR BLD AUTO: 0.6 % (ref 0–1.5)
BILIRUB SERPL-MCNC: 0.5 MG/DL (ref 0–1.2)
BILIRUB UR QL STRIP: ABNORMAL
BILIRUB UR QL STRIP: ABNORMAL
BUN SERPL-MCNC: 15 MG/DL (ref 8–23)
BUN/CREAT SERPL: 16.5 (ref 7–25)
CALCIUM SPEC-SCNC: 8.6 MG/DL (ref 8.2–9.6)
CHLORIDE SERPL-SCNC: 90 MMOL/L (ref 98–107)
CLARITY UR: ABNORMAL
CLARITY UR: ABNORMAL
CO2 SERPL-SCNC: 29 MMOL/L (ref 22–29)
COLOR UR: ABNORMAL
COLOR UR: ABNORMAL
CREAT SERPL-MCNC: 0.91 MG/DL (ref 0.57–1)
DEPRECATED RDW RBC AUTO: 43.8 FL (ref 37–54)
EGFRCR SERPLBLD CKD-EPI 2021: 58.2 ML/MIN/1.73
EOSINOPHIL # BLD AUTO: 0.12 10*3/MM3 (ref 0–0.4)
EOSINOPHIL NFR BLD AUTO: 1.5 % (ref 0.3–6.2)
ERYTHROCYTE [DISTWIDTH] IN BLOOD BY AUTOMATED COUNT: 13.2 % (ref 12.3–15.4)
GLOBULIN UR ELPH-MCNC: 1.7 GM/DL
GLUCOSE SERPL-MCNC: 223 MG/DL (ref 65–99)
GLUCOSE UR STRIP-MCNC: NEGATIVE MG/DL
GLUCOSE UR STRIP-MCNC: NEGATIVE MG/DL
HCT VFR BLD AUTO: 32.9 % (ref 34–46.6)
HGB BLD-MCNC: 10.5 G/DL (ref 12–15.9)
HGB UR QL STRIP.AUTO: ABNORMAL
HGB UR QL STRIP.AUTO: ABNORMAL
HOLD SPECIMEN: NORMAL
HYALINE CASTS UR QL AUTO: ABNORMAL /LPF
IMM GRANULOCYTES # BLD AUTO: 0.06 10*3/MM3 (ref 0–0.05)
IMM GRANULOCYTES NFR BLD AUTO: 0.7 % (ref 0–0.5)
KETONES UR QL STRIP: NEGATIVE
KETONES UR QL STRIP: NEGATIVE
LEUKOCYTE ESTERASE UR QL STRIP.AUTO: ABNORMAL
LEUKOCYTE ESTERASE UR QL STRIP.AUTO: ABNORMAL
LYMPHOCYTES # BLD AUTO: 0.46 10*3/MM3 (ref 0.7–3.1)
LYMPHOCYTES NFR BLD AUTO: 5.7 % (ref 19.6–45.3)
MCH RBC QN AUTO: 28.8 PG (ref 26.6–33)
MCHC RBC AUTO-ENTMCNC: 31.9 G/DL (ref 31.5–35.7)
MCV RBC AUTO: 90.1 FL (ref 79–97)
MONOCYTES # BLD AUTO: 0.4 10*3/MM3 (ref 0.1–0.9)
MONOCYTES NFR BLD AUTO: 5 % (ref 5–12)
NEUTROPHILS NFR BLD AUTO: 6.97 10*3/MM3 (ref 1.7–7)
NEUTROPHILS NFR BLD AUTO: 86.5 % (ref 42.7–76)
NITRITE UR QL STRIP: POSITIVE
NITRITE UR QL STRIP: POSITIVE
NRBC BLD AUTO-RTO: 0 /100 WBC (ref 0–0.2)
PH UR STRIP.AUTO: 5.5 [PH] (ref 5–8)
PH UR STRIP.AUTO: 5.5 [PH] (ref 5–8)
PLATELET # BLD AUTO: 246 10*3/MM3 (ref 140–450)
PMV BLD AUTO: 9.5 FL (ref 6–12)
POTASSIUM SERPL-SCNC: 4 MMOL/L (ref 3.5–5.2)
PROT SERPL-MCNC: 5.8 G/DL (ref 6–8.5)
PROT UR QL STRIP: ABNORMAL
PROT UR QL STRIP: ABNORMAL
RBC # BLD AUTO: 3.65 10*6/MM3 (ref 3.77–5.28)
RBC # UR STRIP: ABNORMAL /HPF
REF LAB TEST METHOD: ABNORMAL
SODIUM SERPL-SCNC: 129 MMOL/L (ref 136–145)
SP GR UR STRIP: 1.02 (ref 1–1.03)
SP GR UR STRIP: 1.02 (ref 1–1.03)
SQUAMOUS #/AREA URNS HPF: ABNORMAL /HPF
UROBILINOGEN UR QL STRIP: ABNORMAL
UROBILINOGEN UR QL STRIP: ABNORMAL
WBC # UR STRIP: ABNORMAL /HPF
WBC NRBC COR # BLD: 8.06 10*3/MM3 (ref 3.4–10.8)
WHOLE BLOOD HOLD SPECIMEN: NORMAL
WHOLE BLOOD HOLD SPECIMEN: NORMAL

## 2022-04-16 PROCEDURE — 87086 URINE CULTURE/COLONY COUNT: CPT | Performed by: EMERGENCY MEDICINE

## 2022-04-16 PROCEDURE — 80053 COMPREHEN METABOLIC PANEL: CPT | Performed by: PHYSICIAN ASSISTANT

## 2022-04-16 PROCEDURE — 96365 THER/PROPH/DIAG IV INF INIT: CPT

## 2022-04-16 PROCEDURE — 87186 SC STD MICRODIL/AGAR DIL: CPT | Performed by: EMERGENCY MEDICINE

## 2022-04-16 PROCEDURE — 81001 URINALYSIS AUTO W/SCOPE: CPT | Performed by: EMERGENCY MEDICINE

## 2022-04-16 PROCEDURE — 85025 COMPLETE CBC W/AUTO DIFF WBC: CPT | Performed by: PHYSICIAN ASSISTANT

## 2022-04-16 PROCEDURE — 99283 EMERGENCY DEPT VISIT LOW MDM: CPT

## 2022-04-16 PROCEDURE — 25010000002 CEFTRIAXONE PER 250 MG: Performed by: PHYSICIAN ASSISTANT

## 2022-04-16 PROCEDURE — 87077 CULTURE AEROBIC IDENTIFY: CPT | Performed by: EMERGENCY MEDICINE

## 2022-04-16 PROCEDURE — P9612 CATHETERIZE FOR URINE SPEC: HCPCS

## 2022-04-16 RX ORDER — CEFDINIR 300 MG/1
300 CAPSULE ORAL 2 TIMES DAILY
Qty: 20 CAPSULE | Refills: 0 | Status: SHIPPED | OUTPATIENT
Start: 2022-04-16 | End: 2022-07-28

## 2022-04-16 RX ORDER — SODIUM CHLORIDE 0.9 % (FLUSH) 0.9 %
10 SYRINGE (ML) INJECTION AS NEEDED
Status: DISCONTINUED | OUTPATIENT
Start: 2022-04-16 | End: 2022-04-16 | Stop reason: HOSPADM

## 2022-04-16 RX ADMIN — SODIUM CHLORIDE 1 G: 900 INJECTION INTRAVENOUS at 11:10

## 2022-04-16 NOTE — ED PROVIDER NOTES
Subjective   89-year-old female presents emergency department today complaining of having dysuria frequency little bit of hematuria that was noted in her pad.  Said no fevers no chills no abdominal pain denies any nausea or vomiting.  States that feels like she just needs treatment for UTI.  Seen with family present she has had no mental status changes.      History provided by:  Patient   used: No    Urinary Tract Infection  Pain quality:  Burning  Pain severity:  Mild  Onset quality:  Sudden  Duration:  1 day  Timing:  Constant  Progression:  Worsening  Chronicity:  New  Recent urinary tract infections: no    Relieved by:  Nothing  Worsened by:  Nothing  Ineffective treatments:  None tried  Urinary symptoms: frequent urination and hematuria    Associated symptoms: no abdominal pain, no fever, no flank pain, no genital lesions, no nausea, no vaginal discharge and no vomiting    Risk factors: no hx of pyelonephritis, no hx of urolithiasis, no kidney transplant, no recurrent urinary tract infections, no renal cysts, no renal disease, no sexually transmitted infections, no single kidney and no urinary catheter        Review of Systems   Constitutional: Negative for chills and fever.   Respiratory: Negative for chest tightness and wheezing.    Cardiovascular: Negative for chest pain and palpitations.   Gastrointestinal: Negative for abdominal pain, nausea and vomiting.   Genitourinary: Negative for flank pain and vaginal discharge.   Musculoskeletal: Negative for back pain and neck pain.   Skin: Negative for pallor.   Psychiatric/Behavioral: Negative.    All other systems reviewed and are negative.      Past Medical History:   Diagnosis Date   • A-fib ([facility])    • Arthritis    • Cancer ([facility])     1988- left breast   • Carpal tunnel syndrome    • CHF (congestive heart failure) ([facility])    • Detached retina     right side   • Disease of thyroid gland    • Dizzy    • Full dentures    • GERD (gastroesophageal  "reflux disease)    • Hearing aid worn     bilat   • Heart murmur    • History of transfusion     x4 just this year- never had reaction   • [place] (hard of hearing)     bilat hearinhg aids   • Hypertension    • Infection     right knee- had infection 15 to 20 years ago- unsure if mrsa or not   • Rheumatic fever    • SOBOE (shortness of breath on exertion)    • UTI (urinary tract infection)    • Wears glasses        Allergies   Allergen Reactions   • Cardizem [Diltiazem] Other (See Comments)     \"unknown\"   • Codeine GI Intolerance   • Morphine Other (See Comments)     \"unknown\"   • Percocet [Oxycodone-Acetaminophen] Other (See Comments)     \"unknown\"     • Sulfa Antibiotics Unknown (See Comments)     Unknown         Past Surgical History:   Procedure Laterality Date   • APPENDECTOMY     • CARDIAC ELECTROPHYSIOLOGY PROCEDURE N/A 06/25/2021    Procedure: DEVICE IMPLANT;  Surgeon: Calvin Ventura MD;  Location:  [facility];  Service: Cardiovascular;  Laterality: N/A;   • CARPAL TUNNEL RELEASE Bilateral    • CATARACT EXTRACTION, BILATERAL     • CHOLECYSTECTOMY     • COLONOSCOPY     • HYSTERECTOMY     • MASTECTOMY Left    • OTHER SURGICAL HISTORY      x2 fluid removed from right side   • PLEURAL CATHETER INSERTION Right 4/4/2022    Procedure: PLEURX CATHETER INSERTION;  Surgeon: Fidel Carter MD;  Location: [facility] OR;  Service: Cardiothoracic;  Laterality: Right;   • PORTACATH PLACEMENT Left    • REPLACEMENT TOTAL KNEE BILATERAL     • RETINAL DETACHMENT SURGERY Right     buckle in place   • SHOULDER ROTATOR CUFF REPAIR Right    • TOTAL HIP ARTHROPLASTY Right    • TRIGGER FINGER RELEASE Right     thinks right side       History reviewed. No pertinent family history.    Social History     Socioeconomic History   • Marital status:    Tobacco Use   • Smoking status: Never Smoker   • Smokeless tobacco: Never Used   Vaping Use   • Vaping Use: Never used   Substance and Sexual Activity   • Alcohol " use: No   • Drug use: No   • Sexual activity: Defer           Objective   Physical Exam  Vitals and nursing note reviewed.   Constitutional:       Appearance: She is well-developed.      Comments: Warm pink dry afebrile nontoxic   HENT:      Head: Normocephalic and atraumatic.      Right Ear: External ear normal.      Left Ear: External ear normal.      Nose: Nose normal.   Eyes:      General: No scleral icterus.     Conjunctiva/sclera: Conjunctivae normal.      Pupils: Pupils are equal, round, and reactive to light.   Neck:      Thyroid: No thyromegaly.   Cardiovascular:      Rate and Rhythm: Normal rate and regular rhythm.      Heart sounds: Normal heart sounds.   Pulmonary:      Effort: Pulmonary effort is normal. No respiratory distress.      Breath sounds: Normal breath sounds. No wheezing or rales.   Chest:      Chest wall: No tenderness.   Abdominal:      General: Bowel sounds are normal. There is no distension.      Palpations: Abdomen is soft.      Tenderness: There is no abdominal tenderness.   Musculoskeletal:         General: Normal range of motion.      Cervical back: Normal range of motion.   Lymphadenopathy:      Cervical: No cervical adenopathy.   Skin:     General: Skin is warm and dry.   Neurological:      Mental Status: She is alert and oriented to person, place, and time.      Cranial Nerves: No cranial nerve deficit.      Coordination: Coordination normal.      Deep Tendon Reflexes: Reflexes are normal and symmetric. Reflexes normal.   Psychiatric:         Behavior: Behavior normal.         Thought Content: Thought content normal.         Judgment: Judgment normal.         Procedures           ED Course                                         Recent Results (from the past 24 hour(s))   Urinalysis With Microscopic If Indicated (No Culture) - Urine, Catheter In/Out    Collection Time: 04/16/22  9:34 AM    Specimen: Urine, Catheter In/Out   Result Value Ref Range    Color, UA Dark Yellow (A)  Yellow, Straw    Appearance, UA Cloudy (A) Clear    pH, UA 5.5 5.0 - 8.0    Specific Gravity, UA 1.016 1.001 - 1.030    Glucose, UA Negative Negative    Ketones, UA Negative Negative    Bilirubin, UA Small (1+) (A) Negative    Blood, UA Large (3+) (A) Negative    Protein,  mg/dL (2+) (A) Negative    Leuk Esterase, UA Large (3+) (A) Negative    Nitrite, UA Positive (A) Negative    Urobilinogen, UA 1.0 E.U./dL 0.2 - 1.0 E.U./dL   Urinalysis With Culture If Indicated - Urine, Catheter In/Out    Collection Time: 04/16/22  9:34 AM    Specimen: Urine, Catheter In/Out   Result Value Ref Range    Color, UA Dark Yellow (A) Yellow, Straw    Appearance, UA Cloudy (A) Clear    pH, UA 5.5 5.0 - 8.0    Specific Gravity, UA 1.016 1.001 - 1.030    Glucose, UA Negative Negative    Ketones, UA Negative Negative    Bilirubin, UA Small (1+) (A) Negative    Blood, UA Large (3+) (A) Negative    Protein,  mg/dL (2+) (A) Negative    Leuk Esterase, UA Large (3+) (A) Negative    Nitrite, UA Positive (A) Negative    Urobilinogen, UA 1.0 E.U./dL 0.2 - 1.0 E.U./dL   Urinalysis, Microscopic Only - Urine, Catheter In/Out    Collection Time: 04/16/22  9:34 AM    Specimen: Urine, Catheter In/Out   Result Value Ref Range    RBC, UA Too Numerous to Count (A) None Seen, 0-2 /HPF    WBC, UA Too Numerous to Count (A) None Seen, 0-2 /HPF    Bacteria, UA 4+ (A) None Seen, Trace /HPF    Squamous Epithelial Cells, UA 0-2 None Seen, 0-2 /HPF    Hyaline Casts, UA 0-6 0 - 6 /LPF    Methodology Automated Microscopy    Green Top (Gel)    Collection Time: 04/16/22  9:35 AM   Result Value Ref Range    Extra Tube Hold for add-ons.    Lavender Top    Collection Time: 04/16/22  9:35 AM   Result Value Ref Range    Extra Tube hold for add-on    Gold Top - SST    Collection Time: 04/16/22  9:35 AM   Result Value Ref Range    Extra Tube Hold for add-ons.    Gray Top    Collection Time: 04/16/22  9:35 AM   Result Value Ref Range    Extra Tube Hold for add-ons.     Light Blue Top    Collection Time: 04/16/22  9:35 AM   Result Value Ref Range    Extra Tube hold for add-on    Comprehensive Metabolic Panel    Collection Time: 04/16/22  9:35 AM    Specimen: Blood   Result Value Ref Range    Glucose 223 (H) 65 - 99 mg/dL    BUN 15 8 - 23 mg/dL    Creatinine 0.91 0.57 - 1.00 mg/dL    Sodium 129 (L) 136 - 145 mmol/L    Potassium 4.0 3.5 - 5.2 mmol/L    Chloride 90 (L) 98 - 107 mmol/L    CO2 29.0 22.0 - 29.0 mmol/L    Calcium 8.6 8.2 - 9.6 mg/dL    Total Protein 5.8 (L) 6.0 - 8.5 g/dL    Albumin 4.10 3.50 - 5.20 g/dL    ALT (SGPT) 14 1 - 33 U/L    AST (SGOT) 18 1 - 32 U/L    Alkaline Phosphatase 133 (H) 39 - 117 U/L    Total Bilirubin 0.5 0.0 - 1.2 mg/dL    Globulin 1.7 gm/dL    A/G Ratio 2.4 g/dL    BUN/Creatinine Ratio 16.5 7.0 - 25.0    Anion Gap 10.0 5.0 - 15.0 mmol/L    eGFR 58.2 (L) >60.0 mL/min/1.73   CBC Auto Differential    Collection Time: 04/16/22  9:35 AM    Specimen: Blood   Result Value Ref Range    WBC 8.06 3.40 - 10.80 10*3/mm3    RBC 3.65 (L) 3.77 - 5.28 10*6/mm3    Hemoglobin 10.5 (L) 12.0 - 15.9 g/dL    Hematocrit 32.9 (L) 34.0 - 46.6 %    MCV 90.1 79.0 - 97.0 fL    MCH 28.8 26.6 - 33.0 pg    MCHC 31.9 31.5 - 35.7 g/dL    RDW 13.2 12.3 - 15.4 %    RDW-SD 43.8 37.0 - 54.0 fl    MPV 9.5 6.0 - 12.0 fL    Platelets 246 140 - 450 10*3/mm3    Neutrophil % 86.5 (H) 42.7 - 76.0 %    Lymphocyte % 5.7 (L) 19.6 - 45.3 %    Monocyte % 5.0 5.0 - 12.0 %    Eosinophil % 1.5 0.3 - 6.2 %    Basophil % 0.6 0.0 - 1.5 %    Immature Grans % 0.7 (H) 0.0 - 0.5 %    Neutrophils, Absolute 6.97 1.70 - 7.00 10*3/mm3    Lymphocytes, Absolute 0.46 (L) 0.70 - 3.10 10*3/mm3    Monocytes, Absolute 0.40 0.10 - 0.90 10*3/mm3    Eosinophils, Absolute 0.12 0.00 - 0.40 10*3/mm3    Basophils, Absolute 0.05 0.00 - 0.20 10*3/mm3    Immature Grans, Absolute 0.06 (H) 0.00 - 0.05 10*3/mm3    nRBC 0.0 0.0 - 0.2 /100 WBC     Note: In addition to lab results from this visit, the labs listed above may include  "labs taken at another facility or during a different encounter within the last 24 hours. Please correlate lab times with ED admission and discharge times for further clarification of the services performed during this visit.    No orders to display     Vitals:    04/16/22 0904 04/16/22 1000 04/16/22 1030   BP: 143/92 128/69 121/53   BP Location: Right arm     Patient Position: Lying     Pulse: 94     Resp: 14     Temp: 98 °F (36.7 °C)     TempSrc: Oral     SpO2: 97% 96% 93%   Weight: 64.4 kg (142 lb)     Height: 160 cm (63\")       Medications   cefTRIAXone (ROCEPHIN) 1 g/100 mL 0.9% NS (MBP) (0 g Intravenous Stopped 4/16/22 1143)     ECG/EMG Results (last 24 hours)     ** No results found for the last 24 hours. **        No orders to display               MDM  Number of Diagnoses or Management Options  Acute UTI: new and requires workup  Hematuria, unspecified type: new and requires workup     Amount and/or Complexity of Data Reviewed  Clinical lab tests: reviewed and ordered  Tests in the medicine section of CPT®: reviewed and ordered  Discuss the patient with other providers: yes    Patient Progress  Patient progress: stable      Final diagnoses:   Acute UTI   Hematuria, unspecified type       ED Disposition  ED Disposition     ED Disposition   Discharge    Condition   Stable    Comment   --             Omayra Alcazar, DO  [address] [facility]  [address] [place] [address]  [phone]               Medication List      New Prescriptions    cefdinir 300 MG capsule  Commonly known as: OMNICEF  Take 1 capsule by mouth 2 (Two) Times a Day.        Stop    PRESERVISION AREDS 2 PO           Where to Get Your Medications      These medications were sent to [facility] - [place], [place] - [address] [facility] - [phone] PH - [phone] FX  [facility] [address]    Phone: [phone]   · cefdinir 300 MG capsule          [employer] [place]  04/17/22 " 0742

## 2022-04-18 LAB — BACTERIA SPEC AEROBE CULT: ABNORMAL

## 2022-04-19 PROCEDURE — G0180 MD CERTIFICATION HHA PATIENT: HCPCS | Performed by: THORACIC SURGERY (CARDIOTHORACIC VASCULAR SURGERY)

## 2022-04-21 ENCOUNTER — OFFICE VISIT (OUTPATIENT)
Dept: CARDIAC SURGERY | Facility: CLINIC | Age: 87
End: 2022-04-21

## 2022-04-21 VITALS
DIASTOLIC BLOOD PRESSURE: 61 MMHG | BODY MASS INDEX: 24.98 KG/M2 | HEART RATE: 63 BPM | TEMPERATURE: 97.3 F | SYSTOLIC BLOOD PRESSURE: 119 MMHG | HEIGHT: 63 IN | OXYGEN SATURATION: 99 % | WEIGHT: 141 LBS

## 2022-04-21 DIAGNOSIS — C50.919 MALIGNANT NEOPLASM OF FEMALE BREAST, UNSPECIFIED ESTROGEN RECEPTOR STATUS, UNSPECIFIED LATERALITY, UNSPECIFIED SITE OF BREAST: ICD-10-CM

## 2022-04-21 DIAGNOSIS — J90 RECURRENT RIGHT PLEURAL EFFUSION: ICD-10-CM

## 2022-04-21 DIAGNOSIS — I48.11 LONGSTANDING PERSISTENT ATRIAL FIBRILLATION: Primary | ICD-10-CM

## 2022-04-21 PROCEDURE — 71045 X-RAY EXAM CHEST 1 VIEW: CPT | Performed by: THORACIC SURGERY (CARDIOTHORACIC VASCULAR SURGERY)

## 2022-04-21 PROCEDURE — 99024 POSTOP FOLLOW-UP VISIT: CPT | Performed by: THORACIC SURGERY (CARDIOTHORACIC VASCULAR SURGERY)

## 2022-04-21 RX ORDER — NITROFURANTOIN 25; 75 MG/1; MG/1
CAPSULE ORAL
COMMUNITY
Start: 2022-04-18

## 2022-04-21 NOTE — PROGRESS NOTES
04/21/2022  Patient Information  Syeda Gomez                                                                                          [address] [facility] [address]   1/6/1927  'PCP/Referring Physician'  Ottoniel, Omayra Chan, DO  No ref. provider found  Chief Complaint   Patient presents with   • Follow-up     [facility] S/P RT Pleurx Cath on 4/4/22. Pt stated she is draining daily about 150 - 230mL and denies SOA.          CC: The catheter in my side hurts all the time    History of Present Illness: Elderly  female with a recent Pleurx catheter placed in the right hemithorax for recurrent right pleural effusion.  The patient does have a past history of carcinoma of the breast.  Since she was discharged home she is done well.  She has been having daily or every other day drainage of 150 to 200 cc of pleural fluid.  She has had pain in the area of the catheter insertion and her daughter is concerned that the pain is secondary to an infected stitch holding the catheter in place.  In all other respects she is doing well.      Patient Active Problem List   Diagnosis   • Pneumonia due to COVID-19 virus   • Pancytopenia (HCC)   • Presence of cardiac pacemaker   • Atrial fibrillation ([facility]CC)   • Prolonged QTc interval on ECG   • Hypothyroidism (acquired)   • HTN (hypertension)   • HLD (hyperlipidemia)   • Elective replacement indicated for pacemaker   • CHF (congestive heart failure) ([facility])   • Anemia   • Recurrent right pleural effusion     Past Medical History:   Diagnosis Date   • A-fib ([facility])    • Arthritis    • Cancer ([facility])     1988- left breast   • Carpal tunnel syndrome    • CHF (congestive heart failure) ([facility])    • Detached retina     right side   • Disease of thyroid gland    • Dizzy    • Full dentures    • GERD (gastroesophageal reflux disease)    • Hearing aid worn     bilat   • Heart murmur    • History of transfusion     x4 just this year- never had reaction   • [place] (hard of hearing)     bilat  hearinhg aids   • Hypertension    • Infection     right knee- had infection 15 to 20 years ago- unsure if mrsa or not   • Rheumatic fever    • SOBOE (shortness of breath on exertion)    • UTI (urinary tract infection)    • Wears glasses      Past Surgical History:   Procedure Laterality Date   • APPENDECTOMY     • CARDIAC ELECTROPHYSIOLOGY PROCEDURE N/A 06/25/2021    Procedure: DEVICE IMPLANT;  Surgeon: Calvin Ventura MD;  Location:  [facility];  Service: Cardiovascular;  Laterality: N/A;   • CARPAL TUNNEL RELEASE Bilateral    • CATARACT EXTRACTION, BILATERAL     • CHOLECYSTECTOMY     • COLONOSCOPY     • HYSTERECTOMY     • MASTECTOMY Left    • OTHER SURGICAL HISTORY      x2 fluid removed from right side   • PLEURAL CATHETER INSERTION Right 4/4/2022    Procedure: PLEURX CATHETER INSERTION;  Surgeon: Fidel Carter MD;  Location:  [facility] OR;  Service: Cardiothoracic;  Laterality: Right;   • PORTACATH PLACEMENT Left    • REPLACEMENT TOTAL KNEE BILATERAL     • RETINAL DETACHMENT SURGERY Right     buckle in place   • SHOULDER ROTATOR CUFF REPAIR Right    • TOTAL HIP ARTHROPLASTY Right    • TRIGGER FINGER RELEASE Right     thinks right side       Current Outpatient Medications:   •  acetaminophen (TYLENOL) 650 MG 8 hr tablet, Take 650 mg by mouth Every 8 (Eight) Hours As Needed for Mild Pain ., Disp: , Rfl:   •  albuterol (PROVENTIL) (2.5 MG/3ML) 0.083% nebulizer solution, Take 2.5 mg by nebulization 2 (Two) Times a Day As Needed for Shortness of Air., Disp: , Rfl:   •  aspirin 81 MG EC tablet, Take 81 mg by mouth Daily., Disp: , Rfl:   •  carvedilol (COREG) 12.5 MG tablet, Take 12.5 mg by mouth 2 (Two) Times a Day With Meals., Disp: , Rfl:   •  cefdinir (OMNICEF) 300 MG capsule, Take 1 capsule by mouth 2 (Two) Times a Day., Disp: 20 capsule, Rfl: 0  •  clonazePAM (KlonoPIN) 0.5 MG tablet, Take 0.25-0.5 mg by mouth At Night As Needed (sleep)., Disp: , Rfl:   •  diphenhydrAMINE (BENADRYL) 25 MG  "tablet, Take 50 mg by mouth At Night As Needed for Sleep., Disp: , Rfl:   •  docusate sodium (COLACE) 100 MG capsule, Take 200 mg by mouth As Needed for Constipation., Disp: , Rfl:   •  isosorbide mononitrate (IMDUR) 30 MG 24 hr tablet, Take 1 tablet by mouth Daily., Disp: 30 tablet, Rfl: 1  •  levothyroxine (SYNTHROID, LEVOTHROID) 50 MCG tablet, Take 50 mcg by mouth Daily., Disp: , Rfl:   •  loratadine (CLARITIN) 10 MG tablet, Take 10 mg by mouth Daily., Disp: , Rfl:   •  magnesium oxide (MAGOX) 400 (241.3 Mg) MG tablet tablet, Take 400 mg by mouth Daily., Disp: , Rfl:   •  nitrofurantoin, macrocrystal-monohydrate, (MACROBID) 100 MG capsule, , Disp: , Rfl:   •  nitroglycerin (NITROSTAT) 0.4 MG SL tablet, Place 0.4 mg under the tongue Every 5 (Five) Minutes As Needed for Chest Pain. Take no more than 3 doses in 15 minutes., Disp: , Rfl:   •  O2 (OXYGEN), Inhale 2 L/min Daily As Needed (shortness of air)., Disp: , Rfl:   •  omeprazole (priLOSEC) 20 MG capsule, Take 20 mg by mouth Daily., Disp: , Rfl:   •  ondansetron (ZOFRAN) 8 MG tablet, Take 8 mg by mouth Every 6 (Six) Hours As Needed for Nausea or Vomiting., Disp: , Rfl:   •  pravastatin (PRAVACHOL) 20 MG tablet, Take 20 mg by mouth Every Night., Disp: , Rfl:   •  torsemide (DEMADEX) 20 MG tablet, Take 20 mg by mouth 2 (Two) Times a Day., Disp: , Rfl:   •  traMADol (ULTRAM) 50 MG tablet, Take 50 mg by mouth 2 (Two) Times a Day As Needed for Moderate Pain ., Disp: , Rfl:   •  vitamin D3 125 MCG (5000 UT) capsule capsule, Take 5,000 Units by mouth Daily., Disp: , Rfl:   Allergies   Allergen Reactions   • Cardizem [Diltiazem] Other (See Comments)     \"unknown\"   • Codeine GI Intolerance   • Morphine Other (See Comments)     \"unknown\"   • Percocet [Oxycodone-Acetaminophen] Other (See Comments)     \"unknown\"     • Sulfa Antibiotics Unknown (See Comments)     Unknown       Social History     Socioeconomic History   • Marital status:    • Number of children: 1 " "  Tobacco Use   • Smoking status: Never Smoker   • Smokeless tobacco: Never Used   Vaping Use   • Vaping Use: Never used   Substance and Sexual Activity   • Alcohol use: No   • Drug use: No   • Sexual activity: Defer     History reviewed. No pertinent family history.    ROS, past medical history, surgical history, family history, social history and vitals  reviewed by me and corrected as needed.        Review of Systems   Constitutional: Negative for chills, fever, malaise/fatigue, night sweats and weight loss.   HENT: Negative for hearing loss, odynophagia and sore throat.    Cardiovascular: Negative for chest pain, dyspnea on exertion, leg swelling, orthopnea and palpitations.   Respiratory: Negative for cough and shortness of breath.    Hematologic/Lymphatic: Does not bruise/bleed easily.   Skin: Negative for itching and rash.   Musculoskeletal: Negative for arthritis, joint pain, joint swelling and myalgias.   Gastrointestinal: Negative for abdominal pain, constipation, diarrhea, hematemesis, hematochezia, nausea and vomiting.   Genitourinary: Negative for dysuria, frequency and hematuria.   Neurological: Negative for focal weakness, headaches, numbness and seizures.   Psychiatric/Behavioral: Negative for suicidal ideas.       Vitals:    04/21/22 1016   BP: 119/61   BP Location: Right arm   Pulse: 63   Temp: 97.3 °F (36.3 °C)   SpO2: 99%   Weight: 64 kg (141 lb)   Height: 160 cm (63\")        Physical Exam  Vitals and nursing note reviewed.   Constitutional:       General: She is not in acute distress.     Appearance: Normal appearance.      Comments: Elderly rather frail appearing  female in no acute distress   HENT:      Head: Normocephalic and atraumatic.      Right Ear: External ear normal.      Left Ear: External ear normal.      Nose: Nose normal.      Mouth/Throat:      Mouth: Mucous membranes are moist.   Eyes:      Extraocular Movements: Extraocular movements intact.      Pupils: Pupils are " equal, round, and reactive to light.   Neck:      Vascular: No carotid bruit.   Cardiovascular:      Rate and Rhythm: Normal rate and regular rhythm.      Pulses: Normal pulses.      Heart sounds: Normal heart sounds. No murmur heard.  Pulmonary:      Effort: Pulmonary effort is normal. No respiratory distress.      Breath sounds: No wheezing, rhonchi or rales.      Comments: Decreased breath sounds at the right base  Abdominal:      General: Abdomen is flat. Bowel sounds are normal.      Palpations: Abdomen is soft. There is no mass.      Tenderness: There is no abdominal tenderness. There is no guarding.   Musculoskeletal:         General: No swelling or tenderness.      Cervical back: Normal range of motion. No rigidity. No muscular tenderness.      Right lower leg: No edema.      Left lower leg: No edema.      Comments: Pleurx catheter inserted in the right hemithorax appears normal.  There is some redness about a silk suture holding the catheter in place.  The suture was removed without difficulty the area was cleansed and a sterile dressing was placed.   Lymphadenopathy:      Cervical: No cervical adenopathy.   Skin:     General: Skin is warm and dry.      Capillary Refill: Capillary refill takes less than 2 seconds.      Coloration: Skin is not jaundiced.      Findings: No erythema.   Neurological:      General: No focal deficit present.      Mental Status: She is alert and oriented to person, place, and time. Mental status is at baseline.   Psychiatric:         Mood and Affect: Mood normal.         Behavior: Behavior normal.         Thought Content: Thought content normal.         Judgment: Judgment normal.         Labs: None    Imaging: PA and lateral chest x-ray reviewed.  There is a small right pleural effusion the Pleurx catheter is in good position.  There are chronic changes in both right and left lung fields.  Mediastinum is normal    Assessment: Stable course following Pleurx catheter insertion  sutured that is irritating the patient removed    Plan: Return to clinic in 6 weeks with a repeat chest x-ray    Electronically signed by Fidel Carter MD, 04/22/22, 2:08 PM EDT.

## 2022-04-25 ENCOUNTER — READMISSION MANAGEMENT (OUTPATIENT)
Dept: CALL CENTER | Facility: HOSPITAL | Age: 87
End: 2022-04-25

## 2022-04-25 NOTE — OUTREACH NOTE
CHF Week 4 Survey    Flowsheet Row Responses   [facility] patient discharged from? [place]   Does the patient have one of the following disease processes/diagnoses(primary or secondary)? CHF   Week 4 attempt successful? Yes   Call start time 1513   Call end time 1517   Discharge diagnosis Acute respiratory failure with hypoxia   Meds reviewed with patient/caregiver? Yes   Is the patient having any side effects they believe may be caused by any medication additions or changes? No   Is the patient taking all medications as directed (includes completed medication regime)? Yes   Has the patient kept scheduled appointments due by today? Yes   Is the patient still receiving Home Health Services? Yes   Pulse Ox monitoring Intermittent   Pulse Ox device source Patient   O2 Sat comments 98% on RA   O2 Sat: education provided Sat levels, When to seek care   Psychosocial issues? No   Comments states does not need 02 often recently   What is the patient's perception of their health status since discharge? Improving   Nursing interventions Nurse provided patient education   Is the patient weighing daily? Yes   Does the patient have scales? Yes   Is the patient able to teach back Heart Failure diet management? Yes   Is the patient able to teach back Heart Failure Zones? Yes   Is the patient able to teach back signs and symptoms of worsening condition? (i.e. weight gain, shortness of air, etc.) Yes   Is the patient/caregiver able to teach back the hierarchy of who to call/visit for symptoms/problems? PCP, Specialist, Home health nurse, Urgent Care, ED, 911 Yes   Additional teach back comments states has good understanding of recognizing increases in fluid   Week 4 Call Completed? Yes   Would the patient like one additional call? No   Graduated Yes   Is the patient interested in additional calls from an ambulatory ?  NOTE:  applies to high risk patients requiring additional follow-up. No   Did the patient feel  the follow up calls were helpful during their recovery period? Yes   Was the number of calls appropriate? Yes          SOFI ALCAZAR - Registered Nurse

## 2022-04-28 ENCOUNTER — HOME CARE VISIT (OUTPATIENT)
Dept: HOME HEALTH SERVICES | Facility: HOME HEALTHCARE | Age: 87
End: 2022-04-28

## 2022-04-28 VITALS
SYSTOLIC BLOOD PRESSURE: 128 MMHG | BODY MASS INDEX: 25.15 KG/M2 | DIASTOLIC BLOOD PRESSURE: 68 MMHG | OXYGEN SATURATION: 99 % | RESPIRATION RATE: 16 BRPM | HEART RATE: 68 BPM | TEMPERATURE: 97 F | WEIGHT: 142 LBS

## 2022-04-28 PROCEDURE — G0495 RN CARE TRAIN/EDU IN HH: HCPCS

## 2022-04-28 NOTE — CASE COMMUNICATION
Patient has been discharged from home care services.  Pts daughter is independent with care of pleurix catheter , has supplies in the home and has the information about how to order more as needed.  Goals have been met and pt and family do not feel any further home care services are needed at this time.

## 2022-06-02 ENCOUNTER — OFFICE VISIT (OUTPATIENT)
Dept: CARDIAC SURGERY | Facility: CLINIC | Age: 87
End: 2022-06-02

## 2022-06-02 VITALS
OXYGEN SATURATION: 95 % | DIASTOLIC BLOOD PRESSURE: 59 MMHG | HEART RATE: 79 BPM | WEIGHT: 150 LBS | BODY MASS INDEX: 26.58 KG/M2 | HEIGHT: 63 IN | TEMPERATURE: 97.3 F | SYSTOLIC BLOOD PRESSURE: 109 MMHG

## 2022-06-02 DIAGNOSIS — I20.8 CHRONIC STABLE ANGINA: ICD-10-CM

## 2022-06-02 DIAGNOSIS — I48.91 ATRIAL FIBRILLATION, UNSPECIFIED TYPE: ICD-10-CM

## 2022-06-02 DIAGNOSIS — J90 RECURRENT RIGHT PLEURAL EFFUSION: ICD-10-CM

## 2022-06-02 DIAGNOSIS — C50.919 MALIGNANT NEOPLASM OF FEMALE BREAST, UNSPECIFIED ESTROGEN RECEPTOR STATUS, UNSPECIFIED LATERALITY, UNSPECIFIED SITE OF BREAST: ICD-10-CM

## 2022-06-02 DIAGNOSIS — I50.9 CHRONIC CONGESTIVE HEART FAILURE, UNSPECIFIED HEART FAILURE TYPE: ICD-10-CM

## 2022-06-02 PROCEDURE — 71046 X-RAY EXAM CHEST 2 VIEWS: CPT | Performed by: THORACIC SURGERY (CARDIOTHORACIC VASCULAR SURGERY)

## 2022-06-02 PROCEDURE — 99213 OFFICE O/P EST LOW 20 MIN: CPT | Performed by: THORACIC SURGERY (CARDIOTHORACIC VASCULAR SURGERY)

## 2022-06-02 RX ORDER — SACUBITRIL AND VALSARTAN 24; 26 MG/1; MG/1
1 TABLET, FILM COATED ORAL 2 TIMES DAILY
COMMUNITY

## 2022-06-02 NOTE — PROGRESS NOTES
06/02/2022  Patient Information  Syeda Gomez                                                                                          [address] [facility] [address]   1/6/1927  'PCP/Referring Physician'  Ottoniel, Omayra Chan, DO  No ref. provider found  Chief Complaint   Patient presents with   • Follow-up     Follow up for right pleural effusion.       CC: I keep having pain right where the catheters    History of Present Illness: 89-year-old  female with a recurrent right pleural effusion.  Patient had a Pleurx catheter placed 2 months ago.  She has had persistent drainage of 200 cc or thereabouts every other day since that time.  She has persistent pain in the right chest near the catheter insertion site.  There is no redness or erythema.  She has not had fever, chills, or night sweats.  Recently it appears that the amount of drainage has decreased.  PA and lateral chest x-ray done in the office today reveals catheter position to be good with only a minimal amount of residual right pleural fluid.  Lung fields are otherwise clear      Patient Active Problem List   Diagnosis   • Pneumonia due to COVID-19 virus   • Pancytopenia (HCC)   • Presence of cardiac pacemaker   • Atrial fibrillation (HCC)   • Prolonged QTc interval on ECG   • Hypothyroidism (acquired)   • HTN (hypertension)   • HLD (hyperlipidemia)   • Elective replacement indicated for pacemaker   • CHF (congestive heart failure) (HCC)   • Anemia   • Recurrent right pleural effusion     Past Medical History:   Diagnosis Date   • A-fib (HCC)    • Arthritis    • Cancer (HCC)     1988- left breast   • Carpal tunnel syndrome    • CHF (congestive heart failure) (HCC)    • Detached retina     right side   • Disease of thyroid gland    • Dizzy    • Full dentures    • GERD (gastroesophageal reflux disease)    • Hearing aid worn     bilat   • Heart murmur    • History of transfusion     x4 just this year- never had reaction   • [place] (hard of  hearing)     bilat hearinhg aids   • Hypertension    • Infection     right knee- had infection 15 to 20 years ago- unsure if mrsa or not   • Rheumatic fever    • SOBOE (shortness of breath on exertion)    • UTI (urinary tract infection)    • Wears glasses      Past Surgical History:   Procedure Laterality Date   • APPENDECTOMY     • CARDIAC ELECTROPHYSIOLOGY PROCEDURE N/A 06/25/2021    Procedure: DEVICE IMPLANT;  Surgeon: Calvin Ventura MD;  Location:  [facility] INVASIVE LOCATION;  Service: Cardiovascular;  Laterality: N/A;   • CARPAL TUNNEL RELEASE Bilateral    • CATARACT EXTRACTION, BILATERAL     • CHOLECYSTECTOMY     • COLONOSCOPY     • HYSTERECTOMY     • MASTECTOMY Left    • OTHER SURGICAL HISTORY      x2 fluid removed from right side   • PLEURAL CATHETER INSERTION Right 4/4/2022    Procedure: PLEURX CATHETER INSERTION;  Surgeon: Fidel Carter MD;  Location:  [facility] OR;  Service: Cardiothoracic;  Laterality: Right;   • PORTACATH PLACEMENT Left    • REPLACEMENT TOTAL KNEE BILATERAL     • RETINAL DETACHMENT SURGERY Right     buckle in place   • SHOULDER ROTATOR CUFF REPAIR Right    • TOTAL HIP ARTHROPLASTY Right    • TRIGGER FINGER RELEASE Right     thinks right side       Current Outpatient Medications:   •  acetaminophen (TYLENOL) 650 MG 8 hr tablet, Take 650 mg by mouth Every 8 (Eight) Hours As Needed for Mild Pain ., Disp: , Rfl:   •  albuterol (PROVENTIL) (2.5 MG/3ML) 0.083% nebulizer solution, Take 2.5 mg by nebulization 2 (Two) Times a Day As Needed for Shortness of Air., Disp: , Rfl:   •  aspirin 81 MG EC tablet, Take 81 mg by mouth Daily., Disp: , Rfl:   •  carvedilol (COREG) 12.5 MG tablet, Take 12.5 mg by mouth 2 (Two) Times a Day With Meals., Disp: , Rfl:   •  clonazePAM (KlonoPIN) 0.5 MG tablet, Take 0.25-0.5 mg by mouth At Night As Needed (sleep)., Disp: , Rfl:   •  diphenhydrAMINE (BENADRYL) 25 MG tablet, Take 50 mg by mouth At Night As Needed for Sleep., Disp: , Rfl:   •  docusate sodium  "(COLACE) 100 MG capsule, Take 200 mg by mouth As Needed for Constipation., Disp: , Rfl:   •  isosorbide mononitrate (IMDUR) 30 MG 24 hr tablet, Take 1 tablet by mouth Daily., Disp: 30 tablet, Rfl: 1  •  levothyroxine (SYNTHROID, LEVOTHROID) 50 MCG tablet, Take 50 mcg by mouth Daily., Disp: , Rfl:   •  loratadine (CLARITIN) 10 MG tablet, Take 10 mg by mouth Daily., Disp: , Rfl:   •  magnesium oxide (MAGOX) 400 (241.3 Mg) MG tablet tablet, Take 400 mg by mouth Daily., Disp: , Rfl:   •  nitrofurantoin, macrocrystal-monohydrate, (MACROBID) 100 MG capsule, , Disp: , Rfl:   •  nitroglycerin (NITROSTAT) 0.4 MG SL tablet, Place 0.4 mg under the tongue Every 5 (Five) Minutes As Needed for Chest Pain. Take no more than 3 doses in 15 minutes., Disp: , Rfl:   •  O2 (OXYGEN), Inhale 2 L/min Daily As Needed (shortness of air)., Disp: , Rfl:   •  omeprazole (priLOSEC) 20 MG capsule, Take 20 mg by mouth Daily., Disp: , Rfl:   •  ondansetron (ZOFRAN) 8 MG tablet, Take 8 mg by mouth Every 6 (Six) Hours As Needed for Nausea or Vomiting., Disp: , Rfl:   •  pravastatin (PRAVACHOL) 20 MG tablet, Take 20 mg by mouth Every Night., Disp: , Rfl:   •  sacubitril-valsartan (Entresto) 24-26 MG tablet, Take 1 tablet by mouth 2 (Two) Times a Day., Disp: , Rfl:   •  torsemide (DEMADEX) 20 MG tablet, Take 20 mg by mouth 2 (Two) Times a Day., Disp: , Rfl:   •  traMADol (ULTRAM) 50 MG tablet, Take 50 mg by mouth 2 (Two) Times a Day As Needed for Moderate Pain ., Disp: , Rfl:   •  vitamin D3 125 MCG (5000 UT) capsule capsule, Take 5,000 Units by mouth Daily., Disp: , Rfl:   •  cefdinir (OMNICEF) 300 MG capsule, Take 1 capsule by mouth 2 (Two) Times a Day. (Patient not taking: Reported on 6/2/2022), Disp: 20 capsule, Rfl: 0  Allergies   Allergen Reactions   • Cardizem [Diltiazem] Other (See Comments)     \"unknown\"   • Codeine GI Intolerance   • Morphine Other (See Comments)     \"unknown\"   • Percocet [Oxycodone-Acetaminophen] Other (See Comments)     " "\"unknown\"     • Sulfa Antibiotics Unknown (See Comments)     Unknown       Social History     Socioeconomic History   • Marital status:    • Number of children: 1   Tobacco Use   • Smoking status: Never Smoker   • Smokeless tobacco: Never Used   Vaping Use   • Vaping Use: Never used   Substance and Sexual Activity   • Alcohol use: No   • Drug use: No   • Sexual activity: Defer     History reviewed. No pertinent family history.    ROS, past medical history, surgical history, family history, social history and vitals  reviewed by me and corrected as needed.        Review of Systems   Constitutional: Positive for malaise/fatigue and weight gain. Negative for chills, fever, night sweats and weight loss.   HENT: Negative.  Negative for hearing loss, odynophagia and sore throat.    Cardiovascular: Positive for chest pain, dyspnea on exertion and leg swelling. Negative for orthopnea and palpitations.   Respiratory: Positive for cough and shortness of breath. Negative for hemoptysis.    Endocrine: Negative for cold intolerance, heat intolerance, polydipsia, polyphagia and polyuria.   Hematologic/Lymphatic: Bruises/bleeds easily.   Skin: Negative.  Negative for itching and rash.   Musculoskeletal: Positive for back pain and muscle cramps. Negative for joint pain, joint swelling and myalgias.   Gastrointestinal: Positive for abdominal pain and constipation. Negative for diarrhea, hematemesis, hematochezia, melena, nausea and vomiting.   Genitourinary: Negative.  Negative for dysuria, frequency and hematuria.   Neurological: Positive for dizziness, light-headedness and loss of balance. Negative for focal weakness, headaches, numbness and seizures.   Psychiatric/Behavioral: Positive for depression. Negative for suicidal ideas. The patient is nervous/anxious.    All other systems reviewed and are negative.      Vitals:    06/02/22 1000   BP: 109/59   BP Location: Right arm   Patient Position: Sitting   Pulse: 79   Temp: " "97.3 °F (36.3 °C)   SpO2: 95%   Weight: 68 kg (150 lb)   Height: 160 cm (63\")        Physical Exam  Vitals and nursing note reviewed.   Constitutional:       General: She is not in acute distress.     Appearance: Normal appearance. She is normal weight.      Comments: Thin elderly  female in a wheelchair in  with no acute distress.   HENT:      Head: Normocephalic and atraumatic.      Right Ear: External ear normal.      Left Ear: External ear normal.      Nose: Nose normal.      Mouth/Throat:      Mouth: Mucous membranes are moist.   Eyes:      Extraocular Movements: Extraocular movements intact.      Pupils: Pupils are equal, round, and reactive to light.   Neck:      Vascular: No carotid bruit.   Cardiovascular:      Rate and Rhythm: Normal rate and regular rhythm.      Heart sounds: Normal heart sounds. No murmur heard.  Pulmonary:      Effort: Pulmonary effort is normal. No respiratory distress.      Breath sounds: No wheezing, rhonchi or rales.   Abdominal:      General: Abdomen is flat. Bowel sounds are normal.      Palpations: Abdomen is soft. There is no mass.      Tenderness: There is no abdominal tenderness. There is no guarding.   Musculoskeletal:         General: No swelling or tenderness.      Cervical back: Normal range of motion. No rigidity. No muscular tenderness.      Right lower leg: No edema.      Left lower leg: No edema.   Lymphadenopathy:      Cervical: No cervical adenopathy.   Skin:     General: Skin is warm and dry.      Capillary Refill: Capillary refill takes less than 2 seconds.      Coloration: Skin is not jaundiced.      Findings: No erythema.   Neurological:      General: No focal deficit present.      Mental Status: She is alert and oriented to person, place, and time. Mental status is at baseline.   Psychiatric:         Mood and Affect: Mood normal.         Behavior: Behavior normal.         Thought Content: Thought content normal.         Judgment: Judgment normal. "         Labs: None    Imaging: PA and lateral chest x-ray as noted in the present illness    Assessment: Stable course.  Partial resolution of the recurrent right pleural effusion    Plan: Continue catheter drainage every other day.  Patient will return to the office in 6 weeks for follow-up.    Electronically signed by Fidel Carter MD, 06/06/22, 9:47 AM EDT.

## 2022-07-14 ENCOUNTER — OFFICE VISIT (OUTPATIENT)
Dept: CARDIAC SURGERY | Facility: CLINIC | Age: 87
End: 2022-07-14

## 2022-07-14 VITALS
BODY MASS INDEX: 24.88 KG/M2 | WEIGHT: 140.4 LBS | SYSTOLIC BLOOD PRESSURE: 110 MMHG | TEMPERATURE: 97.5 F | DIASTOLIC BLOOD PRESSURE: 60 MMHG | HEART RATE: 76 BPM | HEIGHT: 63 IN | OXYGEN SATURATION: 95 %

## 2022-07-14 DIAGNOSIS — J90 RECURRENT RIGHT PLEURAL EFFUSION: ICD-10-CM

## 2022-07-14 PROCEDURE — 99214 OFFICE O/P EST MOD 30 MIN: CPT | Performed by: NURSE PRACTITIONER

## 2022-07-14 PROCEDURE — 71046 X-RAY EXAM CHEST 2 VIEWS: CPT | Performed by: NURSE PRACTITIONER

## 2022-07-14 RX ORDER — DOXYCYCLINE HYCLATE 100 MG/1
100 CAPSULE ORAL 2 TIMES DAILY
Qty: 28 CAPSULE | Refills: 0 | Status: SHIPPED | OUTPATIENT
Start: 2022-07-14 | End: 2022-07-28

## 2022-07-14 NOTE — PROGRESS NOTES
[facility] Cardiothoracic Surgery Office Follow Up Note     Date of Encounter: 2022     Name: Syeda Gomez  : 1927     Referred By: No ref. provider found  PCP: Omayra Alcazar DO    Chief Complaint:    Chief Complaint   Patient presents with   • Pleural Effusion     6 week follow-up with chest xray. Patient still continues to drain pleurx every other day. Most recent drainage was 110mL last night. Patient has tenderness around pleurx insertion site.        Subjective      History of Present Illness:    Syeda Gomez is a 89 y.o. female non-smoker, with history of hypertension, hyperlipidemia on statin therapy, atrial fibrillation, breast cancer s/p mastectomy, CHF (EF 51 to 55%), PPM, COVID infection , and recurrent right pleural effusion s/p thoracentesis and now s/p Pleurx catheter placement on 2022 by Dr. Carter.  Patient was last seen in clinic by Dr. Carter on 2022, doing well with about 200 cc of drainage every other day and clear CXR.  Recommendation to continue catheter drainage every other day and return for 6-week follow-up.  Patient presents today for follow-up.  Patient's daughter has been draining Pleurx catheter every other day.  They had decrease in drainage to about 75cc-100cc but has increased again over the last few weeks to 150-175cc. Last was drained last night and had out about 110 cc of argenis clear fluid. They are following with cardiology Dr. Ventura, started patient on Entresto therapy. Patient reports that she does get JHAVERI but resolves fairly quickly with sitting. Patient's daughter reports concern for some possible purulent drainage that she noticed around the chest tube site last night. Insertion site with some mild surrounding erythema. Denies any fevers, chills, or body aches.     Review of Systems:  Review of Systems   Constitutional: Positive for malaise/fatigue. Negative for chills, decreased appetite, diaphoresis, fever, night sweats, weight  "gain and weight loss.   HENT: Negative for hoarse voice.    Eyes: Negative for blurred vision, double vision and visual disturbance.   Cardiovascular: Positive for chest pain and dyspnea on exertion. Negative for claudication, irregular heartbeat, leg swelling, near-syncope, orthopnea, palpitations, paroxysmal nocturnal dyspnea and syncope.   Respiratory: Positive for cough and shortness of breath. Negative for hemoptysis, sputum production and wheezing.    Hematologic/Lymphatic: Negative for adenopathy and bleeding problem. Does not bruise/bleed easily.   Skin: Negative for color change, nail changes, poor wound healing and rash.   Musculoskeletal: Positive for arthritis. Negative for back pain, falls and muscle cramps.   Gastrointestinal: Positive for dysphagia and heartburn. Negative for abdominal pain.   Genitourinary: Negative for flank pain.   Neurological: Positive for dizziness, light-headedness, loss of balance and weakness. Negative for brief paralysis, disturbances in coordination, focal weakness, headaches, numbness, paresthesias, sensory change and vertigo.   Psychiatric/Behavioral: Negative for depression and suicidal ideas.   Allergic/Immunologic: Negative for persistent infections.       I have reviewed the following portions of the patient's history: allergies, current medications, past family history, past medical history, past social history, past surgical history, problem list and ROS and confirm it's accurate.    Allergies:  Allergies   Allergen Reactions   • Cardizem [Diltiazem] Other (See Comments)     \"unknown\"   • Codeine GI Intolerance   • Morphine Other (See Comments)     \"unknown\"   • Percocet [Oxycodone-Acetaminophen] Other (See Comments)     \"unknown\"     • Sulfa Antibiotics Unknown (See Comments)     Unknown         Medications:      Current Outpatient Medications:   •  acetaminophen (TYLENOL) 650 MG 8 hr tablet, Take 650 mg by mouth Every 8 (Eight) Hours As Needed for Mild Pain ., " Disp: , Rfl:   •  albuterol (PROVENTIL) (2.5 MG/3ML) 0.083% nebulizer solution, Take 2.5 mg by nebulization 2 (Two) Times a Day As Needed for Shortness of Air., Disp: , Rfl:   •  aspirin 81 MG EC tablet, Take 81 mg by mouth Daily., Disp: , Rfl:   •  carvedilol (COREG) 12.5 MG tablet, Take 12.5 mg by mouth 2 (Two) Times a Day With Meals., Disp: , Rfl:   •  clonazePAM (KlonoPIN) 0.5 MG tablet, Take 0.25-0.5 mg by mouth At Night As Needed (sleep)., Disp: , Rfl:   •  diphenhydrAMINE (BENADRYL) 25 MG tablet, Take 50 mg by mouth At Night As Needed for Sleep., Disp: , Rfl:   •  docusate sodium (COLACE) 100 MG capsule, Take 200 mg by mouth As Needed for Constipation., Disp: , Rfl:   •  isosorbide mononitrate (IMDUR) 30 MG 24 hr tablet, Take 1 tablet by mouth Daily. (Patient taking differently: Take 60 mg by mouth Daily.), Disp: 30 tablet, Rfl: 1  •  levothyroxine (SYNTHROID, LEVOTHROID) 50 MCG tablet, Take 50 mcg by mouth Daily., Disp: , Rfl:   •  loratadine (CLARITIN) 10 MG tablet, Take 10 mg by mouth Daily., Disp: , Rfl:   •  magnesium oxide (MAGOX) 400 (241.3 Mg) MG tablet tablet, Take 400 mg by mouth Daily., Disp: , Rfl:   •  nitrofurantoin, macrocrystal-monohydrate, (MACROBID) 100 MG capsule, , Disp: , Rfl:   •  nitroglycerin (NITROSTAT) 0.4 MG SL tablet, Place 0.4 mg under the tongue Every 5 (Five) Minutes As Needed for Chest Pain. Take no more than 3 doses in 15 minutes., Disp: , Rfl:   •  O2 (OXYGEN), Inhale 2 L/min Daily As Needed (shortness of air)., Disp: , Rfl:   •  omeprazole (priLOSEC) 20 MG capsule, Take 20 mg by mouth Daily., Disp: , Rfl:   •  ondansetron (ZOFRAN) 8 MG tablet, Take 8 mg by mouth Every 6 (Six) Hours As Needed for Nausea or Vomiting., Disp: , Rfl:   •  pravastatin (PRAVACHOL) 20 MG tablet, Take 20 mg by mouth Every Night., Disp: , Rfl:   •  sacubitril-valsartan (Entresto) 24-26 MG tablet, Take 1 tablet by mouth 2 (Two) Times a Day., Disp: , Rfl:   •  torsemide (DEMADEX) 20 MG tablet, Take 20  mg by mouth 2 (Two) Times a Day., Disp: , Rfl:   •  traMADol (ULTRAM) 50 MG tablet, Take 50 mg by mouth 2 (Two) Times a Day As Needed for Moderate Pain ., Disp: , Rfl:   •  vitamin D3 125 MCG (5000 UT) capsule capsule, Take 5,000 Units by mouth Daily., Disp: , Rfl:   •  cefdinir (OMNICEF) 300 MG capsule, Take 1 capsule by mouth 2 (Two) Times a Day. (Patient not taking: No sig reported), Disp: 20 capsule, Rfl: 0  •  doxycycline (VIBRAMYCIN) 100 MG capsule, Take 1 capsule by mouth 2 (Two) Times a Day for 14 days., Disp: 28 capsule, Rfl: 0    History:   Past Medical History:   Diagnosis Date   • A-fib (HCC)    • Arthritis    • Cancer (HCC)     1988- left breast   • Carpal tunnel syndrome    • CHF (congestive heart failure) (HCC)    • Detached retina     right side   • Disease of thyroid gland    • Dizzy    • Full dentures    • GERD (gastroesophageal reflux disease)    • Hearing aid worn     bilat   • Heart murmur    • History of transfusion     x4 just this year- never had reaction   • [place] (hard of hearing)     bilat hearinhg aids   • Hypertension    • Infection     right knee- had infection 15 to 20 years ago- unsure if mrsa or not   • Rheumatic fever    • SOBOE (shortness of breath on exertion)    • UTI (urinary tract infection)    • Wears glasses        Past Surgical History:   Procedure Laterality Date   • APPENDECTOMY     • CARDIAC ELECTROPHYSIOLOGY PROCEDURE N/A 06/25/2021    Procedure: DEVICE IMPLANT;  Surgeon: Calvin Ventura MD;  Location: [facility] EP INVASIVE LOCATION;  Service: Cardiovascular;  Laterality: N/A;   • CARPAL TUNNEL RELEASE Bilateral    • CATARACT EXTRACTION, BILATERAL     • CHOLECYSTECTOMY     • COLONOSCOPY     • HYSTERECTOMY     • MASTECTOMY Left    • OTHER SURGICAL HISTORY      x2 fluid removed from right side   • PLEURAL CATHETER INSERTION Right 4/4/2022    Procedure: PLEURX CATHETER INSERTION;  Surgeon: Fidel Carter MD;  Location: [facility] OR;  Service: Cardiothoracic;  Laterality:  "Right;   • PORTACATH PLACEMENT Left    • REPLACEMENT TOTAL KNEE BILATERAL     • RETINAL DETACHMENT SURGERY Right     buckle in place   • SHOULDER ROTATOR CUFF REPAIR Right    • TOTAL HIP ARTHROPLASTY Right    • TRIGGER FINGER RELEASE Right     thinks right side       Social History     Socioeconomic History   • Marital status:    • Number of children: 1   Tobacco Use   • Smoking status: Never Smoker   • Smokeless tobacco: Never Used   Vaping Use   • Vaping Use: Never used   Substance and Sexual Activity   • Alcohol use: No   • Drug use: No   • Sexual activity: Defer        History reviewed. No pertinent family history.    Objective     Physical Exam:  Vitals:    07/14/22 1122   BP: 110/60   BP Location: Right arm   Patient Position: Sitting   Pulse: 76   Temp: 97.5 °F (36.4 °C)   SpO2: 95%   Weight: 63.7 kg (140 lb 6.4 oz)   Height: 160 cm (63\")      Body mass index is 24.87 kg/m².    Physical Exam  Vitals and nursing note reviewed.   Constitutional:       Appearance: Normal appearance. She is well-developed.   HENT:      Head: Normocephalic and atraumatic.   Eyes:      Pupils: Pupils are equal, round, and reactive to light.   Neck:      Vascular: No carotid bruit.   Cardiovascular:      Rate and Rhythm: Normal rate and regular rhythm.      Pulses: Normal pulses.      Heart sounds: Normal heart sounds, S1 normal and S2 normal. No murmur heard.  Pulmonary:      Effort: Pulmonary effort is normal.      Breath sounds: Normal breath sounds.   Chest:      Comments: Pleurx catheter insertion site with scant amount of thick white drainage, pleural fluid noted in pleurx catheter  Abdominal:      Palpations: Abdomen is soft.   Musculoskeletal:         General: No swelling.      Cervical back: Neck supple.      Right lower leg: No edema.      Left lower leg: No edema.   Skin:     General: Skin is warm and dry.      Capillary Refill: Capillary refill takes less than 2 seconds.      Findings: No bruising.   Neurological: "      General: No focal deficit present.      Mental Status: She is alert and oriented to person, place, and time. Mental status is at baseline.      GCS: GCS eye subscore is 4. GCS verbal subscore is 5. GCS motor subscore is 6.      Motor: Motor function is intact.      Coordination: Coordination is intact.      Gait: Gait is intact.   Psychiatric:         Mood and Affect: Mood normal.         Speech: Speech normal.         Behavior: Behavior normal. Behavior is cooperative.         Cognition and Memory: Cognition normal.         Imaging/Labs:    CXR 7/14/22:    1. Persistent small left effusion.  2. Right base chest tube in satisfactory position  3. Left IJ port with tip likely in the azygos vein.       Assessment / Plan      Assessment / Plan:  Diagnoses and all orders for this visit:    1. Recurrent right pleural effusion  -     doxycycline (VIBRAMYCIN) 100 MG capsule; Take 1 capsule by mouth 2 (Two) Times a Day for 14 days.  Dispense: 28 capsule; Refill: 0       1. Recurrent right pleural effusion s/p thoracentesis and now s/p Pleurx catheter placement on 4/4/2022 by Dr. Carter: Patient's daughter has been draining Pleurx catheter every other day.  They had decrease in drainage to about 75cc-100cc but has increased again over the last few weeks to 150-175cc. Last was drained last night and had out about 110 cc of argenis clear fluid. They do not have home health and daughter changes dressing when she drains pleurx. They are following with cardiology Dr. Ventura, started patient on Entresto therapy. Patient reports that she does get JHAVERI but resolves fairly quickly with sitting. Patient's daughter reports concern for some possible purulent drainage that she noticed around the chest tube site last night. Insertion site with some mild surrounding erythema. Denies any fevers, chills, or body aches. CXR today with no significant pleural effusions noted. Instructed patient to continue draining QOD. Patient has scant  evidence of some thick whitish fluid surrounding chest tube site with mild erythema. Patient does not have any systemic symptoms and pleural fluid noted in catheter is not concerning to be infectious. Will prescribe course of antibiotics and have the patient to follow up in 2 weeks for Pleurx catheter site reassessment.       Follow Up:   Return in about 2 weeks (around 7/28/2022).   Or sooner for any further concerns or worsening sign and symptoms. If unable to reach us in the office please dial 911 or go to the nearest emergency department.      Kelly CLAUDIO  [facility] Cardiothoracic Surgery    Time Spent: I spent 33 minutes caring for Syeda on this date of service. This time includes time spent by me in the following activities: preparing for the visit, reviewing tests, obtaining and/or reviewing a separately obtained history, performing a medically appropriate examination and/or evaluation, counseling and educating the patient/family/caregiver, ordering medications, tests, or procedures, documenting information in the medical record, independently interpreting results and communicating that information with the patient/family/caregiver and care coordination.

## 2022-07-28 ENCOUNTER — OFFICE VISIT (OUTPATIENT)
Dept: CARDIAC SURGERY | Facility: CLINIC | Age: 87
End: 2022-07-28

## 2022-07-28 VITALS
TEMPERATURE: 97.1 F | WEIGHT: 139 LBS | DIASTOLIC BLOOD PRESSURE: 62 MMHG | HEIGHT: 63 IN | HEART RATE: 72 BPM | BODY MASS INDEX: 24.63 KG/M2 | OXYGEN SATURATION: 98 % | SYSTOLIC BLOOD PRESSURE: 122 MMHG

## 2022-07-28 DIAGNOSIS — J90 RECURRENT RIGHT PLEURAL EFFUSION: Primary | ICD-10-CM

## 2022-07-28 PROCEDURE — 99214 OFFICE O/P EST MOD 30 MIN: CPT | Performed by: NURSE PRACTITIONER

## 2022-07-28 NOTE — PROGRESS NOTES
[facility] Cardiothoracic Surgery Office Follow Up Note     Date of Encounter: 07/28/2022     YOB: 1927  Name: Syeda Gomez    PCP: Omayra Alcazar DO    Chief Complaint:    Chief Complaint   Patient presents with   • Follow-up     Follow up for right Pleurx site       History of Present Illness:      Syeda Gomez is a 89 y.o. female with a history of hypertension, hyperlipidemia, atrial fibrillation, breast cancer, CHF (EF 51 to 55%), PPM, with recurrent right pleural effusion s/p thoracocentesis with subsequent Pleurx catheter placement on 4/4/2020 with Dr. Carter.  Patient presents today for wound check of her right Pleurx site.  Last seen in the office on 7/14/2022 with GONZÁLEZ Gómez revealing purulent drainage around her Pleurx catheter site with some mild surrounding erythema.  Since last office visit, patient's Pleurx catheter site has improved.  She has completed antibiotic therapy.  Her daughter has noted some decrease in output every other day to approximately 50 cc.  Patient with ongoing shortness of breath/lower extremity edema and is followed by Dr. Ventura with initiation of Entresto therapy in May.  Difficult to ascertain if her shortness of breath is improved with pleurX drainage or is her baseline.    Review of Systems:  Review of Systems   Constitutional: Positive for malaise/fatigue. Negative for chills, decreased appetite, diaphoresis, fever, night sweats and weight loss.   HENT: Positive for nosebleeds. Negative for congestion, hoarse voice, sore throat and stridor.    Cardiovascular: Positive for dyspnea on exertion and leg swelling. Negative for chest pain, claudication, irregular heartbeat, near-syncope, orthopnea, palpitations, paroxysmal nocturnal dyspnea and syncope.   Respiratory: Positive for cough and shortness of breath. Negative for hemoptysis, sleep disturbances due to breathing, snoring, sputum production and wheezing.    Hematologic/Lymphatic:  "Negative for adenopathy and bleeding problem. Bruises/bleeds easily.   Skin: Negative.  Negative for color change, dry skin, itching, poor wound healing and rash.   Musculoskeletal: Negative.  Negative for arthritis, back pain, falls and muscle weakness.   Gastrointestinal: Positive for nausea. Negative for abdominal pain, anorexia, constipation, diarrhea, hematochezia, melena and vomiting.   Neurological: Positive for dizziness, light-headedness, loss of balance and weakness. Negative for difficulty with concentration, disturbances in coordination, numbness, seizures and vertigo.   Psychiatric/Behavioral: Negative for altered mental status, depression, memory loss and substance abuse. The patient has insomnia. The patient is not nervous/anxious.    Allergic/Immunologic: Negative.  Negative for persistent infections.       Allergies:  Allergies   Allergen Reactions   • Cardizem [Diltiazem] Other (See Comments)     \"unknown\"   • Codeine GI Intolerance   • Morphine Other (See Comments)     \"unknown\"   • Percocet [Oxycodone-Acetaminophen] Other (See Comments)     \"unknown\"     • Sulfa Antibiotics Unknown (See Comments)     Unknown         Medications:      Current Outpatient Medications:   •  acetaminophen (TYLENOL) 650 MG 8 hr tablet, Take 650 mg by mouth Every 8 (Eight) Hours As Needed for Mild Pain ., Disp: , Rfl:   •  albuterol (PROVENTIL) (2.5 MG/3ML) 0.083% nebulizer solution, Take 2.5 mg by nebulization 2 (Two) Times a Day As Needed for Shortness of Air., Disp: , Rfl:   •  aspirin 81 MG EC tablet, Take 81 mg by mouth Daily., Disp: , Rfl:   •  carvedilol (COREG) 12.5 MG tablet, Take 12.5 mg by mouth 2 (Two) Times a Day With Meals., Disp: , Rfl:   •  clonazePAM (KlonoPIN) 0.5 MG tablet, Take 0.25-0.5 mg by mouth At Night As Needed (sleep)., Disp: , Rfl:   •  diphenhydrAMINE (BENADRYL) 25 MG tablet, Take 50 mg by mouth At Night As Needed for Sleep., Disp: , Rfl:   •  docusate sodium (COLACE) 100 MG capsule, Take " 200 mg by mouth As Needed for Constipation., Disp: , Rfl:   •  doxycycline (VIBRAMYCIN) 100 MG capsule, Take 1 capsule by mouth 2 (Two) Times a Day for 14 days., Disp: 28 capsule, Rfl: 0  •  isosorbide mononitrate (IMDUR) 30 MG 24 hr tablet, Take 1 tablet by mouth Daily. (Patient taking differently: Take 60 mg by mouth Daily.), Disp: 30 tablet, Rfl: 1  •  levothyroxine (SYNTHROID, LEVOTHROID) 50 MCG tablet, Take 50 mcg by mouth Daily., Disp: , Rfl:   •  loratadine (CLARITIN) 10 MG tablet, Take 10 mg by mouth Daily., Disp: , Rfl:   •  magnesium oxide (MAGOX) 400 (241.3 Mg) MG tablet tablet, Take 400 mg by mouth Daily., Disp: , Rfl:   •  nitrofurantoin, macrocrystal-monohydrate, (MACROBID) 100 MG capsule, , Disp: , Rfl:   •  nitroglycerin (NITROSTAT) 0.4 MG SL tablet, Place 0.4 mg under the tongue Every 5 (Five) Minutes As Needed for Chest Pain. Take no more than 3 doses in 15 minutes., Disp: , Rfl:   •  O2 (OXYGEN), Inhale 2 L/min Daily As Needed (shortness of air)., Disp: , Rfl:   •  omeprazole (priLOSEC) 20 MG capsule, Take 20 mg by mouth Daily., Disp: , Rfl:   •  ondansetron (ZOFRAN) 8 MG tablet, Take 8 mg by mouth Every 6 (Six) Hours As Needed for Nausea or Vomiting., Disp: , Rfl:   •  pravastatin (PRAVACHOL) 20 MG tablet, Take 20 mg by mouth Every Night., Disp: , Rfl:   •  sacubitril-valsartan (Entresto) 24-26 MG tablet, Take 1 tablet by mouth 2 (Two) Times a Day., Disp: , Rfl:   •  torsemide (DEMADEX) 20 MG tablet, Take 20 mg by mouth 2 (Two) Times a Day., Disp: , Rfl:   •  traMADol (ULTRAM) 50 MG tablet, Take 50 mg by mouth 2 (Two) Times a Day As Needed for Moderate Pain ., Disp: , Rfl:   •  vitamin D3 125 MCG (5000 UT) capsule capsule, Take 5,000 Units by mouth Daily., Disp: , Rfl:     Social History     Socioeconomic History   • Marital status:    • Number of children: 1   Tobacco Use   • Smoking status: Never Smoker   • Smokeless tobacco: Never Used   Vaping Use   • Vaping Use: Never used   Substance  and Sexual Activity   • Alcohol use: No   • Drug use: No   • Sexual activity: Defer       Family History   Problem Relation Age of Onset   • Heart attack Mother    • Hypertension Father    • Stroke Father        Past Medical History:   Diagnosis Date   • A-fib (HCC)    • Arthritis    • Cancer (HCC)     1988- left breast   • Carpal tunnel syndrome    • CHF (congestive heart failure) (HCC)    • Detached retina     right side   • Disease of thyroid gland    • Dizzy    • Full dentures    • GERD (gastroesophageal reflux disease)    • Hearing aid worn     bilat   • Heart murmur    • History of transfusion     x4 just this year- never had reaction   • [place] (hard of hearing)     bilat hearinhg aids   • Hypertension    • Infection     right knee- had infection 15 to 20 years ago- unsure if mrsa or not   • Rheumatic fever    • SOBOE (shortness of breath on exertion)    • UTI (urinary tract infection)    • Wears glasses        Past Surgical History:   Procedure Laterality Date   • APPENDECTOMY     • CARDIAC ELECTROPHYSIOLOGY PROCEDURE N/A 06/25/2021    Procedure: DEVICE IMPLANT;  Surgeon: Calvin Ventura MD;  Location: [facility] INVASIVE LOCATION;  Service: Cardiovascular;  Laterality: N/A;   • CARPAL TUNNEL RELEASE Bilateral    • CATARACT EXTRACTION, BILATERAL     • CHOLECYSTECTOMY     • COLONOSCOPY     • HYSTERECTOMY     • MASTECTOMY Left    • OTHER SURGICAL HISTORY      x2 fluid removed from right side   • PLEURAL CATHETER INSERTION Right 4/4/2022    Procedure: PLEURX CATHETER INSERTION;  Surgeon: Fidel Carter MD;  Location: [facility] OR;  Service: Cardiothoracic;  Laterality: Right;   • PORTACATH PLACEMENT Left    • REPLACEMENT TOTAL KNEE BILATERAL     • RETINAL DETACHMENT SURGERY Right     buckle in place   • SHOULDER ROTATOR CUFF REPAIR Right    • TOTAL HIP ARTHROPLASTY Right    • TRIGGER FINGER RELEASE Right     thinks right side       I have reviewed the following portions of the patient's history: allergies,  "current medications, past family history, past medical history, past social history, past surgical history and problem list and confirm it's accurate.    Physical Exam:  Vital Signs:    Vitals:    07/28/22 1105   BP: 122/62   BP Location: Right arm   Patient Position: Sitting   Pulse: 72   Temp: 97.1 °F (36.2 °C)   SpO2: 98%   Weight: 63 kg (139 lb)   Height: 160 cm (63\")     Body mass index is 24.62 kg/m².     Physical Exam  Vitals and nursing note reviewed.   Constitutional:       Appearance: Normal appearance. She is well-developed and well-groomed.   HENT:      Head: Normocephalic and atraumatic.   Cardiovascular:      Rate and Rhythm: Normal rate and regular rhythm.      Heart sounds: Normal heart sounds, S1 normal and S2 normal. No murmur heard.    No friction rub.   Pulmonary:      Comments: Unlabored, Clear to auscultation bilaterally  Musculoskeletal:      Cervical back: Neck supple.      Right lower leg: Edema present.      Left lower leg: Edema present.   Skin:     General: Skin is warm and dry.      Comments: Left pleurX site Intact  No surrounding erythema, hematoma or induration   Neurological:      Mental Status: She is alert and oriented to person, place, and time.   Psychiatric:         Attention and Perception: Attention normal.         Mood and Affect: Mood normal.         Speech: Speech normal.         Behavior: Behavior is cooperative.         Labs/Imaging:    * Cannot find OR log *     XR Chest 2 View    Result Date: 7/14/2022   1. Persistent small left effusion. 2. Right base chest tube in satisfactory position 3. Left IJ port with tip likely in the azygos vein.  This report was finalized on 7/14/2022 12:55 PM by Tyler Kuo MD.         Time Spent: I spent 31 minutes caring for Syeda on this date of service. This time includes time spent by me in the following activities: preparing for the visit, reviewing tests, obtaining and/or reviewing a separately obtained history, performing a medically " appropriate examination and/or evaluation, counseling and educating the patient/family/caregiver, ordering medications, tests, or procedures, documenting information in the medical record and independently interpreting results and communicating that information with the patient/family/caregiver.     Assessment / Plan:  Diagnoses and all orders for this visit:    1. Recurrent right pleural effusion (Primary)     Syeda Gomez is a 89 y.o. female with a history of hypertension, hyperlipidemia, atrial fibrillation, breast cancer, CHF (EF 51 to 55%), PPM, with recurrent right pleural effusion s/p thoracocentesis with subsequent Pleurx catheter placement on 4/4/2022 with Dr. Carter.  Right Pleurx site is now stable.  Continue cleaning with alcohol swabs.  Previous chest x-ray on 7/14 with persistent small left effusion, but right without effusion.  Daughter is continuing to drain approximately 50 cc every other day from her Pleurx catheter.  Patient continues to have shortness of breath and difficult to ascertain if this is her baseline or if she needs drainage from Pleurx catheter.  Risk for reaccumulation in setting of chronic CHF with discontinuation of PleurX.  We will have patient daughter to go to every third day to see how patient tolerates with plans for repeat chest x-ray in 1 week in anticipation of removal of Pleurx catheter.  Discussed with patient/daughter that if Pleurx drainage begins to increase and is helping with shortness of air we can cancel appointment next week for cxr/removal.    Lauren Aguilera APRGONSALO  [facility] Cardiothoracic Surgery

## 2022-08-04 ENCOUNTER — OFFICE VISIT (OUTPATIENT)
Dept: CARDIAC SURGERY | Facility: CLINIC | Age: 87
End: 2022-08-04

## 2022-08-04 VITALS
SYSTOLIC BLOOD PRESSURE: 110 MMHG | OXYGEN SATURATION: 96 % | HEIGHT: 63 IN | TEMPERATURE: 97.5 F | WEIGHT: 139 LBS | DIASTOLIC BLOOD PRESSURE: 50 MMHG | HEART RATE: 72 BPM | BODY MASS INDEX: 24.63 KG/M2

## 2022-08-04 DIAGNOSIS — J90 RECURRENT RIGHT PLEURAL EFFUSION: ICD-10-CM

## 2022-08-04 PROCEDURE — 71045 X-RAY EXAM CHEST 1 VIEW: CPT | Performed by: NURSE PRACTITIONER

## 2022-08-04 PROCEDURE — 71046 X-RAY EXAM CHEST 2 VIEWS: CPT | Performed by: NURSE PRACTITIONER

## 2022-08-04 PROCEDURE — 99215 OFFICE O/P EST HI 40 MIN: CPT | Performed by: NURSE PRACTITIONER

## 2022-08-04 RX ORDER — MULTIPLE VITAMINS W/ MINERALS TAB 9MG-400MCG
1 TAB ORAL DAILY
COMMUNITY

## 2022-08-04 NOTE — PROGRESS NOTES
[facility] Cardiothoracic Surgery Office Follow Up Note     Date of Encounter: 2022     Name: Syeda Gomez  : 1927     Referred By: No ref. provider found  PCP: Omayra Alcazar DO    Chief Complaint:    Chief Complaint   Patient presents with   • Follow-up     1 WK FU With CXR to Eval Right Pleurx Cath       Subjective      History of Present Illness:    Syeda Gomez is a 89 y.o. female non-smoker, with history of hypertension, hyperlipidemia on statin therapy, atrial fibrillation, breast cancer s/p mastectomy, CHF (EF 51 to 55%), PPM, COVID infection , and recurrent right pleural effusion s/p thoracentesis and now s/p Pleurx catheter placement on 2022 by Dr. Carter. Patient was evaluated in clinic 2022 with concerns for redness around Pleurx insertion site and given short course of doxycycline and instructed to continue performing adequate wound care.  Patient was last seen in clinic 2022 with continued decreased output from Pleurx and asking for it to be removed. Recommendations were made to monitor for one more week to ensure drainage output stayed low. Patient presents today for follow up. She denies any SOA, cough, fevers, chills or body aches. She has drained twice in the last week, once 30mL and once 60mL. Patient and daughter feel that since starting Entresto in  that patient's heart failure has been stable. They are wanting to have Pleurx removed today. They continue to follow closely with Dr. Ventura.     Review of Systems:  Review of Systems   Constitutional: Positive for malaise/fatigue. Negative for chills, fever, night sweats and weight loss.   HENT: Positive for hearing loss. Negative for odynophagia and sore throat.    Cardiovascular: Positive for dyspnea on exertion and leg swelling. Negative for chest pain, orthopnea and palpitations.   Respiratory: Positive for cough and shortness of breath. Negative for hemoptysis.    Endocrine: Negative for  "cold intolerance, heat intolerance, polydipsia, polyphagia and polyuria.   Hematologic/Lymphatic: Does not bruise/bleed easily.   Skin: Negative for itching and rash.   Musculoskeletal: Negative for joint pain, joint swelling and myalgias.   Gastrointestinal: Negative for abdominal pain, constipation, diarrhea, hematemesis, hematochezia, melena, nausea and vomiting.   Genitourinary: Negative for dysuria, frequency and hematuria.   Neurological: Positive for dizziness, light-headedness, loss of balance and weakness. Negative for focal weakness, headaches, numbness and seizures.   Psychiatric/Behavioral: Negative for suicidal ideas.   All other systems reviewed and are negative.      I have reviewed the following portions of the patient's history: allergies, current medications, past family history, past medical history, past social history, past surgical history, problem list and ROS and confirm it's accurate.    Allergies:  Allergies   Allergen Reactions   • Cardizem [Diltiazem] Other (See Comments)     \"unknown\"   • Codeine GI Intolerance   • Morphine Other (See Comments)     \"unknown\"   • Percocet [Oxycodone-Acetaminophen] Other (See Comments)     \"unknown\"     • Sulfa Antibiotics Unknown (See Comments)     Unknown         Medications:      Current Outpatient Medications:   •  acetaminophen (TYLENOL) 650 MG 8 hr tablet, Take 650 mg by mouth Every 8 (Eight) Hours As Needed for Mild Pain ., Disp: , Rfl:   •  albuterol (PROVENTIL) (2.5 MG/3ML) 0.083% nebulizer solution, Take 2.5 mg by nebulization 2 (Two) Times a Day As Needed for Shortness of Air., Disp: , Rfl:   •  Ascorbic Acid (VITAMIN C PO), Take  by mouth Daily., Disp: , Rfl:   •  aspirin 81 MG EC tablet, Take 81 mg by mouth Daily., Disp: , Rfl:   •  carvedilol (COREG) 12.5 MG tablet, Take 12.5 mg by mouth 2 (Two) Times a Day With Meals., Disp: , Rfl:   •  clonazePAM (KlonoPIN) 0.5 MG tablet, Take 0.25-0.5 mg by mouth At Night As Needed (sleep)., Disp: , Rfl: "   •  docusate sodium (COLACE) 100 MG capsule, Take 200 mg by mouth As Needed for Constipation., Disp: , Rfl:   •  isosorbide mononitrate (IMDUR) 30 MG 24 hr tablet, Take 1 tablet by mouth Daily. (Patient taking differently: Take 60 mg by mouth Daily.), Disp: 30 tablet, Rfl: 1  •  levothyroxine (SYNTHROID, LEVOTHROID) 50 MCG tablet, Take 50 mcg by mouth Daily., Disp: , Rfl:   •  loratadine (CLARITIN) 10 MG tablet, Take 10 mg by mouth Daily., Disp: , Rfl:   •  magnesium oxide (MAGOX) 400 (241.3 Mg) MG tablet tablet, Take 400 mg by mouth Daily., Disp: , Rfl:   •  multivitamin with minerals (PRESERVISION AREDS PO), Take 1 tablet by mouth Daily., Disp: , Rfl:   •  nitroglycerin (NITROSTAT) 0.4 MG SL tablet, Place 0.4 mg under the tongue Every 5 (Five) Minutes As Needed for Chest Pain. Take no more than 3 doses in 15 minutes., Disp: , Rfl:   •  O2 (OXYGEN), Inhale 2 L/min Daily As Needed (shortness of air)., Disp: , Rfl:   •  omeprazole (priLOSEC) 20 MG capsule, Take 20 mg by mouth Daily., Disp: , Rfl:   •  ondansetron (ZOFRAN) 8 MG tablet, Take 8 mg by mouth Every 6 (Six) Hours As Needed for Nausea or Vomiting., Disp: , Rfl:   •  pravastatin (PRAVACHOL) 20 MG tablet, Take 20 mg by mouth Every Night., Disp: , Rfl:   •  sacubitril-valsartan (Entresto) 24-26 MG tablet, Take 1 tablet by mouth 2 (Two) Times a Day., Disp: , Rfl:   •  torsemide (DEMADEX) 20 MG tablet, Take 20 mg by mouth 2 (Two) Times a Day., Disp: , Rfl:   •  vitamin D3 125 MCG (5000 UT) capsule capsule, Take 5,000 Units by mouth Daily., Disp: , Rfl:   •  diphenhydrAMINE (BENADRYL) 25 MG tablet, Take 50 mg by mouth At Night As Needed for Sleep. (Patient not taking: Reported on 8/4/2022), Disp: , Rfl:   •  nitrofurantoin, macrocrystal-monohydrate, (MACROBID) 100 MG capsule, , Disp: , Rfl:   •  traMADol (ULTRAM) 50 MG tablet, Take 50 mg by mouth 2 (Two) Times a Day As Needed for Moderate Pain . (Patient not taking: Reported on 8/4/2022), Disp: , Rfl:      History:   Past Medical History:   Diagnosis Date   • A-fib (HCC)    • Arthritis    • Cancer (HCC)     1988- left breast   • Carpal tunnel syndrome    • CHF (congestive heart failure) (HCC)    • Detached retina     right side   • Disease of thyroid gland    • Dizzy    • Full dentures    • GERD (gastroesophageal reflux disease)    • Hearing aid worn     bilat   • Heart murmur    • History of transfusion     x4 just this year- never had reaction   • [place] (hard of hearing)     bilat hearinhg aids   • Hypertension    • Infection     right knee- had infection 15 to 20 years ago- unsure if mrsa or not   • Pleural effusion    • Rheumatic fever    • SOBOE (shortness of breath on exertion)    • UTI (urinary tract infection)    • Wears glasses        Past Surgical History:   Procedure Laterality Date   • APPENDECTOMY     • CARDIAC ELECTROPHYSIOLOGY PROCEDURE N/A 06/25/2021    Procedure: DEVICE IMPLANT;  Surgeon: Calvin Ventura MD;  Location:  [facility] INVASIVE LOCATION;  Service: Cardiovascular;  Laterality: N/A;   • CARPAL TUNNEL RELEASE Bilateral    • CATARACT EXTRACTION, BILATERAL     • CHOLECYSTECTOMY     • COLONOSCOPY     • HYSTERECTOMY     • MASTECTOMY Left    • OTHER SURGICAL HISTORY      x2 fluid removed from right side   • PLEURAL CATHETER INSERTION Right 4/4/2022    Procedure: PLEURX CATHETER INSERTION;  Surgeon: Fidel Carter MD;  Location: [facility] OR;  Service: Cardiothoracic;  Laterality: Right;   • PORTACATH PLACEMENT Left    • REPLACEMENT TOTAL KNEE BILATERAL     • RETINAL DETACHMENT SURGERY Right     buckle in place   • SHOULDER ROTATOR CUFF REPAIR Right    • TOTAL HIP ARTHROPLASTY Right    • TRIGGER FINGER RELEASE Right     thinks right side       Social History     Socioeconomic History   • Marital status:    • Number of children: 1   Tobacco Use   • Smoking status: Never Smoker   • Smokeless tobacco: Never Used   Vaping Use   • Vaping Use: Never used   Substance and Sexual Activity   •  "Alcohol use: No   • Drug use: No   • Sexual activity: Defer        Family History   Problem Relation Age of Onset   • Heart attack Mother    • Hypertension Father    • Stroke Father        Objective     Physical Exam:  Vitals:    08/04/22 1429   BP: 110/50   BP Location: Right arm   Patient Position: Sitting   Pulse: 72   Temp: 97.5 °F (36.4 °C)   SpO2: 96%   Weight: 63 kg (139 lb)   Height: 160 cm (63\")      Body mass index is 24.62 kg/m².    Physical Exam  Vitals and nursing note reviewed.   Constitutional:       Appearance: Normal appearance. She is well-developed and well-groomed.   HENT:      Head: Normocephalic and atraumatic.   Cardiovascular:      Rate and Rhythm: Normal rate and regular rhythm.      Heart sounds: Normal heart sounds, S1 normal and S2 normal. No murmur heard.    No friction rub.   Pulmonary:      Comments: Unlabored, Clear to auscultation bilaterally  Musculoskeletal:      Cervical back: Neck supple.      Right lower leg: Edema present.      Left lower leg: Edema present.   Skin:     General: Skin is warm and dry.      Comments: Left pleurX site Intact  No surrounding erythema, hematoma or induration   Neurological:      Mental Status: She is alert and oriented to person, place, and time.   Psychiatric:         Attention and Perception: Attention normal.         Mood and Affect: Mood normal.         Speech: Speech normal.         Behavior: Behavior is cooperative.         Imaging/Labs:    XR Chest 2 View  Result Date: 8/4/2022  Redemonstration of left chest port and dual-lead cardiac pacer device with right chest pulse generator. Redemonstration of right basilar Pleurx catheter. Stable cardiomediastinal silhouette with mild cardiomegaly. Stable diffuse interstitial prominence. Stable small left greater than right pleural effusions. No pneumothorax. No acute osseous findings. Right humeral head sutures of prior rotator cuff repair.  This report was finalized on 8/4/2022 3:37 PM by Salomón" MD Jackson.      XR Chest Pa  Result Date: 8/4/2022  Interval removal of right-sided Pleurx catheter. There is no pneumothorax.  This report was finalized on 8/4/2022 4:59 PM by Calvin Olivares.             Assessment / Plan      Assessment / Plan:  Diagnoses and all orders for this visit:    1. Recurrent right pleural effusion       1. Recurrent right pleural effusion s/p thoracentesis and now s/p Pleurx catheter placement on 4/4/2022 by Dr. Carter: She denies any SOA, cough, fevers, chills or body aches. She has drained twice in the last week, once 30mL and once 60mL. Patient and daughter feel that since starting Entresto in June that patient's heart failure has been stable. They are wanting to have Pleurx removed today. They continue to follow closely with Dr. Ventura. Discussed risks of re-accumulation related to patient's heart failure and progressive nature of disease. Patient and daughter were understanding and requesting Pleurx to be removed which was agreeable due to lack of large amounts of drainage and stable CXR without evidence of large effusion. Written consent was obtained. Sterile technique performed. Pleurx site was numbed around the insertion site. Pleurx was removed without difficulty. Pressure was applied with duoderm and patient was monitored for bleeding. Post pull CXR was obtained and reviewed which did not reveal pneumothorax. Patient likely had vasovagal episode and reported lightheadedness/dizziness immediately after procedure. She was monitored for 20 minutes after with resolution of symptoms prior to leaving clinic. Instructed patient and daughter to keep duoderm in place for 3 days. Additional dressing was placed over top of duoderm to reinforce dressing. After removal, instructed to cleanse area with plain antibacterial soap and water. Patient will contact our office for any worsening chest pain or SOA. Will plan to see the patient back on an as needed basis.       Follow Up:    Return if symptoms worsen or fail to improve.   Or sooner for any further concerns or worsening sign and symptoms. If unable to reach us in the office please dial 911 or go to the nearest emergency department.      Kelly CLAUDIO  [facility] Cardiothoracic Surgery    Time Spent: I spent 55 minutes caring for Syeda on this date of service. This time includes time spent by me in the following activities: preparing for the visit, reviewing tests, obtaining and/or reviewing a separately obtained history, performing a medically appropriate examination and/or evaluation, counseling and educating the patient/family/caregiver, ordering medications, tests, or procedures, documenting information in the medical record, independently interpreting results and communicating that information with the patient/family/caregiver and care coordination.

## 2022-11-23 ENCOUNTER — TRANSCRIBE ORDERS (OUTPATIENT)
Dept: NUTRITION | Facility: HOSPITAL | Age: 87
End: 2022-11-23

## 2022-11-23 DIAGNOSIS — E11.9 TYPE 2 DIABETES MELLITUS WITHOUT COMPLICATION, UNSPECIFIED WHETHER LONG TERM INSULIN USE: Primary | ICD-10-CM

## 2023-01-01 ENCOUNTER — APPOINTMENT (OUTPATIENT)
Dept: CT IMAGING | Facility: HOSPITAL | Age: 88
DRG: 177 | End: 2023-01-01
Payer: MEDICARE

## 2023-01-01 ENCOUNTER — APPOINTMENT (OUTPATIENT)
Dept: GENERAL RADIOLOGY | Facility: HOSPITAL | Age: 88
DRG: 177 | End: 2023-01-01
Payer: MEDICARE

## 2023-01-01 ENCOUNTER — APPOINTMENT (OUTPATIENT)
Dept: CARDIOLOGY | Facility: HOSPITAL | Age: 88
DRG: 177 | End: 2023-01-01
Payer: MEDICARE

## 2023-01-01 ENCOUNTER — HOSPITAL ENCOUNTER (INPATIENT)
Facility: HOSPITAL | Age: 88
LOS: 4 days | DRG: 951 | End: 2023-12-25
Attending: INTERNAL MEDICINE | Admitting: INTERNAL MEDICINE
Payer: COMMERCIAL

## 2023-01-01 ENCOUNTER — HOSPITAL ENCOUNTER (INPATIENT)
Facility: HOSPITAL | Age: 88
LOS: 11 days | DRG: 177 | End: 2023-12-21
Attending: STUDENT IN AN ORGANIZED HEALTH CARE EDUCATION/TRAINING PROGRAM | Admitting: INTERNAL MEDICINE
Payer: MEDICARE

## 2023-01-01 VITALS
OXYGEN SATURATION: 91 % | DIASTOLIC BLOOD PRESSURE: 59 MMHG | HEIGHT: 63 IN | SYSTOLIC BLOOD PRESSURE: 119 MMHG | RESPIRATION RATE: 14 BRPM | HEART RATE: 67 BPM | TEMPERATURE: 98.1 F | WEIGHT: 139 LBS | BODY MASS INDEX: 24.63 KG/M2

## 2023-01-01 DIAGNOSIS — J18.9 PNEUMONIA OF LEFT LOWER LOBE DUE TO INFECTIOUS ORGANISM: ICD-10-CM

## 2023-01-01 DIAGNOSIS — R13.10 DYSPHAGIA, UNSPECIFIED TYPE: ICD-10-CM

## 2023-01-01 DIAGNOSIS — U07.1 COVID-19: Primary | ICD-10-CM

## 2023-01-01 LAB
ALBUMIN SERPL-MCNC: 3.4 G/DL (ref 3.5–5.2)
ALBUMIN SERPL-MCNC: 3.5 G/DL (ref 3.5–5.2)
ALBUMIN SERPL-MCNC: 3.8 G/DL (ref 3.5–5.2)
ALBUMIN SERPL-MCNC: 3.8 G/DL (ref 3.5–5.2)
ALBUMIN SERPL-MCNC: 3.9 G/DL (ref 3.5–5.2)
ALBUMIN/GLOB SERPL: 1.8 G/DL
ALBUMIN/GLOB SERPL: 2 G/DL
ALBUMIN/GLOB SERPL: 2 G/DL
ALBUMIN/GLOB SERPL: 2.1 G/DL
ALP SERPL-CCNC: 123 U/L (ref 39–117)
ALP SERPL-CCNC: 125 U/L (ref 39–117)
ALP SERPL-CCNC: 127 U/L (ref 39–117)
ALP SERPL-CCNC: 145 U/L (ref 39–117)
ALT SERPL W P-5'-P-CCNC: 22 U/L (ref 1–33)
ALT SERPL W P-5'-P-CCNC: 26 U/L (ref 1–33)
ALT SERPL W P-5'-P-CCNC: 28 U/L (ref 1–33)
ALT SERPL W P-5'-P-CCNC: 43 U/L (ref 1–33)
ANION GAP SERPL CALCULATED.3IONS-SCNC: 10 MMOL/L (ref 5–15)
ANION GAP SERPL CALCULATED.3IONS-SCNC: 11 MMOL/L (ref 5–15)
ANION GAP SERPL CALCULATED.3IONS-SCNC: 12 MMOL/L (ref 5–15)
ANION GAP SERPL CALCULATED.3IONS-SCNC: 16 MMOL/L (ref 5–15)
ANION GAP SERPL CALCULATED.3IONS-SCNC: 7 MMOL/L (ref 5–15)
APTT PPP: 28.4 SECONDS (ref 22–39)
ARTERIAL PATENCY WRIST A: ABNORMAL
ARTERIAL PATENCY WRIST A: ABNORMAL
ASCENDING AORTA: 3.7 CM
AST SERPL-CCNC: 18 U/L (ref 1–32)
AST SERPL-CCNC: 21 U/L (ref 1–32)
AST SERPL-CCNC: 26 U/L (ref 1–32)
AST SERPL-CCNC: 61 U/L (ref 1–32)
ATMOSPHERIC PRESS: ABNORMAL MM[HG]
ATMOSPHERIC PRESS: ABNORMAL MM[HG]
B PARAPERT DNA SPEC QL NAA+PROBE: NOT DETECTED
B PERT DNA SPEC QL NAA+PROBE: NOT DETECTED
BACTERIA SPEC AEROBE CULT: NORMAL
BACTERIA SPEC AEROBE CULT: NORMAL
BASE EXCESS BLDA CALC-SCNC: 3.2 MMOL/L (ref 0–2)
BASE EXCESS BLDA CALC-SCNC: 3.3 MMOL/L (ref 0–2)
BASOPHILS # BLD AUTO: 0 10*3/MM3 (ref 0–0.2)
BASOPHILS # BLD AUTO: 0 10*3/MM3 (ref 0–0.2)
BASOPHILS # BLD AUTO: 0.01 10*3/MM3 (ref 0–0.2)
BASOPHILS # BLD AUTO: 0.01 10*3/MM3 (ref 0–0.2)
BASOPHILS # BLD AUTO: 0.02 10*3/MM3 (ref 0–0.2)
BASOPHILS # BLD AUTO: 0.03 10*3/MM3 (ref 0–0.2)
BASOPHILS # BLD AUTO: 0.03 10*3/MM3 (ref 0–0.2)
BASOPHILS NFR BLD AUTO: 0 % (ref 0–1.5)
BASOPHILS NFR BLD AUTO: 0 % (ref 0–1.5)
BASOPHILS NFR BLD AUTO: 0.1 % (ref 0–1.5)
BASOPHILS NFR BLD AUTO: 0.2 % (ref 0–1.5)
BASOPHILS NFR BLD AUTO: 0.4 % (ref 0–1.5)
BDY SITE: ABNORMAL
BDY SITE: ABNORMAL
BH CV ECHO MEAS - AI P1/2T: 308.7 MSEC
BH CV ECHO MEAS - AO MAX PG: 13.5 MMHG
BH CV ECHO MEAS - AO MEAN PG: 7 MMHG
BH CV ECHO MEAS - AO ROOT DIAM: 2.7 CM
BH CV ECHO MEAS - AO V2 MAX: 183.5 CM/SEC
BH CV ECHO MEAS - AO V2 VTI: 40.4 CM
BH CV ECHO MEAS - AVA(I,D): 1.01 CM2
BH CV ECHO MEAS - EDV(CUBED): 42.9 ML
BH CV ECHO MEAS - EDV(MOD-SP2): 62.9 ML
BH CV ECHO MEAS - EDV(MOD-SP4): 89.2 ML
BH CV ECHO MEAS - EF(MOD-BP): 63.9 %
BH CV ECHO MEAS - EF(MOD-SP2): 65.5 %
BH CV ECHO MEAS - EF(MOD-SP4): 63.1 %
BH CV ECHO MEAS - ESV(CUBED): 19.7 ML
BH CV ECHO MEAS - ESV(MOD-SP2): 21.7 ML
BH CV ECHO MEAS - ESV(MOD-SP4): 32.9 ML
BH CV ECHO MEAS - FS: 22.9 %
BH CV ECHO MEAS - IVS/LVPW: 1 CM
BH CV ECHO MEAS - IVSD: 1.6 CM
BH CV ECHO MEAS - LA DIMENSION: 4.3 CM
BH CV ECHO MEAS - LAT PEAK E' VEL: 8.3 CM/SEC
BH CV ECHO MEAS - LV MASS(C)D: 215.2 GRAMS
BH CV ECHO MEAS - LV MAX PG: 1.38 MMHG
BH CV ECHO MEAS - LV MEAN PG: 1 MMHG
BH CV ECHO MEAS - LV V1 MAX: 58.8 CM/SEC
BH CV ECHO MEAS - LV V1 VTI: 13 CM
BH CV ECHO MEAS - LVIDD: 3.5 CM
BH CV ECHO MEAS - LVIDS: 2.7 CM
BH CV ECHO MEAS - LVOT AREA: 3.1 CM2
BH CV ECHO MEAS - LVOT DIAM: 2 CM
BH CV ECHO MEAS - LVPWD: 1.6 CM
BH CV ECHO MEAS - MED PEAK E' VEL: 6.2 CM/SEC
BH CV ECHO MEAS - MV DEC SLOPE: 457 CM/SEC2
BH CV ECHO MEAS - MV DEC TIME: 0.26 SEC
BH CV ECHO MEAS - MV E MAX VEL: 113 CM/SEC
BH CV ECHO MEAS - MV P1/2T: 87.2 MSEC
BH CV ECHO MEAS - MVA(P1/2T): 2.5 CM2
BH CV ECHO MEAS - PA ACC TIME: 0.14 SEC
BH CV ECHO MEAS - PA V2 MAX: 85 CM/SEC
BH CV ECHO MEAS - RAP SYSTOLE: 8 MMHG
BH CV ECHO MEAS - RVSP: 45 MMHG
BH CV ECHO MEAS - SV(LVOT): 40.8 ML
BH CV ECHO MEAS - SV(MOD-SP2): 41.2 ML
BH CV ECHO MEAS - SV(MOD-SP4): 56.3 ML
BH CV ECHO MEAS - TR MAX PG: 38.9 MMHG
BH CV ECHO MEAS - TR MAX VEL: 312 CM/SEC
BH CV ECHO MEASUREMENTS AVERAGE E/E' RATIO: 15.59
BH CV XLRA - RV BASE: 4.3 CM
BH CV XLRA - RV LENGTH: 5.6 CM
BH CV XLRA - RV MID: 3.1 CM
BH CV XLRA - TDI S': 7.4 CM/SEC
BILIRUB SERPL-MCNC: 0.5 MG/DL (ref 0–1.2)
BILIRUB SERPL-MCNC: 0.6 MG/DL (ref 0–1.2)
BILIRUB SERPL-MCNC: 0.6 MG/DL (ref 0–1.2)
BILIRUB SERPL-MCNC: 0.9 MG/DL (ref 0–1.2)
BODY TEMPERATURE: 37
BODY TEMPERATURE: 37
BUN SERPL-MCNC: 19 MG/DL (ref 8–23)
BUN SERPL-MCNC: 21 MG/DL (ref 8–23)
BUN SERPL-MCNC: 21 MG/DL (ref 8–23)
BUN SERPL-MCNC: 24 MG/DL (ref 8–23)
BUN SERPL-MCNC: 25 MG/DL (ref 8–23)
BUN SERPL-MCNC: 27 MG/DL (ref 8–23)
BUN SERPL-MCNC: 27 MG/DL (ref 8–23)
BUN/CREAT SERPL: 16.8 (ref 7–25)
BUN/CREAT SERPL: 19.4 (ref 7–25)
BUN/CREAT SERPL: 20.7 (ref 7–25)
BUN/CREAT SERPL: 23.1 (ref 7–25)
BUN/CREAT SERPL: 23.9 (ref 7–25)
BUN/CREAT SERPL: 25 (ref 7–25)
BUN/CREAT SERPL: 27.6 (ref 7–25)
C PNEUM DNA NPH QL NAA+NON-PROBE: NOT DETECTED
CALCIUM SPEC-SCNC: 7.9 MG/DL (ref 8.2–9.6)
CALCIUM SPEC-SCNC: 7.9 MG/DL (ref 8.2–9.6)
CALCIUM SPEC-SCNC: 8 MG/DL (ref 8.2–9.6)
CALCIUM SPEC-SCNC: 8.5 MG/DL (ref 8.2–9.6)
CALCIUM SPEC-SCNC: 8.7 MG/DL (ref 8.2–9.6)
CALCIUM SPEC-SCNC: 8.8 MG/DL (ref 8.2–9.6)
CALCIUM SPEC-SCNC: 8.9 MG/DL (ref 8.2–9.6)
CHLORIDE SERPL-SCNC: 101 MMOL/L (ref 98–107)
CHLORIDE SERPL-SCNC: 102 MMOL/L (ref 98–107)
CHLORIDE SERPL-SCNC: 104 MMOL/L (ref 98–107)
CHLORIDE SERPL-SCNC: 95 MMOL/L (ref 98–107)
CHLORIDE SERPL-SCNC: 95 MMOL/L (ref 98–107)
CHLORIDE SERPL-SCNC: 98 MMOL/L (ref 98–107)
CHLORIDE SERPL-SCNC: 98 MMOL/L (ref 98–107)
CHOLEST SERPL-MCNC: 133 MG/DL (ref 0–200)
CO2 BLDA-SCNC: 27.9 MMOL/L (ref 22–33)
CO2 BLDA-SCNC: 28.5 MMOL/L (ref 22–33)
CO2 SERPL-SCNC: 23 MMOL/L (ref 22–29)
CO2 SERPL-SCNC: 25 MMOL/L (ref 22–29)
CO2 SERPL-SCNC: 26 MMOL/L (ref 22–29)
CO2 SERPL-SCNC: 27 MMOL/L (ref 22–29)
CO2 SERPL-SCNC: 28 MMOL/L (ref 22–29)
CO2 SERPL-SCNC: 29 MMOL/L (ref 22–29)
CO2 SERPL-SCNC: 30 MMOL/L (ref 22–29)
COHGB MFR BLD: 1.7 % (ref 0–2)
COHGB MFR BLD: 1.7 % (ref 0–2)
CREAT SERPL-MCNC: 0.92 MG/DL (ref 0.57–1)
CREAT SERPL-MCNC: 0.98 MG/DL (ref 0.57–1)
CREAT SERPL-MCNC: 1 MG/DL (ref 0.57–1)
CREAT SERPL-MCNC: 1.04 MG/DL (ref 0.57–1)
CREAT SERPL-MCNC: 1.08 MG/DL (ref 0.57–1)
CREAT SERPL-MCNC: 1.13 MG/DL (ref 0.57–1)
CREAT SERPL-MCNC: 1.25 MG/DL (ref 0.57–1)
CRP SERPL-MCNC: 0.64 MG/DL (ref 0–0.5)
CRP SERPL-MCNC: 1.16 MG/DL (ref 0–0.5)
CRP SERPL-MCNC: 1.24 MG/DL (ref 0–0.5)
CRP SERPL-MCNC: 1.74 MG/DL (ref 0–0.5)
CRP SERPL-MCNC: <0.3 MG/DL (ref 0–0.5)
D DIMER PPP FEU-MCNC: 0.9 MCGFEU/ML (ref 0–0.96)
D-LACTATE SERPL-SCNC: 1.5 MMOL/L (ref 0.5–2)
DEPRECATED RDW RBC AUTO: 45.7 FL (ref 37–54)
DEPRECATED RDW RBC AUTO: 46.9 FL (ref 37–54)
DEPRECATED RDW RBC AUTO: 47.1 FL (ref 37–54)
DEPRECATED RDW RBC AUTO: 47.1 FL (ref 37–54)
DEPRECATED RDW RBC AUTO: 47.9 FL (ref 37–54)
DEPRECATED RDW RBC AUTO: 49.1 FL (ref 37–54)
DEPRECATED RDW RBC AUTO: 51.3 FL (ref 37–54)
EGFRCR SERPLBLD CKD-EPI 2021: 39.5 ML/MIN/1.73
EGFRCR SERPLBLD CKD-EPI 2021: 44.6 ML/MIN/1.73
EGFRCR SERPLBLD CKD-EPI 2021: 47.1 ML/MIN/1.73
EGFRCR SERPLBLD CKD-EPI 2021: 49.3 ML/MIN/1.73
EGFRCR SERPLBLD CKD-EPI 2021: 51.7 ML/MIN/1.73
EGFRCR SERPLBLD CKD-EPI 2021: 52.9 ML/MIN/1.73
EGFRCR SERPLBLD CKD-EPI 2021: 57.1 ML/MIN/1.73
EOSINOPHIL # BLD AUTO: 0 10*3/MM3 (ref 0–0.4)
EOSINOPHIL # BLD AUTO: 0.06 10*3/MM3 (ref 0–0.4)
EOSINOPHIL # BLD AUTO: 0.1 10*3/MM3 (ref 0–0.4)
EOSINOPHIL # BLD AUTO: 0.11 10*3/MM3 (ref 0–0.4)
EOSINOPHIL NFR BLD AUTO: 0 % (ref 0.3–6.2)
EOSINOPHIL NFR BLD AUTO: 1.2 % (ref 0.3–6.2)
EOSINOPHIL NFR BLD AUTO: 1.3 % (ref 0.3–6.2)
EOSINOPHIL NFR BLD AUTO: 1.4 % (ref 0.3–6.2)
EPAP: 0
EPAP: 0
ERYTHROCYTE [DISTWIDTH] IN BLOOD BY AUTOMATED COUNT: 14.9 % (ref 12.3–15.4)
ERYTHROCYTE [DISTWIDTH] IN BLOOD BY AUTOMATED COUNT: 15 % (ref 12.3–15.4)
ERYTHROCYTE [DISTWIDTH] IN BLOOD BY AUTOMATED COUNT: 15.2 % (ref 12.3–15.4)
ERYTHROCYTE [DISTWIDTH] IN BLOOD BY AUTOMATED COUNT: 15.3 % (ref 12.3–15.4)
ERYTHROCYTE [DISTWIDTH] IN BLOOD BY AUTOMATED COUNT: 15.3 % (ref 12.3–15.4)
ERYTHROCYTE [DISTWIDTH] IN BLOOD BY AUTOMATED COUNT: 15.8 % (ref 12.3–15.4)
ERYTHROCYTE [DISTWIDTH] IN BLOOD BY AUTOMATED COUNT: 16 % (ref 12.3–15.4)
FERRITIN SERPL-MCNC: 326.9 NG/ML (ref 13–150)
FLUAV SUBTYP SPEC NAA+PROBE: NOT DETECTED
FLUBV RNA ISLT QL NAA+PROBE: NOT DETECTED
FOLATE SERPL-MCNC: 8.62 NG/ML (ref 4.78–24.2)
GEN 5 2HR TROPONIN T REFLEX: 93 NG/L
GLOBULIN UR ELPH-MCNC: 1.7 GM/DL
GLOBULIN UR ELPH-MCNC: 1.7 GM/DL
GLOBULIN UR ELPH-MCNC: 2 GM/DL
GLOBULIN UR ELPH-MCNC: 2.1 GM/DL
GLUCOSE BLDC GLUCOMTR-MCNC: 153 MG/DL (ref 70–130)
GLUCOSE BLDC GLUCOMTR-MCNC: 162 MG/DL (ref 70–130)
GLUCOSE BLDC GLUCOMTR-MCNC: 168 MG/DL (ref 70–130)
GLUCOSE BLDC GLUCOMTR-MCNC: 178 MG/DL (ref 70–130)
GLUCOSE BLDC GLUCOMTR-MCNC: 187 MG/DL (ref 70–130)
GLUCOSE BLDC GLUCOMTR-MCNC: 188 MG/DL (ref 70–130)
GLUCOSE BLDC GLUCOMTR-MCNC: 204 MG/DL (ref 70–130)
GLUCOSE BLDC GLUCOMTR-MCNC: 261 MG/DL (ref 70–130)
GLUCOSE BLDC GLUCOMTR-MCNC: 313 MG/DL (ref 70–130)
GLUCOSE SERPL-MCNC: 149 MG/DL (ref 65–99)
GLUCOSE SERPL-MCNC: 189 MG/DL (ref 65–99)
GLUCOSE SERPL-MCNC: 194 MG/DL (ref 65–99)
GLUCOSE SERPL-MCNC: 248 MG/DL (ref 65–99)
GLUCOSE SERPL-MCNC: 252 MG/DL (ref 65–99)
GLUCOSE SERPL-MCNC: 268 MG/DL (ref 65–99)
GLUCOSE SERPL-MCNC: 344 MG/DL (ref 65–99)
HADV DNA SPEC NAA+PROBE: NOT DETECTED
HBA1C MFR BLD: 6.9 % (ref 4.8–5.6)
HCO3 BLDA-SCNC: 26.8 MMOL/L (ref 20–26)
HCO3 BLDA-SCNC: 27.3 MMOL/L (ref 20–26)
HCOV 229E RNA SPEC QL NAA+PROBE: NOT DETECTED
HCOV HKU1 RNA SPEC QL NAA+PROBE: NOT DETECTED
HCOV NL63 RNA SPEC QL NAA+PROBE: NOT DETECTED
HCOV OC43 RNA SPEC QL NAA+PROBE: NOT DETECTED
HCT VFR BLD AUTO: 31.7 % (ref 34–46.6)
HCT VFR BLD AUTO: 33.1 % (ref 34–46.6)
HCT VFR BLD AUTO: 33.3 % (ref 34–46.6)
HCT VFR BLD AUTO: 37.3 % (ref 34–46.6)
HCT VFR BLD AUTO: 37.4 % (ref 34–46.6)
HCT VFR BLD AUTO: 37.7 % (ref 34–46.6)
HCT VFR BLD AUTO: 40.3 % (ref 34–46.6)
HCT VFR BLD CALC: 35.2 % (ref 38–51)
HCT VFR BLD CALC: 35.8 % (ref 38–51)
HDLC SERPL-MCNC: 54 MG/DL (ref 40–60)
HGB BLD-MCNC: 10.3 G/DL (ref 12–15.9)
HGB BLD-MCNC: 10.7 G/DL (ref 12–15.9)
HGB BLD-MCNC: 10.9 G/DL (ref 12–15.9)
HGB BLD-MCNC: 12 G/DL (ref 12–15.9)
HGB BLD-MCNC: 12.2 G/DL (ref 12–15.9)
HGB BLD-MCNC: 12.4 G/DL (ref 12–15.9)
HGB BLD-MCNC: 12.8 G/DL (ref 12–15.9)
HGB BLDA-MCNC: 11.5 G/DL (ref 14–18)
HGB BLDA-MCNC: 11.7 G/DL (ref 14–18)
HMPV RNA NPH QL NAA+NON-PROBE: NOT DETECTED
HPIV1 RNA ISLT QL NAA+PROBE: NOT DETECTED
HPIV2 RNA SPEC QL NAA+PROBE: NOT DETECTED
HPIV3 RNA NPH QL NAA+PROBE: NOT DETECTED
HPIV4 P GENE NPH QL NAA+PROBE: NOT DETECTED
IMM GRANULOCYTES # BLD AUTO: 0.01 10*3/MM3 (ref 0–0.05)
IMM GRANULOCYTES # BLD AUTO: 0.03 10*3/MM3 (ref 0–0.05)
IMM GRANULOCYTES # BLD AUTO: 0.04 10*3/MM3 (ref 0–0.05)
IMM GRANULOCYTES # BLD AUTO: 0.05 10*3/MM3 (ref 0–0.05)
IMM GRANULOCYTES # BLD AUTO: 0.06 10*3/MM3 (ref 0–0.05)
IMM GRANULOCYTES NFR BLD AUTO: 0.2 % (ref 0–0.5)
IMM GRANULOCYTES NFR BLD AUTO: 0.5 % (ref 0–0.5)
IMM GRANULOCYTES NFR BLD AUTO: 0.5 % (ref 0–0.5)
IMM GRANULOCYTES NFR BLD AUTO: 0.6 % (ref 0–0.5)
IMM GRANULOCYTES NFR BLD AUTO: 0.7 % (ref 0–0.5)
INHALED O2 CONCENTRATION: 21 %
INHALED O2 CONCENTRATION: 32 %
INR PPP: 1.46 (ref 0.89–1.12)
IPAP: 0
IPAP: 0
LDH SERPL-CCNC: 315 U/L (ref 135–214)
LDLC SERPL CALC-MCNC: 62 MG/DL (ref 0–100)
LDLC/HDLC SERPL: 1.14 {RATIO}
LEFT ATRIUM VOLUME INDEX: 55.8 ML/M2
LV EF 2D ECHO EST: 56 %
LYMPHOCYTES # BLD AUTO: 0.25 10*3/MM3 (ref 0.7–3.1)
LYMPHOCYTES # BLD AUTO: 0.32 10*3/MM3 (ref 0.7–3.1)
LYMPHOCYTES # BLD AUTO: 0.33 10*3/MM3 (ref 0.7–3.1)
LYMPHOCYTES # BLD AUTO: 0.33 10*3/MM3 (ref 0.7–3.1)
LYMPHOCYTES # BLD AUTO: 0.68 10*3/MM3 (ref 0.7–3.1)
LYMPHOCYTES # BLD AUTO: 0.77 10*3/MM3 (ref 0.7–3.1)
LYMPHOCYTES # BLD AUTO: 0.83 10*3/MM3 (ref 0.7–3.1)
LYMPHOCYTES NFR BLD AUTO: 16.1 % (ref 19.6–45.3)
LYMPHOCYTES NFR BLD AUTO: 3.7 % (ref 19.6–45.3)
LYMPHOCYTES NFR BLD AUTO: 4.7 % (ref 19.6–45.3)
LYMPHOCYTES NFR BLD AUTO: 6.1 % (ref 19.6–45.3)
LYMPHOCYTES NFR BLD AUTO: 6.2 % (ref 19.6–45.3)
LYMPHOCYTES NFR BLD AUTO: 8.8 % (ref 19.6–45.3)
LYMPHOCYTES NFR BLD AUTO: 9.7 % (ref 19.6–45.3)
M PNEUMO IGG SER IA-ACNC: NOT DETECTED
MAGNESIUM SERPL-MCNC: 1.9 MG/DL (ref 1.7–2.3)
MAGNESIUM SERPL-MCNC: 2.2 MG/DL (ref 1.7–2.3)
MAGNESIUM SERPL-MCNC: 2.3 MG/DL (ref 1.7–2.3)
MAGNESIUM SERPL-MCNC: 2.7 MG/DL (ref 1.7–2.3)
MCH RBC QN AUTO: 27.8 PG (ref 26.6–33)
MCH RBC QN AUTO: 27.9 PG (ref 26.6–33)
MCH RBC QN AUTO: 28 PG (ref 26.6–33)
MCH RBC QN AUTO: 28 PG (ref 26.6–33)
MCH RBC QN AUTO: 28.2 PG (ref 26.6–33)
MCH RBC QN AUTO: 28.3 PG (ref 26.6–33)
MCH RBC QN AUTO: 28.8 PG (ref 26.6–33)
MCHC RBC AUTO-ENTMCNC: 31.8 G/DL (ref 31.5–35.7)
MCHC RBC AUTO-ENTMCNC: 32.1 G/DL (ref 31.5–35.7)
MCHC RBC AUTO-ENTMCNC: 32.3 G/DL (ref 31.5–35.7)
MCHC RBC AUTO-ENTMCNC: 32.4 G/DL (ref 31.5–35.7)
MCHC RBC AUTO-ENTMCNC: 32.5 G/DL (ref 31.5–35.7)
MCHC RBC AUTO-ENTMCNC: 32.7 G/DL (ref 31.5–35.7)
MCHC RBC AUTO-ENTMCNC: 33.2 G/DL (ref 31.5–35.7)
MCV RBC AUTO: 84.8 FL (ref 79–97)
MCV RBC AUTO: 85.6 FL (ref 79–97)
MCV RBC AUTO: 86.3 FL (ref 79–97)
MCV RBC AUTO: 86.8 FL (ref 79–97)
MCV RBC AUTO: 87.1 FL (ref 79–97)
MCV RBC AUTO: 88.2 FL (ref 79–97)
MCV RBC AUTO: 89.2 FL (ref 79–97)
METHGB BLD QL: 0.4 % (ref 0–1.5)
METHGB BLD QL: 0.7 % (ref 0–1.5)
MODALITY: ABNORMAL
MODALITY: ABNORMAL
MONOCYTES # BLD AUTO: 0.1 10*3/MM3 (ref 0.1–0.9)
MONOCYTES # BLD AUTO: 0.33 10*3/MM3 (ref 0.1–0.9)
MONOCYTES # BLD AUTO: 0.36 10*3/MM3 (ref 0.1–0.9)
MONOCYTES # BLD AUTO: 0.37 10*3/MM3 (ref 0.1–0.9)
MONOCYTES # BLD AUTO: 0.43 10*3/MM3 (ref 0.1–0.9)
MONOCYTES # BLD AUTO: 0.46 10*3/MM3 (ref 0.1–0.9)
MONOCYTES # BLD AUTO: 0.59 10*3/MM3 (ref 0.1–0.9)
MONOCYTES NFR BLD AUTO: 2.5 % (ref 5–12)
MONOCYTES NFR BLD AUTO: 3.9 % (ref 5–12)
MONOCYTES NFR BLD AUTO: 5.8 % (ref 5–12)
MONOCYTES NFR BLD AUTO: 6.1 % (ref 5–12)
MONOCYTES NFR BLD AUTO: 6.8 % (ref 5–12)
MONOCYTES NFR BLD AUTO: 7 % (ref 5–12)
MONOCYTES NFR BLD AUTO: 7.7 % (ref 5–12)
NEUTROPHILS NFR BLD AUTO: 3.67 10*3/MM3 (ref 1.7–7)
NEUTROPHILS NFR BLD AUTO: 3.86 10*3/MM3 (ref 1.7–7)
NEUTROPHILS NFR BLD AUTO: 4.69 10*3/MM3 (ref 1.7–7)
NEUTROPHILS NFR BLD AUTO: 6.2 10*3/MM3 (ref 1.7–7)
NEUTROPHILS NFR BLD AUTO: 6.25 10*3/MM3 (ref 1.7–7)
NEUTROPHILS NFR BLD AUTO: 6.58 10*3/MM3 (ref 1.7–7)
NEUTROPHILS NFR BLD AUTO: 7.84 10*3/MM3 (ref 1.7–7)
NEUTROPHILS NFR BLD AUTO: 74.9 % (ref 42.7–76)
NEUTROPHILS NFR BLD AUTO: 81.2 % (ref 42.7–76)
NEUTROPHILS NFR BLD AUTO: 82.6 % (ref 42.7–76)
NEUTROPHILS NFR BLD AUTO: 86 % (ref 42.7–76)
NEUTROPHILS NFR BLD AUTO: 88.1 % (ref 42.7–76)
NEUTROPHILS NFR BLD AUTO: 91.1 % (ref 42.7–76)
NEUTROPHILS NFR BLD AUTO: 91.7 % (ref 42.7–76)
NRBC BLD AUTO-RTO: 0 /100 WBC (ref 0–0.2)
NT-PROBNP SERPL-MCNC: ABNORMAL PG/ML (ref 0–1800)
OXYHGB MFR BLDV: 94.6 % (ref 94–99)
OXYHGB MFR BLDV: 95.1 % (ref 94–99)
PAW @ PEAK INSP FLOW SETTING VENT: 0 CMH2O
PAW @ PEAK INSP FLOW SETTING VENT: 0 CMH2O
PCO2 BLDA: 36.2 MM HG (ref 35–45)
PCO2 BLDA: 38.4 MM HG (ref 35–45)
PCO2 TEMP ADJ BLD: 36.2 MM HG (ref 35–45)
PCO2 TEMP ADJ BLD: 38.4 MM HG (ref 35–45)
PH BLDA: 7.46 PH UNITS (ref 7.35–7.45)
PH BLDA: 7.48 PH UNITS (ref 7.35–7.45)
PH, TEMP CORRECTED: 7.46 PH UNITS
PH, TEMP CORRECTED: 7.48 PH UNITS
PHOSPHATE SERPL-MCNC: 3.2 MG/DL (ref 2.5–4.5)
PHOSPHATE SERPL-MCNC: 3.5 MG/DL (ref 2.5–4.5)
PLATELET # BLD AUTO: 124 10*3/MM3 (ref 140–450)
PLATELET # BLD AUTO: 130 10*3/MM3 (ref 140–450)
PLATELET # BLD AUTO: 138 10*3/MM3 (ref 140–450)
PLATELET # BLD AUTO: 183 10*3/MM3 (ref 140–450)
PLATELET # BLD AUTO: 193 10*3/MM3 (ref 140–450)
PLATELET # BLD AUTO: 196 10*3/MM3 (ref 140–450)
PLATELET # BLD AUTO: 210 10*3/MM3 (ref 140–450)
PMV BLD AUTO: 10.2 FL (ref 6–12)
PMV BLD AUTO: 10.4 FL (ref 6–12)
PMV BLD AUTO: 11 FL (ref 6–12)
PMV BLD AUTO: 11.3 FL (ref 6–12)
PMV BLD AUTO: 11.3 FL (ref 6–12)
PMV BLD AUTO: 11.5 FL (ref 6–12)
PMV BLD AUTO: 11.6 FL (ref 6–12)
PO2 BLDA: 78.9 MM HG (ref 83–108)
PO2 BLDA: 79.8 MM HG (ref 83–108)
PO2 TEMP ADJ BLD: 78.9 MM HG (ref 83–108)
PO2 TEMP ADJ BLD: 79.8 MM HG (ref 83–108)
POTASSIUM SERPL-SCNC: 3 MMOL/L (ref 3.5–5.2)
POTASSIUM SERPL-SCNC: 3.3 MMOL/L (ref 3.5–5.2)
POTASSIUM SERPL-SCNC: 3.5 MMOL/L (ref 3.5–5.2)
POTASSIUM SERPL-SCNC: 3.6 MMOL/L (ref 3.5–5.2)
POTASSIUM SERPL-SCNC: 3.6 MMOL/L (ref 3.5–5.2)
POTASSIUM SERPL-SCNC: 3.7 MMOL/L (ref 3.5–5.2)
POTASSIUM SERPL-SCNC: 4.4 MMOL/L (ref 3.5–5.2)
PROCALCITONIN SERPL-MCNC: 0.1 NG/ML (ref 0–0.25)
PROT SERPL-MCNC: 5.1 G/DL (ref 6–8.5)
PROT SERPL-MCNC: 5.2 G/DL (ref 6–8.5)
PROT SERPL-MCNC: 5.9 G/DL (ref 6–8.5)
PROT SERPL-MCNC: 5.9 G/DL (ref 6–8.5)
PROTHROMBIN TIME: 17.9 SECONDS (ref 12.2–14.5)
QT INTERVAL: 478 MS
QT INTERVAL: 484 MS
QT INTERVAL: 508 MS
QTC INTERVAL: 511 MS
QTC INTERVAL: 551 MS
QTC INTERVAL: 552 MS
RBC # BLD AUTO: 3.64 10*6/MM3 (ref 3.77–5.28)
RBC # BLD AUTO: 3.71 10*6/MM3 (ref 3.77–5.28)
RBC # BLD AUTO: 3.89 10*6/MM3 (ref 3.77–5.28)
RBC # BLD AUTO: 4.31 10*6/MM3 (ref 3.77–5.28)
RBC # BLD AUTO: 4.37 10*6/MM3 (ref 3.77–5.28)
RBC # BLD AUTO: 4.4 10*6/MM3 (ref 3.77–5.28)
RBC # BLD AUTO: 4.57 10*6/MM3 (ref 3.77–5.28)
RHINOVIRUS RNA SPEC NAA+PROBE: NOT DETECTED
RSV RNA NPH QL NAA+NON-PROBE: NOT DETECTED
SARS-COV-2 RNA NPH QL NAA+NON-PROBE: DETECTED
SODIUM SERPL-SCNC: 132 MMOL/L (ref 136–145)
SODIUM SERPL-SCNC: 134 MMOL/L (ref 136–145)
SODIUM SERPL-SCNC: 136 MMOL/L (ref 136–145)
SODIUM SERPL-SCNC: 138 MMOL/L (ref 136–145)
SODIUM SERPL-SCNC: 140 MMOL/L (ref 136–145)
SODIUM SERPL-SCNC: 140 MMOL/L (ref 136–145)
SODIUM SERPL-SCNC: 141 MMOL/L (ref 136–145)
TOTAL RATE: 0 BREATHS/MINUTE
TOTAL RATE: 0 BREATHS/MINUTE
TRIGL SERPL-MCNC: 86 MG/DL (ref 0–150)
TROPONIN T DELTA: 7 NG/L
TROPONIN T SERPL HS-MCNC: 86 NG/L
TSH SERPL DL<=0.05 MIU/L-ACNC: 4.45 UIU/ML (ref 0.27–4.2)
VIT B12 BLD-MCNC: 772 PG/ML (ref 211–946)
VLDLC SERPL-MCNC: 17 MG/DL (ref 5–40)
WBC NRBC COR # BLD AUTO: 4.03 10*3/MM3 (ref 3.4–10.8)
WBC NRBC COR # BLD AUTO: 5.15 10*3/MM3 (ref 3.4–10.8)
WBC NRBC COR # BLD AUTO: 5.45 10*3/MM3 (ref 3.4–10.8)
WBC NRBC COR # BLD AUTO: 7.04 10*3/MM3 (ref 3.4–10.8)
WBC NRBC COR # BLD AUTO: 7.7 10*3/MM3 (ref 3.4–10.8)
WBC NRBC COR # BLD AUTO: 7.96 10*3/MM3 (ref 3.4–10.8)
WBC NRBC COR # BLD AUTO: 8.55 10*3/MM3 (ref 3.4–10.8)

## 2023-01-01 PROCEDURE — 82948 REAGENT STRIP/BLOOD GLUCOSE: CPT

## 2023-01-01 PROCEDURE — 83735 ASSAY OF MAGNESIUM: CPT | Performed by: INTERNAL MEDICINE

## 2023-01-01 PROCEDURE — 99232 SBSQ HOSP IP/OBS MODERATE 35: CPT | Performed by: INTERNAL MEDICINE

## 2023-01-01 PROCEDURE — 0202U NFCT DS 22 TRGT SARS-COV-2: CPT | Performed by: NURSE PRACTITIONER

## 2023-01-01 PROCEDURE — 80053 COMPREHEN METABOLIC PANEL: CPT | Performed by: INTERNAL MEDICINE

## 2023-01-01 PROCEDURE — 97530 THERAPEUTIC ACTIVITIES: CPT

## 2023-01-01 PROCEDURE — 25010000002 MIDAZOLAM PER 1 MG: Performed by: NURSE PRACTITIONER

## 2023-01-01 PROCEDURE — 99231 SBSQ HOSP IP/OBS SF/LOW 25: CPT | Performed by: INTERNAL MEDICINE

## 2023-01-01 PROCEDURE — 93005 ELECTROCARDIOGRAM TRACING: CPT | Performed by: NURSE PRACTITIONER

## 2023-01-01 PROCEDURE — 83036 HEMOGLOBIN GLYCOSYLATED A1C: CPT | Performed by: NURSE PRACTITIONER

## 2023-01-01 PROCEDURE — 25010000002 LORAZEPAM PER 2 MG

## 2023-01-01 PROCEDURE — 25010000002 KETOROLAC TROMETHAMINE PER 15 MG: Performed by: INTERNAL MEDICINE

## 2023-01-01 PROCEDURE — 86140 C-REACTIVE PROTEIN: CPT | Performed by: INTERNAL MEDICINE

## 2023-01-01 PROCEDURE — 94664 DEMO&/EVAL PT USE INHALER: CPT

## 2023-01-01 PROCEDURE — 25010000002 HEPARIN (PORCINE) PER 1000 UNITS: Performed by: INTERNAL MEDICINE

## 2023-01-01 PROCEDURE — 83050 HGB METHEMOGLOBIN QUAN: CPT

## 2023-01-01 PROCEDURE — 99223 1ST HOSP IP/OBS HIGH 75: CPT | Performed by: INTERNAL MEDICINE

## 2023-01-01 PROCEDURE — 25010000002 HYDROMORPHONE PER 4 MG

## 2023-01-01 PROCEDURE — 93005 ELECTROCARDIOGRAM TRACING: CPT | Performed by: INTERNAL MEDICINE

## 2023-01-01 PROCEDURE — 25010000002 CEFTRIAXONE PER 250 MG: Performed by: NURSE PRACTITIONER

## 2023-01-01 PROCEDURE — 80053 COMPREHEN METABOLIC PANEL: CPT | Performed by: NURSE PRACTITIONER

## 2023-01-01 PROCEDURE — 94799 UNLISTED PULMONARY SVC/PX: CPT

## 2023-01-01 PROCEDURE — 93306 TTE W/DOPPLER COMPLETE: CPT | Performed by: INTERNAL MEDICINE

## 2023-01-01 PROCEDURE — 92526 ORAL FUNCTION THERAPY: CPT

## 2023-01-01 PROCEDURE — 71045 X-RAY EXAM CHEST 1 VIEW: CPT

## 2023-01-01 PROCEDURE — 25010000002 MIDAZOLAM PER 1 MG: Performed by: INTERNAL MEDICINE

## 2023-01-01 PROCEDURE — 25010000002 HYDROMORPHONE PER 4 MG: Performed by: NURSE PRACTITIONER

## 2023-01-01 PROCEDURE — 83615 LACTATE (LD) (LDH) ENZYME: CPT | Performed by: NURSE PRACTITIONER

## 2023-01-01 PROCEDURE — 70450 CT HEAD/BRAIN W/O DYE: CPT

## 2023-01-01 PROCEDURE — 84443 ASSAY THYROID STIM HORMONE: CPT | Performed by: INTERNAL MEDICINE

## 2023-01-01 PROCEDURE — 99223 1ST HOSP IP/OBS HIGH 75: CPT | Performed by: NURSE PRACTITIONER

## 2023-01-01 PROCEDURE — 84484 ASSAY OF TROPONIN QUANT: CPT | Performed by: NURSE PRACTITIONER

## 2023-01-01 PROCEDURE — 25010000002 HYDROMORPHONE PER 4 MG: Performed by: INTERNAL MEDICINE

## 2023-01-01 PROCEDURE — 63710000001 INSULIN REGULAR HUMAN PER 5 UNITS: Performed by: NURSE PRACTITIONER

## 2023-01-01 PROCEDURE — 85025 COMPLETE CBC W/AUTO DIFF WBC: CPT | Performed by: INTERNAL MEDICINE

## 2023-01-01 PROCEDURE — 36415 COLL VENOUS BLD VENIPUNCTURE: CPT

## 2023-01-01 PROCEDURE — 85730 THROMBOPLASTIN TIME PARTIAL: CPT | Performed by: NURSE PRACTITIONER

## 2023-01-01 PROCEDURE — 97166 OT EVAL MOD COMPLEX 45 MIN: CPT

## 2023-01-01 PROCEDURE — 80069 RENAL FUNCTION PANEL: CPT | Performed by: INTERNAL MEDICINE

## 2023-01-01 PROCEDURE — 25010000002 DEXAMETHASONE PER 1 MG: Performed by: NURSE PRACTITIONER

## 2023-01-01 PROCEDURE — 82607 VITAMIN B-12: CPT | Performed by: INTERNAL MEDICINE

## 2023-01-01 PROCEDURE — 99233 SBSQ HOSP IP/OBS HIGH 50: CPT | Performed by: INTERNAL MEDICINE

## 2023-01-01 PROCEDURE — 93306 TTE W/DOPPLER COMPLETE: CPT

## 2023-01-01 PROCEDURE — 97535 SELF CARE MNGMENT TRAINING: CPT

## 2023-01-01 PROCEDURE — 25510000001 IOPAMIDOL PER 1 ML: Performed by: INTERNAL MEDICINE

## 2023-01-01 PROCEDURE — 0042T HC CT CEREBRAL PERFUSION W/WO CONTRAST: CPT

## 2023-01-01 PROCEDURE — 36600 WITHDRAWAL OF ARTERIAL BLOOD: CPT

## 2023-01-01 PROCEDURE — 97168 OT RE-EVAL EST PLAN CARE: CPT

## 2023-01-01 PROCEDURE — 70496 CT ANGIOGRAPHY HEAD: CPT

## 2023-01-01 PROCEDURE — 25010000002 DEXAMETHASONE PER 1 MG: Performed by: INTERNAL MEDICINE

## 2023-01-01 PROCEDURE — 80048 BASIC METABOLIC PNL TOTAL CA: CPT | Performed by: INTERNAL MEDICINE

## 2023-01-01 PROCEDURE — 70498 CT ANGIOGRAPHY NECK: CPT

## 2023-01-01 PROCEDURE — 97161 PT EVAL LOW COMPLEX 20 MIN: CPT

## 2023-01-01 PROCEDURE — 25810000003 SODIUM CHLORIDE 0.9 % SOLUTION 250 ML FLEX CONT: Performed by: NURSE PRACTITIONER

## 2023-01-01 PROCEDURE — 82375 ASSAY CARBOXYHB QUANT: CPT

## 2023-01-01 PROCEDURE — 82805 BLOOD GASES W/O2 SATURATION: CPT

## 2023-01-01 PROCEDURE — 99222 1ST HOSP IP/OBS MODERATE 55: CPT | Performed by: INTERNAL MEDICINE

## 2023-01-01 PROCEDURE — 83605 ASSAY OF LACTIC ACID: CPT | Performed by: NURSE PRACTITIONER

## 2023-01-01 PROCEDURE — 92610 EVALUATE SWALLOWING FUNCTION: CPT

## 2023-01-01 PROCEDURE — 25010000002 FUROSEMIDE PER 20 MG: Performed by: INTERNAL MEDICINE

## 2023-01-01 PROCEDURE — 85610 PROTHROMBIN TIME: CPT | Performed by: NURSE PRACTITIONER

## 2023-01-01 PROCEDURE — 99239 HOSP IP/OBS DSCHRG MGMT >30: CPT | Performed by: INTERNAL MEDICINE

## 2023-01-01 PROCEDURE — 25010000002 TENECTEPLASE PER 50 MG: Performed by: STUDENT IN AN ORGANIZED HEALTH CARE EDUCATION/TRAINING PROGRAM

## 2023-01-01 PROCEDURE — 94761 N-INVAS EAR/PLS OXIMETRY MLT: CPT

## 2023-01-01 PROCEDURE — 97164 PT RE-EVAL EST PLAN CARE: CPT

## 2023-01-01 PROCEDURE — 93010 ELECTROCARDIOGRAM REPORT: CPT | Performed by: INTERNAL MEDICINE

## 2023-01-01 PROCEDURE — 25010000002 HALOPERIDOL LACTATE PER 5 MG

## 2023-01-01 PROCEDURE — 25010000002 AZITHROMYCIN PER 500 MG: Performed by: NURSE PRACTITIONER

## 2023-01-01 PROCEDURE — 82728 ASSAY OF FERRITIN: CPT | Performed by: NURSE PRACTITIONER

## 2023-01-01 PROCEDURE — 85379 FIBRIN DEGRADATION QUANT: CPT | Performed by: NURSE PRACTITIONER

## 2023-01-01 PROCEDURE — 87040 BLOOD CULTURE FOR BACTERIA: CPT | Performed by: NURSE PRACTITIONER

## 2023-01-01 PROCEDURE — 82746 ASSAY OF FOLIC ACID SERUM: CPT | Performed by: INTERNAL MEDICINE

## 2023-01-01 PROCEDURE — 86140 C-REACTIVE PROTEIN: CPT | Performed by: NURSE PRACTITIONER

## 2023-01-01 PROCEDURE — 80061 LIPID PANEL: CPT | Performed by: NURSE PRACTITIONER

## 2023-01-01 PROCEDURE — 84100 ASSAY OF PHOSPHORUS: CPT | Performed by: INTERNAL MEDICINE

## 2023-01-01 PROCEDURE — 94640 AIRWAY INHALATION TREATMENT: CPT

## 2023-01-01 PROCEDURE — 85025 COMPLETE CBC W/AUTO DIFF WBC: CPT | Performed by: NURSE PRACTITIONER

## 2023-01-01 PROCEDURE — 84145 PROCALCITONIN (PCT): CPT | Performed by: NURSE PRACTITIONER

## 2023-01-01 PROCEDURE — 3E03317 INTRODUCTION OF OTHER THROMBOLYTIC INTO PERIPHERAL VEIN, PERCUTANEOUS APPROACH: ICD-10-PCS | Performed by: INTERNAL MEDICINE

## 2023-01-01 PROCEDURE — 99291 CRITICAL CARE FIRST HOUR: CPT | Performed by: STUDENT IN AN ORGANIZED HEALTH CARE EDUCATION/TRAINING PROGRAM

## 2023-01-01 PROCEDURE — 83880 ASSAY OF NATRIURETIC PEPTIDE: CPT | Performed by: NURSE PRACTITIONER

## 2023-01-01 PROCEDURE — 99285 EMERGENCY DEPT VISIT HI MDM: CPT

## 2023-01-01 RX ORDER — CHOLECALCIFEROL (VITAMIN D3) 125 MCG
5 CAPSULE ORAL NIGHTLY PRN
Status: DISCONTINUED | OUTPATIENT
Start: 2023-01-01 | End: 2023-01-01

## 2023-01-01 RX ORDER — HALOPERIDOL 5 MG/ML
1 INJECTION INTRAMUSCULAR EVERY 4 HOURS PRN
Status: DISCONTINUED | OUTPATIENT
Start: 2023-01-01 | End: 2023-12-25 | Stop reason: HOSPADM

## 2023-01-01 RX ORDER — SODIUM CHLORIDE 0.9 % (FLUSH) 0.9 %
10 SYRINGE (ML) INJECTION EVERY 12 HOURS SCHEDULED
Status: DISCONTINUED | OUTPATIENT
Start: 2023-01-01 | End: 2023-01-01 | Stop reason: HOSPADM

## 2023-01-01 RX ORDER — ACETAMINOPHEN 650 MG/1
650 SUPPOSITORY RECTAL EVERY 4 HOURS PRN
Status: DISCONTINUED | OUTPATIENT
Start: 2023-01-01 | End: 2023-12-25 | Stop reason: HOSPADM

## 2023-01-01 RX ORDER — DEXTROSE MONOHYDRATE 25 G/50ML
25 INJECTION, SOLUTION INTRAVENOUS
Status: DISCONTINUED | OUTPATIENT
Start: 2023-01-01 | End: 2023-01-01

## 2023-01-01 RX ORDER — NITROGLYCERIN 0.4 MG/1
0.4 TABLET SUBLINGUAL
Status: DISCONTINUED | OUTPATIENT
Start: 2023-01-01 | End: 2023-01-01

## 2023-01-01 RX ORDER — CASTOR OIL AND BALSAM, PERU 788; 87 MG/G; MG/G
1 OINTMENT TOPICAL EVERY 12 HOURS SCHEDULED
Status: CANCELLED | OUTPATIENT
Start: 2023-01-01

## 2023-01-01 RX ORDER — PANTOPRAZOLE SODIUM 40 MG/1
40 TABLET, DELAYED RELEASE ORAL NIGHTLY
Status: DISCONTINUED | OUTPATIENT
Start: 2023-01-01 | End: 2023-01-01

## 2023-01-01 RX ORDER — POTASSIUM CHLORIDE 20 MEQ/1
40 TABLET, EXTENDED RELEASE ORAL ONCE
Status: COMPLETED | OUTPATIENT
Start: 2023-01-01 | End: 2023-01-01

## 2023-01-01 RX ORDER — IBUPROFEN 600 MG/1
1 TABLET ORAL
Status: DISCONTINUED | OUTPATIENT
Start: 2023-01-01 | End: 2023-01-01

## 2023-01-01 RX ORDER — HYDROMORPHONE HYDROCHLORIDE 1 MG/ML
0.5 INJECTION, SOLUTION INTRAMUSCULAR; INTRAVENOUS; SUBCUTANEOUS
Status: DISCONTINUED | OUTPATIENT
Start: 2023-01-01 | End: 2023-12-25 | Stop reason: HOSPADM

## 2023-01-01 RX ORDER — BISACODYL 10 MG
10 SUPPOSITORY, RECTAL RECTAL ONCE
Status: COMPLETED | OUTPATIENT
Start: 2023-01-01 | End: 2023-01-01

## 2023-01-01 RX ORDER — PANTOPRAZOLE SODIUM 40 MG/10ML
40 INJECTION, POWDER, LYOPHILIZED, FOR SOLUTION INTRAVENOUS
Status: DISCONTINUED | OUTPATIENT
Start: 2023-01-01 | End: 2023-12-25 | Stop reason: HOSPADM

## 2023-01-01 RX ORDER — LEVOTHYROXINE SODIUM 0.05 MG/1
50 TABLET ORAL
Status: DISCONTINUED | OUTPATIENT
Start: 2023-01-01 | End: 2023-01-01

## 2023-01-01 RX ORDER — CHOLECALCIFEROL (VITAMIN D3) 125 MCG
10 CAPSULE ORAL NIGHTLY
Status: DISCONTINUED | OUTPATIENT
Start: 2023-01-01 | End: 2023-01-01

## 2023-01-01 RX ORDER — BISACODYL 10 MG
10 SUPPOSITORY, RECTAL RECTAL DAILY PRN
Status: DISCONTINUED | OUTPATIENT
Start: 2023-01-01 | End: 2023-12-25 | Stop reason: HOSPADM

## 2023-01-01 RX ORDER — HYDROMORPHONE HYDROCHLORIDE 1 MG/ML
0.25 INJECTION, SOLUTION INTRAMUSCULAR; INTRAVENOUS; SUBCUTANEOUS
Status: DISCONTINUED | OUTPATIENT
Start: 2023-01-01 | End: 2023-01-01 | Stop reason: HOSPADM

## 2023-01-01 RX ORDER — HYDROMORPHONE HCL IN 0.9% NACL 50 MG/50ML
0.3 PLASTIC BAG, INJECTION (ML) INJECTION CONTINUOUS
Status: DISCONTINUED | OUTPATIENT
Start: 2023-01-01 | End: 2023-12-25 | Stop reason: HOSPADM

## 2023-01-01 RX ORDER — ENOXAPARIN SODIUM 100 MG/ML
40 INJECTION SUBCUTANEOUS EVERY 24 HOURS
Status: DISCONTINUED | OUTPATIENT
Start: 2023-01-01 | End: 2023-01-01 | Stop reason: ALTCHOICE

## 2023-01-01 RX ORDER — HALOPERIDOL 5 MG/ML
1 INJECTION INTRAMUSCULAR EVERY 6 HOURS PRN
Status: DISCONTINUED | OUTPATIENT
Start: 2023-01-01 | End: 2023-01-01 | Stop reason: HOSPADM

## 2023-01-01 RX ORDER — SODIUM CHLORIDE 0.9 % (FLUSH) 0.9 %
10 SYRINGE (ML) INJECTION ONCE
Status: COMPLETED | OUTPATIENT
Start: 2023-01-01 | End: 2023-01-01

## 2023-01-01 RX ORDER — IPRATROPIUM BROMIDE AND ALBUTEROL SULFATE 2.5; .5 MG/3ML; MG/3ML
3 SOLUTION RESPIRATORY (INHALATION) EVERY 4 HOURS PRN
Status: DISCONTINUED | OUTPATIENT
Start: 2023-01-01 | End: 2023-12-25 | Stop reason: HOSPADM

## 2023-01-01 RX ORDER — HALOPERIDOL 5 MG/ML
1 INJECTION INTRAMUSCULAR EVERY 6 HOURS PRN
Status: CANCELLED | OUTPATIENT
Start: 2023-01-01

## 2023-01-01 RX ORDER — SODIUM CHLORIDE 0.9 % (FLUSH) 0.9 %
10 SYRINGE (ML) INJECTION AS NEEDED
Status: CANCELLED | OUTPATIENT
Start: 2023-01-01

## 2023-01-01 RX ORDER — MIDAZOLAM HYDROCHLORIDE 1 MG/ML
0.5 INJECTION INTRAMUSCULAR; INTRAVENOUS EVERY 12 HOURS
Status: DISCONTINUED | OUTPATIENT
Start: 2023-01-01 | End: 2023-12-25 | Stop reason: HOSPADM

## 2023-01-01 RX ORDER — DEXAMETHASONE SODIUM PHOSPHATE 4 MG/ML
6 INJECTION, SOLUTION INTRA-ARTICULAR; INTRALESIONAL; INTRAMUSCULAR; INTRAVENOUS; SOFT TISSUE DAILY
Qty: 20 ML | Refills: 0 | Status: DISCONTINUED | OUTPATIENT
Start: 2023-01-01 | End: 2023-01-01

## 2023-01-01 RX ORDER — ATORVASTATIN CALCIUM 40 MG/1
80 TABLET, FILM COATED ORAL NIGHTLY
Status: DISCONTINUED | OUTPATIENT
Start: 2023-01-01 | End: 2023-01-01

## 2023-01-01 RX ORDER — HYDROMORPHONE HYDROCHLORIDE 1 MG/ML
0.25 INJECTION, SOLUTION INTRAMUSCULAR; INTRAVENOUS; SUBCUTANEOUS
Status: CANCELLED | OUTPATIENT
Start: 2023-01-01 | End: 2023-12-26

## 2023-01-01 RX ORDER — LORAZEPAM 2 MG/ML
0.25 INJECTION INTRAMUSCULAR EVERY 4 HOURS PRN
Status: CANCELLED | OUTPATIENT
Start: 2023-01-01 | End: 2023-12-29

## 2023-01-01 RX ORDER — LEVOTHYROXINE SODIUM 40 UG/ML
40 INJECTION, SOLUTION INTRAVENOUS
Status: DISCONTINUED | OUTPATIENT
Start: 2023-01-01 | End: 2023-12-25 | Stop reason: HOSPADM

## 2023-01-01 RX ORDER — ALBUTEROL SULFATE 90 UG/1
2 AEROSOL, METERED RESPIRATORY (INHALATION)
Status: DISCONTINUED | OUTPATIENT
Start: 2023-01-01 | End: 2023-01-01

## 2023-01-01 RX ORDER — HEPARIN SODIUM 5000 [USP'U]/ML
5000 INJECTION, SOLUTION INTRAVENOUS; SUBCUTANEOUS EVERY 12 HOURS SCHEDULED
Status: DISCONTINUED | OUTPATIENT
Start: 2023-01-01 | End: 2023-01-01

## 2023-01-01 RX ORDER — ONDANSETRON 2 MG/ML
4 INJECTION INTRAMUSCULAR; INTRAVENOUS EVERY 6 HOURS PRN
Status: DISCONTINUED | OUTPATIENT
Start: 2023-01-01 | End: 2023-01-01 | Stop reason: ALTCHOICE

## 2023-01-01 RX ORDER — ONDANSETRON 2 MG/ML
4 INJECTION INTRAMUSCULAR; INTRAVENOUS EVERY 6 HOURS PRN
Status: DISCONTINUED | OUTPATIENT
Start: 2023-01-01 | End: 2023-12-25 | Stop reason: HOSPADM

## 2023-01-01 RX ORDER — MIDAZOLAM HYDROCHLORIDE 1 MG/ML
1 INJECTION INTRAMUSCULAR; INTRAVENOUS EVERY 4 HOURS PRN
Status: DISCONTINUED | OUTPATIENT
Start: 2023-01-01 | End: 2023-01-01

## 2023-01-01 RX ORDER — PANTOPRAZOLE SODIUM 40 MG/10ML
40 INJECTION, POWDER, LYOPHILIZED, FOR SOLUTION INTRAVENOUS
Status: CANCELLED | OUTPATIENT
Start: 2023-01-01

## 2023-01-01 RX ORDER — MIDAZOLAM HYDROCHLORIDE 1 MG/ML
1 INJECTION INTRAMUSCULAR; INTRAVENOUS
Status: DISCONTINUED | OUTPATIENT
Start: 2023-01-01 | End: 2023-01-01

## 2023-01-01 RX ORDER — ZIPRASIDONE MESYLATE 20 MG/ML
10 INJECTION, POWDER, LYOPHILIZED, FOR SOLUTION INTRAMUSCULAR EVERY 6 HOURS PRN
Status: DISCONTINUED | OUTPATIENT
Start: 2023-01-01 | End: 2023-01-01

## 2023-01-01 RX ORDER — HYDROMORPHONE HYDROCHLORIDE 1 MG/ML
0.25 INJECTION, SOLUTION INTRAMUSCULAR; INTRAVENOUS; SUBCUTANEOUS
Status: DISCONTINUED | OUTPATIENT
Start: 2023-01-01 | End: 2023-01-01

## 2023-01-01 RX ORDER — ISOSORBIDE MONONITRATE 60 MG/1
60 TABLET, EXTENDED RELEASE ORAL DAILY
Status: DISCONTINUED | OUTPATIENT
Start: 2023-01-01 | End: 2023-01-01

## 2023-01-01 RX ORDER — CASTOR OIL AND BALSAM, PERU 788; 87 MG/G; MG/G
1 OINTMENT TOPICAL EVERY 12 HOURS SCHEDULED
Status: DISCONTINUED | OUTPATIENT
Start: 2023-01-01 | End: 2023-01-01 | Stop reason: HOSPADM

## 2023-01-01 RX ORDER — LEVOTHYROXINE SODIUM 40 UG/ML
40 INJECTION, SOLUTION INTRAVENOUS
Status: CANCELLED | OUTPATIENT
Start: 2023-01-01

## 2023-01-01 RX ORDER — ALBUTEROL SULFATE 2.5 MG/3ML
2.5 SOLUTION RESPIRATORY (INHALATION) EVERY 6 HOURS PRN
Status: CANCELLED | OUTPATIENT
Start: 2023-01-01

## 2023-01-01 RX ORDER — QUETIAPINE FUMARATE 25 MG/1
25 TABLET, FILM COATED ORAL ONCE
Status: DISCONTINUED | OUTPATIENT
Start: 2023-01-01 | End: 2023-01-01

## 2023-01-01 RX ORDER — GLYCOPYRROLATE 0.2 MG/ML
0.4 INJECTION INTRAMUSCULAR; INTRAVENOUS
Status: DISCONTINUED | OUTPATIENT
Start: 2023-01-01 | End: 2023-12-25 | Stop reason: HOSPADM

## 2023-01-01 RX ORDER — CLONAZEPAM 0.5 MG/1
0.5 TABLET ORAL NIGHTLY
Status: DISCONTINUED | OUTPATIENT
Start: 2023-01-01 | End: 2023-01-01 | Stop reason: ALTCHOICE

## 2023-01-01 RX ORDER — ALBUTEROL SULFATE 2.5 MG/3ML
2.5 SOLUTION RESPIRATORY (INHALATION)
Status: DISCONTINUED | OUTPATIENT
Start: 2023-01-01 | End: 2023-01-01

## 2023-01-01 RX ORDER — BISACODYL 5 MG/1
10 TABLET, DELAYED RELEASE ORAL DAILY PRN
Status: DISCONTINUED | OUTPATIENT
Start: 2023-01-01 | End: 2023-01-01

## 2023-01-01 RX ORDER — CARVEDILOL 12.5 MG/1
12.5 TABLET ORAL 2 TIMES DAILY WITH MEALS
Status: DISCONTINUED | OUTPATIENT
Start: 2023-01-01 | End: 2023-01-01

## 2023-01-01 RX ORDER — NICOTINE POLACRILEX 4 MG
15 LOZENGE BUCCAL
Status: DISCONTINUED | OUTPATIENT
Start: 2023-01-01 | End: 2023-01-01

## 2023-01-01 RX ORDER — POLYVINYL ALCOHOL 14 MG/ML
1 SOLUTION/ DROPS OPHTHALMIC
Status: DISCONTINUED | OUTPATIENT
Start: 2023-01-01 | End: 2023-12-25 | Stop reason: HOSPADM

## 2023-01-01 RX ORDER — BENZONATATE 100 MG/1
100 CAPSULE ORAL EVERY 4 HOURS PRN
Status: DISCONTINUED | OUTPATIENT
Start: 2023-01-01 | End: 2023-01-01

## 2023-01-01 RX ORDER — ASPIRIN 81 MG/1
81 TABLET, CHEWABLE ORAL DAILY
Status: DISCONTINUED | OUTPATIENT
Start: 2023-01-01 | End: 2023-01-01

## 2023-01-01 RX ORDER — SODIUM CHLORIDE 0.9 % (FLUSH) 0.9 %
10 SYRINGE (ML) INJECTION AS NEEDED
Status: DISCONTINUED | OUTPATIENT
Start: 2023-01-01 | End: 2023-01-01

## 2023-01-01 RX ORDER — QUETIAPINE FUMARATE 25 MG/1
25 TABLET, FILM COATED ORAL NIGHTLY
Status: DISCONTINUED | OUTPATIENT
Start: 2023-01-01 | End: 2023-01-01

## 2023-01-01 RX ORDER — DEXAMETHASONE SODIUM PHOSPHATE 4 MG/ML
6 INJECTION, SOLUTION INTRA-ARTICULAR; INTRALESIONAL; INTRAMUSCULAR; INTRAVENOUS; SOFT TISSUE ONCE
Status: COMPLETED | OUTPATIENT
Start: 2023-01-01 | End: 2023-01-01

## 2023-01-01 RX ORDER — LEVOTHYROXINE SODIUM 40 UG/ML
40 INJECTION, SOLUTION INTRAVENOUS
Status: DISCONTINUED | OUTPATIENT
Start: 2023-01-01 | End: 2023-01-01 | Stop reason: HOSPADM

## 2023-01-01 RX ORDER — PRAVASTATIN SODIUM 20 MG
20 TABLET ORAL NIGHTLY
Status: DISCONTINUED | OUTPATIENT
Start: 2023-01-01 | End: 2023-01-01

## 2023-01-01 RX ORDER — SODIUM CHLORIDE 0.9 % (FLUSH) 0.9 %
10 SYRINGE (ML) INJECTION EVERY 12 HOURS SCHEDULED
Status: CANCELLED | OUTPATIENT
Start: 2023-01-01

## 2023-01-01 RX ORDER — SODIUM CHLORIDE 0.9 % (FLUSH) 0.9 %
10 SYRINGE (ML) INJECTION AS NEEDED
Status: DISCONTINUED | OUTPATIENT
Start: 2023-01-01 | End: 2023-01-01 | Stop reason: HOSPADM

## 2023-01-01 RX ORDER — TORSEMIDE 20 MG/1
20 TABLET ORAL DAILY
Status: DISCONTINUED | OUTPATIENT
Start: 2023-01-01 | End: 2023-01-01

## 2023-01-01 RX ORDER — FUROSEMIDE 10 MG/ML
20 INJECTION INTRAMUSCULAR; INTRAVENOUS ONCE
Status: COMPLETED | OUTPATIENT
Start: 2023-01-01 | End: 2023-01-01

## 2023-01-01 RX ORDER — ALBUTEROL SULFATE 90 UG/1
2 AEROSOL, METERED RESPIRATORY (INHALATION) EVERY 6 HOURS PRN
Status: DISCONTINUED | OUTPATIENT
Start: 2023-01-01 | End: 2023-01-01 | Stop reason: HOSPADM

## 2023-01-01 RX ORDER — LORAZEPAM 2 MG/ML
0.25 INJECTION INTRAMUSCULAR EVERY 4 HOURS PRN
Status: DISCONTINUED | OUTPATIENT
Start: 2023-01-01 | End: 2023-01-01 | Stop reason: HOSPADM

## 2023-01-01 RX ORDER — TORSEMIDE 20 MG/1
20 TABLET ORAL
Status: DISCONTINUED | OUTPATIENT
Start: 2023-01-01 | End: 2023-01-01

## 2023-01-01 RX ORDER — SODIUM CHLORIDE 0.9 % (FLUSH) 0.9 %
10 SYRINGE (ML) INJECTION
Status: COMPLETED | OUTPATIENT
Start: 2023-01-01 | End: 2023-01-01

## 2023-01-01 RX ORDER — KETOROLAC TROMETHAMINE 15 MG/ML
15 INJECTION, SOLUTION INTRAMUSCULAR; INTRAVENOUS EVERY 6 HOURS PRN
Status: DISCONTINUED | OUTPATIENT
Start: 2023-01-01 | End: 2023-12-25 | Stop reason: HOSPADM

## 2023-01-01 RX ORDER — MIDAZOLAM HYDROCHLORIDE 1 MG/ML
0.5 INJECTION INTRAMUSCULAR; INTRAVENOUS
Status: DISCONTINUED | OUTPATIENT
Start: 2023-01-01 | End: 2023-01-01

## 2023-01-01 RX ORDER — SODIUM CHLORIDE 9 MG/ML
40 INJECTION, SOLUTION INTRAVENOUS AS NEEDED
Status: DISCONTINUED | OUTPATIENT
Start: 2023-01-01 | End: 2023-01-01

## 2023-01-01 RX ORDER — ASPIRIN 81 MG/1
81 TABLET ORAL DAILY
Status: DISCONTINUED | OUTPATIENT
Start: 2023-01-01 | End: 2023-01-01

## 2023-01-01 RX ORDER — ASPIRIN 300 MG/1
300 SUPPOSITORY RECTAL DAILY
Status: DISCONTINUED | OUTPATIENT
Start: 2023-01-01 | End: 2023-01-01

## 2023-01-01 RX ORDER — PANTOPRAZOLE SODIUM 40 MG/10ML
40 INJECTION, POWDER, LYOPHILIZED, FOR SOLUTION INTRAVENOUS
Status: DISCONTINUED | OUTPATIENT
Start: 2023-01-01 | End: 2023-01-01 | Stop reason: HOSPADM

## 2023-01-01 RX ORDER — LORAZEPAM 0.5 MG/1
0.25 TABLET ORAL NIGHTLY PRN
Status: DISCONTINUED | OUTPATIENT
Start: 2023-01-01 | End: 2023-01-01

## 2023-01-01 RX ORDER — MIDAZOLAM HYDROCHLORIDE 1 MG/ML
1 INJECTION INTRAMUSCULAR; INTRAVENOUS
Status: DISCONTINUED | OUTPATIENT
Start: 2023-01-01 | End: 2023-12-25 | Stop reason: HOSPADM

## 2023-01-01 RX ADMIN — TORSEMIDE 20 MG: 20 TABLET ORAL at 09:35

## 2023-01-01 RX ADMIN — HYDROMORPHONE HYDROCHLORIDE 0.25 MG: 1 INJECTION, SOLUTION INTRAMUSCULAR; INTRAVENOUS; SUBCUTANEOUS at 07:44

## 2023-01-01 RX ADMIN — HYDROMORPHONE HYDROCHLORIDE 0.25 MG: 1 INJECTION, SOLUTION INTRAMUSCULAR; INTRAVENOUS; SUBCUTANEOUS at 14:22

## 2023-01-01 RX ADMIN — HYDROMORPHONE HYDROCHLORIDE 0.25 MG: 1 INJECTION, SOLUTION INTRAMUSCULAR; INTRAVENOUS; SUBCUTANEOUS at 03:58

## 2023-01-01 RX ADMIN — Medication 10 ML: at 14:20

## 2023-01-01 RX ADMIN — ALBUTEROL SULFATE 2 PUFF: 90 AEROSOL, METERED RESPIRATORY (INHALATION) at 07:22

## 2023-01-01 RX ADMIN — PRAVASTATIN SODIUM 20 MG: 20 TABLET ORAL at 21:35

## 2023-01-01 RX ADMIN — ASPIRIN 300 MG: 300 SUPPOSITORY RECTAL at 15:11

## 2023-01-01 RX ADMIN — HYDROMORPHONE HYDROCHLORIDE 0.25 MG: 1 INJECTION, SOLUTION INTRAMUSCULAR; INTRAVENOUS; SUBCUTANEOUS at 21:43

## 2023-01-01 RX ADMIN — LORAZEPAM 0.25 MG: 0.5 TABLET ORAL at 22:43

## 2023-01-01 RX ADMIN — HYDROMORPHONE HYDROCHLORIDE 0.25 MG: 1 INJECTION, SOLUTION INTRAMUSCULAR; INTRAVENOUS; SUBCUTANEOUS at 18:25

## 2023-01-01 RX ADMIN — ISOSORBIDE MONONITRATE 60 MG: 60 TABLET, EXTENDED RELEASE ORAL at 08:30

## 2023-01-01 RX ADMIN — LORAZEPAM 0.25 MG: 0.5 TABLET ORAL at 05:21

## 2023-01-01 RX ADMIN — ALBUTEROL SULFATE 2 PUFF: 90 AEROSOL, METERED RESPIRATORY (INHALATION) at 18:55

## 2023-01-01 RX ADMIN — INSULIN HUMAN 2 UNITS: 100 INJECTION, SOLUTION PARENTERAL at 05:47

## 2023-01-01 RX ADMIN — ASPIRIN 81 MG: 81 TABLET, COATED ORAL at 09:17

## 2023-01-01 RX ADMIN — ALBUTEROL SULFATE 2 PUFF: 90 AEROSOL, METERED RESPIRATORY (INHALATION) at 07:08

## 2023-01-01 RX ADMIN — HEPARIN SODIUM 5000 UNITS: 5000 INJECTION INTRAVENOUS; SUBCUTANEOUS at 20:03

## 2023-01-01 RX ADMIN — ALBUTEROL SULFATE 2 PUFF: 90 AEROSOL, METERED RESPIRATORY (INHALATION) at 13:22

## 2023-01-01 RX ADMIN — LEVOTHYROXINE SODIUM 40 MCG: 40 INJECTION, SOLUTION INTRAVENOUS at 12:10

## 2023-01-01 RX ADMIN — ALBUTEROL SULFATE 2 PUFF: 90 AEROSOL, METERED RESPIRATORY (INHALATION) at 13:04

## 2023-01-01 RX ADMIN — CASTOR OIL AND BALSAM, PERU 1 APPLICATION: 788; 87 OINTMENT TOPICAL at 21:06

## 2023-01-01 RX ADMIN — PANTOPRAZOLE SODIUM 40 MG: 40 INJECTION, POWDER, FOR SOLUTION INTRAVENOUS at 06:10

## 2023-01-01 RX ADMIN — SACUBITRIL AND VALSARTAN 1 TABLET: 24; 26 TABLET, FILM COATED ORAL at 08:30

## 2023-01-01 RX ADMIN — Medication 10 MG: at 21:05

## 2023-01-01 RX ADMIN — MINERAL OIL: 1000 LIQUID ORAL at 09:43

## 2023-01-01 RX ADMIN — Medication 10 ML: at 11:49

## 2023-01-01 RX ADMIN — LORAZEPAM 0.25 MG: 0.5 TABLET ORAL at 20:01

## 2023-01-01 RX ADMIN — MIDAZOLAM HYDROCHLORIDE 0.5 MG: 1 INJECTION, SOLUTION INTRAMUSCULAR; INTRAVENOUS at 09:35

## 2023-01-01 RX ADMIN — NIRMATRELVIR AND RITONAVIR 2 EACH: KIT at 09:19

## 2023-01-01 RX ADMIN — MINERAL OIL: 1000 LIQUID ORAL at 21:16

## 2023-01-01 RX ADMIN — CARVEDILOL 12.5 MG: 12.5 TABLET, FILM COATED ORAL at 17:31

## 2023-01-01 RX ADMIN — FUROSEMIDE 20 MG: 10 INJECTION, SOLUTION INTRAMUSCULAR; INTRAVENOUS at 22:50

## 2023-01-01 RX ADMIN — MIDAZOLAM HYDROCHLORIDE 0.5 MG: 1 INJECTION, SOLUTION INTRAMUSCULAR; INTRAVENOUS at 09:43

## 2023-01-01 RX ADMIN — ALBUTEROL SULFATE 2 PUFF: 90 AEROSOL, METERED RESPIRATORY (INHALATION) at 07:41

## 2023-01-01 RX ADMIN — HYDROMORPHONE HYDROCHLORIDE 0.25 MG: 1 INJECTION, SOLUTION INTRAMUSCULAR; INTRAVENOUS; SUBCUTANEOUS at 00:57

## 2023-01-01 RX ADMIN — LEVOTHYROXINE SODIUM 50 MCG: 0.05 TABLET ORAL at 05:21

## 2023-01-01 RX ADMIN — MINERAL OIL: 1000 LIQUID ORAL at 09:35

## 2023-01-01 RX ADMIN — CARVEDILOL 12.5 MG: 12.5 TABLET, FILM COATED ORAL at 09:32

## 2023-01-01 RX ADMIN — HYDROMORPHONE HYDROCHLORIDE 0.25 MG: 1 INJECTION, SOLUTION INTRAMUSCULAR; INTRAVENOUS; SUBCUTANEOUS at 06:10

## 2023-01-01 RX ADMIN — TORSEMIDE 20 MG: 20 TABLET ORAL at 09:13

## 2023-01-01 RX ADMIN — HEPARIN SODIUM 5000 UNITS: 5000 INJECTION INTRAVENOUS; SUBCUTANEOUS at 09:32

## 2023-01-01 RX ADMIN — SACUBITRIL AND VALSARTAN 1 TABLET: 24; 26 TABLET, FILM COATED ORAL at 09:44

## 2023-01-01 RX ADMIN — ALBUTEROL SULFATE 2 PUFF: 90 AEROSOL, METERED RESPIRATORY (INHALATION) at 18:45

## 2023-01-01 RX ADMIN — ALBUTEROL SULFATE 2 PUFF: 90 AEROSOL, METERED RESPIRATORY (INHALATION) at 13:52

## 2023-01-01 RX ADMIN — LEVOTHYROXINE SODIUM 50 MCG: 0.05 TABLET ORAL at 06:43

## 2023-01-01 RX ADMIN — BENZONATATE 100 MG: 100 CAPSULE ORAL at 17:54

## 2023-01-01 RX ADMIN — MIDAZOLAM HYDROCHLORIDE 0.5 MG: 1 INJECTION, SOLUTION INTRAMUSCULAR; INTRAVENOUS at 10:22

## 2023-01-01 RX ADMIN — PANTOPRAZOLE SODIUM 40 MG: 40 INJECTION, POWDER, FOR SOLUTION INTRAVENOUS at 05:46

## 2023-01-01 RX ADMIN — MINERAL OIL: 1000 LIQUID ORAL at 20:02

## 2023-01-01 RX ADMIN — TORSEMIDE 20 MG: 20 TABLET ORAL at 08:30

## 2023-01-01 RX ADMIN — HYDROMORPHONE HYDROCHLORIDE 0.25 MG: 1 INJECTION, SOLUTION INTRAMUSCULAR; INTRAVENOUS; SUBCUTANEOUS at 04:20

## 2023-01-01 RX ADMIN — HYDROMORPHONE HYDROCHLORIDE 0.25 MG: 1 INJECTION, SOLUTION INTRAMUSCULAR; INTRAVENOUS; SUBCUTANEOUS at 11:08

## 2023-01-01 RX ADMIN — CARVEDILOL 12.5 MG: 12.5 TABLET, FILM COATED ORAL at 09:17

## 2023-01-01 RX ADMIN — INSULIN HUMAN 7 UNITS: 100 INJECTION, SOLUTION PARENTERAL at 18:12

## 2023-01-01 RX ADMIN — Medication 10 ML: at 12:10

## 2023-01-01 RX ADMIN — MIDAZOLAM HYDROCHLORIDE 0.5 MG: 1 INJECTION, SOLUTION INTRAMUSCULAR; INTRAVENOUS at 20:02

## 2023-01-01 RX ADMIN — ALBUTEROL SULFATE 2 PUFF: 90 AEROSOL, METERED RESPIRATORY (INHALATION) at 19:45

## 2023-01-01 RX ADMIN — CASTOR OIL AND BALSAM, PERU 1 APPLICATION: 788; 87 OINTMENT TOPICAL at 11:48

## 2023-01-01 RX ADMIN — TORSEMIDE 20 MG: 20 TABLET ORAL at 08:35

## 2023-01-01 RX ADMIN — CARVEDILOL 12.5 MG: 12.5 TABLET, FILM COATED ORAL at 17:47

## 2023-01-01 RX ADMIN — LEVOTHYROXINE SODIUM 40 MCG: 40 INJECTION, SOLUTION INTRAVENOUS at 12:38

## 2023-01-01 RX ADMIN — CARVEDILOL 12.5 MG: 12.5 TABLET, FILM COATED ORAL at 08:30

## 2023-01-01 RX ADMIN — ALBUTEROL SULFATE 2 PUFF: 90 AEROSOL, METERED RESPIRATORY (INHALATION) at 19:18

## 2023-01-01 RX ADMIN — CARVEDILOL 12.5 MG: 12.5 TABLET, FILM COATED ORAL at 18:12

## 2023-01-01 RX ADMIN — ALBUTEROL SULFATE 2 PUFF: 90 AEROSOL, METERED RESPIRATORY (INHALATION) at 00:01

## 2023-01-01 RX ADMIN — IOPAMIDOL 115 ML: 755 INJECTION, SOLUTION INTRAVENOUS at 13:52

## 2023-01-01 RX ADMIN — ASPIRIN 81 MG: 81 TABLET, COATED ORAL at 08:30

## 2023-01-01 RX ADMIN — HEPARIN SODIUM 5000 UNITS: 5000 INJECTION INTRAVENOUS; SUBCUTANEOUS at 09:44

## 2023-01-01 RX ADMIN — QUETIAPINE FUMARATE 25 MG: 25 TABLET ORAL at 20:37

## 2023-01-01 RX ADMIN — BISACODYL 10 MG: 10 SUPPOSITORY RECTAL at 10:21

## 2023-01-01 RX ADMIN — HYDROMORPHONE HYDROCHLORIDE 0.25 MG: 1 INJECTION, SOLUTION INTRAMUSCULAR; INTRAVENOUS; SUBCUTANEOUS at 05:37

## 2023-01-01 RX ADMIN — LEVOTHYROXINE SODIUM 50 MCG: 0.05 TABLET ORAL at 06:06

## 2023-01-01 RX ADMIN — ISOSORBIDE MONONITRATE 60 MG: 60 TABLET, EXTENDED RELEASE ORAL at 09:13

## 2023-01-01 RX ADMIN — PANTOPRAZOLE SODIUM 40 MG: 40 INJECTION, POWDER, FOR SOLUTION INTRAVENOUS at 05:37

## 2023-01-01 RX ADMIN — HYDROMORPHONE HYDROCHLORIDE 0.25 MG: 1 INJECTION, SOLUTION INTRAMUSCULAR; INTRAVENOUS; SUBCUTANEOUS at 15:10

## 2023-01-01 RX ADMIN — HEPARIN SODIUM 5000 UNITS: 5000 INJECTION INTRAVENOUS; SUBCUTANEOUS at 22:44

## 2023-01-01 RX ADMIN — ALBUTEROL SULFATE 2 PUFF: 90 AEROSOL, METERED RESPIRATORY (INHALATION) at 13:17

## 2023-01-01 RX ADMIN — ALBUTEROL SULFATE 2 PUFF: 90 AEROSOL, METERED RESPIRATORY (INHALATION) at 20:17

## 2023-01-01 RX ADMIN — ASPIRIN 300 MG: 300 SUPPOSITORY RECTAL at 08:42

## 2023-01-01 RX ADMIN — NIRMATRELVIR AND RITONAVIR 2 EACH: KIT at 20:16

## 2023-01-01 RX ADMIN — HYDROMORPHONE HYDROCHLORIDE 0.25 MG: 1 INJECTION, SOLUTION INTRAMUSCULAR; INTRAVENOUS; SUBCUTANEOUS at 17:34

## 2023-01-01 RX ADMIN — LORAZEPAM 0.25 MG: 2 INJECTION INTRAMUSCULAR; INTRAVENOUS at 04:09

## 2023-01-01 RX ADMIN — CASTOR OIL AND BALSAM, PERU 1 APPLICATION: 788; 87 OINTMENT TOPICAL at 08:42

## 2023-01-01 RX ADMIN — LEVOTHYROXINE SODIUM 50 MCG: 0.05 TABLET ORAL at 05:44

## 2023-01-01 RX ADMIN — SACUBITRIL AND VALSARTAN 1 TABLET: 24; 26 TABLET, FILM COATED ORAL at 22:43

## 2023-01-01 RX ADMIN — CARVEDILOL 12.5 MG: 12.5 TABLET, FILM COATED ORAL at 17:48

## 2023-01-01 RX ADMIN — PRAVASTATIN SODIUM 20 MG: 20 TABLET ORAL at 22:43

## 2023-01-01 RX ADMIN — TORSEMIDE 20 MG: 20 TABLET ORAL at 09:32

## 2023-01-01 RX ADMIN — ALBUTEROL SULFATE 2 PUFF: 90 AEROSOL, METERED RESPIRATORY (INHALATION) at 13:12

## 2023-01-01 RX ADMIN — LEVOTHYROXINE SODIUM 50 MCG: 0.05 TABLET ORAL at 05:36

## 2023-01-01 RX ADMIN — QUETIAPINE FUMARATE 25 MG: 25 TABLET ORAL at 20:35

## 2023-01-01 RX ADMIN — INSULIN HUMAN 2 UNITS: 100 INJECTION, SOLUTION PARENTERAL at 12:26

## 2023-01-01 RX ADMIN — DEXAMETHASONE SODIUM PHOSPHATE 6 MG: 4 INJECTION INTRA-ARTICULAR; INTRALESIONAL; INTRAMUSCULAR; INTRAVENOUS; SOFT TISSUE at 09:34

## 2023-01-01 RX ADMIN — INSULIN HUMAN 2 UNITS: 100 INJECTION, SOLUTION PARENTERAL at 18:23

## 2023-01-01 RX ADMIN — MIDAZOLAM HYDROCHLORIDE 0.5 MG: 1 INJECTION, SOLUTION INTRAMUSCULAR; INTRAVENOUS at 21:43

## 2023-01-01 RX ADMIN — HYDROMORPHONE HYDROCHLORIDE 0.25 MG: 1 INJECTION, SOLUTION INTRAMUSCULAR; INTRAVENOUS; SUBCUTANEOUS at 00:49

## 2023-01-01 RX ADMIN — ASPIRIN 81 MG: 81 TABLET, COATED ORAL at 08:35

## 2023-01-01 RX ADMIN — HYDROMORPHONE HYDROCHLORIDE 0.25 MG: 1 INJECTION, SOLUTION INTRAMUSCULAR; INTRAVENOUS; SUBCUTANEOUS at 00:06

## 2023-01-01 RX ADMIN — SACUBITRIL AND VALSARTAN 1 TABLET: 24; 26 TABLET, FILM COATED ORAL at 08:36

## 2023-01-01 RX ADMIN — CASTOR OIL AND BALSAM, PERU 1 APPLICATION: 788; 87 OINTMENT TOPICAL at 11:15

## 2023-01-01 RX ADMIN — MINERAL OIL: 1000 LIQUID ORAL at 06:09

## 2023-01-01 RX ADMIN — SACUBITRIL AND VALSARTAN 1 TABLET: 24; 26 TABLET, FILM COATED ORAL at 20:35

## 2023-01-01 RX ADMIN — PANTOPRAZOLE SODIUM 40 MG: 40 TABLET, DELAYED RELEASE ORAL at 21:35

## 2023-01-01 RX ADMIN — NIRMATRELVIR AND RITONAVIR 2 EACH: KIT at 09:15

## 2023-01-01 RX ADMIN — Medication 10 ML: at 09:36

## 2023-01-01 RX ADMIN — ISOSORBIDE MONONITRATE 60 MG: 60 TABLET, EXTENDED RELEASE ORAL at 09:32

## 2023-01-01 RX ADMIN — MINERAL OIL: 1000 LIQUID ORAL at 12:46

## 2023-01-01 RX ADMIN — SACUBITRIL AND VALSARTAN 1 TABLET: 24; 26 TABLET, FILM COATED ORAL at 20:38

## 2023-01-01 RX ADMIN — HEPARIN SODIUM 5000 UNITS: 5000 INJECTION INTRAVENOUS; SUBCUTANEOUS at 08:30

## 2023-01-01 RX ADMIN — MINERAL OIL: 1000 LIQUID ORAL at 16:51

## 2023-01-01 RX ADMIN — MIDAZOLAM HYDROCHLORIDE 0.5 MG: 1 INJECTION, SOLUTION INTRAMUSCULAR; INTRAVENOUS at 15:21

## 2023-01-01 RX ADMIN — QUETIAPINE FUMARATE 25 MG: 25 TABLET ORAL at 21:35

## 2023-01-01 RX ADMIN — HYDROMORPHONE HYDROCHLORIDE 0.25 MG: 1 INJECTION, SOLUTION INTRAMUSCULAR; INTRAVENOUS; SUBCUTANEOUS at 12:29

## 2023-01-01 RX ADMIN — HYDROMORPHONE HYDROCHLORIDE 0.25 MG: 1 INJECTION, SOLUTION INTRAMUSCULAR; INTRAVENOUS; SUBCUTANEOUS at 16:28

## 2023-01-01 RX ADMIN — MIDAZOLAM HYDROCHLORIDE 0.5 MG: 1 INJECTION, SOLUTION INTRAMUSCULAR; INTRAVENOUS at 11:08

## 2023-01-01 RX ADMIN — MINERAL OIL: 1000 LIQUID ORAL at 16:40

## 2023-01-01 RX ADMIN — NIRMATRELVIR AND RITONAVIR 2 EACH: KIT at 20:36

## 2023-01-01 RX ADMIN — HYDROMORPHONE HYDROCHLORIDE 0.25 MG: 1 INJECTION, SOLUTION INTRAMUSCULAR; INTRAVENOUS; SUBCUTANEOUS at 16:40

## 2023-01-01 RX ADMIN — ALBUTEROL SULFATE 2 PUFF: 90 AEROSOL, METERED RESPIRATORY (INHALATION) at 12:39

## 2023-01-01 RX ADMIN — HALOPERIDOL LACTATE 1 MG: 5 INJECTION, SOLUTION INTRAMUSCULAR at 07:44

## 2023-01-01 RX ADMIN — HEPARIN SODIUM 5000 UNITS: 5000 INJECTION INTRAVENOUS; SUBCUTANEOUS at 09:13

## 2023-01-01 RX ADMIN — ALBUTEROL SULFATE 2 PUFF: 90 AEROSOL, METERED RESPIRATORY (INHALATION) at 00:17

## 2023-01-01 RX ADMIN — PRAVASTATIN SODIUM 20 MG: 20 TABLET ORAL at 20:36

## 2023-01-01 RX ADMIN — PANTOPRAZOLE SODIUM 40 MG: 40 INJECTION, POWDER, FOR SOLUTION INTRAVENOUS at 11:15

## 2023-01-01 RX ADMIN — PANTOPRAZOLE SODIUM 40 MG: 40 TABLET, DELAYED RELEASE ORAL at 20:36

## 2023-01-01 RX ADMIN — Medication 10 ML: at 09:33

## 2023-01-01 RX ADMIN — HEPARIN SODIUM 5000 UNITS: 5000 INJECTION INTRAVENOUS; SUBCUTANEOUS at 09:36

## 2023-01-01 RX ADMIN — CARVEDILOL 12.5 MG: 12.5 TABLET, FILM COATED ORAL at 08:35

## 2023-01-01 RX ADMIN — LEVOTHYROXINE SODIUM 50 MCG: 0.05 TABLET ORAL at 06:19

## 2023-01-01 RX ADMIN — INSULIN HUMAN 2 UNITS: 100 INJECTION, SOLUTION PARENTERAL at 06:54

## 2023-01-01 RX ADMIN — MIDAZOLAM HYDROCHLORIDE 0.5 MG: 1 INJECTION, SOLUTION INTRAMUSCULAR; INTRAVENOUS at 15:10

## 2023-01-01 RX ADMIN — HEPARIN SODIUM 5000 UNITS: 5000 INJECTION INTRAVENOUS; SUBCUTANEOUS at 21:35

## 2023-01-01 RX ADMIN — HEPARIN SODIUM 5000 UNITS: 5000 INJECTION INTRAVENOUS; SUBCUTANEOUS at 20:16

## 2023-01-01 RX ADMIN — ISOSORBIDE MONONITRATE 60 MG: 60 TABLET, EXTENDED RELEASE ORAL at 09:35

## 2023-01-01 RX ADMIN — ASPIRIN 81 MG: 81 TABLET, COATED ORAL at 09:35

## 2023-01-01 RX ADMIN — SACUBITRIL AND VALSARTAN 1 TABLET: 24; 26 TABLET, FILM COATED ORAL at 09:15

## 2023-01-01 RX ADMIN — HYDROMORPHONE HYDROCHLORIDE 0.25 MG: 1 INJECTION, SOLUTION INTRAMUSCULAR; INTRAVENOUS; SUBCUTANEOUS at 21:08

## 2023-01-01 RX ADMIN — MIDAZOLAM HYDROCHLORIDE 0.5 MG: 1 INJECTION, SOLUTION INTRAMUSCULAR; INTRAVENOUS at 20:58

## 2023-01-01 RX ADMIN — Medication 10 MG: at 21:35

## 2023-01-01 RX ADMIN — ALBUTEROL SULFATE 2 PUFF: 90 AEROSOL, METERED RESPIRATORY (INHALATION) at 01:09

## 2023-01-01 RX ADMIN — DEXAMETHASONE SODIUM PHOSPHATE 6 MG: 4 INJECTION INTRA-ARTICULAR; INTRALESIONAL; INTRAMUSCULAR; INTRAVENOUS; SOFT TISSUE at 16:46

## 2023-01-01 RX ADMIN — Medication 10 MG: at 20:37

## 2023-01-01 RX ADMIN — MINERAL OIL: 1000 LIQUID ORAL at 00:48

## 2023-01-01 RX ADMIN — SACUBITRIL AND VALSARTAN 1 TABLET: 24; 26 TABLET, FILM COATED ORAL at 09:32

## 2023-01-01 RX ADMIN — ALBUTEROL SULFATE 2 PUFF: 90 AEROSOL, METERED RESPIRATORY (INHALATION) at 00:20

## 2023-01-01 RX ADMIN — MIDAZOLAM HYDROCHLORIDE 0.5 MG: 1 INJECTION, SOLUTION INTRAMUSCULAR; INTRAVENOUS at 12:42

## 2023-01-01 RX ADMIN — HYDROMORPHONE HYDROCHLORIDE 0.25 MG: 1 INJECTION, SOLUTION INTRAMUSCULAR; INTRAVENOUS; SUBCUTANEOUS at 16:51

## 2023-01-01 RX ADMIN — MINERAL OIL: 1000 LIQUID ORAL at 00:58

## 2023-01-01 RX ADMIN — ALBUTEROL SULFATE 2 PUFF: 90 AEROSOL, METERED RESPIRATORY (INHALATION) at 08:41

## 2023-01-01 RX ADMIN — INSULIN HUMAN 2 UNITS: 100 INJECTION, SOLUTION PARENTERAL at 23:32

## 2023-01-01 RX ADMIN — ISOSORBIDE MONONITRATE 60 MG: 60 TABLET, EXTENDED RELEASE ORAL at 09:17

## 2023-01-01 RX ADMIN — HEPARIN SODIUM 5000 UNITS: 5000 INJECTION INTRAVENOUS; SUBCUTANEOUS at 20:39

## 2023-01-01 RX ADMIN — ALBUTEROL SULFATE 2 PUFF: 90 AEROSOL, METERED RESPIRATORY (INHALATION) at 00:15

## 2023-01-01 RX ADMIN — ALBUTEROL SULFATE 2 PUFF: 90 AEROSOL, METERED RESPIRATORY (INHALATION) at 08:17

## 2023-01-01 RX ADMIN — PRAVASTATIN SODIUM 20 MG: 20 TABLET ORAL at 20:38

## 2023-01-01 RX ADMIN — PRAVASTATIN SODIUM 20 MG: 20 TABLET ORAL at 20:16

## 2023-01-01 RX ADMIN — SODIUM CHLORIDE 1000 MG: 900 INJECTION INTRAVENOUS at 18:49

## 2023-01-01 RX ADMIN — PANTOPRAZOLE SODIUM 40 MG: 40 INJECTION, POWDER, FOR SOLUTION INTRAVENOUS at 06:42

## 2023-01-01 RX ADMIN — ALBUTEROL SULFATE 2 PUFF: 90 AEROSOL, METERED RESPIRATORY (INHALATION) at 08:30

## 2023-01-01 RX ADMIN — CARVEDILOL 12.5 MG: 12.5 TABLET, FILM COATED ORAL at 17:15

## 2023-01-01 RX ADMIN — PANTOPRAZOLE SODIUM 40 MG: 40 TABLET, DELAYED RELEASE ORAL at 20:01

## 2023-01-01 RX ADMIN — MINERAL OIL: 1000 LIQUID ORAL at 20:58

## 2023-01-01 RX ADMIN — MIDAZOLAM HYDROCHLORIDE 0.5 MG: 1 INJECTION, SOLUTION INTRAMUSCULAR; INTRAVENOUS at 00:59

## 2023-01-01 RX ADMIN — ALBUTEROL SULFATE 2 PUFF: 90 AEROSOL, METERED RESPIRATORY (INHALATION) at 07:51

## 2023-01-01 RX ADMIN — MIDAZOLAM HYDROCHLORIDE 0.5 MG: 1 INJECTION, SOLUTION INTRAMUSCULAR; INTRAVENOUS at 21:12

## 2023-01-01 RX ADMIN — MIDAZOLAM HYDROCHLORIDE 0.5 MG: 1 INJECTION, SOLUTION INTRAMUSCULAR; INTRAVENOUS at 18:27

## 2023-01-01 RX ADMIN — Medication 5 MG: at 02:04

## 2023-01-01 RX ADMIN — Medication 10 ML: at 21:11

## 2023-01-01 RX ADMIN — TORSEMIDE 20 MG: 20 TABLET ORAL at 09:18

## 2023-01-01 RX ADMIN — Medication 10 ML: at 21:07

## 2023-01-01 RX ADMIN — Medication 0.1 MG/HR: at 13:15

## 2023-01-01 RX ADMIN — INSULIN HUMAN 2 UNITS: 100 INJECTION, SOLUTION PARENTERAL at 01:48

## 2023-01-01 RX ADMIN — CARVEDILOL 12.5 MG: 12.5 TABLET, FILM COATED ORAL at 09:13

## 2023-01-01 RX ADMIN — ISOSORBIDE MONONITRATE 60 MG: 60 TABLET, EXTENDED RELEASE ORAL at 08:35

## 2023-01-01 RX ADMIN — SACUBITRIL AND VALSARTAN 1 TABLET: 24; 26 TABLET, FILM COATED ORAL at 20:16

## 2023-01-01 RX ADMIN — SACUBITRIL AND VALSARTAN 1 TABLET: 24; 26 TABLET, FILM COATED ORAL at 09:17

## 2023-01-01 RX ADMIN — PANTOPRAZOLE SODIUM 40 MG: 40 TABLET, DELAYED RELEASE ORAL at 20:16

## 2023-01-01 RX ADMIN — Medication 10 ML: at 09:03

## 2023-01-01 RX ADMIN — HEPARIN SODIUM 5000 UNITS: 5000 INJECTION INTRAVENOUS; SUBCUTANEOUS at 20:37

## 2023-01-01 RX ADMIN — Medication 10 ML: at 20:01

## 2023-01-01 RX ADMIN — HYDROMORPHONE HYDROCHLORIDE 0.25 MG: 1 INJECTION, SOLUTION INTRAMUSCULAR; INTRAVENOUS; SUBCUTANEOUS at 00:59

## 2023-01-01 RX ADMIN — PANTOPRAZOLE SODIUM 40 MG: 40 TABLET, DELAYED RELEASE ORAL at 20:38

## 2023-01-01 RX ADMIN — NIRMATRELVIR AND RITONAVIR 2 EACH: KIT at 09:36

## 2023-01-01 RX ADMIN — MINERAL OIL: 1000 LIQUID ORAL at 18:27

## 2023-01-01 RX ADMIN — TENECTEPLASE 16 MG: KIT at 14:20

## 2023-01-01 RX ADMIN — HYDROMORPHONE HYDROCHLORIDE 0.25 MG: 1 INJECTION, SOLUTION INTRAMUSCULAR; INTRAVENOUS; SUBCUTANEOUS at 02:20

## 2023-01-01 RX ADMIN — PANTOPRAZOLE SODIUM 40 MG: 40 INJECTION, POWDER, FOR SOLUTION INTRAVENOUS at 05:47

## 2023-01-01 RX ADMIN — ASPIRIN 81 MG: 81 TABLET, COATED ORAL at 09:32

## 2023-01-01 RX ADMIN — HYDROMORPHONE HYDROCHLORIDE 0.25 MG: 1 INJECTION, SOLUTION INTRAMUSCULAR; INTRAVENOUS; SUBCUTANEOUS at 05:36

## 2023-01-01 RX ADMIN — MIDAZOLAM HYDROCHLORIDE 0.5 MG: 1 INJECTION, SOLUTION INTRAMUSCULAR; INTRAVENOUS at 17:02

## 2023-01-01 RX ADMIN — HEPARIN SODIUM 5000 UNITS: 5000 INJECTION INTRAVENOUS; SUBCUTANEOUS at 09:18

## 2023-01-01 RX ADMIN — NIRMATRELVIR AND RITONAVIR 2 EACH: KIT at 09:32

## 2023-01-01 RX ADMIN — SACUBITRIL AND VALSARTAN 1 TABLET: 24; 26 TABLET, FILM COATED ORAL at 20:02

## 2023-01-01 RX ADMIN — HYDROMORPHONE HYDROCHLORIDE 0.5 MG: 1 INJECTION, SOLUTION INTRAMUSCULAR; INTRAVENOUS; SUBCUTANEOUS at 21:12

## 2023-01-01 RX ADMIN — HEPARIN SODIUM 5000 UNITS: 5000 INJECTION INTRAVENOUS; SUBCUTANEOUS at 08:35

## 2023-01-01 RX ADMIN — MINERAL OIL: 1000 LIQUID ORAL at 12:04

## 2023-01-01 RX ADMIN — NIRMATRELVIR AND RITONAVIR 2 EACH: KIT at 22:43

## 2023-01-01 RX ADMIN — PRAVASTATIN SODIUM 20 MG: 20 TABLET ORAL at 20:01

## 2023-01-01 RX ADMIN — CASTOR OIL AND BALSAM, PERU 1 APPLICATION: 788; 87 OINTMENT TOPICAL at 21:01

## 2023-01-01 RX ADMIN — NIRMATRELVIR AND RITONAVIR 2 EACH: KIT at 20:02

## 2023-01-01 RX ADMIN — LEVOTHYROXINE SODIUM 40 MCG: 40 INJECTION, SOLUTION INTRAVENOUS at 11:16

## 2023-01-01 RX ADMIN — HYDROMORPHONE HYDROCHLORIDE 0.25 MG: 1 INJECTION, SOLUTION INTRAMUSCULAR; INTRAVENOUS; SUBCUTANEOUS at 03:59

## 2023-01-01 RX ADMIN — CASTOR OIL AND BALSAM, PERU 1 APPLICATION: 788; 87 OINTMENT TOPICAL at 20:01

## 2023-01-01 RX ADMIN — ASPIRIN 81 MG: 81 TABLET, COATED ORAL at 09:14

## 2023-01-01 RX ADMIN — SACUBITRIL AND VALSARTAN 1 TABLET: 24; 26 TABLET, FILM COATED ORAL at 09:35

## 2023-01-01 RX ADMIN — MINERAL OIL: 1000 LIQUID ORAL at 03:58

## 2023-01-01 RX ADMIN — LEVOTHYROXINE SODIUM 40 MCG: 40 INJECTION, SOLUTION INTRAVENOUS at 12:04

## 2023-01-01 RX ADMIN — Medication 10 ML: at 14:40

## 2023-01-01 RX ADMIN — POTASSIUM CHLORIDE 40 MEQ: 1500 TABLET, EXTENDED RELEASE ORAL at 12:53

## 2023-01-01 RX ADMIN — AZITHROMYCIN 500 MG: 500 INJECTION, POWDER, LYOPHILIZED, FOR SOLUTION INTRAVENOUS at 20:06

## 2023-01-01 RX ADMIN — KETOROLAC TROMETHAMINE 15 MG: 15 INJECTION, SOLUTION INTRAMUSCULAR; INTRAVENOUS at 15:21

## 2023-01-01 RX ADMIN — KETOROLAC TROMETHAMINE 15 MG: 15 INJECTION, SOLUTION INTRAMUSCULAR; INTRAVENOUS at 11:08

## 2023-01-01 RX ADMIN — ALBUTEROL SULFATE 2 PUFF: 90 AEROSOL, METERED RESPIRATORY (INHALATION) at 19:23

## 2023-01-01 RX ADMIN — PANTOPRAZOLE SODIUM 40 MG: 40 TABLET, DELAYED RELEASE ORAL at 22:43

## 2023-01-01 RX ADMIN — Medication 10 ML: at 21:01

## 2023-01-12 ENCOUNTER — HOSPITAL ENCOUNTER (OUTPATIENT)
Dept: NUTRITION | Facility: HOSPITAL | Age: 88
Setting detail: RECURRING SERIES
Discharge: HOME | End: 2023-01-12
Payer: MEDICARE

## 2023-01-12 NOTE — CONSULTS
Adult Outpatient Nutrition    Patient Name:  Syeda Gomez  YOB: 1927  MRN: [id number]    Assessment Date:  1/12/2023    Patient attended their scheduled diabetes nutrition education visit.  Please see media tab for assessment and notes if you use EPIC. If you are not an EPIC user a copy of patient's assessment and notes will be sent per routine. Thank you.    Electronically signed by:  Isabella Vega RD  01/12/23 11:39 EST

## 2023-03-28 NOTE — OUTREACH NOTE
CHF Week 1 Survey    Flowsheet Row Responses   List of hospitals in [place] patient discharged from? [facility]   Does the patient have one of the following disease processes/diagnoses(primary or secondary)? CHF   CHF Week 1 attempt successful? Yes   Call start time 0831   Call end time 0838   Discharge diagnosis Acute respiratory failure with hypoxia   Meds reviewed with patient/caregiver? Yes   Is the patient having any side effects they believe may be caused by any medication additions or changes? No   Does the patient have all medications ordered at discharge? Yes   Is the patient taking all medications as directed (includes completed medication regime)? Yes   Does the patient have a primary care provider?  Yes   Does the patient have an appointment with their PCP within 7 days of discharge? Greater than 7 days   Comments regarding PCP 3/31/22   What is preventing the patient from scheduling follow up appointments within 7 days of discharge? Earlier appointment not available   Nursing Interventions Verified appointment date/time/provider   Has the patient kept scheduled appointments due by today? N/A   What DME was ordered? Oxygen   Has all DME been delivered? Yes   Pulse Ox monitoring Intermittent   Pulse Ox device source Patient   O2 Sat comments 95-98% with out O2.. Puts on if below 92%   O2 Sat: education provided Sat levels, Monitoring frequency   Psychosocial issues? No   Did the patient receive a copy of their discharge instructions? Yes   Nursing interventions Reviewed instructions with patient   What is the patient's perception of their health status since discharge? Improving   Is the patient weighing daily? No   Does the patient have scales? Yes   Daily weight interventions Education provided on importance of daily weight   Is the patient able to teach back signs and symptoms of worsening condition? (i.e. weight gain, shortness of air, etc.) Yes   Is the patient/caregiver able to teach back the hierarchy of who to  call/visit for symptoms/problems? PCP, Specialist, Home health nurse, Urgent Care, ED, 911 Yes    CHF Week 1 call completed? Yes   Wrap up additional comments Oncology will be giving units of blood today          CANDY LANGSTON - Registered Nurse   [Mucoid Discharge] : mucoid discharge [Inflamed Nasal Mucosa] : inflamed nasal mucosa [Erythematous Oropharynx] : erythematous oropharynx [NL] : warm, clear

## 2023-06-15 NOTE — OUTREACH NOTE
COVID-19 Week 1 Survey      Responses   [facility] patient discharged from?  [place]   Does the patient have one of the following disease processes/diagnoses(primary or secondary)?  COVID-19   COVID-19 underlying condition?  None   Call Number  Call 4   Week 1 Call successful?  Yes   Call start time  1117   Revoke  Phone number issues   Call end time  1119   Discharge diagnosis  Pneumonia due to COVID-19 virus   Wrap up additional comments  Pt ask me to call her dtr, she did not speak and hung up.          Nitza Hidalgo RN   What Type Of Note Output Would You Prefer (Optional)?: Standard Output Hpi Title: Evaluation of Skin Lesions How Severe Are Your Spot(S)?: mild Have Your Spot(S) Been Treated In The Past?: has not been treated

## 2023-06-27 ENCOUNTER — APPOINTMENT (OUTPATIENT)
Dept: GENERAL RADIOLOGY | Facility: HOSPITAL | Age: 88
End: 2023-06-27
Payer: MEDICARE

## 2023-06-27 ENCOUNTER — HOSPITAL ENCOUNTER (EMERGENCY)
Facility: HOSPITAL | Age: 88
Discharge: HOME | End: 2023-06-27
Attending: EMERGENCY MEDICINE | Admitting: EMERGENCY MEDICINE
Payer: MEDICARE

## 2023-06-27 VITALS
BODY MASS INDEX: 24.1 KG/M2 | DIASTOLIC BLOOD PRESSURE: 41 MMHG | SYSTOLIC BLOOD PRESSURE: 94 MMHG | HEART RATE: 101 BPM | WEIGHT: 136 LBS | OXYGEN SATURATION: 95 % | RESPIRATION RATE: 17 BRPM | HEIGHT: 63 IN | TEMPERATURE: 97.7 F

## 2023-06-27 DIAGNOSIS — S80.01XA CONTUSION OF RIGHT KNEE, INITIAL ENCOUNTER: Primary | ICD-10-CM

## 2023-06-27 PROCEDURE — 99282 EMERGENCY DEPT VISIT SF MDM: CPT

## 2023-06-27 PROCEDURE — 73560 X-RAY EXAM OF KNEE 1 OR 2: CPT

## 2023-06-27 NOTE — ED PROVIDER NOTES
EMERGENCY DEPARTMENT ENCOUNTER    Pt Name: Syeda Gomez  MRN: [id number]  Pt :   1927  Room Number:  25SF/25  Date of encounter:  2023  PCP: Omayra Alcazar DO  ED Provider: GONZÁLEZ Salazar    Historian: patient    HPI:  Chief Complaint: Rt knee pain    Context: Syeda Gomez is a 89 y.o. female who presents to the ED c/o Rt knee pain.  Patient advises that she went to turn around approximately 2 hours ago.  Patient fell and landed on her right knee.  She denies hitting her head or any loss of consciousness.  She denies neck pain, back pain.  Patient reports pain and discomfort to her right knee.  Patient explains that she is a history of total knee bilaterally.  Patient wants to make sure nothing is going on.  HPI     REVIEW OF SYSTEMS  A chief complaint appropriate review of systems was completed and is negative except as noted in the HPI.     PAST MEDICAL HISTORY  Past Medical History:   Diagnosis Date    A-fib     Arthritis     Cancer     - left breast    Carpal tunnel syndrome     CHF (congestive heart failure)     Detached retina     right side    Disease of thyroid gland     Dizzy     Full dentures     GERD (gastroesophageal reflux disease)     Hearing aid worn     bilat    Heart murmur     History of transfusion     x4 just this year- never had reaction    [place] (hard of hearing)     bilat hearinhg aids    Hypertension     Infection     right knee- had infection 15 to 20 years ago- unsure if mrsa or not    Pleural effusion     Rheumatic fever     SOBOE (shortness of breath on exertion)     UTI (urinary tract infection)     Wears glasses        PAST SURGICAL HISTORY  Past Surgical History:   Procedure Laterality Date    APPENDECTOMY      CARDIAC ELECTROPHYSIOLOGY PROCEDURE N/A 2021    Procedure: DEVICE IMPLANT;  Surgeon: Calvin Ventura MD;  Location: [facility] INVAS[facility] LOCATION;  Service: Cardiovascular;  Laterality: N/A;    CARPAL TUNNEL RELEASE Bilateral      CATARACT EXTRACTION, BILATERAL      CHOLECYSTECTOMY      COLONOSCOPY      HYSTERECTOMY      MASTECTOMY Left     OTHER SURGICAL HISTORY      x2 fluid removed from right side    PLEURAL CATHETER INSERTION Right 4/4/2022    Procedure: PLEURX CATHETER INSERTION;  Surgeon: Fidel Carter MD;  [facility];  Service: Cardiothoracic;  Laterality: Right;    PORTACATH PLACEMENT Left     REPLACEMENT TOTAL KNEE BILATERAL      RETINAL DETACHMENT SURGERY Right     buckle in place    SHOULDER ROTATOR CUFF REPAIR Right     TOTAL HIP ARTHROPLASTY Right     TRIGGER FINGER RELEASE Right     thinks right side       FAMILY HISTORY  Family History   Problem Relation Age of Onset    Heart attack Mother     Hypertension Father     Stroke Father        SOCIAL HISTORY  Social History     Socioeconomic History    Marital status:     Number of children: 1   Tobacco Use    Smoking status: Never    Smokeless tobacco: Never   Vaping Use    Vaping Use: Never used   Substance and Sexual Activity    Alcohol use: No    Drug use: No    Sexual activity: Defer       ALLERGIES  Cardizem [diltiazem], Codeine, Morphine, Percocet [oxycodone-acetaminophen], and Sulfa antibiotics    PHYSICAL EXAM  Physical Exam  Vitals and nursing note reviewed.   Constitutional:       Appearance: Normal appearance. She is not ill-appearing.   HENT:      Head: Normocephalic and atraumatic.      Nose: Nose normal.      Mouth/Throat:      Mouth: Mucous membranes are moist.   Eyes:      Extraocular Movements: Extraocular movements intact.   Cardiovascular:      Rate and Rhythm: Regular rhythm.      Pulses: Normal pulses.      Heart sounds: Normal heart sounds.   Pulmonary:      Effort: Pulmonary effort is normal.      Breath sounds: Normal breath sounds.   Musculoskeletal:      Cervical back: Normal range of motion.      Comments: Rt knee:  Tenderness to RT knee, mild edema.  No obvious deformity, pulses palpable.    Skin:     General: Skin is warm and dry.    Neurological:      Mental Status: She is alert and oriented to person, place, and time.         LAB RESULTS  Results for orders placed or performed during the hospital encounter of 04/16/22   Urine Culture - Urine, Urine, Catheter In/Out    Specimen: Urine, Catheter In/Out   Result Value Ref Range    Urine Culture >100,000 CFU/mL Escherichia coli (A)        Susceptibility    Escherichia coli - IDRIS     Ampicillin  Resistant ug/ml     Ampicillin + Sulbactam  Intermediate ug/ml     Cefazolin  Resistant ug/ml     Cefepime  Susceptible ug/ml     Ceftazidime  Resistant ug/ml     Ceftriaxone  Resistant ug/ml     Gentamicin  Susceptible ug/ml     Levofloxacin  Susceptible ug/ml     Nitrofurantoin  Susceptible ug/ml     Piperacillin + Tazobactam  Susceptible ug/ml     Trimethoprim + Sulfamethoxazole  Susceptible ug/ml   Urinalysis With Microscopic If Indicated (No Culture) - Urine, Catheter In/Out    Specimen: Urine, Catheter In/Out   Result Value Ref Range    Color, UA Dark Yellow (A) Yellow, Straw    Appearance, UA Cloudy (A) Clear    pH, UA 5.5 5.0 - 8.0    Specific Gravity, UA 1.016 1.001 - 1.030    Glucose, UA Negative Negative    Ketones, UA Negative Negative    Bilirubin, UA Small (1+) (A) Negative    Blood, UA Large (3+) (A) Negative    Protein,  mg/dL (2+) (A) Negative    Leuk Esterase, UA Large (3+) (A) Negative    Nitrite, UA Positive (A) Negative    Urobilinogen, UA 1.0 E.U./dL 0.2 - 1.0 E.U./dL   Urinalysis With Culture If Indicated - Urine, Catheter In/Out    Specimen: Urine, Catheter In/Out   Result Value Ref Range    Color, UA Dark Yellow (A) Yellow, Straw    Appearance, UA Cloudy (A) Clear    pH, UA 5.5 5.0 - 8.0    Specific Gravity, UA 1.016 1.001 - 1.030    Glucose, UA Negative Negative    Ketones, UA Negative Negative    Bilirubin, UA Small (1+) (A) Negative    Blood, UA Large (3+) (A) Negative    Protein,  mg/dL (2+) (A) Negative    Leuk Esterase, UA Large (3+) (A) Negative    Nitrite, UA  Positive (A) Negative    Urobilinogen, UA 1.0 E.U./dL 0.2 - 1.0 E.U./dL   Comprehensive Metabolic Panel    Specimen: Blood   Result Value Ref Range    Glucose 223 (H) 65 - 99 mg/dL    BUN 15 8 - 23 mg/dL    Creatinine 0.91 0.57 - 1.00 mg/dL    Sodium 129 (L) 136 - 145 mmol/L    Potassium 4.0 3.5 - 5.2 mmol/L    Chloride 90 (L) 98 - 107 mmol/L    CO2 29.0 22.0 - 29.0 mmol/L    Calcium 8.6 8.2 - 9.6 mg/dL    Total Protein 5.8 (L) 6.0 - 8.5 g/dL    Albumin 4.10 3.50 - 5.20 g/dL    ALT (SGPT) 14 1 - 33 U/L    AST (SGOT) 18 1 - 32 U/L    Alkaline Phosphatase 133 (H) 39 - 117 U/L    Total Bilirubin 0.5 0.0 - 1.2 mg/dL    Globulin 1.7 gm/dL    A/G Ratio 2.4 g/dL    BUN/Creatinine Ratio 16.5 7.0 - 25.0    Anion Gap 10.0 5.0 - 15.0 mmol/L    eGFR 58.2 (L) >60.0 mL/min/1.73   CBC Auto Differential    Specimen: Blood   Result Value Ref Range    WBC 8.06 3.40 - 10.80 10*3/mm3    RBC 3.65 (L) 3.77 - 5.28 10*6/mm3    Hemoglobin 10.5 (L) 12.0 - 15.9 g/dL    Hematocrit 32.9 (L) 34.0 - 46.6 %    MCV 90.1 79.0 - 97.0 fL    MCH 28.8 26.6 - 33.0 pg    MCHC 31.9 31.5 - 35.7 g/dL    RDW 13.2 12.3 - 15.4 %    RDW-SD 43.8 37.0 - 54.0 fl    MPV 9.5 6.0 - 12.0 fL    Platelets 246 140 - 450 10*3/mm3    Neutrophil % 86.5 (H) 42.7 - 76.0 %    Lymphocyte % 5.7 (L) 19.6 - 45.3 %    Monocyte % 5.0 5.0 - 12.0 %    Eosinophil % 1.5 0.3 - 6.2 %    Basophil % 0.6 0.0 - 1.5 %    Immature Grans % 0.7 (H) 0.0 - 0.5 %    Neutrophils, Absolute 6.97 1.70 - 7.00 10*3/mm3    Lymphocytes, Absolute 0.46 (L) 0.70 - 3.10 10*3/mm3    Monocytes, Absolute 0.40 0.10 - 0.90 10*3/mm3    Eosinophils, Absolute 0.12 0.00 - 0.40 10*3/mm3    Basophils, Absolute 0.05 0.00 - 0.20 10*3/mm3    Immature Grans, Absolute 0.06 (H) 0.00 - 0.05 10*3/mm3    nRBC 0.0 0.0 - 0.2 /100 WBC   Urinalysis, Microscopic Only - Urine, Catheter In/Out    Specimen: Urine, Catheter In/Out   Result Value Ref Range    RBC, UA Too Numerous to Count (A) None Seen, 0-2 /HPF    WBC, UA Too Numerous to  Count (A) None Seen, 0-2 /HPF    Bacteria, UA 4+ (A) None Seen, Trace /HPF    Squamous Epithelial Cells, UA 0-2 None Seen, 0-2 /HPF    Hyaline Casts, UA 0-6 0 - 6 /LPF    Methodology Automated Microscopy    Green Top (Gel)   Result Value Ref Range    Extra Tube Hold for add-ons.    Lavender Top   Result Value Ref Range    Extra Tube hold for add-on    Gold Top - SST   Result Value Ref Range    Extra Tube Hold for add-ons.    Gray Top   Result Value Ref Range    Extra Tube Hold for add-ons.    Light Blue Top   Result Value Ref Range    Extra Tube hold for add-on        If labs were ordered, I independently reviewed the results and considered them in treating the patient.    RADIOLOGY  XR Knee 1 or 2 View Right   Final Result   Impression:   Unremarkable appearance of total knee arthroplasty. No hardware complication. No acute fracture or traumatic malalignment. Relatively diffuse subcutaneous fat stranding throughout the leg with soft tissue swelling at the anterior knee.         Electronically Signed: Salomón Paz     6/27/2023 5:23 PM EDT     Workstation ID: [id number]        [x] Radiologist's Report Reviewed:  I ordered and independently reviewed the above noted radiographic studies.  See radiologist's dictation for official interpretation.      PROCEDURES    Procedures    No orders to display       MEDICATIONS GIVEN IN ER    Medications - No data to display    MEDICAL DECISION MAKING, PROGRESS, and CONSULTS   Medical Decision Making  Syeda Gomez is a 89 y.o. female who presents to the ED c/o Rt knee pain.  Patient advises that she went to turn around approximately 2 hours ago.  Patient fell and landed on her right knee.  She denies hitting her head or any loss of consciousness.  She denies neck pain, back pain.  Patient reports pain and discomfort to her right knee.  Patient explains that she is a history of total knee bilaterally.  Patient wants to make sure nothing is going on.    Problems  Addressed:  Contusion of right knee, initial encounter: complicated acute illness or injury     Details: This morning patient turnaround to quick lost her balance and fell striking her right knee.  Right knee was imaged negative for any acute findings.    Amount and/or Complexity of Data Reviewed  Radiology: ordered. Decision-making details documented in ED Course.        All labs have been independently reviewed by me.  All radiology studies have been reviewed by me and the radiologist dictating the report.  All EKG's have been independently viewed by me.    [] Discussed with radiology regarding test interpretation:    Discussion below represents my analysis of pertinent findings related to patient's condition, differential diagnosis, treatment plan and final disposition.    Differential diagnosis:  The differential diagnosis associated with the patient's presentation includes: knee contusion, fracture, dislocation.    Additional sources  Discussed/ obtained information from independent historians:   [] Spouse  [] Parent  [x] Family member  [] Friend  [] EMS   [] Other:  External (non-ED) record review:   [] Inpatient record:   [] Office record:   [x] Outpatient record:   [x] Prior Outpatient labs:   [x] Prior Outpatient radiology:   [] Primary Care record:   [] Outside ED record:   [] Other:   Patient's care impacted by:   [] Diabetes  [] Hypertension  [] Hyperlipidemia  [] Hypothyroidism   [] Coronary Artery Disease   [] COPD   [x] Cancer   [] Obesity  [x] GERD   [] Tobacco Abuse   [] Substance Abuse    [] Anxiety   [] Depression   [] Other:   Care significantly affected by Social Determinants of Health (housing and economic circumstances, unemployment)    [] Yes     [x] No   If yes, Patient's care significantly limited by  Social Determinants of Health including:   [] Inadequate housing   [] Low income   [] Alcoholism and drug addiction in family   [] Problems related to primary support group   []  Unemployment   [] Problems related to employment   [] Other Social Determinants of Health:     Shared decision making: Shared decision making with patient.  Patient's negative for any acute findings.  We will discharge the patient home.  Patient to rest, ice, compression elevate extremity.  Patient to follow-up with her primary care physician.    Orders placed during this visit:  Orders Placed This Encounter   Procedures    XR Knee 1 or 2 View Right       I considered prescription management  with:   [] Pain medication  [] Antiviral  [] Antibiotic   [] Other:   Rationale:  Additional orders considered but not ordered:  The following testing was considered but ultimately not selected after discussion with patient/family:    ED Course:    ED Course as of 07/19/23 1408   Tue Jun 27, 2023   1730 Rt knee imaging reviewed.    Impression:  Unremarkable appearance of total knee arthroplasty. No hardware complication. No acute fracture or traumatic malalignment. Relatively diffuse subcutaneous fat stranding throughout the leg with soft tissue swelling at the anterior knee.   [KG]      ED Course User Index  [KG] Baylee Will APRN            DIAGNOSIS  Final diagnoses:   Contusion of right knee, initial encounter       DISPOSITION    ED Disposition       ED Disposition   Discharge    Condition   Stable    Comment   --               Please note that portions of this document were completed with voice recognition software.      Baylee Will APRN  07/19/23 7025

## 2023-06-27 NOTE — DISCHARGE INSTRUCTIONS
Good news the x-ray is normal.  There are no broken bones.    Rest, ice, elevate extremity.    Tylenol for pain.    Follow-up with Ortho.

## 2023-09-13 ENCOUNTER — HOSPITAL ENCOUNTER (EMERGENCY)
Facility: HOSPITAL | Age: 88
Discharge: HOME | End: 2023-09-13
Attending: EMERGENCY MEDICINE
Payer: MEDICARE

## 2023-09-13 ENCOUNTER — APPOINTMENT (OUTPATIENT)
Dept: CT IMAGING | Facility: HOSPITAL | Age: 88
End: 2023-09-13
Payer: MEDICARE

## 2023-09-13 ENCOUNTER — APPOINTMENT (OUTPATIENT)
Dept: GENERAL RADIOLOGY | Facility: HOSPITAL | Age: 88
End: 2023-09-13
Payer: MEDICARE

## 2023-09-13 VITALS
RESPIRATION RATE: 16 BRPM | WEIGHT: 139 LBS | SYSTOLIC BLOOD PRESSURE: 141 MMHG | HEART RATE: 65 BPM | HEIGHT: 63 IN | BODY MASS INDEX: 24.63 KG/M2 | OXYGEN SATURATION: 100 % | TEMPERATURE: 97.5 F | DIASTOLIC BLOOD PRESSURE: 63 MMHG

## 2023-09-13 DIAGNOSIS — N28.9 MILD RENAL INSUFFICIENCY: ICD-10-CM

## 2023-09-13 DIAGNOSIS — E16.2 HYPOGLYCEMIA: Primary | ICD-10-CM

## 2023-09-13 DIAGNOSIS — E11.649 TYPE 2 DIABETES MELLITUS WITH HYPOGLYCEMIA WITHOUT COMA, WITH LONG-TERM CURRENT USE OF INSULIN: ICD-10-CM

## 2023-09-13 DIAGNOSIS — Z79.4 TYPE 2 DIABETES MELLITUS WITH HYPOGLYCEMIA WITHOUT COMA, WITH LONG-TERM CURRENT USE OF INSULIN: ICD-10-CM

## 2023-09-13 LAB
ALBUMIN SERPL-MCNC: 4.2 G/DL (ref 3.5–5.2)
ALBUMIN/GLOB SERPL: 1.8 G/DL
ALP SERPL-CCNC: 158 U/L (ref 39–117)
ALT SERPL W P-5'-P-CCNC: 35 U/L (ref 1–33)
ANION GAP SERPL CALCULATED.3IONS-SCNC: 10 MMOL/L (ref 5–15)
AST SERPL-CCNC: 38 U/L (ref 1–32)
BASOPHILS # BLD AUTO: 0.02 10*3/MM3 (ref 0–0.2)
BASOPHILS NFR BLD AUTO: 0.3 % (ref 0–1.5)
BILIRUB SERPL-MCNC: 0.4 MG/DL (ref 0–1.2)
BILIRUB UR QL STRIP: NEGATIVE
BUN SERPL-MCNC: 37 MG/DL (ref 8–23)
BUN/CREAT SERPL: 30.3 (ref 7–25)
CALCIUM SPEC-SCNC: 8.9 MG/DL (ref 8.2–9.6)
CHLORIDE SERPL-SCNC: 102 MMOL/L (ref 98–107)
CLARITY UR: CLEAR
CO2 SERPL-SCNC: 27 MMOL/L (ref 22–29)
COLOR UR: YELLOW
CREAT SERPL-MCNC: 1.22 MG/DL (ref 0.57–1)
DEPRECATED RDW RBC AUTO: 45.8 FL (ref 37–54)
EGFRCR SERPLBLD CKD-EPI 2021: 40.7 ML/MIN/1.73
EOSINOPHIL # BLD AUTO: 0.05 10*3/MM3 (ref 0–0.4)
EOSINOPHIL NFR BLD AUTO: 0.9 % (ref 0.3–6.2)
ERYTHROCYTE [DISTWIDTH] IN BLOOD BY AUTOMATED COUNT: 13.5 % (ref 12.3–15.4)
GEN 5 2HR TROPONIN T REFLEX: 57 NG/L
GLOBULIN UR ELPH-MCNC: 2.3 GM/DL
GLUCOSE BLDC GLUCOMTR-MCNC: 162 MG/DL (ref 70–130)
GLUCOSE SERPL-MCNC: 165 MG/DL (ref 65–99)
GLUCOSE UR STRIP-MCNC: NEGATIVE MG/DL
HCT VFR BLD AUTO: 33.4 % (ref 34–46.6)
HGB BLD-MCNC: 10.3 G/DL (ref 12–15.9)
HGB UR QL STRIP.AUTO: NEGATIVE
HOLD SPECIMEN: NORMAL
IMM GRANULOCYTES # BLD AUTO: 0.05 10*3/MM3 (ref 0–0.05)
IMM GRANULOCYTES NFR BLD AUTO: 0.9 % (ref 0–0.5)
KETONES UR QL STRIP: NEGATIVE
LEUKOCYTE ESTERASE UR QL STRIP.AUTO: NEGATIVE
LYMPHOCYTES # BLD AUTO: 0.48 10*3/MM3 (ref 0.7–3.1)
LYMPHOCYTES NFR BLD AUTO: 8.3 % (ref 19.6–45.3)
MAGNESIUM SERPL-MCNC: 2.6 MG/DL (ref 1.7–2.3)
MCH RBC QN AUTO: 28.3 PG (ref 26.6–33)
MCHC RBC AUTO-ENTMCNC: 30.8 G/DL (ref 31.5–35.7)
MCV RBC AUTO: 91.8 FL (ref 79–97)
MONOCYTES # BLD AUTO: 0.3 10*3/MM3 (ref 0.1–0.9)
MONOCYTES NFR BLD AUTO: 5.2 % (ref 5–12)
NEUTROPHILS NFR BLD AUTO: 4.87 10*3/MM3 (ref 1.7–7)
NEUTROPHILS NFR BLD AUTO: 84.4 % (ref 42.7–76)
NITRITE UR QL STRIP: NEGATIVE
NRBC BLD AUTO-RTO: 0 /100 WBC (ref 0–0.2)
NT-PROBNP SERPL-MCNC: 1773 PG/ML (ref 0–1800)
PH UR STRIP.AUTO: 5.5 [PH] (ref 5–8)
PLATELET # BLD AUTO: 249 10*3/MM3 (ref 140–450)
PMV BLD AUTO: 10.4 FL (ref 6–12)
POTASSIUM SERPL-SCNC: 4.3 MMOL/L (ref 3.5–5.2)
PROT SERPL-MCNC: 6.5 G/DL (ref 6–8.5)
PROT UR QL STRIP: ABNORMAL
QT INTERVAL: 466 MS
QTC INTERVAL: 557 MS
RBC # BLD AUTO: 3.64 10*6/MM3 (ref 3.77–5.28)
SODIUM SERPL-SCNC: 139 MMOL/L (ref 136–145)
SP GR UR STRIP: 1.02 (ref 1–1.03)
TROPONIN T DELTA: -7 NG/L
TROPONIN T SERPL HS-MCNC: 64 NG/L
UROBILINOGEN UR QL STRIP: ABNORMAL
WBC NRBC COR # BLD: 5.77 10*3/MM3 (ref 3.4–10.8)
WHOLE BLOOD HOLD COAG: NORMAL
WHOLE BLOOD HOLD SPECIMEN: NORMAL

## 2023-09-13 PROCEDURE — 82948 REAGENT STRIP/BLOOD GLUCOSE: CPT

## 2023-09-13 PROCEDURE — 36415 COLL VENOUS BLD VENIPUNCTURE: CPT

## 2023-09-13 PROCEDURE — 81003 URINALYSIS AUTO W/O SCOPE: CPT | Performed by: EMERGENCY MEDICINE

## 2023-09-13 PROCEDURE — 71045 X-RAY EXAM CHEST 1 VIEW: CPT

## 2023-09-13 PROCEDURE — 84484 ASSAY OF TROPONIN QUANT: CPT | Performed by: EMERGENCY MEDICINE

## 2023-09-13 PROCEDURE — 80053 COMPREHEN METABOLIC PANEL: CPT | Performed by: EMERGENCY MEDICINE

## 2023-09-13 PROCEDURE — 85025 COMPLETE CBC W/AUTO DIFF WBC: CPT | Performed by: EMERGENCY MEDICINE

## 2023-09-13 PROCEDURE — 83880 ASSAY OF NATRIURETIC PEPTIDE: CPT | Performed by: EMERGENCY MEDICINE

## 2023-09-13 PROCEDURE — 93005 ELECTROCARDIOGRAM TRACING: CPT | Performed by: EMERGENCY MEDICINE

## 2023-09-13 PROCEDURE — 70450 CT HEAD/BRAIN W/O DYE: CPT

## 2023-09-13 PROCEDURE — 83735 ASSAY OF MAGNESIUM: CPT | Performed by: EMERGENCY MEDICINE

## 2023-09-13 PROCEDURE — 99284 EMERGENCY DEPT VISIT MOD MDM: CPT

## 2023-09-13 RX ORDER — INSULIN GLARGINE-YFGN 100 [IU]/ML
INJECTION, SOLUTION SUBCUTANEOUS
COMMUNITY

## 2023-09-13 RX ORDER — SODIUM CHLORIDE 0.9 % (FLUSH) 0.9 %
10 SYRINGE (ML) INJECTION AS NEEDED
Status: DISCONTINUED | OUTPATIENT
Start: 2023-09-13 | End: 2023-09-13 | Stop reason: HOSPADM

## 2023-09-13 RX ORDER — NYSTATIN 100000 U/G
1 CREAM TOPICAL 2 TIMES DAILY
COMMUNITY

## 2023-09-13 NOTE — ED PROVIDER NOTES
"[facility]    EMERGENCY DEPARTMENT ENCOUNTER      Pt Name: Syeda Gomez  MRN: [id number]  YOB: 1927  Date of evaluation: 9/13/2023  Provider: Dinesh Penn DO    CHIEF COMPLAINT       Chief Complaint   Patient presents with    Altered Mental Status       HPI  Stated Reason for Visit: PT WOKE THIS MORNING NOT FEELING WELL. FAMILY FOUND PT AROUND 1100 HYPOGLYCEMIC. FIRE CAME AND GAVE PT PO FOOD , PT BECAME BETTER BUT PT FAMILY REPORTS PT STILL NOT WELL. PT PALE APPEARING. History Obtained From: patient       HISTORY OF PRESENT ILLNESS  (Location/Symptom, Timing/Onset, Context/Setting, Quality, Duration, Modifying Factors, Severity.)   Syeda Gomez is a 89 y.o. female who presents to the emergency department via EMS for evaluation of weakness, hypoglycemic episode which had occurred earlier today.  Initially EMS called out patient was given food, improvement of her symptoms, unfortunately family notes she is continued to be very weak, \"pale\" and want her sent to the hospital for further assessment.  During initial evaluation patient is here without family present bedside.  She notes she feels generally weak, with history diabetes, insulin-dependent, states she does not believe she ate breakfast this morning which is why she may have passed out had the episode.  She denies any chest pain, cough shortness of breath, recent illness, fever or chills.  She does have family who Hal[place] around at home, currently denies any other acute systemic complaints at this time.  She notes she has a history of atrial fibrillation.      Nursing notes were reviewed.      PAST MEDICAL HISTORY     Past Medical History:   Diagnosis Date    A-fib     Arthritis     Cancer     1988- left breast    Carpal tunnel syndrome     CHF (congestive heart failure)     Detached retina     right side    Disease of thyroid gland     Dizzy     Full dentures     GERD (gastroesophageal reflux disease)     Hearing aid worn     bilat    Heart " murmur     History of transfusion     x4 just this year- never had reaction    [place] (hard of hearing)     bilat hearinhg aids    Hypertension     Infection     right knee- had infection 15 to 20 years ago- unsure if mrsa or not    Pleural effusion     Rheumatic fever     SOBOE (shortness of breath on exertion)     UTI (urinary tract infection)     Wears glasses          SURGICAL HISTORY       Past Surgical History:   Procedure Laterality Date    APPENDECTOMY      CARDIAC ELECTROPHYSIOLOGY PROCEDURE N/A 06/25/2021    Procedure: DEVICE IMPLANT;  Surgeon: Calvin Ventura MD;  Location:  [facility];  Service: Cardiovascular;  Laterality: N/A;    CARPAL TUNNEL RELEASE Bilateral     CATARACT EXTRACTION, BILATERAL      CHOLECYSTECTOMY      COLONOSCOPY      HYSTERECTOMY      MASTECTOMY Left     OTHER SURGICAL HISTORY      x2 fluid removed from right side    PLEURAL CATHETER INSERTION Right 4/4/2022    Procedure: PLEURX CATHETER INSERTION;  Surgeon: Fidel Carter MD;  Location:  [facility];  Service: Cardiothoracic;  Laterality: Right;    PORTACATH PLACEMENT Left     REPLACEMENT TOTAL KNEE BILATERAL      RETINAL DETACHMENT SURGERY Right     buckle in place    SHOULDER ROTATOR CUFF REPAIR Right     TOTAL HIP ARTHROPLASTY Right     TRIGGER FINGER RELEASE Right     thinks right side         CURRENT MEDICATIONS       Current Facility-Administered Medications:     sodium chloride 0.9 % flush 10 mL, 10 mL, Intravenous, PRN, PacittDinesh viera, DO    Current Outpatient Medications:     albuterol (PROVENTIL) (2.5 MG/3ML) 0.083% nebulizer solution, Take 2.5 mg by nebulization 2 (Two) Times a Day As Needed for Shortness of Air., Disp: , Rfl:     aspirin 81 MG EC tablet, Take 1 tablet by mouth Daily., Disp: , Rfl:     carvedilol (COREG) 12.5 MG tablet, Take 1 tablet by mouth 2 (Two) Times a Day With Meals., Disp: , Rfl:     clonazePAM (KlonoPIN) 0.5 MG tablet, Take 0.5-1 tablets by mouth At Night As Needed  (sleep)., Disp: , Rfl:     docusate sodium (COLACE) 100 MG capsule, Take 2 capsules by mouth As Needed for Constipation., Disp: , Rfl:     isosorbide mononitrate (IMDUR) 30 MG 24 hr tablet, Take 1 tablet by mouth Daily. (Patient taking differently: Take 2 tablets by mouth Daily.), Disp: 30 tablet, Rfl: 1    levothyroxine (SYNTHROID, LEVOTHROID) 50 MCG tablet, Take 1 tablet by mouth Daily., Disp: , Rfl:     loratadine (CLARITIN) 10 MG tablet, Take 1 tablet by mouth Daily., Disp: , Rfl:     multivitamin with minerals tablet tablet, Take 1 tablet by mouth Daily., Disp: , Rfl:     nitroglycerin (NITROSTAT) 0.4 MG SL tablet, Place 1 tablet under the tongue Every 5 (Five) Minutes As Needed for Chest Pain. Take no more than 3 doses in 15 minutes., Disp: , Rfl:     omeprazole (priLOSEC) 20 MG capsule, Take 1 capsule by mouth Daily., Disp: , Rfl:     ondansetron (ZOFRAN) 8 MG tablet, Take 1 tablet by mouth Every 6 (Six) Hours As Needed for Nausea or Vomiting., Disp: , Rfl:     pravastatin (PRAVACHOL) 20 MG tablet, Take 1 tablet by mouth Every Night., Disp: , Rfl:     sacubitril-valsartan (Entresto) 24-26 MG tablet, Take 1 tablet by mouth 2 (Two) Times a Day., Disp: , Rfl:     torsemide (DEMADEX) 20 MG tablet, Take 1 tablet by mouth 2 (Two) Times a Day., Disp: , Rfl:     vitamin D3 125 MCG (5000 UT) capsule capsule, Take 1 capsule by mouth Daily., Disp: , Rfl:     acetaminophen (TYLENOL) 650 MG 8 hr tablet, Take 650 mg by mouth Every 8 (Eight) Hours As Needed for Mild Pain ., Disp: , Rfl:     Ascorbic Acid (VITAMIN C PO), Take  by mouth Daily., Disp: , Rfl:     diphenhydrAMINE (BENADRYL) 25 MG tablet, Take 50 mg by mouth At Night As Needed for Sleep. (Patient not taking: Reported on 8/4/2022), Disp: , Rfl:     Insulin Glargine-yfgn (SEMGLEE-YFGN) 100 UNIT/ML, Inject  under the skin into the appropriate area as directed., Disp: , Rfl:     magnesium oxide (MAGOX) 400 (241.3 Mg) MG tablet tablet, Take 400 mg by mouth Daily.,  Disp: , Rfl:     nitrofurantoin, macrocrystal-monohydrate, (MACROBID) 100 MG capsule, , Disp: , Rfl:     nystatin (MYCOSTATIN) 429940 UNIT/GM cream, Apply 1 application  topically to the appropriate area as directed 2 (Two) Times a Day., Disp: , Rfl:     O2 (OXYGEN), Inhale 2 L/min Daily As Needed (shortness of air)., Disp: , Rfl:     traMADol (ULTRAM) 50 MG tablet, Take 50 mg by mouth 2 (Two) Times a Day As Needed for Moderate Pain . (Patient not taking: Reported on 8/4/2022), Disp: , Rfl:     ALLERGIES     Cardizem [diltiazem], Codeine, Morphine, Percocet [oxycodone-acetaminophen], and Sulfa antibiotics    FAMILY HISTORY       Family History   Problem Relation Age of Onset    Heart attack Mother     Hypertension Father     Stroke Father           SOCIAL HISTORY       Social History     Socioeconomic History    Marital status:     Number of children: 1   Tobacco Use    Smoking status: Never    Smokeless tobacco: Never   Vaping Use    Vaping Use: Never used   Substance and Sexual Activity    Alcohol use: No    Drug use: No    Sexual activity: Defer         PHYSICAL EXAM    (up to 7 for level 4, 8 or more for level 5)     Vitals:    09/13/23 1451 09/13/23 1453 09/13/23 1700 09/13/23 1715   BP: 113/53 113/53     Patient Position:  Lying     Pulse: 66 67 67 65   Resp:  16     Temp:       TempSrc:       SpO2: 94% 96% 100% 100%   Weight:       Height:           Physical Exam  General : Patient is elderly, pleasant, nontoxic-appearing, dementia appreciated, awake, alert, oriented, in no acute distress, nontoxic appearing  HEENT: Pupils are equally round, EOMI, conjunctivae clear, there is no injection no icterus.  Oral mucosa is moist, uvula midline  Neck: Neck is supple, full range of motion, trachea midline  Cardiac: Heart irregularly irregular  Lungs: Lungs are clear to auscultation, there is no wheezing, rhonchi, or rales. There is no use of accessory muscles  Abdomen: Abdomen is soft, nontender, nondistended.  There are no firm or pulsatile masses, no rebound rigidity or guarding  Musculoskeletal: Generalized muscle atrophy no focal muscle deficits are appreciated  Neuro: Motor intact, sensory intact, level of consciousness is normal, nebula weakness is appreciated.  Dermatology: Skin is warm and dry      DIAGNOSTIC RESULTS     EKG:  All EKGs are interpreted by the Emergency Department Physician who either signs or Co-signs this chart in the absence of a cardiologist.    ECG 12 Lead ED Triage Standing Order; Weak / Dizzy / AMS   Final Result   Test Reason : ED Triage Standing Order~   Blood Pressure :   */*   mmHG   Vent. Rate :  86 BPM     Atrial Rate : 113 BPM      P-R Int :   * ms          QRS Dur : 132 ms       QT Int : 466 ms       P-R-T Axes :   * -31 150 degrees      QTc Int : 557 ms      Atrial fibrillation with occasional ventricular-paced complexes   Left axis deviation   Right bundle branch block   Moderate voltage criteria for LVH, may be normal variant   Inferior infarct , age undetermined   Anterolateral infarct , age undetermined   Abnormal ECG   When compared with ECG of 10-MAR-2022 20:39,   Electronic ventricular pacemaker has replaced Atrial fibrillation   Confirmed by SHANNON GALICIA MD ([id number]) on 9/13/2023 1:25:51 PM      Referred By: EDMD           Confirmed By: SHANNON GALICIA MD          RADIOLOGY:     [] Radiologist's Report Reviewed:  CT Head Without Contrast   Final Result   1.No acute intracranial abnormality is identified.   2.Findings compatible with chronic microvascular ischemic change and diffuse cortical atrophy.      Electronically Signed: Godfrey Porter MD     9/13/2023 2:26 PM EDT     Workstation ID: [id number]      XR Chest 1 View   Final Result   Impression:   Chronic findings. No acute process.         Electronically Signed: Dipti Ordoñez MD     9/13/2023 1:39 PM EDT     Workstation ID: [id number]          I ordered and independently reviewed the above noted radiographic studies.      I  viewed images of chest x-ray which showed no acute cardiopulmonary process, chronic lower lobe infiltrates per my independent interpretation.    See radiologist's dictation for official interpretation.      ED BEDSIDE ULTRASOUND:   Performed by ED Physician - none    LABS:    I have reviewed and interpreted all of the currently available lab results from this visit (if applicable):  Results for orders placed or performed during the hospital encounter of 09/13/23   Comprehensive Metabolic Panel    Specimen: Blood   Result Value Ref Range    Glucose 165 (H) 65 - 99 mg/dL    BUN 37 (H) 8 - 23 mg/dL    Creatinine 1.22 (H) 0.57 - 1.00 mg/dL    Sodium 139 136 - 145 mmol/L    Potassium 4.3 3.5 - 5.2 mmol/L    Chloride 102 98 - 107 mmol/L    CO2 27.0 22.0 - 29.0 mmol/L    Calcium 8.9 8.2 - 9.6 mg/dL    Total Protein 6.5 6.0 - 8.5 g/dL    Albumin 4.2 3.5 - 5.2 g/dL    ALT (SGPT) 35 (H) 1 - 33 U/L    AST (SGOT) 38 (H) 1 - 32 U/L    Alkaline Phosphatase 158 (H) 39 - 117 U/L    Total Bilirubin 0.4 0.0 - 1.2 mg/dL    Globulin 2.3 gm/dL    A/G Ratio 1.8 g/dL    BUN/Creatinine Ratio 30.3 (H) 7.0 - 25.0    Anion Gap 10.0 5.0 - 15.0 mmol/L    eGFR 40.7 (L) >60.0 mL/min/1.73   Single High Sensitivity Troponin T    Specimen: Blood   Result Value Ref Range    HS Troponin T 64 (C) <10 ng/L   Magnesium    Specimen: Blood   Result Value Ref Range    Magnesium 2.6 (H) 1.7 - 2.3 mg/dL   Urinalysis With Microscopic If Indicated (No Culture) - Urine, Clean Catch    Specimen: Urine, Clean Catch   Result Value Ref Range    Color, UA Yellow Yellow, Straw    Appearance, UA Clear Clear    pH, UA 5.5 5.0 - 8.0    Specific Gravity, UA 1.016 1.001 - 1.030    Glucose, UA Negative Negative    Ketones, UA Negative Negative    Bilirubin, UA Negative Negative    Blood, UA Negative Negative    Protein, UA Trace (A) Negative    Leuk Esterase, UA Negative Negative    Nitrite, UA Negative Negative    Urobilinogen, UA 0.2 E.U./dL 0.2 - 1.0 E.U./dL   CBC Auto  Differential    Specimen: Blood   Result Value Ref Range    WBC 5.77 3.40 - 10.80 10*3/mm3    RBC 3.64 (L) 3.77 - 5.28 10*6/mm3    Hemoglobin 10.3 (L) 12.0 - 15.9 g/dL    Hematocrit 33.4 (L) 34.0 - 46.6 %    MCV 91.8 79.0 - 97.0 fL    MCH 28.3 26.6 - 33.0 pg    MCHC 30.8 (L) 31.5 - 35.7 g/dL    RDW 13.5 12.3 - 15.4 %    RDW-SD 45.8 37.0 - 54.0 fl    MPV 10.4 6.0 - 12.0 fL    Platelets 249 140 - 450 10*3/mm3    Neutrophil % 84.4 (H) 42.7 - 76.0 %    Lymphocyte % 8.3 (L) 19.6 - 45.3 %    Monocyte % 5.2 5.0 - 12.0 %    Eosinophil % 0.9 0.3 - 6.2 %    Basophil % 0.3 0.0 - 1.5 %    Immature Grans % 0.9 (H) 0.0 - 0.5 %    Neutrophils, Absolute 4.87 1.70 - 7.00 10*3/mm3    Lymphocytes, Absolute 0.48 (L) 0.70 - 3.10 10*3/mm3    Monocytes, Absolute 0.30 0.10 - 0.90 10*3/mm3    Eosinophils, Absolute 0.05 0.00 - 0.40 10*3/mm3    Basophils, Absolute 0.02 0.00 - 0.20 10*3/mm3    Immature Grans, Absolute 0.05 0.00 - 0.05 10*3/mm3    nRBC 0.0 0.0 - 0.2 /100 WBC   BNP    Specimen: Blood   Result Value Ref Range    proBNP 1,773.0 0.0 - 1,800.0 pg/mL   High Sensitivity Troponin T 2Hr    Specimen: Blood   Result Value Ref Range    HS Troponin T 57 (C) <10 ng/L    Troponin T Delta -7 (L) >=-4 - <+4 ng/L   POC Glucose Once    Specimen: Blood   Result Value Ref Range    Glucose 162 (H) 70 - 130 mg/dL   ECG 12 Lead ED Triage Standing Order; Weak / Dizzy / AMS   Result Value Ref Range    QT Interval 466 ms    QTC Interval 557 ms   Green Top (Gel)   Result Value Ref Range    Extra Tube Hold for add-ons.    Lavender Top   Result Value Ref Range    Extra Tube hold for add-on    Gold Top - SST   Result Value Ref Range    Extra Tube Hold for add-ons.    Gray Top   Result Value Ref Range    Extra Tube Hold for add-ons.    Light Blue Top   Result Value Ref Range    Extra Tube Hold for add-ons.         If labs were ordered, I independently reviewed the results and considered them in treating the patient.      EMERGENCY DEPARTMENT COURSE and  DIFFERENTIAL DIAGNOSIS/MDM:   Vitals:  AS OF 17:43 EDT    BP - 113/53  HR - 65  TEMP - 97.5 °F (36.4 °C) (Oral)  O2 SATS - 100%      Orders placed during this visit:  Orders Placed This Encounter   Procedures    XR Chest 1 View    CT Head Without Contrast    Royal Draw    Comprehensive Metabolic Panel    Single High Sensitivity Troponin T    Magnesium    Urinalysis With Microscopic If Indicated (No Culture) - Urine, Clean Catch    CBC Auto Differential    BNP    High Sensitivity Troponin T 2Hr    NPO Diet NPO Type: Strict NPO    Undress & Gown    Continuous Pulse Oximetry    Vital Signs    Oxygen Therapy- Nasal Cannula; Titrate 1-6 LPM Per SpO2; 90 - 95%    POC Glucose Once    POC Glucose Once    ECG 12 Lead ED Triage Standing Order; Weak / Dizzy / AMS    Insert Peripheral IV    Fall Precautions    CBC & Differential    Green Top (Gel)    Lavender Top    Gold Top - SST    Gray Top    Light Blue Top       All labs have been independently reviewed by me.  All radiology studies have been reviewed by me and the radiologist dictating the report.  All EKG's have been independently viewed and interpreted by me.      Discussion below represents my analysis of pertinent findings related to patient's condition, differential diagnosis, treatment plan and final disposition.    Differential diagnosis:  The differential diagnosis associated with the patient's presentation includes: Generalized weakness, failure to thrive in adult, hypoglycemic episode, malnutrition, altered mental status, urinary tract infection, encephalopathy, dehydration    Additional sources  Discussed/ obtained information from independent historians:   [] Spouse  [] Parent  [] Family member  [] Friend  [x] EMS   [] Other:    External (non-ED) record review:   [] Inpatient record:   [] Office record:   [] Outpatient record:   [] Prior Outpatient labs:   [] Prior Outpatient radiology:   [] Primary Care record:   [] Outside ED record:   [] Other:      Patient's care impacted by:   [x] Diabetes  [] Hypertension  [] CHF  [] Hyperlipidemia  [] Coronary Artery Disease   [] COPD   [] Cancer   [] Tobacco Abuse   [] Substance Abuse    [] Other:     Care significantly affected by Social Determinants of Health (housing and economic circumstances, unemployment)    [] Yes     [x] No   If yes, Patient's care significantly limited by Social Determinants of Health including:   [] Inadequate housing   [] Low income   [] Alcoholism and drug addiction in family   [] Problems related to primary support group   [] Unemployment   [] Problems related to employment   [] Other Social Determinants of Health:       MEDICATIONS ADMINISTERED IN ED:  Medications   sodium chloride 0.9 % flush 10 mL (has no administration in time range)              This is a pleasant 89-year-old female history of diabetes who had a hypoglycemic episode earlier today, responded to oral glucose, was still weak, pale per family with a history of underlying anemia is at the hospital for further assessment.  She is very pleasant, nontoxic-appearing on my examination, underlying history of atrial fibrillation, stable vital signs normal initial exam.  We did move forward with IV, labs, urinalysis and imaging for further assessment.  Results reviewed as above.  Patient with mild renal insufficiency, creatinine 1.22 with a BUN of 37.  She is on a few diuretic medications with history of heart failure.  CT head is unremarkable as well as her chest x-ray with chronic lower lobe infiltrates, no acute process identified.  On reevaluation patient resting comfortably, awake and alert, no focal neurodeficit on reassessment, daughter at bedside.  Patient states she wants to go home, has felt back to her baseline since her blood sugar was back to a normal range.  She will keep a close monitoring her blood glucose, small meals, close follow-up with her PCP.  We discussed observation admission overnight for further  monitoring and reassessment she would not prefer to be admitted, wants to go home and requesting discharge.  Family keep a close watch on her, fall prevention discussed, return to the ED with any worsening symptoms or further concerns in the meantime.    I had a discussion with the patient/family regarding diagnosis, diagnostic results, treatment plan, and medications.  The patient/family indicated understanding of these instructions.  I spent adequate time at the bedside preceding discharge necessary to personally discuss the aftercare instructions, giving patient education, providing explanations of the results of our evaluations/findings, and my decision making to assure that the patient/family understand the plan of care.  Time was allotted to answer questions at that time and throughout the ED course.  Emphasis was placed on timely follow-up after discharge.  I also discussed the potential for the development of an acute emergent condition requiring further evaluation, admission, or even surgical intervention. I encouraged the patient to return to the emergency department immediately for ANY concerns, worsening, new complaints, or if symptoms persist and unable to seek follow-up in a timely fashion.  The patient/family expressed understanding and agreement with this plan.  The patient will follow-up with their PCP in 1-2 days for reevaluation.     PROCEDURES:  Procedures    CRITICAL CARE TIME    Total Critical Care time was 0 minutes, excluding separately reportable procedures.   There was a high probability of clinically significant/life threatening deterioration in the patient's condition which required my urgent intervention.      FINAL IMPRESSION      1. Hypoglycemia    2. Mild renal insufficiency    3. Type 2 diabetes mellitus with hypoglycemia without coma, with long-term current use of insulin          DISPOSITION/PLAN     ED Disposition       ED Disposition   Discharge    Condition   Stable    Comment    --               PATIENT REFERRED TO:  Ottoniel Omayra Rocio, DO  [address]  [address] [place] [id number] [phone]    In 2 days      [facility] EMERGENCY DEPARTMENT  [address]  [facility] [address] [phone]    If symptoms worsen      DISCHARGE MEDICATIONS:     Medication List        CHANGE how you take these medications      isosorbide mononitrate 30 MG 24 hr tablet  Commonly known as: IMDUR  Take 1 tablet by mouth Daily.  What changed: how much to take            CONTINUE taking these medications      acetaminophen 650 MG 8 hr tablet  Commonly known as: TYLENOL     albuterol (2.5 MG/3ML) 0.083% nebulizer solution  Commonly known as: PROVENTIL     aspirin 81 MG EC tablet     carvedilol 12.5 MG tablet  Commonly known as: COREG     clonazePAM 0.5 MG tablet  Commonly known as: KlonoPIN     diphenhydrAMINE 25 MG tablet  Commonly known as: BENADRYL     docusate sodium 100 MG capsule  Commonly known as: COLACE     Entresto 24-26 MG tablet  Generic drug: sacubitril-valsartan     Insulin Glargine-yfgn 100 UNIT/ML  Commonly known as: SEMGLEE-YFGN     levothyroxine 50 MCG tablet  Commonly known as: SYNTHROID, LEVOTHROID     loratadine 10 MG tablet  Commonly known as: CLARITIN     magnesium oxide 400 (241.3 Mg) MG tablet tablet  Commonly known as: MAGOX     multivitamin with minerals tablet tablet     nitrofurantoin (macrocrystal-monohydrate) 100 MG capsule  Commonly known as: MACROBID     nitroglycerin 0.4 MG SL tablet  Commonly known as: NITROSTAT     nystatin 624716 UNIT/GM cream  Commonly known as: MYCOSTATIN     O2  Commonly known as: OXYGEN     omeprazole 20 MG capsule  Commonly known as: priLOSEC     ondansetron 8 MG tablet  Commonly known as: ZOFRAN     pravastatin 20 MG tablet  Commonly known as: PRAVACHOL     torsemide 20 MG tablet  Commonly known as: DEMADEX     traMADol 50 MG tablet  Commonly known as: ULTRAM     VITAMIN C PO      vitamin D3 125 MCG (5000 UT) capsule capsule                  Comment: Please note this report has been produced using speech recognition software.      Dinesh Penn DO  Attending Emergency Physician         Dinesh Penn DO  09/13/23 2423

## 2023-12-10 PROBLEM — U07.1 COVID-19: Status: ACTIVE | Noted: 2023-01-01

## 2023-12-10 NOTE — ED PROVIDER NOTES
EMERGENCY DEPARTMENT ENCOUNTER    Pt Name: Syeda Gomez  MRN: [id number]  Pt :   1927  Room Number:  33/33  Date of encounter:  12/10/2023  PCP: Omayra Alcazar DO  ED Provider: GONZÁLEZ Salazar    Historian: Patient    HPI:  Chief Complaint: COVID-positive     Context: Syeda Gomez is a 89 y.o. female who presents to the ED c/o covid postitive. patient advises that she took an at home COVID-positive test.  She presents today for complaints of shortness of breath.  She reports that she feels like she is smothering.  Patient's had a cough and congestion.  She reports nausea and vomiting.  Patient had to be admitted the last time she had COVID.  She has a history of CHF, A-fib, hypertension.  HPI     REVIEW OF SYSTEMS  A chief complaint appropriate review of systems was completed and is negative except as noted in the HPI.     PAST MEDICAL HISTORY  Past Medical History:   Diagnosis Date    A-fib     Arthritis     Cancer     - left breast    Carpal tunnel syndrome     CHF (congestive heart failure)     Detached retina     right side    Disease of thyroid gland     Dizzy     Full dentures     GERD (gastroesophageal reflux disease)     Hearing aid worn     bilat    Heart murmur     History of transfusion     x4 just this year- never had reaction    [place] (hard of hearing)     bilat hearinhg aids    Hypertension     Infection     right knee- had infection 15 to 20 years ago- unsure if mrsa or not    Pleural effusion     Rheumatic fever     SOBOE (shortness of breath on exertion)     UTI (urinary tract infection)     Wears glasses        PAST SURGICAL HISTORY  Past Surgical History:   Procedure Laterality Date    APPENDECTOMY      CARDIAC ELECTROPHYSIOLOGY PROCEDURE N/A 2021    Procedure: DEVICE IMPLANT;  Surgeon: Calvin Ventura MD;  Location: [facility] INVASIVE LOCATION;  Service: Cardiovascular;  Laterality: N/A;    CARPAL TUNNEL RELEASE Bilateral     CATARACT EXTRACTION, BILATERAL       CHOLECYSTECTOMY      COLONOSCOPY      HYSTERECTOMY      MASTECTOMY Left     OTHER SURGICAL HISTORY      x2 fluid removed from right side    PLEURAL CATHETER INSERTION Right 4/4/2022    Procedure: PLEURX CATHETER INSERTION;  Surgeon: Fidel Carter MD;  [facility];  Service: Cardiothoracic;  Laterality: Right;    PORTACATH PLACEMENT Left     REPLACEMENT TOTAL KNEE BILATERAL      RETINAL DETACHMENT SURGERY Right     buckle in place    SHOULDER ROTATOR CUFF REPAIR Right     TOTAL HIP ARTHROPLASTY Right     TRIGGER FINGER RELEASE Right     thinks right side       FAMILY HISTORY  Family History   Problem Relation Age of Onset    Heart attack Mother     Hypertension Father     Stroke Father        SOCIAL HISTORY  Social History     Socioeconomic History    Marital status:     Number of children: 1   Tobacco Use    Smoking status: Never    Smokeless tobacco: Never   Vaping Use    Vaping Use: Never used   Substance and Sexual Activity    Alcohol use: No    Drug use: No    Sexual activity: Defer       ALLERGIES  Cardizem [diltiazem], Codeine, Morphine, Percocet [oxycodone-acetaminophen], and Sulfa antibiotics    PHYSICAL EXAM  Physical Exam  Vitals and nursing note reviewed.   Constitutional:       Appearance: Normal appearance. She is ill-appearing.   HENT:      Head: Normocephalic and atraumatic.      Nose: Nose normal.      Mouth/Throat:      Mouth: Mucous membranes are moist.   Eyes:      Extraocular Movements: Extraocular movements intact.      Pupils: Pupils are equal, round, and reactive to light.   Cardiovascular:      Rate and Rhythm: Normal rate.      Pulses: Normal pulses.      Heart sounds: Normal heart sounds.   Pulmonary:      Effort: Pulmonary effort is normal.      Breath sounds: Wheezing present.   Abdominal:      General: Bowel sounds are normal. There is no distension.      Tenderness: There is no abdominal tenderness.   Musculoskeletal:      Cervical back: Normal range of motion. No  tenderness.      Right lower leg: Edema present.      Left lower leg: Edema present.   Skin:     General: Skin is warm and dry.   Neurological:      General: No focal deficit present.      Mental Status: She is alert and oriented to person, place, and time.   Psychiatric:         Mood and Affect: Mood normal.         Behavior: Behavior normal.           LAB RESULTS  Results for orders placed or performed during the hospital encounter of 12/10/23   Respiratory Panel PCR w/COVID-19(SARS-CoV-2) VINICIO/RAGINI/YVETTE/PAD/COR/ASHKAN In-House, NP Swab in UTM/VTM, 2 HR TAT - Swab, Nasopharynx    Specimen: Nasopharynx; Swab   Result Value Ref Range    ADENOVIRUS, PCR Not Detected Not Detected    Coronavirus 229E Not Detected Not Detected    Coronavirus HKU1 Not Detected Not Detected    Coronavirus NL63 Not Detected Not Detected    Coronavirus OC43 Not Detected Not Detected    COVID19 Detected (C) Not Detected - Ref. Range    Human Metapneumovirus Not Detected Not Detected    Human Rhinovirus/Enterovirus Not Detected Not Detected    Influenza A PCR Not Detected Not Detected    Influenza B PCR Not Detected Not Detected    Parainfluenza Virus 1 Not Detected Not Detected    Parainfluenza Virus 2 Not Detected Not Detected    Parainfluenza Virus 3 Not Detected Not Detected    Parainfluenza Virus 4 Not Detected Not Detected    RSV, PCR Not Detected Not Detected    Bordetella pertussis pcr Not Detected Not Detected    Bordetella parapertussis PCR Not Detected Not Detected    Chlamydophila pneumoniae PCR Not Detected Not Detected    Mycoplasma pneumo by PCR Not Detected Not Detected   Comprehensive Metabolic Panel    Specimen: Blood   Result Value Ref Range    Glucose 268 (H) 65 - 99 mg/dL    BUN 21 8 - 23 mg/dL    Creatinine 1.25 (H) 0.57 - 1.00 mg/dL    Sodium 138 136 - 145 mmol/L    Potassium 3.6 3.5 - 5.2 mmol/L    Chloride 98 98 - 107 mmol/L    CO2 28.0 22.0 - 29.0 mmol/L    Calcium 8.5 8.2 - 9.6 mg/dL    Total Protein 5.9 (L) 6.0 - 8.5  g/dL    Albumin 3.9 3.5 - 5.2 g/dL    ALT (SGPT) 43 (H) 1 - 33 U/L    AST (SGOT) 61 (H) 1 - 32 U/L    Alkaline Phosphatase 145 (H) 39 - 117 U/L    Total Bilirubin 0.9 0.0 - 1.2 mg/dL    Globulin 2.0 gm/dL    A/G Ratio 2.0 g/dL    BUN/Creatinine Ratio 16.8 7.0 - 25.0    Anion Gap 12.0 5.0 - 15.0 mmol/L    eGFR 39.5 (L) >60.0 mL/min/1.73   Procalcitonin    Specimen: Blood   Result Value Ref Range    Procalcitonin 0.10 0.00 - 0.25 ng/mL   Lactate Dehydrogenase    Specimen: Blood   Result Value Ref Range     (H) 135 - 214 U/L   D-dimer, Quantitative    Specimen: Blood   Result Value Ref Range    D-Dimer, Quantitative 0.90 0.00 - 0.96 MCGFEU/mL   Protime-INR    Specimen: Blood   Result Value Ref Range    Protime 17.9 (H) 12.2 - 14.5 Seconds    INR 1.46 (H) 0.89 - 1.12   aPTT    Specimen: Blood   Result Value Ref Range    PTT 28.4 22.0 - 39.0 seconds   Lactic Acid, Plasma    Specimen: Blood   Result Value Ref Range    Lactate 1.5 0.5 - 2.0 mmol/L   C-reactive Protein    Specimen: Blood   Result Value Ref Range    C-Reactive Protein 1.24 (H) 0.00 - 0.50 mg/dL   Ferritin    Specimen: Blood   Result Value Ref Range    Ferritin 326.90 (H) 13.00 - 150.00 ng/mL   Single High Sensitivity Troponin T    Specimen: Blood   Result Value Ref Range    HS Troponin T 86 (C) <14 ng/L   BNP    Specimen: Blood   Result Value Ref Range    proBNP 11,750.0 (H) 0.0 - 1,800.0 pg/mL   CBC Auto Differential    Specimen: Blood   Result Value Ref Range    WBC 7.04 3.40 - 10.80 10*3/mm3    RBC 3.71 (L) 3.77 - 5.28 10*6/mm3    Hemoglobin 10.7 (L) 12.0 - 15.9 g/dL    Hematocrit 33.1 (L) 34.0 - 46.6 %    MCV 89.2 79.0 - 97.0 fL    MCH 28.8 26.6 - 33.0 pg    MCHC 32.3 31.5 - 35.7 g/dL    RDW 16.0 (H) 12.3 - 15.4 %    RDW-SD 51.3 37.0 - 54.0 fl    MPV 11.0 6.0 - 12.0 fL    Platelets 130 (L) 140 - 450 10*3/mm3    Neutrophil % 88.1 (H) 42.7 - 76.0 %    Lymphocyte % 4.7 (L) 19.6 - 45.3 %    Monocyte % 6.1 5.0 - 12.0 %    Eosinophil % 0.0 (L) 0.3 - 6.2  %    Basophil % 0.4 0.0 - 1.5 %    Immature Grans % 0.7 (H) 0.0 - 0.5 %    Neutrophils, Absolute 6.20 1.70 - 7.00 10*3/mm3    Lymphocytes, Absolute 0.33 (L) 0.70 - 3.10 10*3/mm3    Monocytes, Absolute 0.43 0.10 - 0.90 10*3/mm3    Eosinophils, Absolute 0.00 0.00 - 0.40 10*3/mm3    Basophils, Absolute 0.03 0.00 - 0.20 10*3/mm3    Immature Grans, Absolute 0.05 0.00 - 0.05 10*3/mm3    nRBC 0.0 0.0 - 0.2 /100 WBC   High Sensitivity Troponin T 2Hr    Specimen: Blood   Result Value Ref Range    HS Troponin T 93 (C) <14 ng/L    Troponin T Delta 7 (C) >=-4 - <+4 ng/L   ECG 12 Lead Dyspnea   Result Value Ref Range    QT Interval 478 ms    QTC Interval 551 ms       If labs were ordered, I independently reviewed the results and considered them in treating the patient.    RADIOLOGY  XR Chest 1 View   Final Result   Impression:   Left lower lobe airspace opacity is suspicious for pneumonia, with questionable small left pleural effusion. Findings superimposed upon chronic/senescent changes of the lungs.         Electronically Signed: Michael Archuleta MD     12/10/2023 4:05 PM EST     Workstation ID: [id number]        [x] Radiologist's Report Reviewed:  I ordered and independently interpreted the above noted radiographic studies.  See radiologist's dictation for official interpretation.      PROCEDURES    Procedures    ECG 12 Lead Dyspnea   Preliminary Result   Test Reason : Dyspnea   Blood Pressure :   */*   mmHG   Vent. Rate :  80 BPM     Atrial Rate : 267 BPM      P-R Int :   * ms          QRS Dur : 144 ms       QT Int : 478 ms       P-R-T Axes :   * -61 136 degrees      QTc Int : 551 ms      Atrial fibrillation with premature ventricular or aberrantly conducted    complexes   Left axis deviation   Right bundle branch block   Inferior infarct , age undetermined   Anterior infarct , age undetermined   T wave abnormality, consider lateral ischemia   Abnormal ECG   When compared with ECG of 13-SEP-2023 13:23,   Atrial fibrillation has  replaced Electronic ventricular pacemaker      Referred By: ED MD           Confirmed By:           MEDICATIONS GIVEN IN ER    Medications   sodium chloride 0.9 % flush 10 mL (has no administration in time range)   azithromycin (ZITHROMAX) 500 mg in sodium chloride 0.9 % 250 mL IVPB-VTB (has no administration in time range)   dexAMETHasone (DECADRON) injection 6 mg (6 mg Intravenous Given 12/10/23 1646)   cefTRIAXone (ROCEPHIN) 1,000 mg in sodium chloride 0.9 % 100 mL IVPB (1,000 mg Intravenous New Bag 12/10/23 1849)       MEDICAL DECISION MAKING, PROGRESS, and CONSULTS   Medical Decision Making  Syeda Gomez is a 89 y.o. female who presents to the ED c/o covid postitive. patient advises that she took an at home COVID-positive test.  She presents today for complaints of shortness of breath.  She reports that she feels like she is smothering.  Patient's had a cough and congestion.  She reports nausea and vomiting.  Patient had to be admitted the last time she had COVID.  She has a history of CHF, A-fib, hypertension.    Problems Addressed:  COVID-19: acute illness or injury  Pneumonia of left lower lobe due to infectious organism: acute illness or injury    Amount and/or Complexity of Data Reviewed  Labs: ordered. Decision-making details documented in ED Course.  Radiology: ordered. Decision-making details documented in ED Course.  ECG/medicine tests: ordered. Decision-making details documented in ED Course.    Risk  Prescription drug management.  Decision regarding hospitalization.        All labs have been independently reviewed by me.  All radiology studies have been interpreted by me and the radiologist dictating the report.  All EKG's have been independently interpreted by me as well as and overseeing attending physician.    [] Discussed with radiology regarding test interpretation:    Discussion below represents my analysis of pertinent findings related to patient's condition, differential diagnosis, treatment  plan and final disposition.    Differential diagnosis:  The differential diagnosis associated with the patient's presentation includes: COVID, pneumonia, influenza, CHF    Additional sources  Discussed/ obtained information from independent historians:   [] Spouse  [] Parent  [x] Family member  [] Friend  [] EMS   [] Other:  External (non-ED) record review:   [x] Inpatient record:   [] Office record:   [x] Outpatient record:   [x] Prior Outpatient labs:   [x] Prior Outpatient radiology:   [] Primary Care record:   [] Outside ED record:   [] Other:   Patient's care impacted by:   [] Diabetes  [] Hypertension  [] Hyperlipidemia  [] Hypothyroidism   [x] Coronary Artery Disease   [] COPD   [] Cancer   [] Obesity  [] GERD   [] Tobacco Abuse   [] Substance Abuse    [] Anxiety   [] Depression   [x] Other: CHF  Care significantly affected by Social Determinants of Health (housing and economic circumstances, unemployment)    [] Yes     [x] No   If yes, Patient's care significantly limited by  Social Determinants of Health including:   [] Inadequate housing   [] Low income   [] Alcoholism and drug addiction in family   [] Problems related to primary support group   [] Unemployment   [] Problems related to employment   [] Other Social Determinants of Health:     Shared decision making: Shared decision making with patient and family patient is COVID-positive.  Imaging also reveals a left lower lobe pneumonia.  We will admit the patient to the hospital.  Patient will be started on Rocephin and Zithromax.   Dr. Bernard was contacted.  She agrees to admit the patient.    Orders placed during this visit:  Orders Placed This Encounter   Procedures    Respiratory Panel PCR w/COVID-19(SARS-CoV-2) VINICIO/RAGINI/YVETTE/PAD/COR/ASHKAN In-House, NP Swab in UTM/VTM, 2 HR TAT - Swab, Nasopharynx    Blood Culture - Blood,    Blood Culture - Blood,    XR Chest 1 View    Comprehensive Metabolic Panel    Procalcitonin    Lactate Dehydrogenase    D-dimer,  Quantitative    Protime-INR    aPTT    Lactic Acid, Plasma    C-reactive Protein    Ferritin    Single High Sensitivity Troponin T    BNP    CBC Auto Differential    High Sensitivity Troponin T 2Hr    ECG 12 Lead Dyspnea    Insert Peripheral IV    Inpatient Admission    ED Bed Request    CBC & Differential       I considered prescription management  with:   [] Pain medication  [] Antiviral  [] Antibiotic   [] Other:   Rationale:  Additional orders considered but not ordered:  The following testing was considered but ultimately not selected after discussion with patient/family:    ED Course:    ED Course as of 12/10/23 1921   [place] Dec 10, 2023   1645 HS Troponin T(!!): 86 [KG]   1645 LDH(!): 315 [KG]   1645 proBNP(!): 11,750.0 [KG]   1645 Ferritin(!): 326.90 [KG]   1645 C-Reactive Protein(!): 1.24 [KG]   1645 Glucose(!): 268 [KG]   1645 Creatinine(!): 1.25 [KG]   1645 Chest x-ray reviewedReveals a left lower lobe pneumonia [KG]   1900 Dr. Hicks contacted agrees to admit the patient. [KG]      ED Course User Index  [KG] Baylee Will APRN            DIAGNOSIS  Final diagnoses:   COVID-19   Pneumonia of left lower lobe due to infectious organism       DISPOSITION    ED Disposition       ED Disposition   Decision to Admit    Condition   --    Comment   Level of Care: Telemetry [5]   Diagnosis: COVID-19 [[id number]]   Certification: I Certify That Inpatient Hospital Services Are Medically Necessary For Greater Than 2 Midnights                 Please note that portions of this document were completed with voice recognition software.       Baylee Will APRN  12/10/23 1921

## 2023-12-11 NOTE — PLAN OF CARE
Goal Outcome Evaluation:  Plan of Care Reviewed With: patient        Progress: no change (PT IE)  Outcome Evaluation: PT eval complete. Pt presents with generalized weakness, decreased functional activity tolerance, and balance deficits warranting skilled IPPT services. Pt required Naun for functional mobility with dyspnea on exertion noted but SpO2 remained >90% on RA. PT rec SNF at d/c but will monitor progress closely.      Anticipated Discharge Disposition (PT): skilled nursing facility

## 2023-12-11 NOTE — PLAN OF CARE
Goal Outcome Evaluation:              Outcome Evaluation: VSS. Remains in RA. No complains of pain and discomfort. No acute events during the shift.

## 2023-12-11 NOTE — NURSING NOTE
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    Both wounds with to be expected progression for 12 days post op. [place] healthy wound beds, some sloughing and mild erythema jose de jesus-incisional on right arm. Wounds were open to air. Placed multilayer xeroform and new silicone foam dressing to both. Recommend continuing daily. Family states they will follow up with dermatology upon discharge and will continue wound care at home per dermatologist's wishes.    Will sign off.

## 2023-12-11 NOTE — PROGRESS NOTES
"                    Clinical Nutrition     Patient Name: Syeda Gomez  YOB: 1927  MRN: [id number]  Date of Encounter: 12/11/23 12:10 EST  Admission date: 12/10/2023    Comments:       Reason for Visit   Identified at risk by screening criteria, Chewing / Swallowing    Medical conditions   Admission Diagnosis:  COVID-19 [U07.1]    Problem List:    COVID-19    Anthropometric      Flowsheet Rows      Flowsheet Row First Filed Value   Admission Height 160 cm (63\") Documented at 12/10/2023 1457   Admission Weight 63 kg (139 lb) Documented at 12/10/2023 1457     Height: 160 cm (63\")  Last Filed Weight: Weight: 63 kg (139 lb) (12/10/23 1457)  Weight Method: Stated  BMI: BMI (Calculated): 24.6  BMI classification: Normal: 18.5-24.9kg/m2   IBW:  115lb    UBW:     Weight change:   stable x 1 year per EMR     Nutrition Focused Physical Exam     Date:    12/11    Unable to perform exam due to: COVID Isolation        Reported/Observed/Food/Nutrition Related - Comments   12/11  Patient triggered for nutrition assessment for c/s difficulty. Patient in COVID isolation. Able to reach patient over the phone, however reported being hard of hearing and not being able to hear questions.  Contacted patient daughter who reports patient prefers soft diet, she typically over cooks food for the patient and cuts meats in really small pieces.  Daughter feels patient might do better with [facility] soft diet during admission.  She typically drinks ~ 1 Glucerna a day at home.      Current Nutrition Prescription   Diet: Cardiac Diets; Healthy Heart (2-3 Na+); Texture: Regular Texture (IDDSI 7); Fluid Consistency: Thin (IDDSI 0)  Orders Placed This Encounter      DIET MESSAGE Please bring raisin bran cereal, milk, soft fruit to chew. Coffee with splenda.    Average Intake from Charting: Insufficient data     Nutrition Diagnosis   Date: 12/11 Updated:     Problem Biting/chewing difficulty   Etiology Advance age   Signs/Symptoms Reported per " family    Status:      Actions     Follow treatment progress, Care plan reviewed, Supplement provided    Change diet to [facility] soft   Boost G.C. (chocolate) daily with breakfast     Monitor Per Protocol      Dedra Prince RD,   Time Spent: 15min

## 2023-12-11 NOTE — PROGRESS NOTES
[facility] Medicine Services  PROGRESS NOTE    Patient Name: Syeda Gomez  : 1927  MRN: [id number]    Date of Admission: 12/10/2023  Primary Care Physician: Omayra Alcazar DO    Subjective   Subjective     CC:  Follow-up for COVID-19 infection    HPI:  Patient seen and examined this morning.  Comfortable in bed.  Complaining of productive cough.  Minimal shortness of breath.  Ox saturation 96% room air      Objective   Objective     Vital Signs:   Temp:  [97 °F (36.1 °C)-98.6 °F (37 °C)] 98.3 °F (36.8 °C)  Heart Rate:  [62-90] 73  Resp:  [16-18] 16  BP: ()/(47-75) 100/49     Physical Exam:  General: Comfortable, not in distress, conversant and cooperative  Head: Atraumatic and normocephalic  Eyes: No Icterus. No pallor  Ears:  Ears appear intact with no abnormalities noted  Throat: No oral lesions, no thrush  Neck: Supple, trachea midline  Lungs: Clear to auscultation bilaterally, equal air entry, no wheezing or crackles  Heart:  Normal S1 and S2, no murmur, no gallop, No JVD, no lower extremity swelling  Abdomen:  Soft, no tenderness, no organomegaly, normal bowel sounds, no organomegaly  Extremities: pulses equal bilaterally  Skin: No bleeding, bruising or rash, normal skin turgor and elasticity  Neurologic: Cranial nerves appear intact with no evidence of facial asymmetry, normal motor and sensory functions in all 4 extremities  Psych: Alert and oriented x 3, normal mood    Results Reviewed:  LAB RESULTS:      Lab 12/10/23  1559   WBC 7.04   HEMOGLOBIN 10.7*   HEMATOCRIT 33.1*   PLATELETS 130*   NEUTROS ABS 6.20   IMMATURE GRANS (ABS) 0.05   LYMPHS ABS 0.33*   MONOS ABS 0.43   EOS ABS 0.00   MCV 89.2   CRP 1.24*   PROCALCITONIN 0.10   LACTATE 1.5   *   PROTIME 17.9*   APTT 28.4   D DIMER QUANT 0.90         Lab 12/10/23  1559   SODIUM 138   POTASSIUM 3.6   CHLORIDE 98   CO2 28.0   ANION GAP 12.0   BUN 21   CREATININE 1.25*   EGFR 39.5*   GLUCOSE 268*    CALCIUM 8.5         Lab 12/10/23  1559   TOTAL PROTEIN 5.9*   ALBUMIN 3.9   GLOBULIN 2.0   ALT (SGPT) 43*   AST (SGOT) 61*   BILIRUBIN 0.9   ALK PHOS 145*         Lab 12/10/23  1837 12/10/23  1559   PROBNP  --  11,750.0*   HSTROP T 93* 86*   PROTIME  --  17.9*   INR  --  1.46*             Lab 12/10/23  1559   FERRITIN 326.90*         Brief Urine Lab Results  (Last result in the past 365 days)        Color   Clarity   Blood   Leuk Est   Nitrite   Protein   CREAT   Urine HCG        09/13/23 1321 Yellow   Clear   Negative   Negative   Negative   Trace                   Microbiology Results Abnormal       None            XR Chest 1 View    Result Date: 12/10/2023  XR CHEST 1 VW Date of Exam: 12/10/2023 3:34 PM EST Indication: covid, SOA Comparison: Chest radiograph 9/13/2023. Findings: Right chest pacemaker. Left chest port tip overlies the SVC, unchanged. Unchanged enlarged cardiac silhouette. Left lower lobe airspace disease. Findings superimposed upon chronic/senescent changes of the lungs. There may be a small left pleural effusion. No right pleural effusion. No pneumothorax. Osseous structures are unchanged.     Impression: Impression: Left lower lobe airspace opacity is suspicious for pneumonia, with questionable small left pleural effusion. Findings superimposed upon chronic/senescent changes of the lungs. Electronically Signed: Michael Archuleta MD  12/10/2023 4:05 PM EST  Workstation ID: [id number]     Results for orders placed during the hospital encounter of 03/10/22    Adult Transthoracic Echo Complete w/ Color, Spectral and Contrast if necessary per protocol    Interpretation Summary  · Left ventricular ejection fraction appears to be 51 - 55%. Left ventricular systolic function is normal.  · Left ventricular wall thickness is consistent with severe concentric hypertrophy.  · Mildly reduced right ventricular systolic function noted.  · The right ventricular cavity is borderline dilated.  · The right atrial  cavity is moderately dilated.  · There is mild calcification of the aortic valve.  · Mild aortic valve regurgitation is present.  · Moderate to severe tricuspid valve regurgitation is present.  · Estimated right ventricular systolic pressure from tricuspid regurgitation is moderately elevated (45-55 mmHg).      Current medications:  Scheduled Meds:Pharmacy Consult, , Does not apply, Q12H  albuterol sulfate HFA, 2 puff, Inhalation, Q6H - RT  aspirin, 81 mg, Oral, Daily  carvedilol, 12.5 mg, Oral, BID With Meals  dexAMETHasone, 6 mg, Oral, Daily   Or  dexAMETHasone, 6 mg, Intravenous, Daily  heparin (porcine), 5,000 Units, Subcutaneous, Q12H  isosorbide mononitrate, 60 mg, Oral, Daily  levothyroxine, 50 mcg, Oral, Q AM  Nirmatrelvir&Ritonavir 150/100, 2 tablet, Oral, BID  pantoprazole, 40 mg, Oral, Nightly  pravastatin, 20 mg, Oral, Nightly  sacubitril-valsartan, 1 tablet, Oral, BID  torsemide, 20 mg, Oral, BID      Continuous Infusions:   PRN Meds:.  LORazepam    melatonin    nitroglycerin    [COMPLETED] Insert Peripheral IV **AND** sodium chloride    Assessment & Plan   Assessment & Plan     Active Hospital Problems    Diagnosis  POA    **COVID-19 [U07.1]  Yes      Resolved Hospital Problems   No resolved problems to display.        Brief Hospital Course to date:  Syeda Gomez is a 89 y.o. female with past medical history of diastolic heart failure, atrial fibrillation, essential hypertension on CKD stage III who presented to the hospital with weakness and worsening shortness of breath.  Tested positive for COVID-19    COVID19 infection    Tested positive on 12/10   Currently satting 96% on room air  Continue Paxlovid and  nebs   Guaifenesin and incentive spirometry     HFpEF, EF 55   CXR with mild volume overload.  Appears euvolemic clinically  Continue p.o. torsemide       CKD stage III  Creat around baseline of 1.2   Monitor BMP       Debility  PT and OT consultation        Expected Discharge Location and  Transportation: Home  Expected Discharge   Expected Discharge Date: 12/13/2023; Expected Discharge Time:      DVT prophylaxis:  Medical DVT prophylaxis orders are present.          CODE STATUS:   Code Status and Medical Interventions:   Ordered at: 12/10/23 1941     Medical Intervention Limits:    NO intubation (DNI)     Level Of Support Discussed With:    Patient     Code Status (Patient has no pulse and is not breathing):    No CPR (Do Not Attempt to Resuscitate)     Medical Interventions (Patient has pulse or is breathing):    Limited Support     Copied text in this note has been reviewed and is accurate as of 12/11/23.     Adwoa Vaughan MD  12/11/23

## 2023-12-11 NOTE — CASE MANAGEMENT/SOCIAL WORK
Discharge Planning Assessment  [facility]     Patient Name: Syeda Gomez  MRN: [id number]  Today's Date: 12/11/2023    Admit Date: 12/10/2023    Plan: IDP   Discharge Needs Assessment       Row Name 12/11/23 1440       Living Environment    People in Home child(fernando), adult    Name(s) of People in Home Betty Sarabia    Current Living Arrangements home    Potentially Unsafe Housing Conditions none    Primary Care Provided by self    Provides Primary Care For no one    Family Caregiver if Needed child(fernando), adult    Quality of Family Relationships helpful;involved;supportive    Able to Return to Prior Arrangements yes       Resource/Environmental Concerns    Resource/Environmental Concerns none    Transportation Concerns none       Transition Planning    Patient/Family Anticipates Transition to home with family    Patient/Family Anticipated Services at Transition     Transportation Anticipated family or friend will provide;health plan transportation       Discharge Needs Assessment    Readmission Within the Last 30 Days no previous admission in last 30 days    Equipment Currently Used at Home wheelchair;rollator;cane, straight;oxygen    Concerns to be Addressed discharge planning    Anticipated Changes Related to Illness none    Equipment Needed After Discharge other (see comments)  TBD                   Discharge Plan       Row Name 12/11/23 1442       Plan    Plan IDP    Patient/Family in Agreement with Plan yes    Plan Comments Spoke to daughterBetty to initiate discharge planning. Patient lives at home and daughter stays with her in her home in [facility]. Prior to admission, she needed assist with ADL's. She uses a rollator & straight cane for assist. She uses a wheelchair for going out & appointments. She has home oxygen PRN through [employer]. Daughter states she hasn't used in a long time. She is not current with home health. Her PCP is Omayra Alcazar. She has drug coverage and verified  Medicare A&B. Her goal is home. Therapy recommends SNF. CM will discuss therapy's recommendations with Ms. Gomez. CM will continue to follow.    Final Discharge Disposition Code 30 - still a patient                  Continued Care and Services - Admitted Since 12/10/2023    Coordination has not been started for this encounter.       Expected Discharge Date and Time       Expected Discharge Date Expected Discharge Time    Dec 13, 2023            Demographic Summary       Row Name 12/11/23 1439       General Information    Admission Type inpatient    Arrived From emergency department    Referral Source admission list    Reason for Consult discharge planning    Preferred Language English                   Functional Status       Row Name 12/11/23 1439       Functional Status    Usual Activity Tolerance fair    Current Activity Tolerance fair       Functional Status, IADL    Medications assistive person    Meal Preparation assistive person    Housekeeping assistive person    Laundry assistive person    Shopping assistive person                   Psychosocial    No documentation.                  Abuse/Neglect    No documentation.                  Legal    No documentation.                  Substance Abuse    No documentation.                  Patient Forms    No documentation.                     Christine Epstein RN

## 2023-12-11 NOTE — THERAPY EVALUATION
Patient Name: Syeda Gomez  : 1927    MRN: [id number]                              Today's Date: 2023       Admit Date: 12/10/2023    Visit Dx:     ICD-10-CM ICD-9-CM   1. COVID-19  U07.1 079.89   2. Pneumonia of left lower lobe due to infectious organism  J18.9 486     Patient Active Problem List   Diagnosis    Pneumonia due to COVID-19 virus    Pancytopenia    Presence of cardiac pacemaker    Atrial fibrillation    Prolonged QTc interval on ECG    Hypothyroidism (acquired)    HTN (hypertension)    HLD (hyperlipidemia)    Elective replacement indicated for pacemaker    CHF (congestive heart failure)    Anemia    Recurrent right pleural effusion    COVID-19     Past Medical History:   Diagnosis Date    A-fib     Arthritis     Cancer     - left breast    Carpal tunnel syndrome     CHF (congestive heart failure)     Detached retina     right side    Disease of thyroid gland     Dizzy     Full dentures     GERD (gastroesophageal reflux disease)     Hearing aid worn     bilat    Heart murmur     History of transfusion     x4 just this year- never had reaction    [place] (hard of hearing)     bilat hearinhg aids    Hypertension     Infection     right knee- had infection 15 to 20 years ago- unsure if mrsa or not    Pleural effusion     Rheumatic fever     SOBOE (shortness of breath on exertion)     UTI (urinary tract infection)     Wears glasses      Past Surgical History:   Procedure Laterality Date    APPENDECTOMY      CARDIAC ELECTROPHYSIOLOGY PROCEDURE N/A 2021    Procedure: DEVICE IMPLANT;  Surgeon: Calvin Ventura MD;  Location: [facility] INVASIVE LOCATION;  Service: Cardiovascular;  Laterality: N/A;    CARPAL TUNNEL RELEASE Bilateral     CATARACT EXTRACTION, BILATERAL      CHOLECYSTECTOMY      COLONOSCOPY      HYSTERECTOMY      MASTECTOMY Left     OTHER SURGICAL HISTORY      x2 fluid removed from right side    PLEURAL CATHETER INSERTION Right 2022    Procedure: PLEURX CATHETER  INSERTION;  Surgeon: Fidel Carter MD;  Location: [facility];  Service: Cardiothoracic;  Laterality: Right;    PORTACATH PLACEMENT Left     REPLACEMENT TOTAL KNEE BILATERAL      RETINAL DETACHMENT SURGERY Right     buckle in place    SHOULDER ROTATOR CUFF REPAIR Right     TOTAL HIP ARTHROPLASTY Right     TRIGGER FINGER RELEASE Right     thinks right side      General Information       Row Name 12/11/23 0910          Physical Therapy Time and Intention    Document Type evaluation  -ES     Mode of Treatment physical therapy  -ES       Row Name 12/11/23 0910          General Information    Patient Profile Reviewed yes  -ES     Prior Level of Function independent:;all household mobility;transfer;bed mobility;ADL's;dependent:;home management  Uses SPC & FWW for mobility at home. w/c for community mobility. dtr & grandson assist as needed  -ES     Existing Precautions/Restrictions fall;other (see comments)  [place]  -ES     Barriers to Rehab medically complex;previous functional deficit;hearing deficit  -ES       Row Name 12/11/23 0910          Living Environment    People in Home child(fernando), adult;other (see comments)  grandson assists as needed  -ES       Row Name 12/11/23 0910          Home Main Entrance    Number of Stairs, Main Entrance none  -ES       Row Name 12/11/23 0910          Stairs Within Home, Primary    Number of Stairs, Within Home, Primary other (see comments)  5+5  -ES     Stair Railings, Within Home, Primary railing on right side (ascending)  -ES       Row Name 12/11/23 0910          Cognition    Orientation Status (Cognition) oriented x 3  -ES       Row Name 12/11/23 0910          Safety Issues, Functional Mobility    Safety Issues Affecting Function (Mobility) awareness of need for assistance;insight into deficits/self-awareness;problem-solving;safety precaution awareness;safety precautions follow-through/compliance;sequencing abilities  -ES     Impairments Affecting Function (Mobility)  balance;endurance/activity tolerance;shortness of breath;strength  -ES               User Key  (r) = Recorded By, (t) = Taken By, (c) = Cosigned By      Initials Name Provider Type    ES Kalie Padron PT Physical Therapist                   Mobility       Row Name 12/11/23 0914          Bed Mobility    Bed Mobility supine-sit  -ES     Supine-Sit [place] (Bed Mobility) minimum assist (75% patient effort);verbal cues  -ES     Assistive Device (Bed Mobility) bed rails;head of bed elevated  -ES     Comment, (Bed Mobility) increased time/effort required  -ES       Row Name 12/11/23 0914          Bed-Chair Transfer    Bed-Chair [place] (Transfers) minimum assist (75% patient effort);verbal cues  -ES     Assistive Device (Bed-Chair Transfers) other (see comments)  UE support  -ES     Comment, (Bed-Chair Transfer) v/c for sequencing, demo'd forward flexed posture throughout t/f  -ES       Row Name 12/11/23 0914          Sit-Stand Transfer    Sit-Stand [place] (Transfers) minimum assist (75% patient effort);verbal cues  -ES     Assistive Device (Sit-Stand Transfers) other (see comments)  UE support  -ES     Comment, (Sit-Stand Transfer) Demo'd forward flexed posture  -ES       Row Name 12/11/23 0914          Gait/Stairs (Locomotion)    [place] Level (Gait) unable to assess  -ES     Comment, (Gait/Stairs) Pt declined further mobility  -ES               User Key  (r) = Recorded By, (t) = Taken By, (c) = Cosigned By      Initials Name Provider Type    ES Kalie Padron PT Physical Therapist                   Obj/Interventions       Row Name 12/11/23 0915          Range of Motion Comprehensive    General Range of Motion bilateral lower extremity ROM WFL  -ES       Row Name 12/11/23 0915          Strength Comprehensive (MMT)    General Manual Muscle Testing (MMT) Assessment lower extremity strength deficits identified  -ES     Comment, General Manual Muscle Testing (MMT) Assessment BLE grossly 4/5  -ES        Row Name 12/11/23 0915          Balance    Balance Assessment sitting static balance;sitting dynamic balance;sit to stand dynamic balance;standing static balance;standing dynamic balance  -ES     Static Sitting Balance contact guard  -ES     Dynamic Sitting Balance contact guard;verbal cues  -ES     Position, Sitting Balance unsupported;sitting in chair;sitting edge of bed  -ES     Sit to Stand Dynamic Balance minimal assist;verbal cues  -ES     Static Standing Balance minimal assist  -ES     Dynamic Standing Balance minimal assist;verbal cues  -ES     Position/Device Used, Standing Balance supported;other (see comments)  UE support  -ES     Balance Interventions sitting;standing;sit to stand;supported;static;dynamic;occupation based/functional task  -ES     Comment, Balance no LOB  -ES       Row Name 12/11/23 0915          Sensory Assessment (Somatosensory)    Sensory Assessment (Somatosensory) LE sensation intact  -ES               User Key  (r) = Recorded By, (t) = Taken By, (c) = Cosigned By      Initials Name Provider Type    ES Kalie Padron, PT Physical Therapist                   Goals/Plan       Row Name 12/11/23 0919          Bed Mobility Goal 1 (PT)    Activity/Assistive Device (Bed Mobility Goal 1, PT) sit to supine/supine to sit  -ES     [place] Level/Cues Needed (Bed Mobility Goal 1, PT) standby assist  -ES     Time Frame (Bed Mobility Goal 1, PT) long term goal (LTG);10 days  -ES       Row Name 12/11/23 0919          Transfer Goal 1 (PT)    Activity/Assistive Device (Transfer Goal 1, PT) sit-to-stand/stand-to-sit;bed-to-chair/chair-to-bed;cane, straight  -ES     [place] Level/Cues Needed (Transfer Goal 1, PT) standby assist  -ES     Time Frame (Transfer Goal 1, PT) 10 days;long term goal (LTG)  -ES       Row Name 12/11/23 0919          Gait Training Goal 1 (PT)    Activity/Assistive Device (Gait Training Goal 1, PT) gait (walking locomotion);assistive device use;decrease fall  risk;increase endurance/gait distance;walker, rolling;cane, straight  -ES     [place] Level (Gait Training Goal 1, PT) standby assist  -ES     Distance (Gait Training Goal 1, PT) 50  -ES     Time Frame (Gait Training Goal 1, PT) long term goal (LTG);10 days  -ES       Row Name 12/11/23 0919          Stairs Goal 1 (PT)    Activity/Assistive Device (Stairs Goal 1, PT) ascending stairs;descending stairs;using handrail, left;using handrail, right  -ES     [place] Level/Cues Needed (Stairs Goal 1, PT) standby assist  -ES     Number of Stairs (Stairs Goal 1, PT) 5  -ES     Time Frame (Stairs Goal 1, PT) long term goal (LTG);by discharge  -ES       Row Name 12/11/23 0919          Therapy Assessment/Plan (PT)    Planned Therapy Interventions (PT) balance training;bed mobility training;gait training;neuromuscular re-education;patient/family education;strengthening;stair training;transfer training;postural re-education  -ES               User Key  (r) = Recorded By, (t) = Taken By, (c) = Cosigned By      Initials Name Provider Type    ES Kalie Padron, PT Physical Therapist                   Clinical Impression       Row Name 12/11/23 0917          Pain    Pretreatment Pain Rating 0/10 - no pain  -ES     Posttreatment Pain Rating 0/10 - no pain  -ES     Pre/Posttreatment Pain Comment tolerated  -ES     Pain Intervention(s) Repositioned;Ambulation/increased activity  -ES       Row Name 12/11/23 0917          Plan of Care Review    Plan of Care Reviewed With patient  -ES     Progress no change  PT IE  -ES     Outcome Evaluation PT eval complete. Pt presents with generalized weakness, decreased functional activity tolerance, and balance deficits warranting skilled IPPT services. Pt required Naun for functional mobility with dyspnea on exertion noted but SpO2 remained >90% on RA. PT rec SNF at d/c but will monitor progress closely.  -ES       Row Name 12/11/23 0917          Therapy Assessment/Plan (PT)    Rehab  Potential (PT) good, to achieve stated therapy goals  -ES     Criteria for Skilled Interventions Met (PT) yes;meets criteria;skilled treatment is necessary  -ES     Therapy Frequency (PT) daily  -ES       Row Name 12/11/23 0917          Vital Signs    Pre Systolic BP Rehab 98  -ES     Pre Treatment Diastolic BP 59  -ES     Post Systolic BP Rehab 111  -ES     Post Treatment Diastolic BP 57  -ES     Pretreatment Heart Rate (beats/min) 75  -ES     Posttreatment Heart Rate (beats/min) 75  -ES     Pre SpO2 (%) 96  -ES     O2 Delivery Pre Treatment room air  -ES     O2 Delivery Intra Treatment room air  -ES     Post SpO2 (%) 96  -ES     O2 Delivery Post Treatment room air  -ES     Pre Patient Position Supine  -ES     Intra Patient Position Standing  -ES     Post Patient Position Sitting  -ES       Row Name 12/11/23 0917          Positioning and Restraints    Pre-Treatment Position in bed  -ES     Post Treatment Position chair  -ES     In Chair notified nsg;reclined;sitting;call light within reach;encouraged to call for assist;exit alarm on;waffle cushion;legs elevated;heels elevated  -ES               User Key  (r) = Recorded By, (t) = Taken By, (c) = Cosigned By      Initials Name Provider Type    ES Kalie Padron, PT Physical Therapist                   Outcome Measures       Row Name 12/11/23 0920          How much help from another person do you currently need...    Turning from your back to your side while in flat bed without using bedrails? 3  -ES     Moving from lying on back to sitting on the side of a flat bed without bedrails? 3  -ES     Moving to and from a bed to a chair (including a wheelchair)? 3  -ES     Standing up from a chair using your arms (e.g., wheelchair, bedside chair)? 3  -ES     Climbing 3-5 steps with a railing? 2  -ES     To walk in hospital room? 2  -ES     AM-PAC 6 Clicks Score (PT) 16  -ES     Highest Level of Mobility Goal 5 --> Static standing  -ES       Row Name 12/11/23 0920           Functional Assessment    Outcome Measure Options AM-PAC 6 Clicks Basic Mobility (PT)  -ES               User Key  (r) = Recorded By, (t) = Taken By, (c) = Cosigned By      Initials Name Provider Type    Kalie Castellanos PT Physical Therapist                                 Physical Therapy Education       Title: PT OT SLP Therapies (In Progress)       Topic: Physical Therapy (In Progress)       Point: Mobility training (In Progress)       Learning Progress Summary             Patient Acceptance, E,TB, NR by ES at 12/11/2023 0920                         Point: Home exercise program (Not Started)       Learner Progress:  Not documented in this visit.              Point: Body mechanics (In Progress)       Learning Progress Summary             Patient Acceptance, E,TB, NR by ES at 12/11/2023 0920                         Point: Precautions (In Progress)       Learning Progress Summary             Patient Acceptance, E,TB, NR by ES at 12/11/2023 0920                                         User Key       Initials Effective Dates Name Provider Type Discipline     08/11/22 -  Kalie Padron PT Physical Therapist PT                  PT Recommendation and Plan  Planned Therapy Interventions (PT): balance training, bed mobility training, gait training, neuromuscular re-education, patient/family education, strengthening, stair training, transfer training, postural re-education  Plan of Care Reviewed With: patient  Progress: no change (PT IE)  Outcome Evaluation: PT eval complete. Pt presents with generalized weakness, decreased functional activity tolerance, and balance deficits warranting skilled IPPT services. Pt required Naun for functional mobility with dyspnea on exertion noted but SpO2 remained >90% on RA. PT rec SNF at d/c but will monitor progress closely.     Time Calculation:   PT Evaluation Complexity  History, PT Evaluation Complexity: 1-2 personal factors and/or comorbidities  Examination of Body Systems  (PT Eval Complexity): total of 3 or more elements  Clinical Presentation (PT Evaluation Complexity): stable  Clinical Decision Making (PT Evaluation Complexity): low complexity  Overall Complexity (PT Evaluation Complexity): low complexity     PT Charges       Row Name 12/11/23 0921             Time Calculation    Start Time 0836  -ES      PT Received On 12/11/23  -ES      PT Goal Re-Cert Due Date 12/21/23  -ES         Time Calculation- PT    Total Timed Code Minutes- PT 9 minute(s)  -ES         Timed Charges    [id number] - PT Therapeutic Activity Minutes 9  -ES         Untimed Charges    PT Eval/Re-eval Minutes 50  -ES         Total Minutes    Timed Charges Total Minutes 9  -ES      Untimed Charges Total Minutes 50  -ES       Total Minutes 59  -ES                User Key  (r) = Recorded By, (t) = Taken By, (c) = Cosigned By      Initials Name Provider Type    ES Kalie Padron, PT Physical Therapist                  Therapy Charges for Today       Code Description Service Date Service Provider Modifiers Qty    65681089985 HC PT THERAPEUTIC ACT EA 15 MIN 12/11/2023 Kalie Padron, PT GP 1    46416584090 HC PT EVAL LOW COMPLEXITY 4 12/11/2023 Kalie Padron, PT GP 1            PT G-Codes  Outcome Measure Options: AM-PAC 6 Clicks Basic Mobility (PT)  AM-PAC 6 Clicks Score (PT): 16  PT Discharge Summary  Anticipated Discharge Disposition (PT): skilled nursing facility    Kalie Padron PT  12/11/2023

## 2023-12-11 NOTE — PLAN OF CARE
Goal Outcome Evaluation:  Plan of Care Reviewed With: patient        Progress: no change  Outcome Evaluation: VSS. RA sats above 90%. A-paced on monitor. No compliants. Pt denies pain. Will continue plan of care.          needs device/needed assist

## 2023-12-11 NOTE — H&P
[facility]  HISTORY AND PHYSICAL    Patient Name: Syeda Gomez  : 1927  MRN: [id number]  Primary Care Physician: Omayra Alcazar DO  Date of admission: 12/10/2023      Subjective   Subjective     Chief Complaint:  Nausea vomiting, dyspnea     HPI:  Syeda Gomez is a 89 y.o. female who lives at home with help from her daughter, has Afib, CHF, HTN.  She walks short distances in the house and is chronically dyspneic w activity though not on home oxygen.  Recently she has had nausea, dyspnea and no appetite; took a COVID test at home which was positive.     She has not eaten all day.  She is not currently dyspneic and denies coughing.  She is not sure about fevers.      Recently her BLE swelling (L>R chronically) is worse than baseline, and distal LLE has been weeping fluid.        Personal History     Past Medical History:   Diagnosis Date   • A-fib    • Arthritis    • Cancer     - left breast   • Carpal tunnel syndrome    • CHF (congestive heart failure)    • Detached retina     right side   • Disease of thyroid gland    • Dizzy    • Full dentures    • GERD (gastroesophageal reflux disease)    • Hearing aid worn     bilat   • Heart murmur    • History of transfusion     x4 just this year- never had reaction   • [place] (hard of hearing)     bilat hearinhg aids   • Hypertension    • Infection     right knee- had infection 15 to 20 years ago- unsure if mrsa or not   • Pleural effusion    • Rheumatic fever    • SOBOE (shortness of breath on exertion)    • UTI (urinary tract infection)    • Wears glasses              Past Surgical History:   Procedure Laterality Date   • APPENDECTOMY     • CARDIAC ELECTROPHYSIOLOGY PROCEDURE N/A 2021    Procedure: DEVICE IMPLANT;  Surgeon: Calvin Ventura MD;  Location: [facility] INVASIVE LOCATION;  Service: Cardiovascular;  Laterality: N/A;   • CARPAL TUNNEL RELEASE Bilateral    • CATARACT EXTRACTION, BILATERAL    "  • CHOLECYSTECTOMY     • COLONOSCOPY     • HYSTERECTOMY     • MASTECTOMY Left    • OTHER SURGICAL HISTORY      x2 fluid removed from right side   • PLEURAL CATHETER INSERTION Right 4/4/2022    Procedure: PLEURX CATHETER INSERTION;  Surgeon: Fidel Carter MD;  Location: [facility];  Service: Cardiothoracic;  Laterality: Right;   • PORTACATH PLACEMENT Left    • REPLACEMENT TOTAL KNEE BILATERAL     • RETINAL DETACHMENT SURGERY Right     buckle in place   • SHOULDER ROTATOR CUFF REPAIR Right    • TOTAL HIP ARTHROPLASTY Right    • TRIGGER FINGER RELEASE Right     thinks right side       Family History: family history includes Heart attack in her mother; Hypertension in her father; Stroke in her father.     Social History:  reports that she has never smoked. She has never used smokeless tobacco. She reports that she does not drink alcohol and does not use drugs.  Social History     Social History Narrative    Lives in [facility] alone. Nephew stays with patient at night.        Medications:  Available home medication information reviewed.  (Not in a hospital admission)      Allergies   Allergen Reactions   • Cardizem [Diltiazem] Other (See Comments)     \"unknown\"   • Codeine GI Intolerance   • Morphine Other (See Comments)     \"unknown\"   • Percocet [Oxycodone-Acetaminophen] Other (See Comments)     \"unknown\"     • Sulfa Antibiotics Unknown (See Comments)     Unknown         Objective   Objective     Vital Signs:   Temp:  [98.6 °F (37 °C)] 98.6 °F (37 °C)  Heart Rate:  [65-90] 75  Resp:  [18] 18  BP: ()/(54-75) 122/61       Physical Exam   Gen:  elderly woman, [place] but cognitively sharp, dtr present.    Neuro: alert and oriented, clear speech, follows commands, grossly nonfocal  HEENT:  NC/AT  Neck:  Supple, no LAD  Heart RRR   Lungs scattered wheeze, no crackle, no coughing.  On RA with sat 95.  Not labored.    Abd:  Soft, nontender, no rebound or guarding, pos BS  Extrem:  No c/c.  Bilat edema, L>R 2+  Dressing " on LLE post ankle      Result Review:  I have personally reviewed the results from the time of this admission to 12/10/2023 20:21 EST and agree with these findings:  [x]  Laboratory list / accordion  [x]  Microbiology  [x]  Radiology  []  EKG/Telemetry   []  Cardiology/Vascular   []  Pathology  []  Old records  []  Other:  Most notable findings include: CXR hazy BLL .  WBC 7.  proBNP 43179.  COVID+/          LAB RESULTS:      Lab 12/10/23  1559   WBC 7.04   HEMOGLOBIN 10.7*   HEMATOCRIT 33.1*   PLATELETS 130*   NEUTROS ABS 6.20   IMMATURE GRANS (ABS) 0.05   LYMPHS ABS 0.33*   MONOS ABS 0.43   EOS ABS 0.00   MCV 89.2   CRP 1.24*   PROCALCITONIN 0.10   LACTATE 1.5   *   PROTIME 17.9*   INR 1.46*   APTT 28.4   D DIMER QUANT 0.90         Lab 12/10/23  1559   SODIUM 138   POTASSIUM 3.6   CHLORIDE 98   CO2 28.0   ANION GAP 12.0   BUN 21   CREATININE 1.25*   EGFR 39.5*   GLUCOSE 268*   CALCIUM 8.5         Lab 12/10/23  1559   TOTAL PROTEIN 5.9*   ALBUMIN 3.9   GLOBULIN 2.0   ALT (SGPT) 43*   AST (SGOT) 61*   BILIRUBIN 0.9   ALK PHOS 145*         Lab 12/10/23  1837 12/10/23  1559   PROBNP  --  11,750.0*   HSTROP T 93* 86*             Lab 12/10/23  1559   FERRITIN 326.90*         UA          9/13/2023    13:21   Urinalysis   Specific [place], UA 1.016    Ketones, UA Negative    Blood, UA Negative    Leukocytes, UA Negative    Nitrite, UA Negative        Microbiology Results (last 10 days)       Procedure Component Value - Date/Time    Respiratory Panel PCR w/COVID-19(SARS-CoV-2) VINICIO/RAGINI/YVETTE/PAD/COR/ASHKAN In-House, NP Swab in UTM/VTM, 2 HR TAT - Swab, Nasopharynx [[id number]]  (Abnormal) Collected: 12/10/23 1601    Lab Status: Final result Specimen: Swab from Nasopharynx Updated: 12/10/23 1021     ADENOVIRUS, PCR Not Detected     Coronavirus 229E Not Detected     Coronavirus HKU1 Not Detected     Coronavirus NL63 Not Detected     Coronavirus OC43 Not Detected     COVID19 Detected     Human Metapneumovirus Not Detected      Human Rhinovirus/Enterovirus Not Detected     Influenza A PCR Not Detected     Influenza B PCR Not Detected     Parainfluenza Virus 1 Not Detected     Parainfluenza Virus 2 Not Detected     Parainfluenza Virus 3 Not Detected     Parainfluenza Virus 4 Not Detected     RSV, PCR Not Detected     Bordetella pertussis pcr Not Detected     Bordetella parapertussis PCR Not Detected     Chlamydophila pneumoniae PCR Not Detected     Mycoplasma pneumo by PCR Not Detected    Narrative:      In the setting of a positive respiratory panel with a viral infection PLUS a negative procalcitonin without other underlying concern for bacterial infection, consider observing off antibiotics or discontinuation of antibiotics and continue supportive care. If the respiratory panel is positive for atypical bacterial infection (Bordetella pertussis, Chlamydophila pneumoniae, or Mycoplasma pneumoniae), consider antibiotic de-escalation to target atypical bacterial infection.            XR Chest 1 View    Result Date: 12/10/2023  XR CHEST 1 VW Date of Exam: 12/10/2023 3:34 PM EST Indication: covid, SOA Comparison: Chest radiograph 9/13/2023. Findings: Right chest pacemaker. Left chest port tip overlies the SVC, unchanged. Unchanged enlarged cardiac silhouette. Left lower lobe airspace disease. Findings superimposed upon chronic/senescent changes of the lungs. There may be a small left pleural effusion. No right pleural effusion. No pneumothorax. Osseous structures are unchanged.     Impression: Impression: Left lower lobe airspace opacity is suspicious for pneumonia, with questionable small left pleural effusion. Findings superimposed upon chronic/senescent changes of the lungs. Electronically Signed: Michael Archuleta MD  12/10/2023 4:05 PM EST  Workstation ID: [id number]     Results for orders placed during the hospital encounter of 03/10/22    Adult Transthoracic Echo Complete w/ Color, Spectral and Contrast if necessary per  protocol    Interpretation Summary  · Left ventricular ejection fraction appears to be 51 - 55%. Left ventricular systolic function is normal.  · Left ventricular wall thickness is consistent with severe concentric hypertrophy.  · Mildly reduced right ventricular systolic function noted.  · The right ventricular cavity is borderline dilated.  · The right atrial cavity is moderately dilated.  · There is mild calcification of the aortic valve.  · Mild aortic valve regurgitation is present.  · Moderate to severe tricuspid valve regurgitation is present.  · Estimated right ventricular systolic pressure from tricuspid regurgitation is moderately elevated (45-55 mmHg).      Assessment & Plan   Assessment & Plan     Active Hospital Problems    Diagnosis  POA   • **COVID-19 [U07.1]  Yes     89 yr old woman from home, has history of CHF Afib HTN.  She has dyspnea and tested positive for COVID at home.    COVID19 infection  + on 12/10   - dyspnea, nausea, vomiting  - oxygen sat 95% on RA  - treat with decadron and Paxlovid and nebs   - got abx in ED, will monitor off these    HFpEF, EF 55   - home meds noted:  continue  - give extra dose torsemide IV in AM due to increased swelling and proBNP over baseline    - CXR equivocal for volume overload   - monitor     QT prolongation  - got azithromycin in ED as well  - defer antiemetics  - check magn in AM     CKD.  Creat 1.2, baseline    Debility  Ambulatory (short distances) at baseline  - PT     DVT prophylaxis:  lovenox      CODE STATUS:  dnr - discussed at bedside w pt/dtr.   Code Status and Medical Interventions:   Ordered at: 12/10/23 1941     Medical Intervention Limits:    NO intubation (DNI)     Level Of Support Discussed With:    Patient     Code Status (Patient has no pulse and is not breathing):    No CPR (Do Not Attempt to Resuscitate)     Medical Interventions (Patient has pulse or is breathing):    Limited Support       Expected Discharge (click hyperlink to enter  date then refresh the note)  Expected Discharge Date: 12/13/2023; Expected Discharge Time:      Margarita Hicks MD  12/10/23

## 2023-12-11 NOTE — ED NOTES
Syeda Gomez    Nursing Report ED to Floor:  Mental status:Aox4 but [place].  Ambulatory status: Do not ambulate. To SOA to walk. Pt is on purewick.  Oxygen Therapy:  Room air  Cardiac Rhythm: Afib controlled.  Admitted from: ED  Safety Concerns:  Fall precautions.  Social Issues: NA  ED Room #:  33    ED Nurse Phone Extension - [phone] or may call [phone].    Pt is COVID + and has family at bedside but they are COVID + as well. I'm not sure what policy is on the floor but it might be helpful for admitting charting. They've been pleasant and wearing mask.    HPI:   Chief Complaint   Patient presents with    Covid Positive       Past Medical History:  Past Medical History:   Diagnosis Date    A-fib     Arthritis     Cancer     1988- left breast    Carpal tunnel syndrome     CHF (congestive heart failure)     Detached retina     right side    Disease of thyroid gland     Dizzy     Full dentures     GERD (gastroesophageal reflux disease)     Hearing aid worn     bilat    Heart murmur     History of transfusion     x4 just this year- never had reaction    [place] (hard of hearing)     bilat hearinhg aids    Hypertension     Infection     right knee- had infection 15 to 20 years ago- unsure if mrsa or not    Pleural effusion     Rheumatic fever     SOBOE (shortness of breath on exertion)     UTI (urinary tract infection)     Wears glasses         Past Surgical History:  Past Surgical History:   Procedure Laterality Date    APPENDECTOMY      CARDIAC ELECTROPHYSIOLOGY PROCEDURE N/A 06/25/2021    Procedure: DEVICE IMPLANT;  Surgeon: Calvin Ventura MD;  Location: [facility] INVASIVE LOCATION;  Service: Cardiovascular;  Laterality: N/A;    CARPAL TUNNEL RELEASE Bilateral     CATARACT EXTRACTION, BILATERAL      CHOLECYSTECTOMY      COLONOSCOPY      HYSTERECTOMY      MASTECTOMY Left     OTHER SURGICAL HISTORY      x2 fluid removed from right side    PLEURAL CATHETER INSERTION Right 4/4/2022    Procedure: PLEURX CATHETER INSERTION;   Surgeon: Fidel Carter MD;  Location: [facility];  Service: Cardiothoracic;  Laterality: Right;    PORTACATH PLACEMENT Left     REPLACEMENT TOTAL KNEE BILATERAL      RETINAL DETACHMENT SURGERY Right     buckle in place    SHOULDER ROTATOR CUFF REPAIR Right     TOTAL HIP ARTHROPLASTY Right     TRIGGER FINGER RELEASE Right     thinks right side        Admitting Doctor:   No admitting provider for patient encounter.    Consulting Provider(s):  Consults       No orders found from 11/11/2023 to 12/11/2023.             Admitting Diagnosis:   The primary encounter diagnosis was COVID-19. A diagnosis of Pneumonia of left lower lobe due to infectious organism was also pertinent to this visit.    Most Recent Vitals:   Vitals:    12/10/23 1730 12/10/23 1800 12/10/23 1830 12/10/23 1900   BP: 95/54 (!) 84/57 124/75 122/61   BP Location:       Patient Position:       Pulse: 67 75 71 75   Resp:       Temp:       TempSrc:       SpO2: 97% 92% 92% 95%   Weight:       Height:           Active LDAs/IV Access:   Lines, Drains & Airways       Active LDAs       Name Placement date Placement time Site Days    Peripheral IV 12/10/23 1600 Right Antecubital 12/10/23  1600  Antecubital  less than 1    Chest Tube 1 Right Midaxillary 04/04/22  --  Midaxillary  pleurx cath  615    Chest Tube 04/04/22  1423  --  615    External Urinary Catheter 12/10/23  1656  --  less than 1                    Labs (abnormal labs have a star):   Labs Reviewed   RESPIRATORY PANEL PCR W/ COVID-19 (SARS-COV-2), NP SWAB IN UTM/VTP, 2 HR TAT - Abnormal; Notable for the following components:       Result Value    COVID19 Detected (*)     All other components within normal limits    Narrative:     In the setting of a positive respiratory panel with a viral infection PLUS a negative procalcitonin without other underlying concern for bacterial infection, consider observing off antibiotics or discontinuation of antibiotics and continue supportive care. If the respiratory  panel is positive for atypical bacterial infection (Bordetella pertussis, Chlamydophila pneumoniae, or Mycoplasma pneumoniae), consider antibiotic de-escalation to target atypical bacterial infection.   COMPREHENSIVE METABOLIC PANEL - Abnormal; Notable for the following components:    Glucose 268 (*)     Creatinine 1.25 (*)     Total Protein 5.9 (*)     ALT (SGPT) 43 (*)     AST (SGOT) 61 (*)     Alkaline Phosphatase 145 (*)     eGFR 39.5 (*)     All other components within normal limits    Narrative:     GFR Normal >60  Chronic Kidney Disease <60  Kidney Failure <15    The GFR formula is only valid for adults with stable renal function between ages 18 and 70.   LACTATE DEHYDROGENASE - Abnormal; Notable for the following components:     (*)     All other components within normal limits   PROTIME-INR - Abnormal; Notable for the following components:    Protime 17.9 (*)     INR 1.46 (*)     All other components within normal limits   C-REACTIVE PROTEIN - Abnormal; Notable for the following components:    C-Reactive Protein 1.24 (*)     All other components within normal limits   FERRITIN - Abnormal; Notable for the following components:    Ferritin 326.90 (*)     All other components within normal limits    Narrative:     Results may be falsely decreased if patient taking Biotin.     SINGLE HSTROPONIN T - Abnormal; Notable for the following components:    HS Troponin T 86 (*)     All other components within normal limits    Narrative:     High Sensitive Troponin T Reference Range:  <14.0 ng/L- Negative Female for AMI  <22.0 ng/L- Negative Male for AMI  >=14 - Abnormal Female indicating possible myocardial injury.  >=22 - Abnormal Male indicating possible myocardial injury.   Clinicians would have to utilize clinical acumen, EKG, Troponin, and serial changes to determine if it is an Acute Myocardial Infarction or myocardial injury due to an underlying chronic condition.        BNP (IN-HOUSE) - Abnormal; Notable  for the following components:    proBNP 11,750.0 (*)     All other components within normal limits    Narrative:     This assay is used as an aid in the diagnosis of individuals suspected of having heart failure. It can be used as an aid in the diagnosis of acute decompensated heart failure (ADHF) in patients presenting with signs and symptoms of ADHF to the emergency department (ED). In addition, NT-proBNP of <300 pg/mL indicates ADHF is not likely.    Age Range Result Interpretation  NT-proBNP Concentration (pg/mL:      <50             Positive            >450                   Gray                 300-450                    Negative             <300    50-75           Positive            >900                  Gray                300-900                  Negative            <300      >75             Positive            >1800                  Gray                300-1800                  Negative            <300   CBC WITH AUTO DIFFERENTIAL - Abnormal; Notable for the following components:    RBC 3.71 (*)     Hemoglobin 10.7 (*)     Hematocrit 33.1 (*)     RDW 16.0 (*)     Platelets 130 (*)     Neutrophil % 88.1 (*)     Lymphocyte % 4.7 (*)     Eosinophil % 0.0 (*)     Immature Grans % 0.7 (*)     Lymphocytes, Absolute 0.33 (*)     All other components within normal limits   HIGH SENSITIVITIY TROPONIN T 2HR - Abnormal; Notable for the following components:    HS Troponin T 93 (*)     Troponin T Delta 7 (*)     All other components within normal limits    Narrative:     High Sensitive Troponin T Reference Range:  <14.0 ng/L- Negative Female for AMI  <22.0 ng/L- Negative Male for AMI  >=14 - Abnormal Female indicating possible myocardial injury.  >=22 - Abnormal Male indicating possible myocardial injury.   Clinicians would have to utilize clinical acumen, EKG, Troponin, and serial changes to determine if it is an Acute Myocardial Infarction or myocardial injury due to an underlying chronic condition.       "  PROCALCITONIN - Normal    Narrative:     As a Marker for Sepsis (Non-Neonates):    1. <0.5 ng/mL represents a low risk of severe sepsis and/or septic shock.  2. >2 ng/mL represents a high risk of severe sepsis and/or septic shock.    As a Marker for Lower Respiratory Tract Infections that require antibiotic therapy:    PCT on Admission    Antibiotic Therapy       6-12 Hrs later    >0.5                Strongly Recommended  >0.25 - <0.5        Recommended   0.1 - 0.25          Discouraged              Remeasure/reassess PCT  <0.1                Strongly Discouraged     Remeasure/reassess PCT    As 28 day mortality risk marker: \"Change in Procalcitonin Result\" (>80% or <=80%) if Day 0 (or Day 1) and Day 4 values are available. Refer to http://www.[employer]-pct-calculator.com    Change in PCT <=80%  A decrease of PCT levels below or equal to 80% defines a positive change in PCT test result representing a higher risk for 28-day all-cause mortality of patients diagnosed with severe sepsis for septic shock.    Change in PCT >80%  A decrease of PCT levels of more than 80% defines a negative change in PCT result representing a lower risk for 28-day all-cause mortality of patients diagnosed with severe sepsis or septic shock.      D-DIMER, QUANTITATIVE - Normal    Narrative:     According to the assay 's published package insert, a normal (<0.50 MCGFEU/mL) D-dimer result in conjunction with a non-high clinical probability assessment, excludes deep vein thrombosis (DVT) and pulmonary embolism (PE) with high sensitivity.    D-dimer values increase with age and this can make VTE exclusion of an older population difficult. To address this, the American College of Physicians, based on best available evidence and recent guidelines, recommends that clinicians use age-adjusted D-dimer thresholds in patients greater than 50 years of age with: a) a low probability of PE who do not meet all Pulmonary Embolism Rule Out " "Criteria, or b) in those with intermediate probability of PE.   The formula for an age-adjusted D-dimer cut-off is \"age/100\".  For example, a 60 year old patient would have an age-adjusted cut-off of 0.60 MCGFEU/mL and an 80 year old 0.80 MCGFEU/mL.   APTT - Normal    Narrative:     PTT = The equivalent PTT values for the therapeutic range of heparin levels at 0.3 to 0.5 U/ml are 60 to 70 seconds.   LACTIC ACID, PLASMA - Normal   BLOOD CULTURE   BLOOD CULTURE   CBC AND DIFFERENTIAL    Narrative:     The following orders were created for panel order CBC & Differential.  Procedure                               Abnormality         Status                     ---------                               -----------         ------                     CBC Auto Differential[[id number]]        Abnormal            Final result                 Please view results for these tests on the individual orders.       Meds Given in ED:   Medications   sodium chloride 0.9 % flush 10 mL (has no administration in time range)   azithromycin (ZITHROMAX) 500 mg in sodium chloride 0.9 % 250 mL IVPB-VTB (has no administration in time range)   nitroglycerin (NITROSTAT) SL tablet 0.4 mg (has no administration in time range)   Enoxaparin Sodium (LOVENOX) syringe 40 mg (has no administration in time range)   nirmatrelvir and ritonavir (300mg/100mg)(PAXLOVID) 3 tablet pack (has no administration in time range)   dexAMETHasone (DECADRON) tablet 6 mg (has no administration in time range)     Or   dexAMETHasone (DECADRON) injection 6 mg (has no administration in time range)   ondansetron (ZOFRAN) injection 4 mg (has no administration in time range)   dexAMETHasone (DECADRON) injection 6 mg (6 mg Intravenous Given 12/10/23 1646)   cefTRIAXone (ROCEPHIN) 1,000 mg in sodium chloride 0.9 % 100 mL IVPB (1,000 mg Intravenous New Bag 12/10/23 2848)             "

## 2023-12-12 NOTE — PLAN OF CARE
Goal Outcome Evaluation:  Plan of Care Reviewed With: patient        Progress: no change  Outcome Evaluation: Pt concerned this shift with having BM. Pt got so anxious r/t this, her HR increased to 150s for a moment. Pt assessed and immediately returned to baseline HR when this RN instructed patient to stop straining to have BM. Pt is interested in an enema.  Otherwise, VSS. Remains on room air.          yes

## 2023-12-12 NOTE — PLAN OF CARE
Goal Outcome Evaluation:    SLP evaluation completed. Will continue to address diet tolerance. Please see note for further details and recommendations.                       Anticipated Discharge Disposition (SLP): home with assist

## 2023-12-12 NOTE — CASE MANAGEMENT/SOCIAL WORK
Continued Stay Note  [facility]     Patient Name: Syeda Gomez  MRN: [id number]  Today's Date: 12/12/2023    Admit Date: 12/10/2023    Plan: SNF   Discharge Plan       Row Name 12/12/23 1305       Plan    Plan SNF    Patient/Family in Agreement with Plan yes    Plan Comments Spoke to patient and daughter, Betty at bedside. Discussed therapy's recommendations for rehab. Patient is agreeable to a referral to [facility]. Referral called to [facility]. Patient has Medicare A&B and will not need insurance approval, once a bed is offered. CM will continue to follow and assist.    Final Discharge Disposition Code 03 - skilled nursing facility (SNF)                   Discharge Codes    No documentation.                 Expected Discharge Date and Time       Expected Discharge Date Expected Discharge Time    Dec 13, 2023               Christine Epstein RN

## 2023-12-12 NOTE — THERAPY EVALUATION
Patient Name: Syeda Gomez  : 1927    MRN: [id number]                              Today's Date: 2023       Admit Date: 12/10/2023    Visit Dx:     ICD-10-CM ICD-9-CM   1. COVID-19  U07.1 079.89   2. Pneumonia of left lower lobe due to infectious organism  J18.9 486     Patient Active Problem List   Diagnosis    Pneumonia due to COVID-19 virus    Pancytopenia    Presence of cardiac pacemaker    Atrial fibrillation    Prolonged QTc interval on ECG    Hypothyroidism (acquired)    HTN (hypertension)    HLD (hyperlipidemia)    Elective replacement indicated for pacemaker    CHF (congestive heart failure)    Anemia    Recurrent right pleural effusion    COVID-19     Past Medical History:   Diagnosis Date    A-fib     Arthritis     Cancer     - left breast    Carpal tunnel syndrome     CHF (congestive heart failure)     Detached retina     right side    Disease of thyroid gland     Dizzy     Full dentures     GERD (gastroesophageal reflux disease)     Hearing aid worn     bilat    Heart murmur     History of transfusion     x4 just this year- never had reaction    [place] (hard of hearing)     bilat hearinhg aids    Hypertension     Infection     right knee- had infection 15 to 20 years ago- unsure if mrsa or not    Pleural effusion     Rheumatic fever     SOBOE (shortness of breath on exertion)     UTI (urinary tract infection)     Wears glasses      Past Surgical History:   Procedure Laterality Date    APPENDECTOMY      CARDIAC ELECTROPHYSIOLOGY PROCEDURE N/A 2021    Procedure: DEVICE IMPLANT;  Surgeon: Calvin Ventura MD;  Location: [facility] INVASIVE LOC[facility];  Service: Cardiovascular;  Laterality: N/A;    CARPAL TUNNEL RELEASE Bilateral     CATARACT EXTRACTION, BILATERAL      CHOLECYSTECTOMY      COLONOSCOPY      HYSTERECTOMY      MASTECTOMY Left     OTHER SURGICAL HISTORY      x2 fluid removed from right side    PLEURAL CATHETER INSERTION Right 2022    Procedure: PLEURX CATHETER  "INSERTION;  Surgeon: Fidel Carter MD;  Location: [facility];  Service: Cardiothoracic;  Laterality: Right;    PORTACATH PLACEMENT Left     REPLACEMENT TOTAL KNEE BILATERAL      RETINAL DETACHMENT SURGERY Right     buckle in place    SHOULDER ROTATOR CUFF REPAIR Right     TOTAL HIP ARTHROPLASTY Right     TRIGGER FINGER RELEASE Right     thinks right side      General Information       Row Name 12/12/23 1319          OT Time and Intention    Document Type evaluation  -KF     Mode of Treatment occupational therapy;individual therapy  -       Row Name 12/12/23 1319          General Information    Patient Profile Reviewed yes  -KF     Prior Level of Function independent:;all household mobility;bed mobility;ADL's;max assist:;home management;cleaning  Pt's daughter reports pt uses a rollator at baseline. Pt states she is \"typically\" able to bathe and dress herself, though notes buttons are a challenge.  -KF     Existing Precautions/Restrictions fall  -KF     Barriers to Rehab previous functional deficit;medically complex  -       Row Name 12/12/23 1319          Living Environment    People in Home child(fernando), adult  -       Row Name 12/12/23 1319          Home Main Entrance    Number of Stairs, Main Entrance none  -       Row Name 12/12/23 1319          Stairs Within Home, Primary    Stairs, Within Home, Primary Split level home with 2 steps into house, 6 steps up to main living and 5 steps down to bottom level. Pt states she rarely goes to the bottom level.  -     Stairs Comment, Within Home, Primary Pt has a tub shower and grab bars near commode.  -       Row Name 12/12/23 1319          Cognition    Orientation Status (Cognition) oriented x 3  -       Row Name 12/12/23 1319          Safety Issues, Functional Mobility    Safety Issues Affecting Function (Mobility) awareness of need for assistance;insight into deficits/self-awareness;judgment;problem-solving;safety precaution awareness;safety precautions " follow-through/compliance;sequencing abilities;positioning of assistive device  -KF     Impairments Affecting Function (Mobility) balance;endurance/activity tolerance;shortness of breath;strength  -KF               User Key  (r) = Recorded By, (t) = Taken By, (c) = Cosigned By      Initials Name Provider Type    KF Traci Castro OT Occupational Therapist                     Mobility/ADL's       Row Name 12/12/23 [facility]2          Bed Mobility    Bed Mobility supine-sit;scooting/bridging  -KF     Scooting/Bridging [place] (Bed Mobility) minimum assist (75% patient effort);1 person assist  -KF     Supine-Sit [place] (Bed Mobility) minimum assist (75% patient effort);1 person assist  -KF     Assistive Device (Bed Mobility) bed rails;draw sheet;head of bed elevated  -KF     Comment, (Bed Mobility) Increased time and effort. Max encouragement provided upon arrival for OOB activity.  -       Row Name 12/12/23 1322          Transfers    Transfers bed-chair transfer;sit-stand transfer;stand-sit transfer  -       Row Name 12/12/23 [facility]          Bed-Chair Transfer    Bed-Chair [place] (Transfers) minimum assist (75% patient effort);verbal cues;nonverbal cues (demo/gesture)  -KF     Assistive Device (Bed-Chair Transfers) walker, front-wheeled  -KF     Comment, (Bed-Chair Transfer) Verbal/tactile cues for hand placement, RWx management and upright posture.  -KF       Row Name 12/12/23 1322          Sit-Stand Transfer    Sit-Stand [place] (Transfers) minimum assist (75% patient effort);verbal cues;nonverbal cues (demo/gesture)  -KF     Assistive Device (Sit-Stand Transfers) walker, front-wheeled  -KF       Row Name 12/12/23 1322          Stand-Sit Transfer    Stand-Sit [place] (Transfers) minimum assist (75% patient effort);verbal cues;nonverbal cues (demo/gesture)  -KF     Assistive Device (Stand-Sit Transfers) walker, front-wheeled  -KF       Row Name 12/12/23 1322          Functional Mobility     Functional Mobility- Ind. Level minimum assist (75% patient effort);1 person;verbal cues required;nonverbal cues required (demo/gesture)  -KF     Functional Mobility- Device walker, front-wheeled  -KF     Functional Mobility- Comment Pt ambulated to the doorway and back x2 using a RWx with Naun for safety. One seated rest break taken during mobility. Pt appeared SOA, however SpO2 remained >91%.  -       Row Name 12/12/23 1322          Activities of Daily Living    BADL Assessment/Intervention upper body dressing;feeding  -       Row Name 12/12/23 1322          Upper Body Dressing Assessment/Training    [place] Level (Upper Body Dressing) don;front opening garment;minimum assist (75% patient effort)  -     Position (Upper Body Dressing) edge of bed sitting  -[facility] Name 12/12/23 1322          Self-Feeding Assessment/Training    [place] Level (Feeding) liquids to mouth;set up  -     Position (Self-Feeding) supported sitting  -               User Key  (r) = Recorded By, (t) = Taken By, (c) = Cosigned By      Initials Name Provider Type    KF Traci Castro OT Occupational Therapist                   Obj/Interventions       Row Name 12/12/23 1325          Sensory Assessment (Somatosensory)    Sensory Assessment (Somatosensory) UE sensation intact  -[facility] Name 12/12/23 1325          Vision Assessment/Intervention    Visual Impairment/Limitations WFL  -[facility] Name 12/12/23 1325          Range of Motion Comprehensive    General Range of Motion bilateral upper extremity ROM WFL  -[facility] Name 12/12/23 1325          Strength Comprehensive (MMT)    General Manual Muscle Testing (MMT) Assessment upper extremity strength deficits identified  -     Comment, General Manual Muscle Testing (MMT) Assessment BUE grossly 3+/5  -       Row Name 12/12/23 1325          Balance    Balance Assessment sitting static balance;sitting dynamic balance;sit to stand dynamic balance;standing static  balance;standing dynamic balance  -KF     Static Sitting Balance standby assist  -KF     Dynamic Sitting Balance contact guard  -KF     Position, Sitting Balance unsupported;sitting edge of bed  -KF     Sit to Stand Dynamic Balance minimal assist;verbal cues;non-verbal cues (demo/gesture)  -KF     Static Standing Balance contact guard  -KF     Dynamic Standing Balance minimal assist  -KF     Position/Device Used, Standing Balance supported;walker, front-wheeled  -KF     Balance Interventions sitting;standing;sit to stand;supported;static;dynamic;occupation based/functional task  -KF     Comment, Balance No overt LOB, though unsteady throughout  -KF               User Key  (r) = Recorded By, (t) = Taken By, (c) = Cosigned By      Initials Name Provider Type    KF Traci Castro OT Occupational Therapist                   Goals/Plan       Row Name 12/12/23 1327          Transfer Goal 1 (OT)    Activity/Assistive Device (Transfer Goal 1, OT) commode;bed-to-chair/chair-to-bed  -KF     [place] Level/Cues Needed (Transfer Goal 1, OT) standby assist  -KF     Time Frame (Transfer Goal 1, OT) long term goal (LTG);10 days  -KF     Progress/Outcome (Transfer Goal 1, OT) new goal  -KF       Row Name 12/12/23 1327          Dressing Goal 1 (OT)    Activity/Device (Dressing Goal 1, OT) upper body dressing;lower body dressing  -KF     [place]/Cues Needed (Dressing Goal 1, OT) standby assist  -KF     Time Frame (Dressing Goal 1, OT) long term goal (LTG);10 days  -KF     Progress/Outcome (Dressing Goal 1, OT) new goal  -KF       Row Name 12/12/23 1327          Grooming Goal 1 (OT)    Activity/Device (Grooming Goal 1, OT) grooming skills, all;other (see comments)  Sink side  -KF     [place] (Grooming Goal 1, OT) standby assist  -KF     Time Frame (Grooming Goal 1, OT) long term goal (LTG);10 days  -KF     Progress/Outcome (Grooming Goal 1, OT) new goal  -KF       Row Name 12/12/23 1327          Therapy Assessment/Plan  (OT)    Planned Therapy Interventions (OT) activity tolerance training;adaptive equipment training;BADL retraining;functional balance retraining;IADL retraining;occupation/activity based interventions;patient/caregiver education/training;strengthening exercise;transfer/mobility retraining  -KF               User Key  (r) = Recorded By, (t) = Taken By, (c) = Cosigned By      Initials Name Provider Type    KF Traci Castro, JANIYA Occupational Therapist                   Clinical Impression       Row Name 12/12/23 1323          Pain Assessment    Pretreatment Pain Rating 0/10 - no pain  -KF     Posttreatment Pain Rating 0/10 - no pain  -KF     Pain Intervention(s) Repositioned;Ambulation/increased activity  -KF       Row Name 12/12/23 1329          Plan of Care Review    Plan of Care Reviewed With patient;daughter  -KF     Progress no change  -KF     Outcome Evaluation OT evaluation completed. Pt presents below her functional baseline with generalized weakness, decreased activity tolerance, and balance deficits. Pt performed bed mobility and in room ambulation using a RWx with Naun. UBD completed with Naun this date. Pt will benefit from continued IP OT services to address deficits listed. If deemed medically appropriate, recommend a d/c to SNF for best functional outcome.  -KF       Row Name 12/12/23 1328          Therapy Assessment/Plan (OT)    Patient/Family Therapy Goal Statement (OT) Restore PLOF  -     Criteria for Skilled Therapeutic Interventions Met (OT) yes;skilled treatment is necessary  -KF     Therapy Frequency (OT) daily  -KF       Row Name 12/12/23 1326          Therapy Plan Review/Discharge Plan (OT)    Anticipated Discharge Disposition (OT) skilled nursing facility  -       Row Name 12/12/23 1320          Vital Signs    Pre SpO2 (%) 96  -KF     O2 Delivery Pre Treatment room air  -KF     Intra SpO2 (%) 91  -KF     O2 Delivery Intra Treatment room air  -KF     Post SpO2 (%) 94  -KF     O2 Delivery  Post Treatment room air  -KF     Pre Patient Position Supine  -KF     Intra Patient Position Standing  -KF     Post Patient Position Sitting  -KF       Row Name 12/12/23 1325          Positioning and Restraints    Pre-Treatment Position in bed  -KF     Post Treatment Position chair  -KF     In Chair reclined;call light within reach;encouraged to call for assist;exit alarm on;with family/caregiver;legs elevated  -KF               User Key  (r) = Recorded By, (t) = Taken By, (c) = Cosigned By      Initials Name Provider Type    Traci Harmon OT Occupational Therapist                   Outcome Measures       Row Name 12/12/23 1328          How much help from another is currently needed...    Putting on and taking off regular lower body clothing? 2  -KF     Bathing (including washing, rinsing, and drying) 2  -KF     Toileting (which includes using toilet bed pan or urinal) 3  -KF     Putting on and taking off regular upper body clothing 3  -KF     Taking care of personal grooming (such as brushing teeth) 3  -KF     Eating meals 3  -KF     AM-PAC 6 Clicks Score (OT) 16  -KF       Row Name 12/12/23 0500          How much help from another person do you currently need...    Turning from your back to your side while in flat bed without using bedrails? 3  -LG     Moving from lying on back to sitting on the side of a flat bed without bedrails? 3  -LG     Moving to and from a bed to a chair (including a wheelchair)? 3  -LG     Standing up from a chair using your arms (e.g., wheelchair, bedside chair)? 3  -LG     Climbing 3-5 steps with a railing? 2  -LG     To walk in hospital room? 2  -LG     AM-PAC 6 Clicks Score (PT) 16  -LG     Highest Level of Mobility Goal 5 --> Static standing  -LG       Row Name 12/12/23 1328          Functional Assessment    Outcome Measure Options AM-PAC 6 Clicks Daily Activity (OT)  -KF               User Key  (r) = Recorded By, (t) = Taken By, (c) = Cosigned By      Initials Name Provider  Type    LG Annamaria Meehan, RN Registered Nurse    Traci Harmon OT Occupational Therapist                    Occupational Therapy Education       Title: PT OT SLP Therapies (In Progress)       Topic: Occupational Therapy (In Progress)       Point: ADL training (Done)       Description:   Instruct learner(s) on proper safety adaptation and remediation techniques during self care or transfers.   Instruct in proper use of assistive devices.                  Learning Progress Summary             Patient Acceptance, E,TB, VU,DU by  at 12/12/2023 1140                         Point: Home exercise program (Not Started)       Description:   Instruct learner(s) on appropriate technique for monitoring, assisting and/or progressing therapeutic exercises/activities.                  Learner Progress:  Not documented in this visit.              Point: Precautions (Done)       Description:   Instruct learner(s) on prescribed precautions during self-care and functional transfers.                  Learning Progress Summary             Patient Acceptance, E,TB, VU,DU by  at 12/12/2023 1140                         Point: Body mechanics (Done)       Description:   Instruct learner(s) on proper positioning and spine alignment during self-care, functional mobility activities and/or exercises.                  Learning Progress Summary             Patient Acceptance, E,TB, VU,DU by  at 12/12/2023 1140                                         User Key       Initials Effective Dates Name Provider Type Discipline     08/09/23 -  Traci Castro OT Occupational Therapist OT                  OT Recommendation and Plan  Planned Therapy Interventions (OT): activity tolerance training, adaptive equipment training, BADL retraining, functional balance retraining, IADL retraining, occupation/activity based interventions, patient/caregiver education/training, strengthening exercise, transfer/mobility retraining  Therapy Frequency  (OT): daily  Plan of Care Review  Plan of Care Reviewed With: patient, daughter  Progress: no change  Outcome Evaluation: OT evaluation completed. Pt presents below her functional baseline with generalized weakness, decreased activity tolerance, and balance deficits. Pt performed bed mobility and in room ambulation using a RWx with Naun. UBD completed with Naun this date. Pt will benefit from continued IP OT services to address deficits listed. If deemed medically appropriate, recommend a d/c to SNF for best functional outcome.     Time Calculation:   Evaluation Complexity (OT)  Review Occupational Profile/Medical/Therapy History Complexity: expanded/moderate complexity  Assessment, Occupational Performance/Identification of Deficit Complexity: 3-5 performance deficits  Clinical Decision Making Complexity (OT): detailed assessment/moderate complexity  Overall Complexity of Evaluation (OT): moderate complexity     Time Calculation- OT       Row Name 12/12/23 1329             Time Calculation- OT    OT Start Time 1140  -KF      OT Received On 12/12/23  -KF      OT Goal Re-Cert Due Date 12/22/23  -KF         Timed Charges    25713 - OT Therapeutic Activity Minutes 7  -KF      09038 - OT Self Care/Mgmt Minutes 5  -KF         Untimed Charges    OT Eval/Re-eval Minutes 46  -KF         Total Minutes    Timed Charges Total Minutes 12  -KF      Untimed Charges Total Minutes 46  -KF       Total Minutes 58  -KF                User Key  (r) = Recorded By, (t) = Taken By, (c) = Cosigned By      Initials Name Provider Type    KF Traci Castro OT Occupational Therapist                  Therapy Charges for Today       Code Description Service Date Service Provider Modifiers Qty    27531550139 HC OT EVAL MOD COMPLEXITY 4 12/12/2023 Traci Castro OT GO 1    [id number]  OT THERAPEUTIC ACT EA 15 MIN 12/12/2023 Traci Castro OT GO 1                 Traci Castro OT  12/12/2023

## 2023-12-12 NOTE — PLAN OF CARE
Goal Outcome Evaluation:  Plan of Care Reviewed With: patient, daughter        Progress: no change  Outcome Evaluation: OT evaluation completed. Pt presents below her functional baseline with generalized weakness, decreased activity tolerance, and balance deficits. Pt performed bed mobility and in room ambulation using a RWx with Naun. UBD completed with Naun this date. Pt will benefit from continued IP OT services to address deficits listed. If deemed medically appropriate, recommend a d/c to SNF for best functional outcome.      Anticipated Discharge Disposition (OT): skilled nursing facility

## 2023-12-12 NOTE — PLAN OF CARE
Goal Outcome Evaluation:              Outcome Evaluation: remains on RA. disoriented to situation. VSS. No complains of pain or discomfort. No acute events during the shift.

## 2023-12-12 NOTE — THERAPY EVALUATION
Acute Care - Speech Language Pathology   Swallow Initial Evaluation [facility]    Clinical Swallow Evaluation       Patient Name: Syeda Gomez  : 1927  MRN: [id number]  Today's Date: 2023               Admit Date: 12/10/2023    Visit Dx:     ICD-10-CM ICD-9-CM   1. COVID-19  U07.1 079.89   2. Pneumonia of left lower lobe due to infectious organism  J18.9 486   3. Dysphagia, unspecified type  R13.10 787.20     Patient Active Problem List   Diagnosis    Pneumonia due to COVID-19 virus    Pancytopenia    Presence of cardiac pacemaker    Atrial fibrillation    Prolonged QTc interval on ECG    Hypothyroidism (acquired)    HTN (hypertension)    HLD (hyperlipidemia)    Elective replacement indicated for pacemaker    CHF (congestive heart failure)    Anemia    Recurrent right pleural effusion    COVID-19     Past Medical History:   Diagnosis Date    A-fib     Arthritis     Cancer     - left breast    Carpal tunnel syndrome     CHF (congestive heart failure)     Detached retina     right side    Disease of thyroid gland     Dizzy     Full dentures     GERD (gastroesophageal reflux disease)     Hearing aid worn     bilat    Heart murmur     History of transfusion     x4 just this year- never had reaction    [place] (hard of hearing)     bilat hearinhg aids    Hypertension     Infection     right knee- had infection 15 to 20 years ago- unsure if mrsa or not    Pleural effusion     Rheumatic fever     SOBOE (shortness of breath on exertion)     UTI (urinary tract infection)     Wears glasses      Past Surgical History:   Procedure Laterality Date    APPENDECTOMY      CARDIAC ELECTROPHYSIOLOGY PROCEDURE N/A 2021    Procedure: DEVICE IMPLANT;  Surgeon: Calvin Ventura MD;  Location: [facility] INVASIVE LOCATION;  Service: Cardiovascular;  Laterality: N/A;    CARPAL TUNNEL RELEASE Bilateral     CATARACT EXTRACTION, BILATERAL      CHOLECYSTECTOMY      COLONOSCOPY      HYSTERECTOMY      MASTECTOMY Left      OTHER SURGICAL HISTORY      x2 fluid removed from right side    PLEURAL CATHETER INSERTION Right 4/4/2022    Procedure: PLEURX CATHETER INSERTION;  Surgeon: Fidel Carter MD;  Location: [facility];  Service: Cardiothoracic;  Laterality: Right;    PORTACATH PLACEMENT Left     REPLACEMENT TOTAL KNEE BILATERAL      RETINAL DETACHMENT SURGERY Right     buckle in place    SHOULDER ROTATOR CUFF REPAIR Right     TOTAL HIP ARTHROPLASTY Right     TRIGGER FINGER RELEASE Right     thinks right side       SLP Recommendation and Plan  SLP Swallowing Diagnosis: mild, oral dysphagia, R/O pharyngeal dysphagia (12/12/23 1025)  SLP Diet Recommendation: puree, thin liquids (12/12/23 1025)  Recommended Precautions and Strategies: upright posture during/after eating, small bites of food and sips of liquid (12/12/23 1025)  SLP Rec. for Method of Medication Administration: meds whole, meds crushed, with puree, as tolerated (12/12/23 1025)     Monitor for Signs of Aspiration: notify SLP if any concerns (12/12/23 1025)  Recommended Diagnostics: other (see comments) (diet tolerance, adj) (12/12/23 1025)  Swallow Criteria for Skilled Therapeutic Interventions Met: demonstrates skilled criteria (12/12/23 1025)  Anticipated Discharge Disposition (SLP): home with assist (12/12/23 1025)  Rehab Potential/Prognosis, Swallowing: good, to achieve stated therapy goals (12/12/23 1025)  Therapy Frequency (Swallow): PRN (12/12/23 1025)  Predicted Duration Therapy Intervention (Days): until discharge (12/12/23 1025)  Oral Care Recommendations: Oral Care BID/PRN, Denture Care, Toothbrush (12/12/23 1025)                                      Oral Care Recommendations: Oral Care BID/PRN, Denture Care, Toothbrush (12/12/23 1025)           SWALLOW EVALUATION (last 72 hours)       SLP Adult Swallow Evaluation       Row Name 12/12/23 1025                   Rehab Evaluation    Document Type evaluation  -CH        Subjective Information complains of;pain  sore  throat  -        Patient Observations alert;cooperative;agree to therapy  -        Patient/Family/Caregiver Comments/Observations daughter present  -        Patient Effort adequate  -        Symptoms Noted During/After Treatment none  -           General Information    Patient Profile Reviewed yes  -        Pertinent History Of Current Problem 89 y.o. female with past medical history of diastolic heart failure, atrial fibrillation, essential hypertension on CKD stage III who presented to the hospital with weakness and worsening shortness of breath. Covid +, CXR: Left lower lobe airspace opacity is suspicious for pneumonia, with questionable small left pleural effusion. Findings superimposed upon chronic/senescent changes of the lungs. Patients baseline diet is soft to chew and thin liquids  -        Current Method of Nutrition soft to chew textures;thin liquids  -        Precautions/Limitations, Vision WFL;for purposes of eval  -CH        Precautions/Limitations, Hearing WFL;for purposes of eval  -        Prior Level of Function-Communication unknown  -        Prior Level of Function-Swallowing soft to chew;thin liquids  -        Plans/Goals Discussed with patient and family;agreed upon  -        Barriers to Rehab none identified  -        Patient's Goals for Discharge patient did not state  -           Pain    Additional Documentation Pain Scale: FACES Pre/Post-Treatment (Group)  -           Pain Scale: FACES Pre/Post-Treatment    Pain: FACES Scale, Pretreatment 4-->hurts little more  -        Posttreatment Pain Rating 4-->hurts little more  -        Pain Location generalized  -        Pain Location - throat  -        Pre/Posttreatment Pain Comment Sore throat as covid symptom. C/o soreness when swallowing  -           Oral Motor Structure and Function    Dentition Assessment upper dentures/partial in place;other (see comments)  does not have lower dentures here. Unable to  wear as they are ill fitting.  -CH        Secretion Management WNL/WFL  -CH        Mucosal Quality moist, healthy  -        Volitional Swallow WFL;other (see comments)  patinet hesitant to swallow d/t discomfort  -           General Eating/Swallowing Observations    Respiratory Support Currently in Use room air  -        Eating/Swallowing Skills fed by SLP  -        Positioning During Eating upright 90 degree;upright in bed  -        Utensils Used spoon;cup;straw  -        Consistencies Trialed ice chips;thin liquids;pureed  solids not tested d/t throat pain with swallowing. Dtr felt patient would not be able to masticate them.  -CH        Pre SpO2 (%) 96  -CH        Post SpO2 (%) 95  -CH           Respiratory    Respiratory Status WFL;room air  -           Clinical Swallow Eval    Oral Prep Phase impaired  -CH        Oral Transit WFL  -CH        Oral Residue WFL  -CH        Pharyngeal Phase no overt signs/symptoms of pharyngeal impairment  -        Esophageal Phase unremarkable  -        Clinical Swallow Evaluation Summary Prolonged mastication with ice trials d/t missing dentition. Delayed oral transit, however patient reluctant/hesitant to swallow d/t throat pain. No overt clinical s/s of aspiration with any consistency. Recommend pureed and thin liquids. General aspiration precautions. Meds crushed in pureed.  -           Oral Prep Concerns    Oral Prep Concerns prolonged mastication;inefficient mastication;increased prep time  -        Prolonged Mastication regular consistencies  -        Inefficient Mastication regular consistencies  -        Increased Prep Time regular consistencies  -           Swallowing Quality of Life Assessment    Education and counseling provided Signs of aspiration;Risks of aspiration;Oral care recommendations and rationale  -           SLP Evaluation Clinical Impression    SLP Swallowing Diagnosis mild;oral dysphagia;R/O pharyngeal dysphagia  -         Functional Impact risk of aspiration/pneumonia;risk of malnutrition;risk of dehydration  -        Rehab Potential/Prognosis, Swallowing good, to achieve stated therapy goals  -        Swallow Criteria for Skilled Therapeutic Interventions Met demonstrates skilled criteria  -           Recommendations    Therapy Frequency (Swallow) PRN  -        Predicted Duration Therapy Intervention (Days) until discharge  -        SLP Diet Recommendation puree;thin liquids  -        Recommended Diagnostics other (see comments)  diet tolerance, adj  -        Recommended Precautions and Strategies upright posture during/after eating;small bites of food and sips of liquid  -        Oral Care Recommendations Oral Care BID/PRN;Denture Care;Toothbrush  -        SLP Rec. for Method of Medication Administration meds whole;meds crushed;with puree;as tolerated  -        Monitor for Signs of Aspiration notify SLP if any concerns  -        Anticipated Discharge Disposition (SLP) home with assist  -                  User Key  (r) = Recorded By, (t) = Taken By, (c) = Cosigned By      Initials Name Effective Dates     Sushila De La Cruz, MS CCC-SLP 06/16/21 -                     EDUCATION  The patient has been educated in the following areas:   Dysphagia (Swallowing Impairment) Oral Care/Hydration Modified Diet Instruction.        SLP GOALS       Row Name 12/12/23 1025             (LTG) Patient will demonstrate functional swallow for    Diet Texture (Demonstrate functional swallow) soft to chew (whole) textures  -      Liquid viscosity (Demonstrate functional swallow) thin liquids  -      [place] (Demonstrate functional swallow) with minimal cues (75-90% accuracy)  -      Time Frame (Demonstrate functional swallow) by discharge  -         (STG) Patient will tolerate trials of    Consistencies Trialed (Tolerate trials) pureed textures;thin liquids  -      Desired Outcome (Tolerate trials) without  signs/symptoms of aspiration;with adequate oral prep/transit/clearance  -CH      [place] (Tolerate trials) with minimal cues (75-90% accuracy)  -CH      Time Frame (Tolerate trials) by discharge  -                User Key  (r) = Recorded By, (t) = Taken By, (c) = Cosigned By      Initials Name Provider Type    Sushila Rowland MS CCC-SLP Speech and Language Pathologist                       Time Calculation:    Time Calculation- SLP       Row Name 12/12/23 1334             Time Calculation- SLP    SLP Start Time 1025  -CH      SLP Received On 12/12/23  -CH         Untimed Charges    SLP Eval/Re-eval  ST Eval Oral Pharyng Swallow - [id number]  -CH      [id number] Eval Oral Pharyng Swallow Minutes 55  -CH         Total Minutes    Untimed Charges Total Minutes 55  -CH       Total Minutes 55  -CH                User Key  (r) = Recorded By, (t) = Taken By, (c) = Cosigned By      Initials Name Provider Type     Sushila De La Cruz MS CCC-SLP Speech and Language Pathologist                    Therapy Charges for Today       Code Description Service Date Service Provider Modifiers Qty    [id number] HC ST EVAL ORAL PHARYNG SWALLOW 4 12/12/2023 Sushila De La Cruz MS CCC-SLP GN 1                 Sushila De La Cruz MS CCC-CAPRI  12/12/2023

## 2023-12-12 NOTE — PROGRESS NOTES
[facility] Medicine Services  PROGRESS NOTE    Patient Name: Syeda Gomez  : 1927  MRN: [id number]    Date of Admission: 12/10/2023  Primary Care Physician: Omayra Alcazar DO    Subjective   Subjective     CC:  Follow-up for COVID-19 infection    HPI:  Patient seen and examined this morning.  Having slightly worsening cough today.  Oxygen saturation 96% on room air.  Still complaining of weakness.  Patient currently agreeable to acute rehab    Objective   Objective     Vital Signs:   Temp:  [97 °F (36.1 °C)-97.8 °F (36.6 °C)] 97.8 °F (36.6 °C)  Heart Rate:  [61-92] 76  Resp:  [16-18] 18  BP: ()/(55-95) 120/67     Physical Exam:  General: Comfortable, not in distress, conversant and cooperative  Head: Atraumatic and normocephalic  Eyes: No Icterus. No pallor  Ears:  Ears appear intact with no abnormalities noted  Throat: No oral lesions, no thrush  Neck: Supple, trachea midline  Lungs: Clear to auscultation bilaterally, equal air entry, no wheezing or crackles  Heart:  Normal S1 and S2, no murmur, no gallop, No JVD, no lower extremity swelling  Abdomen:  Soft, no tenderness, no organomegaly, normal bowel sounds, no organomegaly  Extremities: pulses equal bilaterally  Skin: No bleeding, bruising or rash, normal skin turgor and elasticity  Neurologic: Cranial nerves appear intact with no evidence of facial asymmetry, normal motor and sensory functions in all 4 extremities  Psych: Alert and oriented x 3, normal mood    Results Reviewed:  LAB RESULTS:      Lab 23  0445 23  0401 12/10/23  1559   WBC 4.03 5.45 7.04   HEMOGLOBIN 10.9* 10.3* 10.7*   HEMATOCRIT 33.3* 31.7* 33.1*   PLATELETS 138* 124* 130*   NEUTROS ABS 3.67 4.69 6.20   IMMATURE GRANS (ABS) 0.01 0.04 0.05   LYMPHS ABS 0.25* 0.33* 0.33*   MONOS ABS 0.10 0.37 0.43   EOS ABS 0.00 0.00 0.00   MCV 85.6 87.1 89.2   CRP 1.16* 1.74* 1.24*   PROCALCITONIN  --   --  0.10   LACTATE  --   --  1.5   LDH  --   --   315*   PROTIME  --   --  17.9*   APTT  --   --  28.4   D DIMER QUANT  --   --  0.90         Lab 12/12/23  0445 12/11/23  0401 12/10/23  1559   SODIUM 132* 136 138   POTASSIUM 3.5 3.0* 3.6   CHLORIDE 95* 98 98   CO2 25.0 26.0 28.0   ANION GAP 12.0 12.0 12.0   BUN 27* 21 21   CREATININE 1.13* 1.08* 1.25*   EGFR 44.6* 47.1* 39.5*   GLUCOSE 344* 252* 268*   CALCIUM 7.9* 7.9* 8.5   MAGNESIUM 2.2 1.9  --    PHOSPHORUS 3.2  --   --          Lab 12/12/23 0445 12/11/23  0401 12/10/23  1559   TOTAL PROTEIN 5.1* 5.2* 5.9*   ALBUMIN 3.4* 3.5 3.9   GLOBULIN 1.7 1.7 2.0   ALT (SGPT) 22 28 43*   AST (SGOT) 18 26 61*   BILIRUBIN 0.5 0.6 0.9   ALK PHOS 125* 123* 145*         Lab 12/10/23  1837 12/10/23  1559   PROBNP  --  11,750.0*   HSTROP T 93* 86*   PROTIME  --  17.9*   INR  --  1.46*             Lab 12/10/23  1559   FERRITIN 326.90*         Brief Urine Lab Results  (Last result in the past 365 days)        Color   Clarity   Blood   Leuk Est   Nitrite   Protein   CREAT   Urine HCG        09/13/23 1321 Yellow   Clear   Negative   Negative   Negative   Trace                   Microbiology Results Abnormal       Procedure Component Value - Date/Time    Blood Culture - Blood, Wrist, Left [[id number]]  (Normal) Collected: 12/10/23 1825    Lab Status: Preliminary result Specimen: Blood from Wrist, Left Updated: 12/11/23 1915     Blood Culture No growth at 24 hours    Blood Culture - Blood, Arm, Left [[id number]]  (Normal) Collected: 12/10/23 1810    Lab Status: Preliminary result Specimen: Blood from Arm, Left Updated: 12/11/23 1915     Blood Culture No growth at 24 hours            XR Chest 1 View    Result Date: 12/10/2023  XR CHEST 1 VW Date of Exam: 12/10/2023 3:34 PM EST Indication: covid, SOA Comparison: Chest radiograph 9/13/2023. Findings: Right chest pacemaker. Left chest port tip overlies the SVC, unchanged. Unchanged enlarged cardiac silhouette. Left lower lobe airspace disease. Findings superimposed upon chronic/senescent  changes of the lungs. There may be a small left pleural effusion. No right pleural effusion. No pneumothorax. Osseous structures are unchanged.     Impression: Impression: Left lower lobe airspace opacity is suspicious for pneumonia, with questionable small left pleural effusion. Findings superimposed upon chronic/senescent changes of the lungs. Electronically Signed: Michael Archuleta MD  12/10/2023 4:05 PM EST  Workstation ID: [id number]     Results for orders placed during the hospital encounter of 03/10/22    Adult Transthoracic Echo Complete w/ Color, Spectral and Contrast if necessary per protocol    Interpretation Summary  · Left ventricular ejection fraction appears to be 51 - 55%. Left ventricular systolic function is normal.  · Left ventricular wall thickness is consistent with severe concentric hypertrophy.  · Mildly reduced right ventricular systolic function noted.  · The right ventricular cavity is borderline dilated.  · The right atrial cavity is moderately dilated.  · There is mild calcification of the aortic valve.  · Mild aortic valve regurgitation is present.  · Moderate to severe tricuspid valve regurgitation is present.  · Estimated right ventricular systolic pressure from tricuspid regurgitation is moderately elevated (45-55 mmHg).      Current medications:  Scheduled Meds:Pharmacy Consult, , Does not apply, Q12H  albuterol sulfate HFA, 2 puff, Inhalation, Q6H - RT  aspirin, 81 mg, Oral, Daily  carvedilol, 12.5 mg, Oral, BID With Meals  heparin (porcine), 5,000 Units, Subcutaneous, Q12H  isosorbide mononitrate, 60 mg, Oral, Daily  levothyroxine, 50 mcg, Oral, Q AM  Nirmatrelvir&Ritonavir 150/100, 2 tablet, Oral, BID  pantoprazole, 40 mg, Oral, Nightly  pravastatin, 20 mg, Oral, Nightly  sacubitril-valsartan, 1 tablet, Oral, BID  torsemide, 20 mg, Oral, Daily      Continuous Infusions:   PRN Meds:.  LORazepam    melatonin    nitroglycerin    [COMPLETED] Insert Peripheral IV **AND** sodium  chloride    Assessment & Plan   Assessment & Plan     Active Hospital Problems    Diagnosis  POA    **COVID-19 [U07.1]  Yes      Resolved Hospital Problems   No resolved problems to display.        Brief Hospital Course to date:  Syeda Gomez is a 89 y.o. female with past medical history of diastolic heart failure, atrial fibrillation, essential hypertension on CKD stage III who presented to the hospital with weakness and worsening shortness of breath.  Tested positive for COVID-19    COVID19 infection    Tested positive on 12/10   Currently satting 96% on room air  Continue Paxlovid and as needed nebs   Guaifenesin and incentive spirometry     HFpEF, EF 55   CXR with mild volume overload.  Appears euvolemic clinically  Continue p.o. torsemide     CKD stage III  Creat around baseline of 1.2   Monitor BMP     Debility  PT and OT   Discussed with the patient and her daughter at bedside, patient agreeable to acute rehab        Expected Discharge Location and Transportation: Home  Expected Discharge   Expected Discharge Date: 12/13/2023; Expected Discharge Time:      DVT prophylaxis:  Medical DVT prophylaxis orders are present.     AM-PAC 6 Clicks Score (PT): 16 (12/12/23 0500)    CODE STATUS:   Code Status and Medical Interventions:   Ordered at: 12/10/23 1941     Medical Intervention Limits:    NO intubation (DNI)     Level Of Support Discussed With:    Patient     Code Status (Patient has no pulse and is not breathing):    No CPR (Do Not Attempt to Resuscitate)     Medical Interventions (Patient has pulse or is breathing):    Limited Support     Copied text in this note has been reviewed and is accurate as of 12/12/23.     Adwoa Vaughan MD  12/12/23

## 2023-12-13 NOTE — PROGRESS NOTES
[facility] Medicine Services  PROGRESS NOTE    Patient Name: Syeda Gomez  : 1927  MRN: [id number]    Date of Admission: 12/10/2023  Primary Care Physician: Omayra Alcazar,     Subjective   Subjective     CC:  Follow-up for COVID-19 infection    HPI:  Patient seen and examined this morning.  Having stable course of hospitalization.  Oxygen saturation 95 to 96% room air.  Did not sleep overnight and got agitated this morning.  Currently requiring sitter.  Sleeping good at the time of the encounter this morning    Objective   Objective     Vital Signs:   Temp:  [96.5 °F (35.8 °C)-97.2 °F (36.2 °C)] 96.5 °F (35.8 °C)  Heart Rate:  [] 85  Resp:  [18-20] 20  BP: (121-143)/(48-77) 143/73     Physical Exam:  General: Comfortable, not in distress, conversant and cooperative  Head: Atraumatic and normocephalic  Eyes: No Icterus. No pallor  Ears:  Ears appear intact with no abnormalities noted  Throat: No oral lesions, no thrush  Neck: Supple, trachea midline  Lungs: Clear to auscultation bilaterally, equal air entry, no wheezing or crackles  Heart:  Normal S1 and S2, no murmur, no gallop, No JVD, no lower extremity swelling  Abdomen:  Soft, no tenderness, no organomegaly, normal bowel sounds, no organomegaly  Extremities: pulses equal bilaterally  Skin: No bleeding, bruising or rash, normal skin turgor and elasticity  Neurologic: Cranial nerves appear intact with no evidence of facial asymmetry, normal motor and sensory functions in all 4 extremities    Results Reviewed:  LAB RESULTS:      Lab 23  0338 23  0445 23  0401 12/10/23  1559   WBC 8.55 4.03 5.45 7.04   HEMOGLOBIN 12.4 10.9* 10.3* 10.7*   HEMATOCRIT 37.3 33.3* 31.7* 33.1*   PLATELETS 193 138* 124* 130*   NEUTROS ABS 7.84* 3.67 4.69 6.20   IMMATURE GRANS (ABS) 0.06* 0.01 0.04 0.05   LYMPHS ABS 0.32* 0.25* 0.33* 0.33*   MONOS ABS 0.33 0.10 0.37 0.43   EOS ABS 0.00 0.00 0.00 0.00   MCV 84.8 85.6 87.1  89.2   CRP 0.64* 1.16* 1.74* 1.24*   PROCALCITONIN  --   --   --  0.10   LACTATE  --   --   --  1.5   LDH  --   --   --  315*   PROTIME  --   --   --  17.9*   APTT  --   --   --  28.4   D DIMER QUANT  --   --   --  0.90         Lab 12/13/23  0338 12/12/23 0445 12/11/23  0401 12/10/23  1559   SODIUM 134* 132* 136 138   POTASSIUM 3.3* 3.5 3.0* 3.6   CHLORIDE 95* 95* 98 98   CO2 23.0 25.0 26.0 28.0   ANION GAP 16.0* 12.0 12.0 12.0   BUN 27* 27* 21 21   CREATININE 0.98 1.13* 1.08* 1.25*   EGFR 52.9* 44.6* 47.1* 39.5*   GLUCOSE 248* 344* 252* 268*   CALCIUM 8.0* 7.9* 7.9* 8.5   MAGNESIUM  --  2.2 1.9  --    PHOSPHORUS  --  3.2  --   --          Lab 12/13/23  0338 12/12/23 0445 12/11/23  0401 12/10/23  1559   TOTAL PROTEIN 5.9* 5.1* 5.2* 5.9*   ALBUMIN 3.8 3.4* 3.5 3.9   GLOBULIN 2.1 1.7 1.7 2.0   ALT (SGPT) 26 22 28 43*   AST (SGOT) 21 18 26 61*   BILIRUBIN 0.6 0.5 0.6 0.9   ALK PHOS 127* 125* 123* 145*         Lab 12/10/23  1837 12/10/23  1559   PROBNP  --  11,750.0*   HSTROP T 93* 86*   PROTIME  --  17.9*   INR  --  1.46*             Lab 12/10/23  1559   FERRITIN 326.90*         Lab 12/12/23  1605   PH, ARTERIAL 7.477*   PCO2, ARTERIAL 36.2   PO2 ART 78.9*   FIO2 21   HCO3 ART 26.8*   BASE EXCESS ART 3.2*   CARBOXYHEMOGLOBIN 1.7     Brief Urine Lab Results  (Last result in the past 365 days)        Color   Clarity   Blood   Leuk Est   Nitrite   Protein   CREAT   Urine HCG        09/13/23 1321 Yellow   Clear   Negative   Negative   Negative   Trace                   Microbiology Results Abnormal       Procedure Component Value - Date/Time    Blood Culture - Blood, Wrist, Left [[id number]]  (Normal) Collected: 12/10/23 1825    Lab Status: Preliminary result Specimen: Blood from Wrist, Left Updated: 12/12/23 1915     Blood Culture No growth at 2 days    Blood Culture - Blood, Arm, Left [[id number]]  (Normal) Collected: 12/10/23 1810    Lab Status: Preliminary result Specimen: Blood from Arm, Left Updated: 12/12/23 1915      Blood Culture No growth at 2 days            No radiology results from the last 24 hrs    Results for orders placed during the hospital encounter of 03/10/22    Adult Transthoracic Echo Complete w/ Color, Spectral and Contrast if necessary per protocol    Interpretation Summary  · Left ventricular ejection fraction appears to be 51 - 55%. Left ventricular systolic function is normal.  · Left ventricular wall thickness is consistent with severe concentric hypertrophy.  · Mildly reduced right ventricular systolic function noted.  · The right ventricular cavity is borderline dilated.  · The right atrial cavity is moderately dilated.  · There is mild calcification of the aortic valve.  · Mild aortic valve regurgitation is present.  · Moderate to severe tricuspid valve regurgitation is present.  · Estimated right ventricular systolic pressure from tricuspid regurgitation is moderately elevated (45-55 mmHg).      Current medications:  Scheduled Meds:Pharmacy Consult, , Does not apply, Q12H  albuterol sulfate HFA, 2 puff, Inhalation, Q6H - RT  aspirin, 81 mg, Oral, Daily  carvedilol, 12.5 mg, Oral, BID With Meals  heparin (porcine), 5,000 Units, Subcutaneous, Q12H  isosorbide mononitrate, 60 mg, Oral, Daily  levothyroxine, 50 mcg, Oral, Q AM  Nirmatrelvir&Ritonavir 150/100, 2 tablet, Oral, BID  pantoprazole, 40 mg, Oral, Nightly  pravastatin, 20 mg, Oral, Nightly  sacubitril-valsartan, 1 tablet, Oral, BID  torsemide, 20 mg, Oral, Daily      Continuous Infusions:   PRN Meds:.  bisacodyl    LORazepam    melatonin    nitroglycerin    [COMPLETED] Insert Peripheral IV **AND** sodium chloride    Assessment & Plan   Assessment & Plan     Active Hospital Problems    Diagnosis  POA    **COVID-19 [U07.1]  Yes      Resolved Hospital Problems   No resolved problems to display.        Brief Hospital Course to date:  Syeda Gomez is a 89 y.o. female with past medical history of diastolic heart failure, atrial fibrillation, essential  hypertension on CKD stage III who presented to the hospital with weakness and worsening shortness of breath.  Tested positive for COVID-19    COVID19 infection    Tested positive on 12/10   Currently satting 96% on room air  Continue Paxlovid and as needed nebs   Guaifenesin and incentive spirometry     HFpEF, EF 55   CXR with mild volume overload.  Appears euvolemic clinically  Continue p.o. torsemide     CKD stage III  Creat around baseline of 1.2   Monitor BMP     Debility  PT and OT   Discussed with the patient and her daughter at bedside, patient agreeable to acute rehab    Acute delirium  And melatonin and Seroquel to bedtime to help regulate sleep cycle  Check B12, folate and TSH      Expected Discharge Location and Transportation: Home  Expected Discharge   Expected Discharge Date: 12/13/2023; Expected Discharge Time:      DVT prophylaxis:  Medical DVT prophylaxis orders are present.     AM-PAC 6 Clicks Score (PT): 16 (12/12/23 0500)    CODE STATUS:   Code Status and Medical Interventions:   Ordered at: 12/10/23 1941     Medical Intervention Limits:    NO intubation (DNI)     Level Of Support Discussed With:    Patient     Code Status (Patient has no pulse and is not breathing):    No CPR (Do Not Attempt to Resuscitate)     Medical Interventions (Patient has pulse or is breathing):    Limited Support     Copied text in this note has been reviewed and is accurate as of 12/13/23.     Adwoa Vaughan MD  12/13/23

## 2023-12-13 NOTE — PLAN OF CARE
Goal Outcome Evaluation:   Pt remains on room air.vss,confusion through the night,continue with poc

## 2023-12-13 NOTE — NURSING NOTE
"Pt up out of bed walking in room the patient pulled out iv ,combative \"looking for my hearing aide'rn called charge going to call for sitter order . House supervisor looking for sitter. Pt will not cooperate with staff.  "

## 2023-12-13 NOTE — CASE MANAGEMENT/SOCIAL WORK
Continued Stay Note  [facility]     Patient Name: Syeda Gomez  MRN: [id number]  Today's Date: 12/13/2023    Admit Date: 12/10/2023    Plan: SNF   Discharge Plan       Row Name 12/13/23 1416       Plan    Plan SNF    Patient/Family in Agreement with Plan yes    Plan Comments CM received a call from [facility] with [facility]. Skilled bed offered once patient is out of isolation on 12/20. Patient has Medicare A&B and will not need insurance approval. CM will continue to follow.    Final Discharge Disposition Code 03 - skilled nursing facility (SNF)                   Discharge Codes    No documentation.                 Expected Discharge Date and Time       Expected Discharge Date Expected Discharge Time    Dec 20, 2023               Christine Epstein RN

## 2023-12-13 NOTE — PLAN OF CARE
Goal Outcome Evaluation:  Plan of Care Reviewed With: patient            SLP treatment completed. Will continue to address dysphagia. Please see note for further details and recommendations.       Anticipated Discharge Disposition (SLP): home with assist

## 2023-12-13 NOTE — THERAPY TREATMENT NOTE
Acute Care - Speech Language Pathology   Swallow Treatment Note  [place]     Patient Name: Syeda Gomez  : 1927  MRN: [id number]  Today's Date: 2023               Admit Date: 12/10/2023    Visit Dx:     ICD-10-CM ICD-9-CM   1. COVID-19  U07.1 079.89   2. Pneumonia of left lower lobe due to infectious organism  J18.9 486   3. Dysphagia, unspecified type  R13.10 787.20     Patient Active Problem List   Diagnosis    Pneumonia due to COVID-19 virus    Pancytopenia    Presence of cardiac pacemaker    Atrial fibrillation    Prolonged QTc interval on ECG    Hypothyroidism (acquired)    HTN (hypertension)    HLD (hyperlipidemia)    Elective replacement indicated for pacemaker    CHF (congestive heart failure)    Anemia    Recurrent right pleural effusion    COVID-19     Past Medical History:   Diagnosis Date    A-fib     Arthritis     Cancer     - left breast    Carpal tunnel syndrome     CHF (congestive heart failure)     Detached retina     right side    Disease of thyroid gland     Dizzy     Full dentures     GERD (gastroesophageal reflux disease)     Hearing aid worn     bilat    Heart murmur     History of transfusion     x4 just this year- never had reaction    [place] (hard of hearing)     bilat hearinhg aids    Hypertension     Infection     right knee- had infection 15 to 20 years ago- unsure if mrsa or not    Pleural effusion     Rheumatic fever     SOBOE (shortness of breath on exertion)     UTI (urinary tract infection)     Wears glasses      Past Surgical History:   Procedure Laterality Date    APPENDECTOMY      CARDIAC ELECTROPHYSIOLOGY PROCEDURE N/A 2021    Procedure: DEVICE IMPLANT;  Surgeon: Calvin Ventura MD;  Location: [facility] INVASIVE LOCATION;  Service: Cardiovascular;  Laterality: N/A;    CARPAL TUNNEL RELEASE Bilateral     CATARACT EXTRACTION, BILATERAL      CHOLECYSTECTOMY      COLONOSCOPY      HYSTERECTOMY      MASTECTOMY Left     OTHER SURGICAL HISTORY      x2 fluid  removed from right side    PLEURAL CATHETER INSERTION Right 4/4/2022    Procedure: PLEURX CATHETER INSERTION;  Surgeon: Fidel Carter MD;  Location: [facility];  Service: Cardiothoracic;  Laterality: Right;    PORTACATH PLACEMENT Left     REPLACEMENT TOTAL KNEE BILATERAL      RETINAL DETACHMENT SURGERY Right     buckle in place    SHOULDER ROTATOR CUFF REPAIR Right     TOTAL HIP ARTHROPLASTY Right     TRIGGER FINGER RELEASE Right     thinks right side       SLP Recommendation and Plan     SLP Diet Recommendation: puree, thin liquids (12/13/23 1010)  Recommended Precautions and Strategies: upright posture during/after eating, small bites of food and sips of liquid (12/13/23 1010)  SLP Rec. for Method of Medication Administration: meds whole, meds crushed, with puree, as tolerated (12/13/23 1010)     Monitor for Signs of Aspiration: notify SLP if any concerns (12/13/23 1010)        Anticipated Discharge Disposition (SLP): home with assist (12/13/23 1010)     Therapy Frequency (Swallow): PRN (12/13/23 1010)  Predicted Duration Therapy Intervention (Days): until discharge (12/13/23 1010)  Oral Care Recommendations: Oral Care BID/PRN, Denture Care, Toothbrush (12/13/23 1010)        Daily Summary of Progress (SLP): progress toward functional goals as expected (12/13/23 1010)               Treatment Assessment (SLP): toleration of diet (12/13/23 1010)  Treatment Assessment Comments (SLP): No overt clinical s/sxs aspiration/distress noted w/ thin or puree. Pt declined solid trial 2' throat pain. SLP will f/u for re-assessment w/ solid to determine if safe for diet upgrade. (12/13/23 1010)  Plan for Continued Treatment (SLP): continue treatment per plan of care (12/13/23 1010)       Oral Care Recommendations: Oral Care BID/PRN, Denture Care, Toothbrush (12/13/23 1010)    Plan of Care Reviewed With: patient      SWALLOW EVALUATION (last 72 hours)       SLP Adult Swallow Evaluation       Row Name 12/13/23 1010 12/12/23 1025                 Rehab Evaluation    Document Type therapy note (daily note)  - evaluation  -       Subjective Information no complaints  - complains of;pain  sore throat  -       Patient Observations alert;cooperative  - alert;cooperative;agree to therapy  -       Patient/Family/Caregiver Comments/Observations No family present  -MP daughter present  -       Patient Effort good  -MP adequate  -       Comment Confused, PCT in room w/ pt  -MP --       Symptoms Noted During/After Treatment -- none  -          General Information    Patient Profile Reviewed -- yes  -       Pertinent History Of Current Problem -- 89 y.o. female with past medical history of diastolic heart failure, atrial fibrillation, essential hypertension on CKD stage III who presented to the hospital with weakness and worsening shortness of breath. Covid +, CXR: Left lower lobe airspace opacity is suspicious for pneumonia, with questionable small left pleural effusion. Findings superimposed upon chronic/senescent changes of the lungs. Patients baseline diet is soft to chew and thin liquids  -       Current Method of Nutrition -- soft to chew textures;thin liquids  -       Precautions/Limitations, Vision -- WFL;for purposes of eval  -       Precautions/Limitations, Hearing -- WFL;for purposes of eval  -       Prior Level of Function-Communication -- unknown  -       Prior Level of Function-Swallowing -- soft to chew;thin liquids  -       Plans/Goals Discussed with -- patient and family;agreed upon  -       Barriers to Rehab -- none identified  -       Patient's Goals for Discharge -- patient did not state  -          Pain    Additional Documentation Pain Scale: FACES Pre/Post-Treatment (Group)  -MP Pain Scale: FACES Pre/Post-Treatment (Group)  -          Pain Scale: FACES Pre/Post-Treatment    Pain: FACES Scale, Pretreatment 0-->no hurt  - 4-->hurts little more  -       Posttreatment Pain Rating 0-->no hurt   -MP 4-->hurts little more  -       Pain Location -- generalized  -       Pain Location - -- throat  -       Pre/Posttreatment Pain Comment -- Sore throat as covid symptom. C/o soreness when swallowing  -          Oral Motor Structure and Function    Dentition Assessment -- upper dentures/partial in place;other (see comments)  does not have lower dentures here. Unable to wear as they are ill fitting.  -       Secretion Management -- WNL/WFL  -       Mucosal Quality -- moist, healthy  -       Volitional Swallow -- WFL;other (see comments)  patinet hesitant to swallow d/t discomfort  -          General Eating/Swallowing Observations    Respiratory Support Currently in Use -- room air  -       Eating/Swallowing Skills -- fed by SLP  -       Positioning During Eating -- upright 90 degree;upright in bed  -       Utensils Used -- spoon;cup;straw  -       Consistencies Trialed -- ice chips;thin liquids;pureed  solids not tested d/t throat pain with swallowing. Dtr felt patient would not be able to masticate them.  -       Pre SpO2 (%) -- 96  -CH       Post SpO2 (%) -- 95  -CH          Respiratory    Respiratory Status -- WFL;room air  -          Clinical Swallow Eval    Oral Prep Phase -- impaired  -       Oral Transit -- WFL  -       Oral Residue -- WFL  -       Pharyngeal Phase -- no overt signs/symptoms of pharyngeal impairment  -       Esophageal Phase -- unremarkable  -       Clinical Swallow Evaluation Summary -- Prolonged mastication with ice trials d/t missing dentition. Delayed oral transit, however patient reluctant/hesitant to swallow d/t throat pain. No overt clinical s/s of aspiration with any consistency. Recommend pureed and thin liquids. General aspiration precautions. Meds crushed in pureed.  -          Oral Prep Concerns    Oral Prep Concerns -- prolonged mastication;inefficient mastication;increased prep time  -       Prolonged Mastication -- regular consistencies   -       Inefficient Mastication -- regular consistencies  -       Increased Prep Time -- regular consistencies  -          Swallowing Quality of Life Assessment    Education and counseling provided -- Signs of aspiration;Risks of aspiration;Oral care recommendations and rationale  -          SLP Evaluation Clinical Impression    SLP Swallowing Diagnosis -- mild;oral dysphagia;R/O pharyngeal dysphagia  -       Functional Impact -- risk of aspiration/pneumonia;risk of malnutrition;risk of dehydration  -       Rehab Potential/Prognosis, Swallowing -- good, to achieve stated therapy goals  -       Swallow Criteria for Skilled Therapeutic Interventions Met -- demonstrates skilled criteria  -          SLP Treatment Clinical Impressions    Treatment Assessment (SLP) toleration of diet  -MP --       Treatment Assessment Comments (SLP) No overt clinical s/sxs aspiration/distress noted w/ thin or puree. Pt declined solid trial 2' throat pain. SLP will f/u for re-assessment w/ solid to determine if safe for diet upgrade.  -MP --       Daily Summary of Progress (SLP) progress toward functional goals as expected  -MP --       Barriers to Overall Progress (SLP) Cognitive status  -MP --       Plan for Continued Treatment (SLP) continue treatment per plan of care  -MP --       Care Plan Review care plan/treatment goals reviewed;patient/other agree to care plan  -MP --          Recommendations    Therapy Frequency (Swallow) PRN  -MP PRN  -       Predicted Duration Therapy Intervention (Days) until discharge  -MP until discharge  -       SLP Diet Recommendation puree;thin liquids  -MP puree;thin liquids  -       Recommended Diagnostics -- other (see comments)  diet tolerance, adj  -       Recommended Precautions and Strategies upright posture during/after eating;small bites of food and sips of liquid  - upright posture during/after eating;small bites of food and sips of liquid  -       Oral Care  Recommendations Oral Care BID/PRN;Denture Care;Toothbrush  -MP Oral Care BID/PRN;Denture Care;Toothbrush  -CH       SLP Rec. for Method of Medication Administration meds whole;meds crushed;with puree;as tolerated  -MP meds whole;meds crushed;with puree;as tolerated  -CH       Monitor for Signs of Aspiration notify SLP if any concerns  -MP notify SLP if any concerns  -CH       Anticipated Discharge Disposition (SLP) home with assist  -MP home with assist  -CH                 User Key  (r) = Recorded By, (t) = Taken By, (c) = Cosigned By      Initials Name Effective Dates    MP Marcial Jerez, MS CCC-SLP 12/28/21 -     CH Sushila De La Cruz MS CCC-SLP 06/16/21 -                     EDUCATION  The patient has been educated in the following areas:   Dysphagia (Swallowing Impairment) Modified Diet Instruction.        SLP GOALS       Row Name 12/13/23 1010 12/12/23 1025          (LTG) Patient will demonstrate functional swallow for    Diet Texture (Demonstrate functional swallow) soft to chew (whole) textures  -MP soft to chew (whole) textures  -CH     Liquid viscosity (Demonstrate functional swallow) thin liquids  -MP thin liquids  -CH     [place] (Demonstrate functional swallow) with minimal cues (75-90% accuracy)  -MP with minimal cues (75-90% accuracy)  -CH     Time Frame (Demonstrate functional swallow) by discharge  -MP by discharge  -CH     Progress/Outcomes (Demonstrate functional swallow) continuing progress toward goal  -MP --     Comment (Demonstrate functional swallow) Pt declined solid trials today  -MP --        (STG) Patient will tolerate trials of    Consistencies Trialed (Tolerate trials) pureed textures;thin liquids  -MP pureed textures;thin liquids  -CH     Desired Outcome (Tolerate trials) without signs/symptoms of aspiration;with adequate oral prep/transit/clearance  -MP without signs/symptoms of aspiration;with adequate oral prep/transit/clearance  -CH     [place] (Tolerate trials)  with minimal cues (75-90% accuracy)  -MP with minimal cues (75-90% accuracy)  -CH     Time Frame (Tolerate trials) by discharge  -MP by discharge  -CH     Progress/Outcomes (Tolerate trials) good progress toward goal  -MP --     Comment (Tolerate trials) No overt clinical s/sxs aspiration w/ thin/puree  -MP --               User Key  (r) = Recorded By, (t) = Taken By, (c) = Cosigned By      Initials Name Provider Type    Marcial Womack MS CCC-SLP Speech and Language Pathologist     Sushila De La Cruz MS CCC-SLP Speech and Language Pathologist                       Time Calculation:    Time Calculation- SLP       Row Name 12/13/23 1123             Time Calculation- SLP    SLP Start Time 1010  -MP      SLP Received On 12/13/23  -MP         Untimed Charges    02507-LH Treatment Swallow Minutes 24  -MP         Total Minutes    Untimed Charges Total Minutes 24  -MP       Total Minutes 24  -MP                User Key  (r) = Recorded By, (t) = Taken By, (c) = Cosigned By      Initials Name Provider Type     Marcial Jerez MS CCC-SLP Speech and Language Pathologist                    Therapy Charges for Today       Code Description Service Date Service Provider Modifiers Qty    [id number] HC ST TREATMENT SWALLOW 2 12/13/2023 Marcial Jerez MS CCC-SLP GN 1                 Marcial Rodriguez MS CCC-CAPRI  12/13/2023

## 2023-12-13 NOTE — NURSING NOTE
Rn spoke with pt daughter who called to check on her mother.rn informed her of pt confusioon and combativeness.informed her of safety sitter in bedside

## 2023-12-13 NOTE — PLAN OF CARE
Goal Outcome Evaluation:              Outcome Evaluation: remains on RA. VSS. alert and oriented X 3 and 4 at times but delirous most of the day. No acute events during the shift.

## 2023-12-13 NOTE — NURSING NOTE
Pt remains confused will not leave pulse ox on or cardiac monitor on . Rn did dressing change to right neck and left upper arm at biopsy sites. Pt pulled off neck dressing within seconds of rn putting on. Keeps taking off her hearing aides as well.pt also swinging at rn with her arms unable to put back on monitor. Will retry again soon to put monitor back on her.

## 2023-12-14 NOTE — PLAN OF CARE
Goal Outcome Evaluation:   VSS throughout shift.  Sitter discontinued around noon today and she was moved closer to nurses station. Still very confused. Plan is to go to rehab when medically stable.

## 2023-12-14 NOTE — THERAPY TREATMENT NOTE
Patient Name: Syeda Gomez  : 1927    MRN: [id number]                              Today's Date: 2023       Admit Date: 12/10/2023    Visit Dx:     ICD-10-CM ICD-9-CM   1. COVID-19  U07.1 079.89   2. Pneumonia of left lower lobe due to infectious organism  J18.9 486   3. Dysphagia, unspecified type  R13.10 787.20     Patient Active Problem List   Diagnosis    Pneumonia due to COVID-19 virus    Pancytopenia    Presence of cardiac pacemaker    Atrial fibrillation    Prolonged QTc interval on ECG    Hypothyroidism (acquired)    HTN (hypertension)    HLD (hyperlipidemia)    Elective replacement indicated for pacemaker    CHF (congestive heart failure)    Anemia    Recurrent right pleural effusion    COVID-19     Past Medical History:   Diagnosis Date    A-fib     Arthritis     Cancer     - left breast    Carpal tunnel syndrome     CHF (congestive heart failure)     Detached retina     right side    Disease of thyroid gland     Dizzy     Full dentures     GERD (gastroesophageal reflux disease)     Hearing aid worn     bilat    Heart murmur     History of transfusion     x4 just this year- never had reaction    [place] (hard of hearing)     bilat hearinhg aids    Hypertension     Infection     right knee- had infection 15 to 20 years ago- unsure if mrsa or not    Pleural effusion     Rheumatic fever     SOBOE (shortness of breath on exertion)     UTI (urinary tract infection)     Wears glasses      Past Surgical History:   Procedure Laterality Date    APPENDECTOMY      CARDIAC ELECTROPHYSIOLOGY PROCEDURE N/A 2021    Procedure: DEVICE IMPLANT;  Surgeon: Calvin Ventura MD;  Location: [facility] INVASIVE LOCATION;  Service: Cardiovascular;  Laterality: N/A;    CARPAL TUNNEL RELEASE Bilateral     CATARACT EXTRACTION, BILATERAL      CHOLECYSTECTOMY      COLONOSCOPY      HYSTERECTOMY      MASTECTOMY Left     OTHER SURGICAL HISTORY      x2 fluid removed from right side    PLEURAL CATHETER INSERTION Right  4/4/2022    Procedure: PLEURX CATHETER INSERTION;  Surgeon: Fidel Carter MD;  Location: [facility];  Service: Cardiothoracic;  Laterality: Right;    PORTACATH PLACEMENT Left     REPLACEMENT TOTAL KNEE BILATERAL      RETINAL DETACHMENT SURGERY Right     buckle in place    SHOULDER ROTATOR CUFF REPAIR Right     TOTAL HIP ARTHROPLASTY Right     TRIGGER FINGER RELEASE Right     thinks right side      General Information       Row Name 12/14/23 1404          Physical Therapy Time and Intention    Document Type therapy note (daily note)  -ML     Mode of Treatment physical therapy  -       Row Name 12/14/23 1405          General Information    Patient Profile Reviewed yes  -ML     Existing Precautions/Restrictions fall  -ML     Barriers to Rehab previous functional deficit;cognitive status  -ML       Row Name 12/14/23 1405          Cognition    Orientation Status (Cognition) oriented x 3  -ML       Row Name 12/14/23 1400          Safety Issues, Functional Mobility    Safety Issues Affecting Function (Mobility) awareness of need for assistance;insight into deficits/self-awareness;judgment;problem-solving;safety precaution awareness;safety precautions follow-through/compliance;sequencing abilities  -ML     Impairments Affecting Function (Mobility) balance;endurance/activity tolerance;shortness of breath;strength  -ML               User Key  (r) = Recorded By, (t) = Taken By, (c) = Cosigned By      Initials Name Provider Type     Halina Mojica Physical Therapist                   Mobility       Row Name 12/14/23 1403          Bed Mobility    Bed Mobility sit-supine  -ML     Sit-Supine [place] (Bed Mobility) verbal cues;minimum assist (75% patient effort);1 person assist  -       Row Name 12/14/23 1407          Sit-Stand Transfer    Sit-Stand [place] (Transfers) contact guard;verbal cues  -     Assistive Device (Sit-Stand Transfers) walker, front-wheeled  -       Row Name 12/14/23 1409          Gait/Stairs  (Locomotion)    [place] Level (Gait) verbal cues;contact guard  -ML     Assistive Device (Gait) walker, front-wheeled  -ML     Distance in Feet (Gait) 15 + 50  -ML     Deviations/Abnormal Patterns (Gait) bilateral deviations;vangie decreased;gait speed decreased;stride length decreased  -ML     Bilateral Gait Deviations forward flexed posture;heel strike decreased  -ML               User Key  (r) = Recorded By, (t) = Taken By, (c) = Cosigned By      Initials Name Provider Type    Halina Corona Physical Therapist                   Obj/Interventions       Row Name 12/14/23 1408          Balance    Balance Assessment sitting static balance;sitting dynamic balance;sit to stand dynamic balance;standing static balance;standing dynamic balance  -ML     Static Sitting Balance standby assist  -ML     Dynamic Sitting Balance contact guard  -ML     Position, Sitting Balance unsupported;sitting edge of bed  -ML     Sit to Stand Dynamic Balance contact guard  -ML     Static Standing Balance contact guard  -ML     Dynamic Standing Balance contact guard  -ML     Position/Device Used, Standing Balance supported;walker, front-wheeled  -ML     Balance Interventions sitting;standing;sit to stand;supported;occupation based/functional task  -ML               User Key  (r) = Recorded By, (t) = Taken By, (c) = Cosigned By      Initials Name Provider Type    Halina Corona Physical Therapist                   Goals/Plan    No documentation.                  Clinical Impression       Row Name 12/14/23 1409          Pain    Pretreatment Pain Rating 0/10 - no pain  -ML     Posttreatment Pain Rating 0/10 - no pain  -ML       Row Name 12/14/23 1409          Plan of Care Review    Plan of Care Reviewed With patient;daughter  -ML     Progress improving  -ML     Outcome Evaluation Patient increased ambulation distance compared to previous treatment session, however, fatigues quickly. The patient continues to present below baseline for  mobility and would continue to benefit from skilled PT to address strength, balance and activity tolerance deficits. Continue current PT POC.  -ML       Row Name 12/14/23 1409          Vital Signs    Pre Patient Position Sitting  -ML     Intra Patient Position Standing  -ML     Post Patient Position Supine  -ML       Row Name 12/14/23 1409          Positioning and Restraints    Pre-Treatment Position sitting in chair/recliner  -ML     Post Treatment Position bed  -ML     In Bed notified nsg;fowlers;call light within reach;encouraged to call for assist;exit alarm on;with family/caregiver;with other staff;side rails up x3  -ML               User Key  (r) = Recorded By, (t) = Taken By, (c) = Cosigned By      Initials Name Provider Type    Halina Corona Physical Therapist                   Outcome Measures       Row Name 12/14/23 1411          How much help from another person do you currently need...    Turning from your back to your side while in flat bed without using bedrails? 3  -ML     Moving from lying on back to sitting on the side of a flat bed without bedrails? 3  -ML     Moving to and from a bed to a chair (including a wheelchair)? 3  -ML     Standing up from a chair using your arms (e.g., wheelchair, bedside chair)? 3  -ML     Climbing 3-5 steps with a railing? 3  -ML     To walk in hospital room? 3  -ML     AM-PAC 6 Clicks Score (PT) 18  -ML     Highest Level of Mobility Goal 6 --> Walk 10 steps or more  -ML       Row Name 12/14/23 1411 12/14/23 1352       Functional Assessment    Outcome Measure Options AM-PAC 6 Clicks Basic Mobility (PT)  -ML AM-PAC 6 Clicks Daily Activity (OT)  -KF              User Key  (r) = Recorded By, (t) = Taken By, (c) = Cosigned By      Initials Name Provider Type    Halina Corona Physical Therapist    Traci Harmon OT Occupational Therapist                                 Physical Therapy Education       Title: PT OT SLP Therapies (In Progress)       Topic: Physical  Therapy (In Progress)       Point: Mobility training (Done)       Learning Progress Summary             Patient Acceptance, E, VU,NR by ML at 12/14/2023 1411    Acceptance, E,TB, NR by ES at 12/11/2023 0920   Family Acceptance, E, VU,NR by ML at 12/14/2023 1411                         Point: Home exercise program (Not Started)       Learner Progress:  Not documented in this visit.              Point: Body mechanics (In Progress)       Learning Progress Summary             Patient Acceptance, E,TB, NR by ES at 12/11/2023 0920                         Point: Precautions (Done)       Learning Progress Summary             Patient Acceptance, E, VU,NR by ML at 12/14/2023 1411    Acceptance, E,TB, NR by ES at 12/11/2023 0920   Family Acceptance, E, VU,NR by ML at 12/14/2023 1411                                         User Key       Initials Effective Dates Name Provider Type Discipline    ML 04/22/21 -  Halina Mojica Physical Therapist PT    ES 08/11/22 -  Kalie Padron PT Physical Therapist PT                  PT Recommendation and Plan     Plan of Care Reviewed With: patient, daughter  Progress: improving  Outcome Evaluation: Patient increased ambulation distance compared to previous treatment session, however, fatigues quickly. The patient continues to present below baseline for mobility and would continue to benefit from skilled PT to address strength, balance and activity tolerance deficits. Continue current PT POC.     Time Calculation:         PT Charges       Row Name 12/14/23 1412             Time Calculation    Start Time 1330  -ML      PT Received On 12/14/23  -ML         Timed Charges    31323 - PT Therapeutic Activity Minutes 15  -ML         Total Minutes    Timed Charges Total Minutes 15  -ML       Total Minutes 15  -ML                User Key  (r) = Recorded By, (t) = Taken By, (c) = Cosigned By      Initials Name Provider Type    ML Halina Mojica Physical Therapist                  Therapy Charges for  Today       Code Description Service Date Service Provider Modifiers Qty    07433643667 HC PT THERAPEUTIC ACT EA 15 MIN 12/14/2023 Halina Mojica GP 1            PT G-Codes  Outcome Measure Options: AM-PAC 6 Clicks Basic Mobility (PT)  AM-PAC 6 Clicks Score (PT): 18  AM-PAC 6 Clicks Score (OT): 18  PT Discharge Summary  Anticipated Discharge Disposition (PT): skilled nursing facility    Halian Mojica  12/14/2023

## 2023-12-14 NOTE — PROGRESS NOTES
[facility] Medicine Services  PROGRESS NOTE    Patient Name: Syeda Gomez  : 1927  MRN: [id number]    Date of Admission: 12/10/2023  Primary Care Physician: Omayra Alcazar DO    Subjective   Subjective     CC:  Follow-up for COVID-19 infection    HPI:  Patient seen and examined this morning.  Was delirious yesterday.  Slept well overnight.  Currently no acute complaints    Objective   Objective     Vital Signs:   Temp:  [96 °F (35.6 °C)-97.6 °F (36.4 °C)] 97.6 °F (36.4 °C)  Heart Rate:  [64-89] 89  Resp:  [16-18] 17  BP: (107-150)/(54-88) 125/70     Physical Exam:  General: Comfortable, not in distress, conversant and cooperative  Head: Atraumatic and normocephalic  Eyes: No Icterus. No pallor  Ears:  Ears appear intact with no abnormalities noted  Throat: No oral lesions, no thrush  Neck: Supple, trachea midline  Lungs: Clear to auscultation bilaterally, equal air entry, no wheezing or crackles  Heart:  Normal S1 and S2, no murmur, no gallop, No JVD, no lower extremity swelling  Abdomen:  Soft, no tenderness, no organomegaly, normal bowel sounds, no organomegaly  Extremities: pulses equal bilaterally  Skin: No bleeding, bruising or rash, normal skin turgor and elasticity  Neurologic: Cranial nerves appear intact with no evidence of facial asymmetry, normal motor and sensory functions in all 4 extremities    Results Reviewed:  LAB RESULTS:      Lab 23  0338 23  0445 23  0401 12/10/23  1559   WBC 8.55 4.03 5.45 7.04   HEMOGLOBIN 12.4 10.9* 10.3* 10.7*   HEMATOCRIT 37.3 33.3* 31.7* 33.1*   PLATELETS 193 138* 124* 130*   NEUTROS ABS 7.84* 3.67 4.69 6.20   IMMATURE GRANS (ABS) 0.06* 0.01 0.04 0.05   LYMPHS ABS 0.32* 0.25* 0.33* 0.33*   MONOS ABS 0.33 0.10 0.37 0.43   EOS ABS 0.00 0.00 0.00 0.00   MCV 84.8 85.6 87.1 89.2   CRP 0.64* 1.16* 1.74* 1.24*   PROCALCITONIN  --   --   --  0.10   LACTATE  --   --   --  1.5   LDH  --   --   --  315*   PROTIME  --   --    --  17.9*   APTT  --   --   --  28.4   D DIMER QUANT  --   --   --  0.90         Lab 12/13/23  0338 12/12/23 0445 12/11/23  0401 12/10/23  1559   SODIUM 134* 132* 136 138   POTASSIUM 3.3* 3.5 3.0* 3.6   CHLORIDE 95* 95* 98 98   CO2 23.0 25.0 26.0 28.0   ANION GAP 16.0* 12.0 12.0 12.0   BUN 27* 27* 21 21   CREATININE 0.98 1.13* 1.08* 1.25*   EGFR 52.9* 44.6* 47.1* 39.5*   GLUCOSE 248* 344* 252* 268*   CALCIUM 8.0* 7.9* 7.9* 8.5   MAGNESIUM  --  2.2 1.9  --    PHOSPHORUS  --  3.2  --   --    TSH 4.450*  --   --   --          Lab 12/13/23 0338 12/12/23 0445 12/11/23  0401 12/10/23  1559   TOTAL PROTEIN 5.9* 5.1* 5.2* 5.9*   ALBUMIN 3.8 3.4* 3.5 3.9   GLOBULIN 2.1 1.7 1.7 2.0   ALT (SGPT) 26 22 28 43*   AST (SGOT) 21 18 26 61*   BILIRUBIN 0.6 0.5 0.6 0.9   ALK PHOS 127* 125* 123* 145*         Lab 12/10/23  1837 12/10/23  1559   PROBNP  --  11,750.0*   HSTROP T 93* 86*   PROTIME  --  17.9*   INR  --  1.46*             Lab 12/10/23  1559   FERRITIN 326.90*         Lab 12/12/23  1605   PH, ARTERIAL 7.477*   PCO2, ARTERIAL 36.2   PO2 ART 78.9*   FIO2 21   HCO3 ART 26.8*   BASE EXCESS ART 3.2*   CARBOXYHEMOGLOBIN 1.7     Brief Urine Lab Results  (Last result in the past 365 days)        Color   Clarity   Blood   Leuk Est   Nitrite   Protein   CREAT   Urine HCG        09/13/23 1321 Yellow   Clear   Negative   Negative   Negative   Trace                   Microbiology Results Abnormal       Procedure Component Value - Date/Time    Blood Culture - Blood, Wrist, Left [[id number]]  (Normal) Collected: 12/10/23 1825    Lab Status: Preliminary result Specimen: Blood from Wrist, Left Updated: 12/13/23 1916     Blood Culture No growth at 3 days    Blood Culture - Blood, Arm, Left [[id number]]  (Normal) Collected: 12/10/23 1810    Lab Status: Preliminary result Specimen: Blood from Arm, Left Updated: 12/13/23 1916     Blood Culture No growth at 3 days            XR Chest 1 View    Result Date: 12/13/2023  XR CHEST 1 VW Date of Exam:  12/13/2023 12:55 AM EST Indication: Follow-up Comparison: 12/10/2023 Findings: The trachea is midline. There is cardiomegaly. There is a dual lead right pacemaker in place. Left internal jugular chest port in place. Mild interval improved aeration to the left lung base with decreased atelectasis or pneumonia. No significant pleural  effusions     Impression: Impression: Interval decrease in left lower lobe atelectasis or pneumonia. Electronically Signed: Jhon Segovia MD  12/13/2023 7:13 AM EST  Workstation ID: [id number]     Results for orders placed during the hospital encounter of 03/10/22    Adult Transthoracic Echo Complete w/ Color, Spectral and Contrast if necessary per protocol    Interpretation Summary  · Left ventricular ejection fraction appears to be 51 - 55%. Left ventricular systolic function is normal.  · Left ventricular wall thickness is consistent with severe concentric hypertrophy.  · Mildly reduced right ventricular systolic function noted.  · The right ventricular cavity is borderline dilated.  · The right atrial cavity is moderately dilated.  · There is mild calcification of the aortic valve.  · Mild aortic valve regurgitation is present.  · Moderate to severe tricuspid valve regurgitation is present.  · Estimated right ventricular systolic pressure from tricuspid regurgitation is moderately elevated (45-55 mmHg).      Current medications:  Scheduled Meds:Pharmacy Consult, , Does not apply, Q12H  albuterol sulfate HFA, 2 puff, Inhalation, Q6H - RT  aspirin, 81 mg, Oral, Daily  carvedilol, 12.5 mg, Oral, BID With Meals  heparin (porcine), 5,000 Units, Subcutaneous, Q12H  isosorbide mononitrate, 60 mg, Oral, Daily  levothyroxine, 50 mcg, Oral, Q AM  melatonin, 10 mg, Oral, Nightly  Nirmatrelvir&Ritonavir 150/100, 2 tablet, Oral, BID  pantoprazole, 40 mg, Oral, Nightly  pravastatin, 20 mg, Oral, Nightly  QUEtiapine, 25 mg, Oral, Nightly  QUEtiapine, 25 mg, Oral, Once  sacubitril-valsartan, 1  tablet, Oral, BID  torsemide, 20 mg, Oral, Daily      Continuous Infusions:   PRN Meds:.  bisacodyl    LORazepam    midazolam    nitroglycerin    [COMPLETED] Insert Peripheral IV **AND** sodium chloride    ziprasidone    Assessment & Plan   Assessment & Plan     Active Hospital Problems    Diagnosis  POA    **COVID-19 [U07.1]  Yes      Resolved Hospital Problems   No resolved problems to display.        Brief Hospital Course to date:  Syeda Gomez is a 89 y.o. female with past medical history of diastolic heart failure, atrial fibrillation, essential hypertension on CKD stage III who presented to the hospital with weakness and worsening shortness of breath.  Tested positive for COVID-19    COVID19 infection    Tested positive on 12/10   Currently satting 96% on room air  Continue Paxlovid   Continue as needed inhalers  Guaifenesin and incentive spirometry     HFpEF, EF 55   CXR with mild volume overload.  Appears euvolemic clinically  Continue p.o. torsemide     CKD stage III  Creat around baseline of 1.2   Monitor BMP     Acute delirium  Continue melatonin and Seroquel to bedtime to help regulate sleep cycle  Normal B12, folate and TSH    Debility  PT and OT   Case management working on rehab placement      Expected Discharge Location and Transportation: Home  Expected Discharge   Expected Discharge Date: 12/20/2023; Expected Discharge Time:      DVT prophylaxis:  Medical DVT prophylaxis orders are present.     AM-PAC 6 Clicks Score (PT): 17 (12/13/23 2100)    CODE STATUS:   Code Status and Medical Interventions:   Ordered at: 12/10/23 1941     Medical Intervention Limits:    NO intubation (DNI)     Level Of Support Discussed With:    Patient     Code Status (Patient has no pulse and is not breathing):    No CPR (Do Not Attempt to Resuscitate)     Medical Interventions (Patient has pulse or is breathing):    Limited Support     Copied text in this note has been reviewed and is accurate as of 12/14/23.     [place]  Tyler Vaughan MD  12/14/23

## 2023-12-14 NOTE — PLAN OF CARE
Goal Outcome Evaluation:  Plan of Care Reviewed With: patient, daughter        Progress: improving  Outcome Evaluation: Patient increased ambulation distance compared to previous treatment session, however, fatigues quickly. The patient continues to present below baseline for mobility and would continue to benefit from skilled PT to address strength, balance and activity tolerance deficits. Continue current PT POC.      Anticipated Discharge Disposition (PT): skilled nursing facility

## 2023-12-14 NOTE — PLAN OF CARE
Goal Outcome Evaluation:  Plan of Care Reviewed With: patient, daughter        Progress: improving  Outcome Evaluation: Pt with good participation and progress toward goals during OT session. Pt performed in room ambulation to/from the bathroom using a RWx with CGA. Verbal cues provided during session for safety awareness, RWx management, and safety awareness. The pt completed toileting and grooming ADLs with CGA. The pt remains below her functional baseline with generalized weakness, decreased activity tolerance, and decreased safety awareness. OT will continue to follow to address deficits. If deemed medically appropriate, continue to recommend SNF at d/c for best functional outcome.      Anticipated Discharge Disposition (OT): skilled nursing facility

## 2023-12-14 NOTE — THERAPY TREATMENT NOTE
Patient Name: Syeda Gomez  : 1927    MRN: [id number]                              Today's Date: 2023       Admit Date: 12/10/2023    Visit Dx:     ICD-10-CM ICD-9-CM   1. COVID-19  U07.1 079.89   2. Pneumonia of left lower lobe due to infectious organism  J18.9 486   3. Dysphagia, unspecified type  R13.10 787.20     Patient Active Problem List   Diagnosis    Pneumonia due to COVID-19 virus    Pancytopenia    Presence of cardiac pacemaker    Atrial fibrillation    Prolonged QTc interval on ECG    Hypothyroidism (acquired)    HTN (hypertension)    HLD (hyperlipidemia)    Elective replacement indicated for pacemaker    CHF (congestive heart failure)    Anemia    Recurrent right pleural effusion    COVID-19     Past Medical History:   Diagnosis Date    A-fib     Arthritis     Cancer     - left breast    Carpal tunnel syndrome     CHF (congestive heart failure)     Detached retina     right side    Disease of thyroid gland     Dizzy     Full dentures     GERD (gastroesophageal reflux disease)     Hearing aid worn     bilat    Heart murmur     History of transfusion     x4 just this year- never had reaction    [place] (hard of hearing)     bilat hearinhg aids    Hypertension     Infection     right knee- had infection 15 to 20 years ago- unsure if mrsa or not    Pleural effusion     Rheumatic fever     SOBOE (shortness of breath on exertion)     UTI (urinary tract infection)     Wears glasses      Past Surgical History:   Procedure Laterality Date    APPENDECTOMY      CARDIAC ELECTROPHYSIOLOGY PROCEDURE N/A 2021    Procedure: DEVICE IMPLANT;  Surgeon: Calvin Ventura MD;  Location: [facility] INVASIVE LOCATION;  Service: Cardiovascular;  Laterality: N/A;    CARPAL TUNNEL RELEASE Bilateral     CATARACT EXTRACTION, BILATERAL      CHOLECYSTECTOMY      COLONOSCOPY      HYSTERECTOMY      MASTECTOMY Left     OTHER SURGICAL HISTORY      x2 fluid removed from right side    PLEURAL CATHETER INSERTION Right  4/4/2022    Procedure: PLEURX CATHETER INSERTION;  Surgeon: Fidel Carter MD;  Location: [facility];  Service: Cardiothoracic;  Laterality: Right;    PORTACATH PLACEMENT Left     REPLACEMENT TOTAL KNEE BILATERAL      RETINAL DETACHMENT SURGERY Right     buckle in place    SHOULDER ROTATOR CUFF REPAIR Right     TOTAL HIP ARTHROPLASTY Right     TRIGGER FINGER RELEASE Right     thinks right side      General Information       Row Name 12/14/23 1344          OT Time and Intention    Document Type therapy note (daily note)  -KF     Mode of Treatment occupational therapy  -       Row Name 12/14/23 1346          General Information    Patient Profile Reviewed yes  -KF     Existing Precautions/Restrictions fall;other (see comments)  covid+  -KF     Barriers to Rehab previous functional deficit;cognitive status  -       Row Name 12/14/23 1348          Cognition    Orientation Status (Cognition) oriented x 3  -       Row Name 12/14/23 5792          Safety Issues, Functional Mobility    Safety Issues Affecting Function (Mobility) awareness of need for assistance;insight into deficits/self-awareness;judgment;problem-solving;safety precaution awareness;safety precautions follow-through/compliance;sequencing abilities  -KF     Impairments Affecting Function (Mobility) balance;endurance/activity tolerance;shortness of breath;strength  -KF               User Key  (r) = Recorded By, (t) = Taken By, (c) = Cosigned By      Initials Name Provider Type    KF Traci Castro OT Occupational Therapist                     Mobility/ADL's       Row Name 12/14/23 6852          Bed Mobility    Comment, (Bed Mobility) Pt found up in chair, left up with PT.  -       Row Name 12/14/23 1726          Transfers    Transfers sit-stand transfer;stand-sit transfer;toilet transfer  -KF     Comment, (Transfers) Verbal cues for safety awareness and RWx management  -       Row Name 12/14/23 7152          Sit-Stand Transfer    Sit-Stand  [place] (Transfers) contact guard;verbal cues  -KF     Assistive Device (Sit-Stand Transfers) walker, front-wheeled  -KF       Row Name 12/14/23 1348          Stand-Sit Transfer    Stand-Sit [place] (Transfers) contact guard;verbal cues  -KF     Assistive Device (Stand-Sit Transfers) walker, front-wheeled  -KF       Row Name 12/14/23 1348          Toilet Transfer    Type (Toilet Transfer) sit-stand;stand-sit  -KF     [place] Level (Toilet Transfer) contact guard;verbal cues  -KF     Assistive Device (Toilet Transfer) walker, front-wheeled;commode;grab bars/safety frame  -       Row Name 12/14/23 1348          Functional Mobility    Functional Mobility- Ind. Level contact guard assist  -KF     Functional Mobility- Device walker, front-wheeled  -KF     Functional Mobility-Distance (Feet) --  In room ambulation to/from the bathroom  -       Row Name 12/14/23 1348          Activities of Daily Living    BADL Assessment/Intervention grooming;feeding;toileting  -       Row Name 12/14/23 1348          Self-Feeding Assessment/Training    [place] Level (Feeding) liquids to mouth;independent  -KF     Position (Self-Feeding) supported sitting  -       Row Name 12/14/23 1348          Grooming Assessment/Training    [place] Level (Grooming) oral care regimen;wash face, hands;set up;verbal cues  -KF     Position (Grooming) sink side;supported standing  -KF     Comment, (Grooming) Verbal cues for task initiation  -       Row Name 12/14/23 1348          Toileting Assessment/Training    [place] Level (Toileting) standby assist;contact guard assist;adjust/manage clothing;perform perineal hygiene  -KF     Assistive Devices (Toileting) commode;grab bar/safety frame  -KF     Position (Toileting) unsupported sitting;supported standing  -KF               User Key  (r) = Recorded By, (t) = Taken By, (c) = Cosigned By      Initials Name Provider Type    Traci Harmon OT Occupational Therapist                    Obj/Interventions       Row Name 12/14/23 8636          Balance    Balance Assessment sitting static balance;sitting dynamic balance;sit to stand dynamic balance;standing static balance;standing dynamic balance  -KF     Static Sitting Balance standby assist  -KF     Dynamic Sitting Balance contact guard  -KF     Position, Sitting Balance unsupported;other (see comments)  commode  -KF     Sit to Stand Dynamic Balance contact guard  -KF     Static Standing Balance contact guard  -KF     Dynamic Standing Balance contact guard  -KF     Position/Device Used, Standing Balance supported;walker, front-wheeled  -KF     Balance Interventions sitting;standing;sit to stand;supported;static;dynamic;occupation based/functional task  -KF     Comment, Balance No overt LOB, forward flexed posture during toileting  -KF               User Key  (r) = Recorded By, (t) = Taken By, (c) = Cosigned By      Initials Name Provider Type    KF Traci Castro, JANIYA Occupational Therapist                   Goals/Plan    No documentation.                  Clinical Impression       Row Name 12/14/23 1356          Pain Assessment    Pretreatment Pain Rating 0/10 - no pain  -KF     Posttreatment Pain Rating 0/10 - no pain  -KF     Pain Intervention(s) Repositioned;Ambulation/increased activity  -KF       Row Name 12/14/23 1355          Plan of Care Review    Plan of Care Reviewed With patient;daughter  -KF     Progress improving  -KF     Outcome Evaluation Pt with good participation and progress toward goals during OT session. Pt performed in room ambulation to/from the bathroom using a RWx with CGA. Verbal cues provided during session for safety awareness, RWx management, and safety awareness. The pt completed toileting and grooming ADLs with CGA. The pt remains below her functional baseline with generalized weakness, decreased activity tolerance, and decreased safety awareness. OT will continue to follow to address deficits. If  deemed medically appropriate, continue to recommend SNF at d/c for best functional outcome.  -KF       Row Name 12/14/23 1350          Therapy Assessment/Plan (OT)    Criteria for Skilled Therapeutic Interventions Met (OT) yes;skilled treatment is necessary  -KF     Therapy Frequency (OT) daily  -KF       Row Name 12/14/23 1350          Therapy Plan Review/Discharge Plan (OT)    Anticipated Discharge Disposition (OT) skilled nursing facility  -       Row Name 12/14/23 1350          Vital Signs    Pre Patient Position Sitting  -KF     Intra Patient Position Standing  -KF     Post Patient Position Standing  -KF       Row Name 12/14/23 1350          Positioning and Restraints    Pre-Treatment Position sitting in chair/recliner  -KF     Post Treatment Position other  -KF     Other Position with PT  -KF               User Key  (r) = Recorded By, (t) = Taken By, (c) = Cosigned By      Initials Name Provider Type    Traci Harmon OT Occupational Therapist                   Outcome Measures       Row Name 12/14/23 2432          How much help from another is currently needed...    Putting on and taking off regular lower body clothing? 3  -KF     Bathing (including washing, rinsing, and drying) 3  -KF     Toileting (which includes using toilet bed pan or urinal) 3  -KF     Putting on and taking off regular upper body clothing 3  -KF     Taking care of personal grooming (such as brushing teeth) 3  -KF     Eating meals 3  -KF     AM-PAC 6 Clicks Score (OT) 18  -KF       Row Name 12/14/23 [facility]          Functional Assessment    Outcome Measure Options AM-PAC 6 Clicks Daily Activity (OT)  -KF               User Key  (r) = Recorded By, (t) = Taken By, (c) = Cosigned By      Initials Name Provider Type    Traci Harmon OT Occupational Therapist                    Occupational Therapy Education       Title: PT OT SLP Therapies (In Progress)       Topic: Occupational Therapy (In Progress)       Point: ADL training (Done)        Description:   Instruct learner(s) on proper safety adaptation and remediation techniques during self care or transfers.   Instruct in proper use of assistive devices.                  Learning Progress Summary             Patient Acceptance, E,TB, VU,DU by  at 12/14/2023 1317    Acceptance, E,TB, VU,DU by  at 12/12/2023 1140                         Point: Home exercise program (Not Started)       Description:   Instruct learner(s) on appropriate technique for monitoring, assisting and/or progressing therapeutic exercises/activities.                  Learner Progress:  Not documented in this visit.              Point: Precautions (Done)       Description:   Instruct learner(s) on prescribed precautions during self-care and functional transfers.                  Learning Progress Summary             Patient Acceptance, E,TB, VU,DU by  at 12/14/2023 1317    Acceptance, E,TB, VU,DU by  at 12/12/2023 1140                         Point: Body mechanics (Done)       Description:   Instruct learner(s) on proper positioning and spine alignment during self-care, functional mobility activities and/or exercises.                  Learning Progress Summary             Patient Acceptance, E,TB, VU,DU by  at 12/14/2023 1317    Acceptance, E,TB, VU,DU by  at 12/12/2023 1140                                         User Key       Initials Effective Dates Name Provider Type Discipline     08/09/23 -  Traci Castro OT Occupational Therapist OT                  OT Recommendation and Plan  Planned Therapy Interventions (OT): activity tolerance training, adaptive equipment training, BADL retraining, functional balance retraining, IADL retraining, occupation/activity based interventions, patient/caregiver education/training, strengthening exercise, transfer/mobility retraining  Therapy Frequency (OT): daily  Plan of Care Review  Plan of Care Reviewed With: patient, daughter  Progress: improving  Outcome Evaluation: Pt  with good participation and progress toward goals during OT session. Pt performed in room ambulation to/from the bathroom using a RWx with CGA. Verbal cues provided during session for safety awareness, RWx management, and safety awareness. The pt completed toileting and grooming ADLs with CGA. The pt remains below her functional baseline with generalized weakness, decreased activity tolerance, and decreased safety awareness. OT will continue to follow to address deficits. If deemed medically appropriate, continue to recommend SNF at d/c for best functional outcome.     Time Calculation:   Evaluation Complexity (OT)  Review Occupational Profile/Medical/Therapy History Complexity: expanded/moderate complexity  Assessment, Occupational Performance/Identification of Deficit Complexity: 3-5 performance deficits  Clinical Decision Making Complexity (OT): detailed assessment/moderate complexity  Overall Complexity of Evaluation (OT): moderate complexity     Time Calculation- OT       Row Name 12/14/23 1353             Time Calculation- OT    OT Start Time 1317  -KF      OT Received On 12/14/23  -KF      OT Goal Re-Cert Due Date 12/22/23  -KF         Timed Charges    72556 - OT Therapeutic Activity Minutes 4  -KF      93984 - OT Self Care/Mgmt Minutes 9  -KF         Total Minutes    Timed Charges Total Minutes 13  -KF       Total Minutes 13  -KF                User Key  (r) = Recorded By, (t) = Taken By, (c) = Cosigned By      Initials Name Provider Type    KF Traci Castro OT Occupational Therapist                  Therapy Charges for Today       Code Description Service Date Service Provider Modifiers Qty    [id number] HC OT SELF CARE/MGMT/TRAIN EA 15 MIN 12/14/2023 Traci Castro OT GO 1                 Traci Castro OT  12/14/2023

## 2023-12-15 NOTE — PLAN OF CARE
Goal Outcome Evaluation:  Plan of Care Reviewed With: patient, daughter        Progress: improving  Outcome Evaluation: VSS. RA. A fib. Pt had some increased cough. MD made aware. No pain. Continue POC.

## 2023-12-15 NOTE — PROGRESS NOTES
[facility] Medicine Services  PROGRESS NOTE    Patient Name: Syeda Gomez  : 1927  MRN: [id number]    Date of Admission: 12/10/2023  Primary Care Physician: Omayra Alcazar DO    Subjective   Subjective     CC:  Follow-up for COVID-19 infection    HPI:  Patient seen and examined this morning. Comfortable. On RA , satting 92%. Delirium improved. Awaiting rehab    Objective   Objective     Vital Signs:   Temp:  [96 °F (35.6 °C)-98.2 °F (36.8 °C)] 97.6 °F (36.4 °C)  Heart Rate:  [54-68] 64  Resp:  [16-18] 18  BP: (108-152)/() 129/69     Physical Exam:  General: Comfortable, not in distress, conversant and cooperative  Head: Atraumatic and normocephalic  Eyes: No Icterus. No pallor  Ears:  Ears appear intact with no abnormalities noted  Throat: No oral lesions, no thrush  Neck: Supple, trachea midline  Lungs: Clear to auscultation bilaterally, equal air entry, no wheezing or crackles  Heart:  Normal S1 and S2, no murmur, no gallop, No JVD, no lower extremity swelling  Abdomen:  Soft, no tenderness, no organomegaly, normal bowel sounds, no organomegaly  Extremities: pulses equal bilaterally  Skin: No bleeding, bruising or rash, normal skin turgor and elasticity  Neurologic: Cranial nerves appear intact with no evidence of facial asymmetry, normal motor and sensory functions in all 4 extremities    Results Reviewed:  LAB RESULTS:      Lab 23  0338 23  0445 23  0401 12/10/23  1559   WBC 8.55 4.03 5.45 7.04   HEMOGLOBIN 12.4 10.9* 10.3* 10.7*   HEMATOCRIT 37.3 33.3* 31.7* 33.1*   PLATELETS 193 138* 124* 130*   NEUTROS ABS 7.84* 3.67 4.69 6.20   IMMATURE GRANS (ABS) 0.06* 0.01 0.04 0.05   LYMPHS ABS 0.32* 0.25* 0.33* 0.33*   MONOS ABS 0.33 0.10 0.37 0.43   EOS ABS 0.00 0.00 0.00 0.00   MCV 84.8 85.6 87.1 89.2   CRP 0.64* 1.16* 1.74* 1.24*   PROCALCITONIN  --   --   --  0.10   LACTATE  --   --   --  1.5   LDH  --   --   --  315*   PROTIME  --   --   --  17.9*    APTT  --   --   --  28.4   D DIMER QUANT  --   --   --  0.90         Lab 12/13/23 0338 12/12/23 0445 12/11/23  0401 12/10/23  1559   SODIUM 134* 132* 136 138   POTASSIUM 3.3* 3.5 3.0* 3.6   CHLORIDE 95* 95* 98 98   CO2 23.0 25.0 26.0 28.0   ANION GAP 16.0* 12.0 12.0 12.0   BUN 27* 27* 21 21   CREATININE 0.98 1.13* 1.08* 1.25*   EGFR 52.9* 44.6* 47.1* 39.5*   GLUCOSE 248* 344* 252* 268*   CALCIUM 8.0* 7.9* 7.9* 8.5   MAGNESIUM  --  2.2 1.9  --    PHOSPHORUS  --  3.2  --   --    TSH 4.450*  --   --   --          Lab 12/13/23  0338 12/12/23  0445 12/11/23  0401 12/10/23  1559   TOTAL PROTEIN 5.9* 5.1* 5.2* 5.9*   ALBUMIN 3.8 3.4* 3.5 3.9   GLOBULIN 2.1 1.7 1.7 2.0   ALT (SGPT) 26 22 28 43*   AST (SGOT) 21 18 26 61*   BILIRUBIN 0.6 0.5 0.6 0.9   ALK PHOS 127* 125* 123* 145*         Lab 12/10/23  1837 12/10/23  1559   PROBNP  --  11,750.0*   HSTROP T 93* 86*   PROTIME  --  17.9*   INR  --  1.46*             Lab 12/14/23  0407 12/10/23  1559   FERRITIN  --  326.90*   FOLATE 8.62  --    VITAMIN B 12 772  --          Lab 12/12/23  1605   PH, ARTERIAL 7.477*   PCO2, ARTERIAL 36.2   PO2 ART 78.9*   FIO2 21   HCO3 ART 26.8*   BASE EXCESS ART 3.2*   CARBOXYHEMOGLOBIN 1.7     Brief Urine Lab Results  (Last result in the past 365 days)        Color   Clarity   Blood   Leuk Est   Nitrite   Protein   CREAT   Urine HCG        09/13/23 1321 Yellow   Clear   Negative   Negative   Negative   Trace                   Microbiology Results Abnormal       Procedure Component Value - Date/Time    Blood Culture - Blood, Arm, Left [[id number]]  (Normal) Collected: 12/10/23 1810    Lab Status: Preliminary result Specimen: Blood from Arm, Left Updated: 12/14/23 1917     Blood Culture No growth at 4 days    Blood Culture - Blood, Wrist, Left [[id number]]  (Normal) Collected: 12/10/23 1825    Lab Status: Preliminary result Specimen: Blood from Wrist, Left Updated: 12/14/23 1917     Blood Culture No growth at 4 days            No radiology  results from the last 24 hrs    Results for orders placed during the hospital encounter of 03/10/22    Adult Transthoracic Echo Complete w/ Color, Spectral and Contrast if necessary per protocol    Interpretation Summary  · Left ventricular ejection fraction appears to be 51 - 55%. Left ventricular systolic function is normal.  · Left ventricular wall thickness is consistent with severe concentric hypertrophy.  · Mildly reduced right ventricular systolic function noted.  · The right ventricular cavity is borderline dilated.  · The right atrial cavity is moderately dilated.  · There is mild calcification of the aortic valve.  · Mild aortic valve regurgitation is present.  · Moderate to severe tricuspid valve regurgitation is present.  · Estimated right ventricular systolic pressure from tricuspid regurgitation is moderately elevated (45-55 mmHg).      Current medications:  Scheduled Meds:Pharmacy Consult, , Does not apply, Q12H  albuterol sulfate HFA, 2 puff, Inhalation, Q6H - RT  aspirin, 81 mg, Oral, Daily  carvedilol, 12.5 mg, Oral, BID With Meals  heparin (porcine), 5,000 Units, Subcutaneous, Q12H  isosorbide mononitrate, 60 mg, Oral, Daily  levothyroxine, 50 mcg, Oral, Q AM  melatonin, 10 mg, Oral, Nightly  Nirmatrelvir&Ritonavir 150/100, 2 tablet, Oral, BID  pantoprazole, 40 mg, Oral, Nightly  pravastatin, 20 mg, Oral, Nightly  QUEtiapine, 25 mg, Oral, Nightly  QUEtiapine, 25 mg, Oral, Once  sacubitril-valsartan, 1 tablet, Oral, BID  torsemide, 20 mg, Oral, Daily      Continuous Infusions:   PRN Meds:.  bisacodyl    LORazepam    midazolam    nitroglycerin    [COMPLETED] Insert Peripheral IV **AND** sodium chloride    ziprasidone    Assessment & Plan   Assessment & Plan     Active Hospital Problems    Diagnosis  POA    **COVID-19 [U07.1]  Yes      Resolved Hospital Problems   No resolved problems to display.        Brief Hospital Course to date:  Syeda Gomez is a 89 y.o. female with past medical history of  diastolic heart failure, atrial fibrillation, essential hypertension on CKD stage III who presented to the hospital with weakness and worsening shortness of breath.  Tested positive for COVID-19    COVID19 infection    Tested positive on 12/10   Currently satting 96% on room air  S/P  Paxlovid   Continue as needed inhalers  Guaifenesin and incentive spirometry     HFpEF, EF 55%  CXR with mild volume overload.  Appears euvolemic clinically  Continue p.o. torsemide     CKD stage III  Creat around baseline of 1.2   Monitor BMP     Acute delirium  Continue melatonin and Seroquel to bedtime to help regulate sleep cycle  Normal B12, folate and TSH    Debility  PT and OT   Case management working on rehab placement      Expected Discharge Location and Transportation: Home  Expected Discharge   Expected Discharge Date: 12/20/2023; Expected Discharge Time:      DVT prophylaxis:  Medical DVT prophylaxis orders are present.     AM-PAC 6 Clicks Score (PT): 18 (12/14/23 1411)    CODE STATUS:   Code Status and Medical Interventions:   Ordered at: 12/10/23 1941     Medical Intervention Limits:    NO intubation (DNI)     Level Of Support Discussed With:    Patient     Code Status (Patient has no pulse and is not breathing):    No CPR (Do Not Attempt to Resuscitate)     Medical Interventions (Patient has pulse or is breathing):    Limited Support     Copied text in this note has been reviewed and is accurate as of 12/15/23.     Adwoa Vaughan MD  12/15/23

## 2023-12-15 NOTE — CASE MANAGEMENT/SOCIAL WORK
Continued Stay Note  [facility]     Patient Name: Syeda Gomez  MRN: [id number]  Today's Date: 12/15/2023    Admit Date: 12/10/2023    Plan: rehab   Discharge Plan       Row Name 12/15/23 1239       Plan    Plan rehab    Patient/Family in Agreement with Plan other (see comments)    Plan Comments This patient has a rehab bed at [facility] when out of isolation for COVID. The patient had a sitter yesterday for confusion, but has not needed one today. She is on room air. CM will continue to follow.    Final Discharge Disposition Code 03 - skilled nursing facility (SNF)                   Discharge Codes    No documentation.                 Expected Discharge Date and Time       Expected Discharge Date Expected Discharge Time    Dec 20, 2023               Sanam Gutiérrez RN

## 2023-12-15 NOTE — PLAN OF CARE
Goal Outcome Evaluation:pt calm a nd cooperative most of the shift.refused to take 2100 meds the patient slept well most of the night.vss paced on monitor

## 2023-12-16 NOTE — PLAN OF CARE
Goal Outcome Evaluation:  Plan of Care Reviewed With: patient        Progress: improving  Outcome Evaluation: VSS. RA. No SOB. Pt took all medications during shift. Continue POC.

## 2023-12-16 NOTE — PROGRESS NOTES
[facility] Medicine Services  PROGRESS NOTE    Patient Name: Syeda Gomez  : 1927  MRN: [id number]    Date of Admission: 12/10/2023  Primary Care Physician: Omayra Alcazar DO    Subjective   Subjective     CC:  Follow-up for COVID-19 infection    HPI:  Patient seen and examined this morning. Comfortable.  No acute events.  Awaiting rehab    Objective   Objective     Vital Signs:   Temp:  [97 °F (36.1 °C)-98.7 °F (37.1 °C)] 98.1 °F (36.7 °C)  Heart Rate:  [56-81] 59  Resp:  [16-18] 16  BP: (119-140)/() 128/63     Physical Exam:  General: Comfortable, not in distress, conversant and cooperative  Head: Atraumatic and normocephalic  Eyes: No Icterus. No pallor  Ears:  Ears appear intact with no abnormalities noted  Throat: No oral lesions, no thrush  Neck: Supple, trachea midline  Lungs: Clear to auscultation bilaterally, equal air entry, no wheezing or crackles  Heart:  Normal S1 and S2, no murmur, no gallop, No JVD, no lower extremity swelling  Abdomen:  Soft, no tenderness, no organomegaly, normal bowel sounds, no organomegaly  Extremities: pulses equal bilaterally  Skin: No bleeding, bruising or rash, normal skin turgor and elasticity  Neurologic: Cranial nerves appear intact with no evidence of facial asymmetry, normal motor and sensory functions in all 4 extremities    Results Reviewed:  LAB RESULTS:      Lab 23  0338 23  0445 23  0401 12/10/23  1559   WBC 8.55 4.03 5.45 7.04   HEMOGLOBIN 12.4 10.9* 10.3* 10.7*   HEMATOCRIT 37.3 33.3* 31.7* 33.1*   PLATELETS 193 138* 124* 130*   NEUTROS ABS 7.84* 3.67 4.69 6.20   IMMATURE GRANS (ABS) 0.06* 0.01 0.04 0.05   LYMPHS ABS 0.32* 0.25* 0.33* 0.33*   MONOS ABS 0.33 0.10 0.37 0.43   EOS ABS 0.00 0.00 0.00 0.00   MCV 84.8 85.6 87.1 89.2   CRP 0.64* 1.16* 1.74* 1.24*   PROCALCITONIN  --   --   --  0.10   LACTATE  --   --   --  1.5   LDH  --   --   --  315*   PROTIME  --   --   --  17.9*   APTT  --   --   --   28.4   D DIMER QUANT  --   --   --  0.90         Lab 12/13/23  0338 12/12/23  0445 12/11/23  0401 12/10/23  1559   SODIUM 134* 132* 136 138   POTASSIUM 3.3* 3.5 3.0* 3.6   CHLORIDE 95* 95* 98 98   CO2 23.0 25.0 26.0 28.0   ANION GAP 16.0* 12.0 12.0 12.0   BUN 27* 27* 21 21   CREATININE 0.98 1.13* 1.08* 1.25*   EGFR 52.9* 44.6* 47.1* 39.5*   GLUCOSE 248* 344* 252* 268*   CALCIUM 8.0* 7.9* 7.9* 8.5   MAGNESIUM  --  2.2 1.9  --    PHOSPHORUS  --  3.2  --   --    TSH 4.450*  --   --   --          Lab 12/13/23 0338 12/12/23 0445 12/11/23  0401 12/10/23  1559   TOTAL PROTEIN 5.9* 5.1* 5.2* 5.9*   ALBUMIN 3.8 3.4* 3.5 3.9   GLOBULIN 2.1 1.7 1.7 2.0   ALT (SGPT) 26 22 28 43*   AST (SGOT) 21 18 26 61*   BILIRUBIN 0.6 0.5 0.6 0.9   ALK PHOS 127* 125* 123* 145*         Lab 12/10/23  1837 12/10/23  1559   PROBNP  --  11,750.0*   HSTROP T 93* 86*   PROTIME  --  17.9*   INR  --  1.46*             Lab 12/14/23  0407 12/10/23  1559   FERRITIN  --  326.90*   FOLATE 8.62  --    VITAMIN B 12 772  --          Lab 12/12/23  1605   PH, ARTERIAL 7.477*   PCO2, ARTERIAL 36.2   PO2 ART 78.9*   FIO2 21   HCO3 ART 26.8*   BASE EXCESS ART 3.2*   CARBOXYHEMOGLOBIN 1.7     Brief Urine Lab Results  (Last result in the past 365 days)        Color   Clarity   Blood   Leuk Est   Nitrite   Protein   CREAT   Urine HCG        09/13/23 1321 Yellow   Clear   Negative   Negative   Negative   Trace                   Microbiology Results Abnormal       Procedure Component Value - Date/Time    Blood Culture - Blood, Wrist, Left [[id number]]  (Normal) Collected: 12/10/23 1825    Lab Status: Final result Specimen: Blood from Wrist, Left Updated: 12/15/23 1916     Blood Culture No growth at 5 days    Blood Culture - Blood, Arm, Left [[id number]]  (Normal) Collected: 12/10/23 1810    Lab Status: Final result Specimen: Blood from Arm, Left Updated: 12/15/23 1916     Blood Culture No growth at 5 days            XR Chest 1 View    Result Date: 12/15/2023  XR CHEST  1 VW Date of Exam: 12/15/2023 6:10 PM EST Indication: Shortness of breath Comparison: Chest radiograph 12/13/2023. Findings: Left IJ port terminates in stable position within the upper SVC. Dual-chamber AICD. Cardiomegaly. Nonspecific streaky bibasilar opacities, greater on the left similar to prior comparison. There is increasing diffuse bilateral interstitial opacities. No pneumothorax. No large effusion. No infectious appearing infiltrate. Degenerative related osseous changes noted.     Impression: Impression: 1. Cardiomegaly with increasing mild interstitial opacities suspicious for developing edema. 2. Nonspecific bibasilar opacities are similar, favor atelectasis. Electronically Signed: Franc Braga MD  12/15/2023 8:16 PM EST  Workstation ID: [id number]     Results for orders placed during the hospital encounter of 03/10/22    Adult Transthoracic Echo Complete w/ Color, Spectral and Contrast if necessary per protocol    Interpretation Summary  · Left ventricular ejection fraction appears to be 51 - 55%. Left ventricular systolic function is normal.  · Left ventricular wall thickness is consistent with severe concentric hypertrophy.  · Mildly reduced right ventricular systolic function noted.  · The right ventricular cavity is borderline dilated.  · The right atrial cavity is moderately dilated.  · There is mild calcification of the aortic valve.  · Mild aortic valve regurgitation is present.  · Moderate to severe tricuspid valve regurgitation is present.  · Estimated right ventricular systolic pressure from tricuspid regurgitation is moderately elevated (45-55 mmHg).      Current medications:  Scheduled Meds:albuterol sulfate HFA, 2 puff, Inhalation, Q6H - RT  aspirin, 81 mg, Oral, Daily  carvedilol, 12.5 mg, Oral, BID With Meals  heparin (porcine), 5,000 Units, Subcutaneous, Q12H  isosorbide mononitrate, 60 mg, Oral, Daily  levothyroxine, 50 mcg, Oral, Q AM  melatonin, 10 mg, Oral, Nightly  pantoprazole, 40  mg, Oral, Nightly  pravastatin, 20 mg, Oral, Nightly  QUEtiapine, 25 mg, Oral, Nightly  sacubitril-valsartan, 1 tablet, Oral, BID  torsemide, 20 mg, Oral, Daily      Continuous Infusions:   PRN Meds:.  benzonatate    bisacodyl    LORazepam    midazolam    nitroglycerin    [COMPLETED] Insert Peripheral IV **AND** sodium chloride    ziprasidone    Assessment & Plan   Assessment & Plan     Active Hospital Problems    Diagnosis  POA    **COVID-19 [U07.1]  Yes      Resolved Hospital Problems   No resolved problems to display.        Brief Hospital Course to date:  Syeda Gomez is a 89 y.o. female with past medical history of diastolic heart failure, atrial fibrillation, essential hypertension on CKD stage III who presented to the hospital with weakness and worsening shortness of breath.  Tested positive for COVID-19    COVID19 infection    Tested positive on 12/10   Currently satting 96% on room air  S/P  Paxlovid   Continue as needed inhalers  Guaifenesin and incentive spirometry     HFpEF, EF 55%  CXR with mild volume overload.  Appears euvolemic clinically  Continue p.o. torsemide     CKD stage III  Creat around baseline of 1.2   Monitor BMP     Acute delirium  Continue melatonin and Seroquel to bedtime to help regulate sleep cycle  Normal B12, folate and TSH    Debility  PT and OT   Case management working on rehab placement      Expected Discharge Location and Transportation: Home  Expected Discharge   Expected Discharge Date: 12/20/2023; Expected Discharge Time:      DVT prophylaxis:  Medical DVT prophylaxis orders are present.     AM-PAC 6 Clicks Score (PT): 18 (12/14/23 1411)    CODE STATUS:   Code Status and Medical Interventions:   Ordered at: 12/10/23 1941     Medical Intervention Limits:    NO intubation (DNI)     Level Of Support Discussed With:    Patient     Code Status (Patient has no pulse and is not breathing):    No CPR (Do Not Attempt to Resuscitate)     Medical Interventions (Patient has pulse or is  breathing):    Limited Support     Copied text in this note has been reviewed and is accurate as of 12/16/23.     Adwoa Vaughan MD  12/16/23

## 2023-12-16 NOTE — PLAN OF CARE
Goal Outcome Evaluation:              Outcome Evaluation: VSS, remains on RA. Denies pain. No acute changes. Patient cooperative overnight. Took all medications. Continue POC.

## 2023-12-17 PROBLEM — I27.21 SECONDARY PULMONARY ARTERIAL HYPERTENSION: Status: ACTIVE | Noted: 2023-01-01

## 2023-12-17 PROBLEM — I63.9 ACUTE CVA (CEREBROVASCULAR ACCIDENT): Status: ACTIVE | Noted: 2023-01-01

## 2023-12-17 PROBLEM — I65.21 CAROTID OCCLUSION, RIGHT: Status: ACTIVE | Noted: 2023-01-01

## 2023-12-17 NOTE — SIGNIFICANT NOTE
Family alerted RN of change in mental status. RN entered room and pt was flaccid on the left side, unable to follow commands and significant weakness noted in L hand/foot strength. Pt presented with facial drooping as well on the left side. Code stroke called. Pt transported to CT. Report given to 2B RN at bedside.

## 2023-12-17 NOTE — PROGRESS NOTES
[facility] Medicine Services  PROGRESS NOTE    Patient Name: Syeda Gomez  : 1927  MRN: [id number]    Date of Admission: 12/10/2023  Primary Care Physician: Omayra Alcazar DO    Subjective   Subjective     CC:  Follow-up for COVID-19 infection    HPI:  Patient seen and examined this morning.  Sitting comfortably in the recliner at bedtime.  No acute complaints or events overnight    Objective   Objective     Vital Signs:   Temp:  [96.8 °F (36 °C)-98.1 °F (36.7 °C)] 96.8 °F (36 °C)  Heart Rate:  [49-76] 73  Resp:  [15-18] 16  BP: ()/(46-89) 113/55     Physical Exam:  General: Comfortable, not in distress, conversant and cooperative  Head: Atraumatic and normocephalic  Eyes: No Icterus. No pallor  Ears:  Ears appear intact with no abnormalities noted  Throat: No oral lesions, no thrush  Neck: Supple, trachea midline  Lungs: Clear to auscultation bilaterally, equal air entry, no wheezing or crackles  Heart:  Normal S1 and S2, no murmur, no gallop, No JVD, no lower extremity swelling  Abdomen:  Soft, no tenderness, no organomegaly, normal bowel sounds, no organomegaly  Extremities: pulses equal bilaterally  Skin: No bleeding, bruising or rash, normal skin turgor and elasticity  Neurologic: Cranial nerves appear intact with no evidence of facial asymmetry, normal motor and sensory functions in all 4 extremities    Results Reviewed:  LAB RESULTS:      Lab 23  0427 23  0338 23  0445 23  0401 12/10/23  1559   WBC 5.15 8.55 4.03 5.45 7.04   HEMOGLOBIN 12.2 12.4 10.9* 10.3* 10.7*   HEMATOCRIT 37.7 37.3 33.3* 31.7* 33.1*   PLATELETS 183 193 138* 124* 130*   NEUTROS ABS 3.86 7.84* 3.67 4.69 6.20   IMMATURE GRANS (ABS) 0.03 0.06* 0.01 0.04 0.05   LYMPHS ABS 0.83 0.32* 0.25* 0.33* 0.33*   MONOS ABS 0.36 0.33 0.10 0.37 0.43   EOS ABS 0.06 0.00 0.00 0.00 0.00   MCV 86.3 84.8 85.6 87.1 89.2   CRP <0.30 0.64* 1.16* 1.74* 1.24*   PROCALCITONIN  --   --   --   --   0.10   LACTATE  --   --   --   --  1.5   LDH  --   --   --   --  315*   PROTIME  --   --   --   --  17.9*   APTT  --   --   --   --  28.4   D DIMER QUANT  --   --   --   --  0.90         Lab 12/16/23  0427 12/13/23  0338 12/12/23  0445 12/11/23  0401 12/10/23  1559   SODIUM 140 134* 132* 136 138   POTASSIUM 3.6 3.3* 3.5 3.0* 3.6   CHLORIDE 101 95* 95* 98 98   CO2 29.0 23.0 25.0 26.0 28.0   ANION GAP 10.0 16.0* 12.0 12.0 12.0   BUN 24* 27* 27* 21 21   CREATININE 1.04* 0.98 1.13* 1.08* 1.25*   EGFR 49.3* 52.9* 44.6* 47.1* 39.5*   GLUCOSE 194* 248* 344* 252* 268*   CALCIUM 8.8 8.0* 7.9* 7.9* 8.5   MAGNESIUM  --   --  2.2 1.9  --    PHOSPHORUS  --   --  3.2  --   --    TSH  --  4.450*  --   --   --          Lab 12/13/23 0338 12/12/23 0445 12/11/23  0401 12/10/23  1559   TOTAL PROTEIN 5.9* 5.1* 5.2* 5.9*   ALBUMIN 3.8 3.4* 3.5 3.9   GLOBULIN 2.1 1.7 1.7 2.0   ALT (SGPT) 26 22 28 43*   AST (SGOT) 21 18 26 61*   BILIRUBIN 0.6 0.5 0.6 0.9   ALK PHOS 127* 125* 123* 145*         Lab 12/10/23  1837 12/10/23  1559   PROBNP  --  11,750.0*   HSTROP T 93* 86*   PROTIME  --  17.9*   INR  --  1.46*             Lab 12/14/23  0407 12/10/23  1559   FERRITIN  --  326.90*   FOLATE 8.62  --    VITAMIN B 12 772  --          Lab 12/12/23  1605   PH, ARTERIAL 7.477*   PCO2, ARTERIAL 36.2   PO2 ART 78.9*   FIO2 21   HCO3 ART 26.8*   BASE EXCESS ART 3.2*   CARBOXYHEMOGLOBIN 1.7     Brief Urine Lab Results  (Last result in the past 365 days)        Color   Clarity   Blood   Leuk Est   Nitrite   Protein   CREAT   Urine HCG        09/13/23 1321 Yellow   Clear   Negative   Negative   Negative   Trace                   Microbiology Results Abnormal       Procedure Component Value - Date/Time    Blood Culture - Blood, Wrist, Left [[id number]]  (Normal) Collected: 12/10/23 1825    Lab Status: Final result Specimen: Blood from Wrist, Left Updated: 12/15/23 1916     Blood Culture No growth at 5 days    Blood Culture - Blood, Arm, Left [[id number]]   (Normal) Collected: 12/10/23 1810    Lab Status: Final result Specimen: Blood from Arm, Left Updated: 12/15/23 1916     Blood Culture No growth at 5 days            XR Chest 1 View    Result Date: 12/15/2023  XR CHEST 1 VW Date of Exam: 12/15/2023 6:10 PM EST Indication: Shortness of breath Comparison: Chest radiograph 12/13/2023. Findings: Left IJ port terminates in stable position within the upper SVC. Dual-chamber AICD. Cardiomegaly. Nonspecific streaky bibasilar opacities, greater on the left similar to prior comparison. There is increasing diffuse bilateral interstitial opacities. No pneumothorax. No large effusion. No infectious appearing infiltrate. Degenerative related osseous changes noted.     Impression: Impression: 1. Cardiomegaly with increasing mild interstitial opacities suspicious for developing edema. 2. Nonspecific bibasilar opacities are similar, favor atelectasis. Electronically Signed: Franc Braga MD  12/15/2023 8:16 PM EST  Workstation ID: [id number]     Results for orders placed during the hospital encounter of 03/10/22    Adult Transthoracic Echo Complete w/ Color, Spectral and Contrast if necessary per protocol    Interpretation Summary  · Left ventricular ejection fraction appears to be 51 - 55%. Left ventricular systolic function is normal.  · Left ventricular wall thickness is consistent with severe concentric hypertrophy.  · Mildly reduced right ventricular systolic function noted.  · The right ventricular cavity is borderline dilated.  · The right atrial cavity is moderately dilated.  · There is mild calcification of the aortic valve.  · Mild aortic valve regurgitation is present.  · Moderate to severe tricuspid valve regurgitation is present.  · Estimated right ventricular systolic pressure from tricuspid regurgitation is moderately elevated (45-55 mmHg).      Current medications:  Scheduled Meds:albuterol sulfate HFA, 2 puff, Inhalation, Q6H - RT  aspirin, 81 mg, Oral,  Daily  carvedilol, 12.5 mg, Oral, BID With Meals  heparin (porcine), 5,000 Units, Subcutaneous, Q12H  isosorbide mononitrate, 60 mg, Oral, Daily  levothyroxine, 50 mcg, Oral, Q AM  melatonin, 10 mg, Oral, Nightly  pantoprazole, 40 mg, Oral, Nightly  pravastatin, 20 mg, Oral, Nightly  QUEtiapine, 25 mg, Oral, Nightly  sacubitril-valsartan, 1 tablet, Oral, BID  torsemide, 20 mg, Oral, Daily      Continuous Infusions:   PRN Meds:.  benzonatate    bisacodyl    LORazepam    midazolam    nitroglycerin    [COMPLETED] Insert Peripheral IV **AND** sodium chloride    ziprasidone    Assessment & Plan   Assessment & Plan     Active Hospital Problems    Diagnosis  POA    **COVID-19 [U07.1]  Yes      Resolved Hospital Problems   No resolved problems to display.        Brief Hospital Course to date:  Syeda Gomez is a 89 y.o. female with past medical history of diastolic heart failure, atrial fibrillation, essential hypertension on CKD stage III who presented to the hospital with weakness and worsening shortness of breath.  Tested positive for COVID-19    COVID19 infection    Tested positive on 12/10   Currently satting 96% on room air  S/P  Paxlovid   Continue as needed inhalers  Guaifenesin and incentive spirometry     HFpEF, EF 55%  CXR with mild volume overload.  Appears euvolemic clinically  Continue p.o. torsemide     CKD stage III  Creat around baseline of 1.2   Monitor BMP     Acute delirium  Continue melatonin and Seroquel to bedtime to help regulate sleep cycle  Normal B12, folate and TSH    Debility  PT and OT   Case management working on rehab placement    Addendum 12/17/2023 at 1320 p.m.  Patient was found unresponsive by her nurse.  Neuroexam is focal.  Stroke alert was called and patient was taken urgently for stroke workup  Patient with history of paroxysmal A-fib, following with Dr. Ventura, not on anticoagulation at home because of increased risk of fall by her daughter  Spoke and updated her daughter.  Tentative  plan for intervention per Dr. Hallman    Expected Discharge Location and Transportation: Home  Expected Discharge   Expected Discharge Date: 12/20/2023; Expected Discharge Time:      DVT prophylaxis:  Medical DVT prophylaxis orders are present.     AM-PAC 6 Clicks Score (PT): 18 (12/14/23 1411)    CODE STATUS:   Code Status and Medical Interventions:   Ordered at: 12/10/23 1941     Medical Intervention Limits:    NO intubation (DNI)     Level Of Support Discussed With:    Patient     Code Status (Patient has no pulse and is not breathing):    No CPR (Do Not Attempt to Resuscitate)     Medical Interventions (Patient has pulse or is breathing):    Limited Support     Copied text in this note has been reviewed and is accurate as of 12/17/23.     Adwoa Vaughan MD  12/17/23

## 2023-12-17 NOTE — PROGRESS NOTES
INTENSIVIST NOTE     Hospital Day: 7    Ms. Syeda Gomez, 89 y.o. female is followed for:   Acute CVA       SUBJECTIVE     Syeda Gomez is a 89 y.o. female with PMH Afib not on anticoag, HFpEF, HTN, CKD stage 3, secondary pulmonary HTN, HLP and hypothyroidism who was admitted 12/10 to the Hospitalist service for shortness of air, weakness, nausea and no appetite.  She had a + COVID home test 12/10. Normally, she walks short distances in the house and is chronically dyspneic w activity though not on home oxygen.  Her BLE swelling (L>R chronically) is worse than baseline, and distal LLE has been weeping fluid.     While on the floor, she completed a course of Paxlovid.  She has had intermittent delirium requiring Seroquel and nightly Melatonin.  She has also been working with PT/OT for weakness.  They recommended SNF at discharge.    Today, she had an RRT after family notified RN of stroke-like symptoms.  She had developed left side flaccidity, left facial droop.  CTH showed hyperdense right MCA sign concerning for thrombus.  CTA H/N revealed complete occlusion of the right internal carotid artery near its origin with extension of thrombus to the right carotid terminus and into the right middle cerebral artery.  Ill-defined hypodensity throughout the right MCA distribution compatible with ischemia and infarct.  CTP revealed large area of cerebral ischemia involving the right MCA distribution.  NIHSS was 30. She was transferred to the ICU.      Time spent:  10 minutes  Electronically signed by GONZÁLEZ Bean, 12/17/23, 5:02 PM EST.    Interval History:    TNK was given at 1420.  NIH on transfer to ICU was 30.  Currently poorly responsive.  Family at bedside.  Blood pressure within acceptable parameters.  Rhythm is atrial fibrillation.    The patient's relevant past medical, surgical and social history were reviewed and updated in Epic as appropriate.       OBJECTIVE     Vital Sign Min/Max for last  "24 hours  Temp  Min: 96.8 °F (36 °C)  Max: 98.3 °F (36.8 °C)   BP  Min: 95/46  Max: 162/65   Pulse  Min: 51  Max: 86   Resp  Min: 16  Max: 24   SpO2  Min: 91 %  Max: 100 %   No data recorded   No data recorded      Intake/Output Summary (Last 24 hours) at 12/17/2023 1707  Last data filed at 12/17/2023 1600  Gross per 24 hour   Intake 240 ml   Output 440 ml   Net -200 ml      Flowsheet Rows      Flowsheet Row First Filed Value   Admission Height 160 cm (63\") Documented at 12/10/2023 1457   Admission Weight 63 kg (139 lb) Documented at 12/10/2023 1457               12/10/23  1457   Weight: 63 kg (139 lb)            Objective:  General Appearance:  In no acute distress and ill-appearing.    Vital signs: (most recent): Blood pressure 108/47, pulse 65, temperature 98.3 °F (36.8 °C), temperature source Axillary, resp. rate 24, height 160 cm (63\"), weight 63 kg (139 lb), SpO2 95%.    HEENT: Normal HEENT exam.    Lungs:  Normal effort and normal respiratory rate.  Breath sounds clear to auscultation.  She is not in respiratory distress.  No rales, wheezes or rhonchi.    Heart: Normal rate.  Regular rhythm and irregular rhythm.  S1 normal and S2 normal.  No murmur, gallop or friction rub.   Chest: Symmetric chest wall expansion.   Abdomen: Abdomen is soft and non-distended.  Bowel sounds are normal.   There is no abdominal tenderness.   There is no mass. There is no splenomegaly. There is no hepatomegaly.   Extremities: There is no deformity or dependent edema.    Pupils:  Pupils are equal, round, and reactive to light.    Skin:  Warm and dry.              Interval:  (TNK)  1a. Level of Consciousness: 1-->Not alert, but arousable by minor stimulation to obey, answer, or respond  1b. LOC Questions: 1-->Answers one question correctly  1c. LOC Commands: 1-->Performs one task correctly  2. Best Gaze: 2-->Forced deviation, or total gaze paresis not overcome by the oculocephalic maneuver  3. Visual: 2-->Complete hemianopia  4. " Facial Palsy: 3-->Complete paralysis of one or both sides (absence of facial movement in the upper and lower face)  5a. Motor Arm, Left: 3-->No effort against gravity, limb falls  5b. Motor Arm, Right: 2-->Some effort against gravity, limb cannot get to or maintain (if cued) 90 (or 45) degrees, drifts down to bed, but has some effort against gravity  6a. Motor Leg, Left: 3-->No effort against gravity, leg falls to bed immediately  6b. Motor Leg, Right: 3-->No effort against gravity, leg falls to bed immediately  7. Limb Ataxia: 0-->Absent  8. Sensory: 1-->Mild-to-moderate sensory loss, patient feels pinprick is less sharp or is dull on the affected side, or there is a loss of superficial pain with pinprick, but patient is aware of being touched  9. Best Language: 2-->Severe aphasia, all communication is through fragmentary expression, great need for inference, questioning, and guessing by the listener. Range of information that can be exchanged is limited, listener carries burden of. . . (see row details)  10. Dysarthria: 1-->Mild-to-moderate dysarthria, patient slurs at least some words and, at worst, can be understood with some difficulty  11. Extinction and Inattention (formerly Neglect): 2-->Profound izzy-inattention/extinction more than 1 modality    Total (NIH Stroke Scale): 27    I reviewed the patient's new clinical results.  I reviewed the patient's new imaging results/reports including actual images and agree with reports.    CT Angiogram Neck    Result Date: 12/17/2023  Impression: 1. 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 There is extension of thrombus to the right carotid terminus and into the right middle cerebral artery. 2. Ill-defined hypodensity throughout the right MCA distribution compatible with ischemia and infarct. 3. No pathologic intracranial contrast enhancement. 4. No left carotid artery stenosis. No left-sided intracranial vascular stenosis or occlusion.  Findings personally discussed with Audra of the stroke team at 2:15 p.m. on 2/17/2023 Electronically Signed: Tyler Carter MD  12/17/2023 2:32 PM EST  Workstation ID: [id number]    CT Angiogram Head w AI Analysis of LVO    Result Date: 12/17/2023  Impression: 1. 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 There is extension of thrombus to the right carotid terminus and into the right middle cerebral artery. 2. Ill-defined hypodensity throughout the right MCA distribution compatible with ischemia and infarct. 3. No pathologic intracranial contrast enhancement. 4. No left carotid artery stenosis. No left-sided intracranial vascular stenosis or occlusion. Findings personally discussed with Audra of the stroke team at 2:15 p.m. on 2/17/2023 Electronically Signed: Tyler Carter MD  12/17/2023 2:15 PM EST  Workstation ID: [id number]    CT CEREBRAL PERFUSION WITH & WITHOUT CONTRAST    Result Date: 12/17/2023  1. Large area of cerebral ischemia involving the right MCA distribution with a total volume of approximately 183 cc. There is a 105 cc volume of brain parenchyma with cerebral blood flow less than 30% compatible with large core infarct. Electronically Signed: Tyler Carter MD  12/17/2023 2:07 PM EST  Workstation ID: [id number]    CT Head Without Contrast Stroke Protocol    Result Date: 12/17/2023  Impression: Hyperdense right MCA sign concerning for thrombus. No large territory loss of gray-white differentiation or acute intracranial hemorrhage. CT perfusion and angiogram dictated separately. Electronically Signed: Franc Braga MD  12/17/2023 2:05 PM EST  Workstation ID: [id number]    XR Chest 1 View    Result Date: 12/15/2023  Impression: 1. Cardiomegaly with increasing mild interstitial opacities suspicious for developing edema. 2. Nonspecific bibasilar opacities are similar, favor atelectasis. Electronically Signed: Franc Braga MD  12/15/2023 8:16 PM EST  Workstation ID: [id number]       INFUSIONS       Results from last 7 days   Lab Units 12/16/23  0427 12/13/23  0338 12/12/23  0445   WBC 10*3/mm3 5.15 8.55 4.03   HEMOGLOBIN g/dL 12.2 12.4 10.9*   HEMATOCRIT % 37.7 37.3 33.3*   PLATELETS 10*3/mm3 183 193 138*     Results from last 7 days   Lab Units 12/16/23  0427 12/13/23  0338 12/12/23  0445 12/11/23  0401   SODIUM mmol/L 140 134* 132* 136   POTASSIUM mmol/L 3.6 3.3* 3.5 3.0*   CHLORIDE mmol/L 101 95* 95* 98   CO2 mmol/L 29.0 23.0 25.0 26.0   BUN mg/dL 24* 27* 27* 21   GLUCOSE mg/dL 194* 248* 344* 252*   CREATININE mg/dL 1.04* 0.98 1.13* 1.08*   MAGNESIUM mg/dL  --   --  2.2 1.9   CALCIUM mg/dL 8.8 8.0* 7.9* 7.9*   ALBUMIN g/dL  --  3.8 3.4* 3.5         Results from last 7 days   Lab Units 12/17/23  1410 12/12/23  1605   PH, ARTERIAL pH units 7.459* 7.477*   PCO2, ARTERIAL mm Hg 38.4 36.2   PO2 ART mm Hg 79.8* 78.9*   FIO2 % 32 21       Patient isn't on Tube Feeding   /h  Patient doesn't have any tube feeding modular orders    Mechanical Ventilator:   Settings: Observed:                                                I reviewed the patient's medications.    Assessment & Plan   ASSESSMENT/PLAN     Active Hospital Problems    Diagnosis     **Acute Rt MCA CVA s/p TNK     Secondary pulmonary arterial hypertension     Rt Carotid occlusion     COVID-19 + 12/10/23     Hypothyroidism (acquired)     HTN (hypertension)     HLD (hyperlipidemia)     Atrial fibrillation        89-year-old female with a past medical history significant for atrial fibrillation not on anticoagulation due to perceived risk, HFpEF, hypertension, CKD 3, secondary pulm hypertension, dyslipidemia, and hypothyroidism.    She was admitted on 12/10 after testing positive for COVID via home test.  She had acute onset of strokelike symptoms earlier this afternoon.  She developed the acute onset of left-sided weakness.  CTh revealed a hyperdense right MCA sign and CTA revealed complete occlusion of the right internal carotid artery  with extension of thrombus to the right middle cerebral artery.  CT PE revealed a large area of cerebral ischemia involving the right MCA distribution.  NIH scale was 30.  She was transferred to the ICU and TNK given at 1420 today.    Plan:    Ischemic stroke order set post TNK ordered  ICU admission  Serial NIH scale  CT head for any sudden change in neurologic status  Post thrombolytic precautions  Echocardiogram  PT/OT/ST  Guarded prognosis.  Level of care DNR/DNI     I discussed the patient's findings and my recommendations with family, nursing staff, and stroke service     Plan of care and goals reviewed with multidisciplinary team at daily rounds.    High level of risk due to:  drug(s) requiring intensive monitoring for toxicity and decision regarding escalation of level of hospital care.    Theo Lindsey MD  Pulmonary and Critical Care Medicine  12/17/23 17:07 EST

## 2023-12-17 NOTE — CONSULTS
Stroke Consult Note    Patient Name: Syeda Gomez   MRN: [id number]  Age: 89 y.o.  Sex: female  : 1927    Primary Care Physician: Omayra Alcazar DO  Referring Physician:  No ref. provider found    TIME STROKE TEAM CALLED: 1322 EST     TIME PATIENT SEEN: 1330 EST    Handedness: Unknown  Race:     Chief Complaint/Reason for Consultation: Right gaze preference, Left Hemiparesis     Subjective .  HPI:     Syeda Gomez is a 89-year-old  female with a past medical history significant for atrial fibrillation without anticoagulation, left breast cancer, hypertension, CKD stage III, pulmonary hypertension, GERD, thyroid disease, congestive heart failure, and carpal tunnel.  She currently was admitted to the hospital on December 10 for COVID.  She had complaints of shortness of breath, weakness, and lack of appetite.  Normally she is able to walk short distances at home independently.  She did complete a course of Paxlovid for her COVID infection and since hospitalization has had intermittent delirium.  Today code stroke was initiated secondary to a new acute onset of right MCA symptoms which included left-sided hemiparesis, left facial droop, speech difficulty, and right gaze preference.  Stat CT imaging was completed.  CT head showed hyperdense right MCA sign concerning for thrombus.  CTA head and neck revealed occlusion of the right ICA near its origin with extension of the thrombus into the right M1 segment.  CT perfusion revealed a large area of ischemia with minimal area of reversible ischemia to note.  Initial NIH on my examination 27.  Blood pressure 160/69.  She is transferred to the neuro ICU for further workup and evaluation.  Plan of care discussed with the patient daughter as well as Dr. Hallman.  Lengthy discussion with the patient's daughter as well as her granddaughter the decision was made to administer TNK and defer invasive procedure with potential mechanical  thrombectomy.    Last Known Normal Date/Time: 1320 EST     Review of Systems   Unable to perform ROS: Acuity of condition        Past Medical History:   Diagnosis Date    A-fib     Arthritis     Cancer     1988- left breast    Carpal tunnel syndrome     CHF (congestive heart failure)     Detached retina     right side    Disease of thyroid gland     Dizzy     Full dentures     GERD (gastroesophageal reflux disease)     Hearing aid worn     bilat    Heart murmur     History of transfusion     x4 just this year- never had reaction    [place] (hard of hearing)     bilat hearinhg aids    Hypertension     Infection     right knee- had infection 15 to 20 years ago- unsure if mrsa or not    Pleural effusion     Rheumatic fever     SOBOE (shortness of breath on exertion)     UTI (urinary tract infection)     Wears glasses      Past Surgical History:   Procedure Laterality Date    APPENDECTOMY      CARDIAC ELECTROPHYSIOLOGY PROCEDURE N/A 06/25/2021    Procedure: DEVICE IMPLANT;  Surgeon: Calvin Ventura MD;  Location:  [facility];  Service: Cardiovascular;  Laterality: N/A;    CARPAL TUNNEL RELEASE Bilateral     CATARACT EXTRACTION, BILATERAL      CHOLECYSTECTOMY      COLONOSCOPY      HYSTERECTOMY      MASTECTOMY Left     OTHER SURGICAL HISTORY      x2 fluid removed from right side    PLEURAL CATHETER INSERTION Right 4/4/2022    Procedure: PLEURX CATHETER INSERTION;  Surgeon: Fidel Carter MD;  Location:  [facility];  Service: Cardiothoracic;  Laterality: Right;    PORTACATH PLACEMENT Left     REPLACEMENT TOTAL KNEE BILATERAL      RETINAL DETACHMENT SURGERY Right     buckle in place    SHOULDER ROTATOR CUFF REPAIR Right     TOTAL HIP ARTHROPLASTY Right     TRIGGER FINGER RELEASE Right     thinks right side     Family History   Problem Relation Age of Onset    Heart attack Mother     Hypertension Father     Stroke Father      Social History     Socioeconomic History    Marital status:     Number of  "children: 1   Tobacco Use    Smoking status: Never    Smokeless tobacco: Never   Vaping Use    Vaping Use: Never used   Substance and Sexual Activity    Alcohol use: No    Drug use: No    Sexual activity: Defer     Allergies   Allergen Reactions    Cardizem [Diltiazem] Other (See Comments)     \"unknown\"    Codeine GI Intolerance    Morphine Other (See Comments)     \"unknown\"    Percocet [Oxycodone-Acetaminophen] Other (See Comments)     \"unknown\"      Sulfa Antibiotics Unknown (See Comments)     Unknown       Prior to Admission medications    Medication Sig Start Date End Date Taking? Authorizing Provider   acetaminophen (TYLENOL) 650 MG 8 hr tablet Take 1 tablet by mouth Every 8 (Eight) Hours As Needed for Mild Pain or Headache. OTC 4/6/22  Yes Armani Perkins MD   albuterol (PROVENTIL) (2.5 MG/3ML) 0.083% nebulizer solution Take 2.5 mg by nebulization 2 (Two) Times a Day As Needed for Shortness of Air. 4/6/22   Armani Perkins MD   Ascorbic Acid (VITAMIN C PO) Take  by mouth Daily.    Armani Perkins MD   aspirin 81 MG EC tablet Take 1 tablet by mouth Daily. OTC    Armani Perkins MD   carvedilol (COREG) 12.5 MG tablet Take 1 tablet by mouth 2 (Two) Times a Day With Meals.    Armani Perkins MD   Cholecalciferol (vitamin D3) 125 MCG (5000 UT) tablet tablet Take 1 tablet by mouth Daily. OTC    Armani Perkins MD   clonazePAM (KlonoPIN) 0.5 MG tablet Take 0.5-1 tablets by mouth At Night As Needed (sleep). 4/6/22   Armani Perkins MD   docusate sodium (COLACE) 100 MG capsule Take 2 capsules by mouth As Needed for Constipation. 4/6/22   Armani Perkins MD   Insulin Glargine-yfgn (SEMGLEE-YFGN) 100 UNIT/ML Inject  under the skin into the appropriate area as directed.    Armani Perkins MD   isosorbide mononitrate (IMDUR) 30 MG 24 hr tablet Take 1 tablet by mouth Daily.  Patient taking differently: Take 2 tablets by mouth Daily. 3/20/22   Mary Romero, " DO   levothyroxine (SYNTHROID, LEVOTHROID) 50 MCG tablet Take 1 tablet by mouth Every Morning.    Armani Perkins MD   loratadine (CLARITIN) 10 MG tablet Take 1 tablet by mouth Daily.    Armani Perkins MD   magnesium oxide (MAGOX) 400 (241.3 Mg) MG tablet tablet Take 1 tablet by mouth Daily. OTC    Armani Perkins MD   multivitamin with minerals tablet tablet Take 1 tablet by mouth Daily. OTC    Armani Perkins MD   nitroglycerin (NITROSTAT) 0.4 MG SL tablet Place 1 tablet under the tongue Every 5 (Five) Minutes As Needed for Chest Pain. Take no more than 3 doses in 15 minutes.    Armani Perkins MD   nystatin (MYCOSTATIN) 777583 UNIT/GM cream Apply 1 application  topically to the appropriate area as directed 2 (Two) Times a Day.    Armani Perkins MD   omeprazole (priLOSEC) 20 MG capsule Take 1 capsule by mouth Daily.    Armani Perkins MD   ondansetron (ZOFRAN) 8 MG tablet Take 1 tablet by mouth Every 6 (Six) Hours As Needed for Nausea or Vomiting.    Armani Perkins MD   pravastatin (PRAVACHOL) 20 MG tablet Take 1 tablet by mouth Every Night.    Armani Perkins MD   sacubitril-valsartan (Entresto) 24-26 MG tablet Take 1 tablet by mouth 2 (Two) Times a Day.    Armani Perkins MD   torsemide (DEMADEX) 20 MG tablet Take 1 tablet by mouth 2 (Two) Times a Day.    Armani Perkins MD             Objective     Temp:  [96.8 °F (36 °C)-98.1 °F (36.7 °C)] 97.5 °F (36.4 °C)  Heart Rate:  [52-76] 68  Resp:  [16-18] 18  BP: ()/(46-89) 97/46  Neurological Exam  Mental Status  Awake and alert. Moderate dysarthria present. Expressive aphasia and receptive aphasia present.    Cranial Nerves  CN II: Left homonymous hemianopsia.  CN III, IV, VI: Right gaze preference.  CN VII:  Left: There is peripheral facial weakness.  CN VIII: Hearing appears to be intact bilaterally.    Motor  Decreased muscle bulk throughout. No fasciculations present. Decreased muscle  tone. Left hemiparesis.    Sensory  Light touch abnormality:   No response to deep painful stimuli on left.    Coordination    Unable to assess secondary to aphasia.    Gait    Able to assess secondary to the acuity of the patient's condition.      Physical Exam  Constitutional:       General: She is awake.      Appearance: She is ill-appearing.   HENT:      Head: Normocephalic.      Mouth/Throat:      Pharynx: Oropharynx is clear.   Eyes:      Conjunctiva/sclera: Conjunctivae normal.   Cardiovascular:      Rate and Rhythm: Normal rate. Rhythm irregular.   Pulmonary:      Effort: Pulmonary effort is normal.   Abdominal:      General: Abdomen is flat.   Musculoskeletal:      Right lower leg: Edema present.      Left lower leg: Edema present.   Skin:     General: Skin is warm and dry.      Findings: Bruising present.   Neurological:      Mental Status: She is alert. She is disoriented.      Cranial Nerves: Cranial nerve deficit and dysarthria present.      Sensory: Sensory deficit present.      Motor: Weakness present.   Psychiatric:         Attention and Perception: She is inattentive.         Speech: She is noncommunicative.         Behavior: Behavior is withdrawn.       Acute Stroke Data    IV Thrombolytic (TPA/Tenecteplase) Inclusion / Exclusion Criteria    Time: 13:40 EST  Person Administering Scale: GONZÁLEZ Tamayo    Inclusion Criteria  [x]   18 years of age or greater   [x]   Onset of symptoms < 4.5 hours before beginning treatment (stroke onset = time patient was last seen well or without symptoms).   [x]   Diagnosis of acute ischemic stroke causing measurable disabling deficit (Complete Hemianopia, Any Aphasia, Visual or Sensory Extinction, Any weakness limiting sustained effort against gravity)   []   Any remaining deficit considered potentially disabling in view of patient and practitioner   Exclusion criteria (Do not proceed with Alteplase if any are checked under exclusion criteria)  []   Onset  unknown or GREATER than 4.5 hours   []   ICH on CT/MRI   []   CT demonstrates hypodensity representing acute or subacute infarct   []   Significant head trauma or prior stroke in the previous 3 months   []   Symptoms suggestive of subarachnoid hemorrhage   []   History of un-ruptured intracranial aneurysm GREATER than 10 mm   []   Recent intracranial or intraspinal surgery within the last 3 months   []   Arterial puncture at a non-compressible site in the previous 7 days   []   Active internal bleeding   []   Acute bleeding tendency   []   Platelet count LESS than 100,000 for known hematological diseases such as leukemia, thrombocytopenia or chronic cirrhosis   []   Current use of anticoagulant with INR GREATER than 1.7 or PT GREATER than 15 seconds, aPTT GREATER than 40 seconds   []   Heparin received within 48 hours, resulting in abnormally elevated aPTT GREATER than upper limit of normal   []   Current use of direct thrombin inhibitors or direct factor Xa inhibitors in the past 48 hours   []   Elevated blood pressure refractory to treatment (systolic GREATER than 185 mm/Hg or diastolic  GREATER than 110 mm/Hg   []   Suspected infective endocarditis and aortic arch dissection   []   Current use of therapeutic treatment dose of low-molecular-weight heparin (LMWH) within the previous 24 hours   []   Structural GI malignancy or bleed   Relative exclusion for all patients  []   Only minor nondisabling symptoms   []   Pregnancy   []   Seizure at onset with postictal residual neurological impairments   []   Major surgery or previous trauma within past 14 days   []   History of previous spontaneous ICH, intracranial neoplasm, or AV malformation   []   Postpartum (within previous 14 days)   []   Recent GI or urinary tract hemorrhage (within previous 21 days)   []   Recent acute MI (within previous 3 months)   []   History of unruptured intracranial aneurysm LESS than 10 mm   []   History of ruptured intracranial aneurysm    []   Blood glucose LESS than 50 mg/dL (2.7 mmol/L)   []   Dural puncture within the last 7 days   []   Known GREATER than 10 cerebral microbleeds   Additional exclusions for patients with symptoms onset between 3 and 4.5 hours.  [x]   Age > 80.   []   On any anticoagulants regardless of INR  >>> Warfarin (Coumadin), Heparin, Enoxaparin (Lovenox), fondaparinux (Arixtra), bivalirudin (Angiomax), Argatroban, dabigatran (Pradaxa), rivaroxaban (Xarelto), or apixaban (Eliquis)   [x]   Severe stroke (NIHSS > 25).   []   History of BOTH diabetes and previous ischemic stroke.   [x]   The risks and benefits have been discussed with the patient or family related to the administration of IV alteplase for stroke symptoms.   [x]   I have discussed and reviewed the patient's case and imaging with the attending prior to IV Thrombolytic (TPA/Tenecteplase).   1420 Time Thrombolytic administered     TNK administration discussed with the patients daughter as well as her granddaughter over the phone by Dr. Hallman. TNK administered 1420.    Hospital Meds:  Scheduled- aspirin, 81 mg, Oral, Daily  carvedilol, 12.5 mg, Oral, BID With Meals  heparin (porcine), 5,000 Units, Subcutaneous, Q12H  isosorbide mononitrate, 60 mg, Oral, Daily  levothyroxine, 50 mcg, Oral, Q AM  melatonin, 10 mg, Oral, Nightly  pantoprazole, 40 mg, Oral, Nightly  pravastatin, 20 mg, Oral, Nightly  QUEtiapine, 25 mg, Oral, Nightly  sacubitril-valsartan, 1 tablet, Oral, BID  torsemide, 20 mg, Oral, Daily      Infusions-     PRNs-   albuterol sulfate HFA    benzonatate    bisacodyl    LORazepam    midazolam    nitroglycerin    [COMPLETED] Insert Peripheral IV **AND** sodium chloride    ziprasidone    Functional Status Prior to Current Stroke/Motley Score: 1-2    NIH Stroke Scale  Time: 13:40 EST  Person Administering Scale: GONZÁLEZ Tamayo    Interval:  (TNK)  1a. Level of Consciousness: 1-->Not alert, but arousable by minor stimulation to obey, answer, or  respond  1b. LOC Questions: 1-->Answers one question correctly  1c. LOC Commands: 1-->Performs one task correctly  2. Best Gaze: 2-->Forced deviation, or total gaze paresis not overcome by the oculocephalic maneuver  3. Visual: 2-->Complete hemianopia  4. Facial Palsy: 3-->Complete paralysis of one or both sides (absence of facial movement in the upper and lower face)  5a. Motor Arm, Left: 3-->No effort against gravity, limb falls  5b. Motor Arm, Right: 2-->Some effort against gravity, limb cannot get to or maintain (if cued) 90 (or 45) degrees, drifts down to bed, but has some effort against gravity  6a. Motor Leg, Left: 3-->No effort against gravity, leg falls to bed immediately  6b. Motor Leg, Right: 3-->No effort against gravity, leg falls to bed immediately  7. Limb Ataxia: 0-->Absent  8. Sensory: 1-->Mild-to-moderate sensory loss, patient feels pinprick is less sharp or is dull on the affected side, or there is a loss of superficial pain with pinprick, but patient is aware of being touched  9. Best Language: 2-->Severe aphasia, all communication is through fragmentary expression, great need for inference, questioning, and guessing by the listener. Range of information that can be exchanged is limited, listener carries burden of. . . (see row details)  10. Dysarthria: 1-->Mild-to-moderate dysarthria, patient slurs at least some words and, at worst, can be understood with some difficulty  11. Extinction and Inattention (formerly Neglect): 2-->Profound izzy-inattention/extinction more than 1 modality    Total (NIH Stroke Scale): 27      Results Reviewed:  I have personally reviewed current lab, radiology, and data and agree with results.        CT Head Without Contrast Stroke Protocol    Result Date: 12/17/2023  CT HEAD WO CONTRAST STROKE PROTOCOL Date of Exam: 12/17/2023 1:46 PM EST Indication: Neuro deficit, acute, stroke suspected. Comparison: Head CT 9/13/2023. Technique: Axial CT images were obtained of the  head without contrast administration.  Reconstructed coronal images were also obtained. Automated exposure control and iterative construction methods were used. Scan Time: 1356 on 12/17/2023 Results discussed with  Audra CLAUDIO   at 1401 on 12/17/2023 Findings: Hypodense defects within the right M1 segment MCA. Negative for large territory loss of gray-white differentiation, acute intracranial hemorrhage, large mass lesion or midline shift. Mild parenchymal volume loss. Mild periventricular hypodensities suggesting chronic microvascular ischemic change and similar to prior comparison. No large extra-axial collection. No obstructive sinus disease. No mastoid effusion. Negative for depressed skull fracture.     Impression: Impression: Hyperdense right MCA sign concerning for thrombus. No large territory loss of gray-white differentiation or acute intracranial hemorrhage. CT perfusion and angiogram dictated separately. Electronically Signed: Franc Braga MD  12/17/2023 2:05 PM EST  Workstation ID: [id number]     T CEREBRAL PERFUSION W WO CONTRAST     Date of Exam: 12/17/2023 1:46 PM EST     Indication: Neuro deficit, acute, stroke suspected.     Comparison: None available.     Technique: Axial CT images of the brain were obtained prior to and after the administration of 115 cc Isovue-370 . Core blood volume, core blood flow, mean transit time, and Tmax images were obtained utilizing the Rapid software protocol. A limited CT   angiogram of the head was also performed to measure the blood vessel density.     The radiation dose reduction device was turned on for each scan per the ALARA (As Low as Reasonably Achievable) protocol.        Findings:            There is a large perfusion defect involving the right MCA distribution.  CBF<30% volume: 105 mL  Tmax>6sec volume: 183 mL  Mismatch volume: 78 mL  Mismatch ratio: 1.7         IMPRESSION:     1. Large area of cerebral ischemia involving the right MCA  distribution with a total volume of approximately 183 cc. There is a 105 cc volume of brain parenchyma with cerebral blood flow less than 30% compatible with large core infarct.           Electronically Signed: Tyler Carter MD    12/17/2023 2:07 PM EST    Workstation ID: [id number]    CT ANGIOGRAM HEAD W AI ANALYSIS OF LVO     Date of Exam: 12/17/2023 1:46 PM EST     Indication: Headache, acute, norm neuro exam.     Comparison: CT head and CT perfusion 12/17/2023     Technique: CTA of the head was performed after the uneventful intravenous administration of 115 cc Isovue-370 . Reconstructed coronal and sagittal images were also obtained. In addition, a 3-D volume rendered image was created for interpretation.   Automated exposure control and iterative reconstruction methods were used.        Findings: CTA neck: Aortic arch and origins of the great vessels are within normal limits.     Right brachiocephalic and right subclavian artery are patent without focal stenosis. Normal right common carotid artery is patent without focal stenosis. There is moderate calcified plaque at the right carotid bifurcation. There is complete occlusion of   the right internal carotid artery just above the origin. Right internal carotid artery is occluded to its terminus. Right external carotid artery is patent.  Left common, internal, and external carotid arteries are patent without focal stenosis.     Left subclavian artery is patent without focal stenosis.     Vertebral arteries are codominant and hypoplastic bilaterally. Vertebral artery is hypoplastic..     The soft tissues of the neck appear within normal limits. The visualized lung apices are clear. No lytic or sclerotic bony lesions are identified.     CTA HEAD:     Intracranial right internal carotid artery is completely occluded. There is extension of thrombus into the right middle cerebral artery. Right anterior cerebral artery is patent but likely fills via the ACOM from  left-sided circulation.  The left anterior cerebral and middle cerebral arteries are patent without focal stenosis.  No anterior circulation aneurysm identified.     Vertebral arteries are hypoplastic at the skull base.  Basilar artery is hypoplastic but patent.  There is fetal-type origin of the left posterior cerebral artery. Right posterior cerebral artery appears to fill via the basilar. Posterior cerebral arteries appear patent. Superior cerebellar arteries are patent.     No posterior circulation aneurysm identified.     No pathologic intracranial contrast enhancement. There is ill-defined hypodensity throughout the right middle cerebral artery distribution compatible with ischemia and infarct.     Globes and orbits appear within normal limits.     Visualized paranasal sinuses and mastoid air cells are clear.     IMPRESSION:  Impression:        1. 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 There is extension of thrombus to the right carotid terminus and into the right middle cerebral artery.  2. Ill-defined hypodensity throughout the right MCA distribution compatible with ischemia and infarct.  3. No pathologic intracranial contrast enhancement.  4. No left carotid artery stenosis. No left-sided intracranial vascular stenosis or occlusion.  Findings personally discussed with Audra of the stroke team at 2:15 p.m. on 2/17/2023        Electronically Signed: Tyler Carter MD    12/17/2023 2:15 PM EST    Workstation ID: [id number]    Sodium 140  Potassium 3.6  Creatinine 1.04  Glucose 194  TSH 4.450  WBC 5.15  Hemoglobin 12.2  Hematocrit 37.7  Platelets 183      Assessment/Plan:  89 year old  female with multiple vascular risk factors. Currently inpatient at [facility] for COVID treatment.  Acute onset of right MCA syndrome with left-sided hemiparesis, aphasia, dysarthria, and right gaze preference.  CT imaging revealed hyperdense right MCA sign.  CTA head and neck revealed complete  occlusion of the right internal carotid artery with extension of thrombus into the right middle cerebral artery.  CT perfusion revealed large perfusion deficit right MCA distribution territory.  Per discussion with Dr. Hallman she was deemed to be an appropriate IV thrombolytic therapy candidate.  IV TNK was administered at 1420.  After discussion with the patient's daughter and granddaughter invasive intervention was deferred and she was transferred to the neuro ICU for further workup and evaluation.    Antiplatelet PTA: None  Coagulant PTA: None      Acute right MCA stroke S/P TNK administration   -STAT CT imaging   -Transfer to neuro ICU  -Ischemic stroke admission with thrombolytic therapy order set initiated  -24-hour CT head 12-  -MRI brain without contrast, will need cardiology clearance prior to imaging   -NIH and neurochecks  -Bedrest overnight  -81 mg aspirin after 24-hour CT head completed  -Lipitor 80 mg nightly  -Hemoglobin A1c and FLP in a.m.  -TTE, defer bubble study  -Anticipate NG tube placement  -SLP/PT/OT to see and assess tomorrow  -Diabetes educator to see and assess if appropriate  -Case management to see and assist with any discharge planning needs    Stroke Neurology will continue to follow. Very guarded prognosis. Plan of care discussed with Dr. Parks, as well as the patient's family was present at the bedside alongside the ICU medicine team.  Thanks for the consult the care of this patient.  Please call with any further questions or concerns.    Audra Lemus, APRN  December 17, 2023  13:40 EST

## 2023-12-17 NOTE — PLAN OF CARE
Goal Outcome Evaluation:              Outcome Evaluation: VSS, remains on RA. Denies pain. No acute changes. Patient cooperative overnight. Took all medications. Discharge to [facility] when out of isolation. Continue POC.

## 2023-12-17 NOTE — PLAN OF CARE
Goal Outcome Evaluation:      NIH 27. TNK given at 1420. L facial droop, neglect, withdrawal to pain only on the Left. Follows commands on the Right side. Pt is oriented to self, speech is garbled and hard to understand. Family members visiting one at a time. 3L NC. AV paced. MRI planned for tonight. Wound consult added for wound on coccyx.

## 2023-12-18 NOTE — PROGRESS NOTES
Stroke Progress Note       Chief Complaint: Left-sided weakness and speech difficulty    Subjective    Subjective     Subjective:  Left-sided weakness    Review of Systems   Able to obtain  Objective      Temp:  [97.9 °F (36.6 °C)-98.2 °F (36.8 °C)] 98.1 °F (36.7 °C)  Heart Rate:  [53-88] 65  Resp:  [16-24] 24  BP: ()/(33-82) 144/60          Patient has right gaze, slow to respond,  Was unable to squeeze the right with right hand  Flaccid left upper and left lower extremity.  Unable to withdraw to painful extremity.  No meaningful speech pattern.    Results Review:    I reviewed the patient's new clinical results.    Lab Results (last 24 hours)       Procedure Component Value Units Date/Time    POC Glucose Once [[id number]]  (Abnormal) Collected: 12/18/23 1209    Specimen: Blood Updated: 12/18/23 1224     Glucose 188 mg/dL     POC Glucose Once [[id number]]  (Abnormal) Collected: 12/18/23 0554    Specimen: Blood Updated: 12/18/23 1224     Glucose 204 mg/dL     POC Glucose Once [[id number]]  (Abnormal) Collected: 12/17/23 2346    Specimen: Blood Updated: 12/18/23 1223     Glucose 187 mg/dL     POC Glucose Once [[id number]]  (Abnormal) Collected: 12/17/23 1742    Specimen: Blood Updated: 12/18/23 1223     Glucose 313 mg/dL     Hemoglobin A1c [[id number]]  (Abnormal) Collected: 12/18/23 0512    Specimen: Blood Updated: 12/18/23 0636     Hemoglobin A1C 6.90 %     Narrative:      Hemoglobin A1C Ranges:    Increased Risk for Diabetes  5.7% to 6.4%  Diabetes                     >= 6.5%  Diabetic Goal                < 7.0%    Lipid Panel [[id number]] Collected: 12/18/23 0512    Specimen: Blood Updated: 12/18/23 0607     Total Cholesterol 133 mg/dL      Triglycerides 86 mg/dL      HDL Cholesterol 54 mg/dL      LDL Cholesterol  62 mg/dL      VLDL Cholesterol 17 mg/dL      LDL/HDL Ratio 1.14    Narrative:      Cholesterol Reference Ranges  ([place] Department of Health and Human Services ATP III Classifications)    Desirable           <200 mg/dL  Borderline High    200-239 mg/dL  High Risk          >240 mg/dL      Triglyceride Reference Ranges  ([place]. Department of Health and Human Services ATP III Classifications)    Normal           <150 mg/dL  Borderline High  150-199 mg/dL  High             200-499 mg/dL  Very High        >500 mg/dL    HDL Reference Ranges  ([place].S. Department of Health and Human Services ATP III Classifications)    Low     <40 mg/dl (major risk factor for CHD)  High    >60 mg/dl ('negative' risk factor for CHD)        LDL Reference Ranges  ([place].S. Department of Health and Human Services ATP III Classifications)    Optimal          <100 mg/dL  Near Optimal     100-129 mg/dL  Borderline High  130-159 mg/dL  High             160-189 mg/dL  Very High        >189 mg/dL    Magnesium [[id number]]  (Normal) Collected: 12/18/23 0512    Specimen: Blood Updated: 12/18/23 0607     Magnesium 2.3 mg/dL     Renal Function Panel [[id number]]  (Abnormal) Collected: 12/18/23 0512    Specimen: Blood Updated: 12/18/23 0607     Glucose 189 mg/dL      BUN 25 mg/dL      Creatinine 1.00 mg/dL      Sodium 140 mmol/L      Potassium 3.7 mmol/L      Comment: Slight hemolysis detected by analyzer. Result may be falsely elevated.        Chloride 102 mmol/L      CO2 27.0 mmol/L      Calcium 8.7 mg/dL      Albumin 3.8 g/dL      Phosphorus 3.5 mg/dL      Anion Gap 11.0 mmol/L      BUN/Creatinine Ratio 25.0     eGFR 51.7 mL/min/1.73     Narrative:      GFR Normal >60  Chronic Kidney Disease <60  Kidney Failure <15    The GFR formula is only valid for adults with stable renal function between ages 18 and 70.    CBC & Differential [[id number]]  (Abnormal) Collected: 12/18/23 0512    Specimen: Blood Updated: 12/18/23 0535    Narrative:      The following orders were created for panel order CBC & Differential.  Procedure                               Abnormality         Status                     ---------                               -----------         ------                      CBC Auto Differential[[id number]]        Abnormal            Final result                 Please view results for these tests on the individual orders.    CBC Auto Differential [[id number]]  (Abnormal) Collected: 12/18/23 0512    Specimen: Blood Updated: 12/18/23 0535     WBC 7.96 10*3/mm3      RBC 4.31 10*6/mm3      Hemoglobin 12.0 g/dL      Hematocrit 37.4 %      MCV 86.8 fL      MCH 27.8 pg      MCHC 32.1 g/dL      RDW 15.0 %      RDW-SD 47.1 fl      MPV 10.2 fL      Platelets 196 10*3/mm3      Neutrophil % 82.6 %      Lymphocyte % 9.7 %      Monocyte % 5.8 %      Eosinophil % 1.3 %      Basophil % 0.1 %      Immature Grans % 0.5 %      Neutrophils, Absolute 6.58 10*3/mm3      Lymphocytes, Absolute 0.77 10*3/mm3      Monocytes, Absolute 0.46 10*3/mm3      Eosinophils, Absolute 0.10 10*3/mm3      Basophils, Absolute 0.01 10*3/mm3      Immature Grans, Absolute 0.04 10*3/mm3      nRBC 0.0 /100 WBC           CT Head Without Contrast    Result Date: 12/18/2023  Impression: Evolving large right MCA territory infarct with associated diffuse right-sided cerebral edema. There is no evidence of hemorrhage, midline shift or brain herniation at this time. Electronically Signed: Aiden Olivares MD  12/18/2023 2:25 PM EST  Workstation ID: [id number]    CT Angiogram Neck    Result Date: 12/17/2023  Impression: 1. 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 There is extension of thrombus to the right carotid terminus and into the right middle cerebral artery. 2. Ill-defined hypodensity throughout the right MCA distribution compatible with ischemia and infarct. 3. No pathologic intracranial contrast enhancement. 4. No left carotid artery stenosis. No left-sided intracranial vascular stenosis or occlusion. Findings personally discussed with Audra of the stroke team at 2:15 p.m. on 2/17/2023 Electronically Signed: Tyler Carter MD  12/17/2023 2:32 PM EST  Workstation ID: [id number]    CT  Angiogram Head w AI Analysis of LVO    Result Date: 12/17/2023  Impression: 1. 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 There is extension of thrombus to the right carotid terminus and into the right middle cerebral artery. 2. Ill-defined hypodensity throughout the right MCA distribution compatible with ischemia and infarct. 3. No pathologic intracranial contrast enhancement. 4. No left carotid artery stenosis. No left-sided intracranial vascular stenosis or occlusion. Findings personally discussed with Audra of the stroke team at 2:15 p.m. on 2/17/2023 Electronically Signed: Tyler Carter MD  12/17/2023 2:15 PM EST  Workstation ID: [id number]    CT CEREBRAL PERFUSION WITH & WITHOUT CONTRAST    Result Date: 12/17/2023  1. Large area of cerebral ischemia involving the right MCA distribution with a total volume of approximately 183 cc. There is a 105 cc volume of brain parenchyma with cerebral blood flow less than 30% compatible with large core infarct. Electronically Signed: Tyler Carter MD  12/17/2023 2:07 PM EST  Workstation ID: [id number]    CT Head Without Contrast Stroke Protocol    Result Date: 12/17/2023  Impression: Hyperdense right MCA sign concerning for thrombus. No large territory loss of gray-white differentiation or acute intracranial hemorrhage. CT perfusion and angiogram dictated separately. Electronically Signed: Franc Braga MD  12/17/2023 2:05 PM EST  Workstation ID: [id number]   Results for orders placed during the hospital encounter of 12/10/23    Adult Transthoracic Echo Complete W/ Cont if Necessary Per Protocol (With Agitated Saline)    Interpretation Summary    Left ventricular systolic function is normal. Estimated left ventricular EF = 56% Normal left ventricular cavity size noted. Left ventricular wall thickness is consistent with severe concentric hypertrophy. Left ventricular diastolic function is consistent with (grade II w/high LAP)  pseudonormalization. Elevated left atrial pressure.    The right ventricular cavity is borderline dilated. Mildly reduced right ventricular systolic function noted. Electronic lead present in the ventricle.    Left atrial volume is severely increased.    The right atrial cavity is severely dilated.    There is moderate calcification of the aortic valve. Moderate aortic valve regurgitation is present. Low gradient, potentially moderated AS. Normal peak velocity and mean gradient, calculated Aortic valve area is 1.06 cm2.    Mitral annular calcification is present. Mild mitral valve regurgitation is present. No significant mitral valve stenosis is present.    Severe tricuspid valve regurgitation is present. Estimated right ventricular systolic pressure from tricuspid regurgitation is moderately elevated. 45 mmHg, but may be underestimated due to the degree of TR.    Mild dilation of the ascending aorta is present. Ascending aorta = 3.7 cm            Assessment/Plan     Assessment/Plan:    Acute ischemic stroke, right M1 occlusion, status post TNK.  Likely etiology cardioembolism with history of pulm atrial fibrillation not being on anticoagulation given the risk of falls.  -Given age and comorbid conditions, with modified Maggi score of 4-patient was deemed not a candidate for mechanical thrombectomy.  I had an extensive discussion with the family including the daughter and the granddaughter.  They are all in agreement with the plan and would not want to pursue aggressive intervention procedures at that age.  -Repeat CT head today showed evolution of the large right MCA infarct without any hemorrhagic conversion.  -Prognosis is poor, given age and significant infarct burden  -No need for MRI brain to confirm the infarct as it is quite evident on the CT scan..  -I would recommend palliative/hospice discussions.    I called granddaughter on phone and was unable to connect.  Will contact the family and update further  regarding the prognosis of the patient with a large stroke.      Critical, spent more than 35 minutes having the scans, discussing the plan with multidisciplinary team.        Shakeel Hallman MD  12/18/23  16:14 EST

## 2023-12-18 NOTE — THERAPY RE-EVALUATION
Patient Name: Syeda Gomez  : 1927    MRN: [id number]                              Today's Date: 2023       Admit Date: 12/10/2023    Visit Dx:     ICD-10-CM ICD-9-CM   1. COVID-19  U07.1 079.89   2. Pneumonia of left lower lobe due to infectious organism  J18.9 486   3. Dysphagia, unspecified type  R13.10 787.20     Patient Active Problem List   Diagnosis    Pneumonia due to COVID-19 virus    Pancytopenia    Presence of cardiac pacemaker    Atrial fibrillation    Prolonged QTc interval on ECG    Hypothyroidism (acquired)    HTN (hypertension)    HLD (hyperlipidemia)    Elective replacement indicated for pacemaker    CHF (congestive heart failure)    Anemia    Recurrent right pleural effusion    COVID-19 + 12/10/23    Acute Rt MCA CVA s/p TNK    Secondary pulmonary arterial hypertension    Rt Carotid occlusion     Past Medical History:   Diagnosis Date    A-fib     Arthritis     Cancer     - left breast    Carpal tunnel syndrome     CHF (congestive heart failure)     Detached retina     right side    Disease of thyroid gland     Dizzy     Full dentures     GERD (gastroesophageal reflux disease)     Hearing aid worn     bilat    Heart murmur     History of transfusion     x4 just this year- never had reaction    [place] (hard of hearing)     bilat hearinhg aids    Hypertension     Infection     right knee- had infection 15 to 20 years ago- unsure if mrsa or not    Pleural effusion     Rheumatic fever     SOBOE (shortness of breath on exertion)     UTI (urinary tract infection)     Wears glasses      Past Surgical History:   Procedure Laterality Date    APPENDECTOMY      CARDIAC ELECTROPHYSIOLOGY PROCEDURE N/A 2021    Procedure: DEVICE IMPLANT;  Surgeon: Calvin Ventura MD;  Location: [facility] INVASIVE LOCATION;  Service: Cardiovascular;  Laterality: N/A;    CARPAL TUNNEL RELEASE Bilateral     CATARACT EXTRACTION, BILATERAL      CHOLECYSTECTOMY      COLONOSCOPY      HYSTERECTOMY       MASTECTOMY Left     OTHER SURGICAL HISTORY      x2 fluid removed from right side    PLEURAL CATHETER INSERTION Right 4/4/2022    Procedure: PLEURX CATHETER INSERTION;  Surgeon: Fidel Carter MD;  Location: [facility];  Service: Cardiothoracic;  Laterality: Right;    PORTACATH PLACEMENT Left     REPLACEMENT TOTAL KNEE BILATERAL      RETINAL DETACHMENT SURGERY Right     buckle in place    SHOULDER ROTATOR CUFF REPAIR Right     TOTAL HIP ARTHROPLASTY Right     TRIGGER FINGER RELEASE Right     thinks right side      General Information       Row Name 12/18/23 0939          OT Time and Intention    Document Type re-evaluation  -     Mode of Treatment occupational therapy  -       Row Name 12/18/23 0939          General Information    Patient Profile Reviewed yes  -     Existing Precautions/Restrictions fall;oxygen therapy device and L/min  -     Barriers to Rehab medically complex;cognitive status;visual deficit  -       Row Name 12/18/23 0939          Cognition    Orientation Status (Cognition) unable/difficult to assess  -       Row Name 12/18/23 0939          Safety Issues, Functional Mobility    Safety Issues Affecting Function (Mobility) ability to follow commands;awareness of need for assistance;friction/shear risk;insight into deficits/self-awareness;problem-solving;safety precaution awareness;safety precautions follow-through/compliance  -     Impairments Affecting Function (Mobility) balance;cognition;endurance/activity tolerance;coordination;grasp;motor control;motor planning;postural/trunk control;range of motion (ROM);sensation/sensory awareness;strength;visual/perceptual  -     Cognitive Impairments, Mobility Safety/Performance attention  -               User Key  (r) = Recorded By, (t) = Taken By, (c) = Cosigned By      Initials Name Provider Type     Daylin Aldridge Occupational Therapist                     Mobility/ADL's       Row Name 12/18/23 0973          Bed Mobility    Bed Mobility  supine-sit;sit-supine  -     Scooting/Bridging [place] (Bed Mobility) dependent (less than 25% patient effort);2 person assist;verbal cues  -     Supine-Sit [place] (Bed Mobility) 2 person assist;dependent (less than 25% patient effort);verbal cues  -     Sit-Supine [place] (Bed Mobility) 2 person assist;dependent (less than 25% patient effort);verbal cues  -     Bed Mobility, Safety Issues decreased use of arms for pushing/pulling;decreased use of legs for bridging/pushing;impaired trunk control for bed mobility  -     Assistive Device (Bed Mobility) bed rails;draw sheet  -       Row Name 12/18/23 0940          Transfers    Transfers sit-stand transfer;stand-sit transfer  -       Row Name 12/18/23 0940          Sit-Stand Transfer    Sit-Stand [place] (Transfers) 2 person assist;dependent (less than 25% patient effort);verbal cues  -     Assistive Device (Sit-Stand Transfers) other (see comments)  BUE support  -       Row Name 12/18/23 0940          Stand-Sit Transfer    Stand-Sit [place] (Transfers) dependent (less than 25% patient effort);verbal cues;2 person assist  -     Assistive Device (Stand-Sit Transfers) other (see comments)  BUE support  -       Row Name 12/18/23 0940          Activities of Daily Living    BADL Assessment/Intervention lower body dressing  -       Row Name 12/18/23 0940          Lower Body Dressing Assessment/Training    [place] Level (Lower Body Dressing) don;socks;dependent (less than 25% patient effort)  -     Position (Lower Body Dressing) supine  -               User Key  (r) = Recorded By, (t) = Taken By, (c) = Cosigned By      Initials Name Provider Type    Daylin [place] Occupational Therapist                   Obj/Interventions       Row Name 12/18/23 0942          Sensory Assessment (Somatosensory)    Sensory Assessment (Somatosensory) unable/difficult to assess  -       Row Name 12/18/23 0942          Vision  Assessment/Intervention    Visual Impairment/Limitations unable/difficult to assess  -       Row Name 12/18/23 0942          Range of Motion Comprehensive    Comment, General Range of Motion BUE PROM WFL, Pt unable to demo AROM  -       Row Name 12/18/23 0942          Strength Comprehensive (MMT)    Comment, General Manual Muscle Testing (MMT) Assessment fair R , Otherwise BUE MMT was unable to be assessed d/t pt's inability to follow commands.  -       Row Name 12/18/23 0942          Balance    Balance Assessment sitting static balance;sitting dynamic balance;sit to stand dynamic balance;standing static balance  -     Static Sitting Balance maximum assist;1-person assist;verbal cues;non-verbal cues (demo/gesture);other (see comments)  [place] A to position R hand in order to improve static balance.  -     Dynamic Sitting Balance dependent;2-person assist;verbal cues;non-verbal cues (demo/gesture)  -     Position, Sitting Balance sitting edge of bed;supported  -     Sit to Stand Dynamic Balance 2-person assist;dependent;verbal cues;non-verbal cues (demo/gesture)  -     Static Standing Balance dependent;2-person assist;verbal cues;non-verbal cues (demo/gesture)  -     Position/Device Used, Standing Balance supported  -     Balance Interventions sitting;standing;sit to stand;supported;static;dynamic;weight shifting activity  -               User Key  (r) = Recorded By, (t) = Taken By, (c) = Cosigned By      Initials Name Provider Type    Daylin Colorado Occupational Therapist                   Goals/Plan       Row Name 12/18/23 1017          Transfer Goal 1 (OT)    Activity/Assistive Device (Transfer Goal 1, OT) sit-to-stand/stand-to-sit  -     [place] Level/Cues Needed (Transfer Goal 1, OT) maximum assist (25-49% patient effort)  -     Time Frame (Transfer Goal 1, OT) long term goal (LTG);10 days  -     Progress/Outcome (Transfer Goal 1, OT) goal revised this date  -       Row Name  12/18/23 1017          Dressing Goal 1 (OT)    Activity/Device (Dressing Goal 1, OT) upper body dressing;lower body dressing  -     [place]/Cues Needed (Dressing Goal 1, OT) standby assist  -KL     Time Frame (Dressing Goal 1, OT) long term goal (LTG);10 days  -KL     Progress/Outcome (Dressing Goal 1, OT) goal no longer appropriate  -       Row Name 12/18/23 1017          Grooming Goal 1 (OT)    Activity/Device (Grooming Goal 1, OT) grooming skills, all;other (see comments)  -     [place] (Grooming Goal 1, OT) standby assist  -KL     Time Frame (Grooming Goal 1, OT) long term goal (LTG);10 days  -KL     Progress/Outcome (Grooming Goal 1, OT) goal no longer appropriate  -       Row Name 12/18/23 1017          ROM Goal 1 (OT)    ROM Goal 1 (OT) Pt will demo BUE shoulder, elbow, wrist and hand AAROM 5x1 in order to improve ROM required for BADLs.  -KL     Time Frame (ROM Goal 1, OT) long term goal (LTG);10 days  -KL     Progress/Outcome (ROM Goal 1, OT) new goal  -       Row Name 12/18/23 1017          Problem Specific Goal 1 (OT)    Problem Specific Goal 1 (OT) Pt will demo proprioceptive input through RUE in order to improve neuromuscular control required for static/dynamic sitting balances 2x10 second hold.  -KL     Time Frame (Problem Specific Goal 1, OT) long term goal (LTG);10 days  -KL     Progress/Outcome (Problem Specific Goal 1, OT) new goal  -       Row Name 12/18/23 1017          Therapy Assessment/Plan (OT)    Planned Therapy Interventions (OT) activity tolerance training;functional balance retraining;neuromuscular control/coordination retraining;occupation/activity based interventions;passive ROM/stretching;patient/caregiver education/training;ROM/therapeutic exercise;transfer/mobility retraining  -               User Key  (r) = Recorded By, (t) = Taken By, (c) = Cosigned By      Initials Name Provider Type    Daylin [place] Occupational Therapist                   Clinical  Impression       Row Name 12/18/23 1012          Pain Scale: FACES Pre/Post-Treatment    Pre/Posttreatment Pain Comment No pain noted throughout therapy session.  -       Row Name 12/18/23 1012          Plan of Care Review    Plan of Care Reviewed With patient  -     Progress declining  -     Outcome Evaluation Pt presents to OT re-eval w/ significant deficits in functional balance, mobility and self-care abilities. Pt would benefit from IPOT to address deficits in order to progress towards PLOF. D/c rec is SNF.  -       Row Name 12/18/23 1012          Therapy Assessment/Plan (OT)    Patient/Family Therapy Goal Statement (OT) Return to PLOF  -     Rehab Potential (OT) fair, will monitor progress closely  -     Criteria for Skilled Therapeutic Interventions Met (OT) yes;skilled treatment is necessary  -     Therapy Frequency (OT) daily  -       Row Name 12/18/23 1012          Therapy Plan Review/Discharge Plan (OT)    Anticipated Discharge Disposition (OT) skilled nursing facility  -       Row Name 12/18/23 1012          Vital Signs    Pre Systolic BP Rehab 116  -KL     Pre Treatment Diastolic BP 52  -KL     Post Systolic BP Rehab 144  -KL     Post Treatment Diastolic BP 60  -KL     Pretreatment Heart Rate (beats/min) 64  -KL     Posttreatment Heart Rate (beats/min) 70  -KL     Pre SpO2 (%) 99  -KL     O2 Delivery Pre Treatment nasal cannula  -     O2 Delivery Intra Treatment nasal cannula  -     Post SpO2 (%) 100  -KL     O2 Delivery Post Treatment nasal cannula  -     Pre Patient Position Supine  -     Intra Patient Position Standing  -     Post Patient Position Sitting  -       Row Name 12/18/23 1012          Positioning and Restraints    Pre-Treatment Position in bed  -     Post Treatment Position bed  -KL     In Bed notified nsg;supine;call light within reach;encouraged to call for assist;exit alarm on;side rails up x3;RUE elevated;LUE elevated;legs elevated  -                User Key  (r) = Recorded By, (t) = Taken By, (c) = Cosigned By      Initials Name Provider Type    Daylin [place] Occupational Therapist                   Outcome Measures       Row Name 12/18/23 1022          How much help from another is currently needed...    Putting on and taking off regular lower body clothing? 1  -KL     Bathing (including washing, rinsing, and drying) 1  -KL     Toileting (which includes using toilet bed pan or urinal) 1  -KL     Putting on and taking off regular upper body clothing 1  -KL     Taking care of personal grooming (such as brushing teeth) 1  -KL     Eating meals 1  -KL     AM-PAC 6 Clicks Score (OT) 6  -       Row Name 12/18/23 1022          Modified [place] Scale    Pre-Stroke Modified [place] Scale 0 - No Symptoms at all.  -     Modified Maggi Scale 4 - Moderately severe disability.  Unable to walk without assistance, and unable to attend to own bodily needs without assistance.  -       Row Name 12/18/23 1022          Functional Assessment    Outcome Measure Options AM-PAC 6 Clicks Daily Activity (OT);Modified Maggi  -               User Key  (r) = Recorded By, (t) = Taken By, (c) = Cosigned By      Initials Name Provider Type    Daylin [place] Occupational Therapist                    Occupational Therapy Education       Title: PT OT SLP Therapies (In Progress)       Topic: Occupational Therapy (In Progress)       Point: ADL training (Done)       Description:   Instruct learner(s) on proper safety adaptation and remediation techniques during self care or transfers.   Instruct in proper use of assistive devices.                  Learning Progress Summary             Patient Acceptance, E,TB, VU,DU by KF at 12/14/2023 1317    Acceptance, E,TB, VU,DU by KF at 12/12/2023 1140                         Point: Home exercise program (Not Started)       Description:   Instruct learner(s) on appropriate technique for monitoring, assisting and/or progressing therapeutic  exercises/activities.                  Learner Progress:  Not documented in this visit.              Point: Precautions (In Progress)       Description:   Instruct learner(s) on prescribed precautions during self-care and functional transfers.                  Learning Progress Summary             Patient Nonacceptance, E, NR by  at 12/18/2023 1024    Acceptance, E,TB, VU,DU by  at 12/14/2023 1317    Acceptance, E,TB, VU,DU by  at 12/12/2023 1140                         Point: Body mechanics (In Progress)       Description:   Instruct learner(s) on proper positioning and spine alignment during self-care, functional mobility activities and/or exercises.                  Learning Progress Summary             Patient Nonacceptance, E, NR by  at 12/18/2023 1024    Acceptance, E,TB, VU,DU by  at 12/14/2023 1317    Acceptance, E,TB, VU,DU by  at 12/12/2023 1140                                         User Key       Initials Effective Dates Name Provider Type Discipline     08/09/23 -  Traci Castro OT Occupational Therapist OT     11/20/23 -  Daylin Aldridge Occupational Therapist                   OT Recommendation and Plan  Planned Therapy Interventions (OT): activity tolerance training, functional balance retraining, neuromuscular control/coordination retraining, occupation/activity based interventions, passive ROM/stretching, patient/caregiver education/training, ROM/therapeutic exercise, transfer/mobility retraining  Therapy Frequency (OT): daily  Plan of Care Review  Plan of Care Reviewed With: patient  Progress: declining  Outcome Evaluation: Pt presents to OT re-eval w/ significant deficits in functional balance, mobility and self-care abilities. Pt would benefit from IPOT to address deficits in order to progress towards PLOF. D/c rec is SNF.     Time Calculation:         Time Calculation- OT       Row Name 12/18/23 1024             Time Calculation- OT    OT Start Time 0835  -      OT Received On  12/18/23  -      OT Goal Re-Cert Due Date 12/28/23  -         Timed Charges    51559 - OT Therapeutic Activity Minutes 8  -KL         Untimed Charges    OT Eval/Re-eval Minutes 25  -KL         Total Minutes    Timed Charges Total Minutes 8  -KL      Untimed Charges Total Minutes 25  -KL       Total Minutes 33  -KL                User Key  (r) = Recorded By, (t) = Taken By, (c) = Cosigned By      Initials Name Provider Type    Daylin [place] Occupational Therapist                  Therapy Charges for Today       Code Description Service Date Service Provider Modifiers Qty    85321421470 HC OT THERAPEUTIC ACT EA 15 MIN 12/18/2023 Daylin Aldridge 1    82871096408 HC OT RE-EVAL 2 12/18/2023 Daylin Aldridge 1                 Daylin Aldridge  12/18/2023

## 2023-12-18 NOTE — CASE MANAGEMENT/SOCIAL WORK
Continued Stay Note  [facility]     Patient Name: Syeda Gomez  MRN: [id number]  Today's Date: 12/18/2023    Admit Date: 12/10/2023    Plan: Ongoing   Discharge Plan       Row Name 12/18/23 1529       Plan    Plan Ongoing    Plan Comments Discussed patient in MDR today.  Patient transferred from the floor yesterday with acute right MCA  and is s/p TNK.  NIH this morning was 27; MRI is pending.  Discharge plan prior to stroke was short term rehab at [facility].  Patient is currently NPO; will await SLP.  CM will continue to follow.                   Discharge Codes    No documentation.                       Fariba Rojas RN

## 2023-12-18 NOTE — PLAN OF CARE
Goal Outcome Evaluation:  Plan of Care Reviewed With: patient        Progress: no change  Outcome Evaluation: Pt limited by L weakness, balance deficit, decreased functional endurance, and impaired cognition with decreased command following compared to baseline. Pt required total A to stand with B knee buckling noted (L>R). Rec continued skilled PT to increase indep with mobility. d/c rec for SNF.      Anticipated Discharge Disposition (PT): skilled nursing facility

## 2023-12-18 NOTE — PROGRESS NOTES
"                    Clinical Nutrition     Patient Name: Syeda Gomez  YOB: 1927  MRN: [id number]  Date of Encounter: 12/18/23 10:19 EST  Admission date: 12/10/2023    48 hr NPO tomorrow, if no changes and within GOC, recommend NG placement for EN. Previously eating okay, no significant nutritional concerns PTA.     Reason for Visit   Identified at risk by screening criteria, Chewing / Swallowing    Medical conditions   Admission Diagnosis:  COVID-19 [U07.1]    Problem List:    Acute Rt MCA CVA s/p TNK    Atrial fibrillation    Hypothyroidism (acquired)    HTN (hypertension)    HLD (hyperlipidemia)    COVID-19 + 12/10/23    Secondary pulmonary arterial hypertension    Rt Carotid occlusion    Anthropometric      Flowsheet Rows      Flowsheet Row First Filed Value   Admission Height 160 cm (63\") Documented at 12/10/2023 1457   Admission Weight 63 kg (139 lb) Documented at 12/10/2023 1457     Height: 160 cm (63\")  Last Filed Weight: Weight: 63 kg (139 lb) (12/10/23 1457)  Weight Method: Stated  BMI: BMI (Calculated): 24.6  BMI classification: Normal: 18.5-24.9kg/m2   IBW:  115lb    UBW:     Weight change:   stable x 1 year per EMR     Nutrition Focused Physical Exam     Date:  12/18    Unable to perform exam due to: COVID Isolation      Reported/Observed/Food/Nutrition Related - Comments     12/18  Pt transferred to ICU s/p MCA on 12/17. NIH 27. Plan for MRI today. [id number]. Per review of limited documentation, appears to have been eating well prior to stroke.     12/11  Patient triggered for nutrition assessment for c/s difficulty. Patient in COVID isolation. Able to reach patient over the phone, however reported being hard of hearing and not being able to hear questions.  Contacted patient daughter who reports patient prefers soft diet, she typically over cooks food for the patient and cuts meats in really small pieces.  Daughter feels patient might do better with [facility] soft diet during admission.  She " typically drinks ~ 1 Glucerna a day at home.      Current Nutrition Prescription   NPO Diet NPO Type: Strict NPO  No active supplement orders    Average Intake from [employer]% X 10 meals documented prior to NPO    Nutrition Diagnosis   Date:  Updated:     Problem Biting/chewing difficulty   Etiology Advance age   Signs/Symptoms Reported per family    Status:      Actions     Follow treatment progress, Care plan reviewed    48 hr NPO tomorrow, if no changes and within GOC, recommend NG placement for EN.     Plan for when/if given PO diet:  [facility] soft   Boost G.C. (chocolate) daily with breakfast     Monitor Per Protocol      Criss Concepcion RD, [facility]  Time Spent: 15min

## 2023-12-18 NOTE — PROGRESS NOTES
Syeda TIMMY Gomez  01/06/1927  N226/1    Patient has a [place] single chamber pacemaker with history of permanent atrial fibrillation.  Interrogation today reveals adequate battery life.  High threshold that has been gradual.  Device is MRI safe and she is not dependent.  She underwent initial implant 3/2010 of dual chamber with a generator change and downgrade to single chamber with capping of atrial lead on 6/25/2021.    Interrogation report and MRI clearance form are on chartlet.    Kath Isaac PA-C  Electronically signed by RONALD Swanson, 12/18/23, 8:07 AM EST.

## 2023-12-18 NOTE — NURSING NOTE
"                             Wound, Ostomy and Continence (WOC) Note    Reason for WOC Consultation: Left gluteal and coccyx deep tissue pressure injury not POA    Patient, difficult to arouse. transferred from  yesterday post stroke.  Family/support person not present.     Skin/Wound Assessment:     Wound Type: Pressure Injury Deep Tissue Pressure Injury (DTPI)-suspected  Location: Left gluteal and coccyx  Wound Bed: non-blanchable, maroon/purple, purple, and red  Wound Edges: Irregular and Open  Periwound Skin: intact and blanchable   Drainage Characteristics/Odor: none  Drainage Amount: none  Image:     Summary and Recommendation(s):     -WOC follow-up in 24 to 48 hours to reassess and confirm Hapi.  -Venelex ordered twice daily for suspected deep tissue pressure injuries to left gluteal and coccyx.  Cover with an Optifoam dressing.  -High risk for skin breakdown and further pressure injury development.  Keep patient turned and offloaded.  - Refer to wound care instructions in the \"Orders\" tab  - Specialty support surface in place: Isolibrium       Pressure Injury Prevention Measures (initiate for a Bala Scale Score of <18):     Most recent Bala Scale score:  Sensory Perception: 2-->very limited  Moisture: 3-->occasionally moist  Activity: 1-->bedfast  Mobility: 2-->very limited  Nutrition: 2-->probably inadequate  Friction and Shear: 2-->potential problem  Bala Score: 12 (12/17/23 2000)     -Turn q 2 hr. using an offloading foam wedge, keep heels elevated and offloaded with offloading heel boots.    -Raise knee-gatch before elevating HOB to reduce shearing   -Follow C.A.R.E protocol if medical devices (Bipap, mendes, Ng tube, etc) are being used.  -Apply moisture barrier cream to bottom BID & PRN, if incontinent.  -If incontinent, consider applying an external catheter. External catheters are finicky and should be checked every few hours for placement.   -Clean skin gently with no-rinse PH-balanced foam " cleanser and a soft, disposable cloth (barrier wipes-blue pack).   -Reduce layers under patient (one sheet as drawsheet and two incontinence pads)    Thank you for consulting the WOC Nurse.  WOC Team will continue to follow.  Please re consult if the wound(s) worsens.     Chicho Chavez RN, BSN, CCRN, CWOCN  Wound, Ostomy and Continence (WOC) Department  [facility]

## 2023-12-18 NOTE — THERAPY RE-EVALUATION
Acute Care - Speech Language Pathology   Swallow Re-Evaluation  Snow  Clinical Swallow Evaluation       Patient Name: Syeda Gomez  : 1927  MRN: [id number]  Today's Date: 2023               Admit Date: 12/10/2023    Visit Dx:     ICD-10-CM ICD-9-CM   1. COVID-19  U07.1 079.89   2. Pneumonia of left lower lobe due to infectious organism  J18.9 486   3. Dysphagia, unspecified type  R13.10 787.20     Patient Active Problem List   Diagnosis    Pneumonia due to COVID-19 virus    Pancytopenia    Presence of cardiac pacemaker    Atrial fibrillation    Prolonged QTc interval on ECG    Hypothyroidism (acquired)    HTN (hypertension)    HLD (hyperlipidemia)    Elective replacement indicated for pacemaker    CHF (congestive heart failure)    Anemia    Recurrent right pleural effusion    COVID-19 + 12/10/23    Acute Rt MCA CVA s/p TNK    Secondary pulmonary arterial hypertension    Rt Carotid occlusion     Past Medical History:   Diagnosis Date    A-fib     Arthritis     Cancer     - left breast    Carpal tunnel syndrome     CHF (congestive heart failure)     Detached retina     right side    Disease of thyroid gland     Dizzy     Full dentures     GERD (gastroesophageal reflux disease)     Hearing aid worn     bilat    Heart murmur     History of transfusion     x4 just this year- never had reaction    [place] (hard of hearing)     bilat hearinhg aids    Hypertension     Infection     right knee- had infection 15 to 20 years ago- unsure if mrsa or not    Pleural effusion     Rheumatic fever     SOBOE (shortness of breath on exertion)     UTI (urinary tract infection)     Wears glasses      Past Surgical History:   Procedure Laterality Date    APPENDECTOMY      CARDIAC ELECTROPHYSIOLOGY PROCEDURE N/A 2021    Procedure: DEVICE IMPLANT;  Surgeon: Calvin Ventura MD;  Location: [facility] INVASIVE LOCATION;  Service: Cardiovascular;  Laterality: N/A;    CARPAL TUNNEL RELEASE Bilateral     CATARACT  EXTRACTION, BILATERAL      CHOLECYSTECTOMY      COLONOSCOPY      HYSTERECTOMY      MASTECTOMY Left     OTHER SURGICAL HISTORY      x2 fluid removed from right side    PLEURAL CATHETER INSERTION Right 4/4/2022    Procedure: PLEURX CATHETER INSERTION;  Surgeon: Fidel Carter MD;  Location: [facility];  Service: Cardiothoracic;  Laterality: Right;    PORTACATH PLACEMENT Left     REPLACEMENT TOTAL KNEE BILATERAL      RETINAL DETACHMENT SURGERY Right     buckle in place    SHOULDER ROTATOR CUFF REPAIR Right     TOTAL HIP ARTHROPLASTY Right     TRIGGER FINGER RELEASE Right     thinks right side       SLP Recommendation and Plan  SLP Swallowing Diagnosis: suspected pharyngeal dysphagia (12/18/23 1445)  SLP Diet Recommendation: NPO (12/18/23 1445)     SLP Rec. for Method of Medication Administration: meds via alternate route (12/18/23 1445)        Recommended Diagnostics: reassess via clinical swallow evaluation (12/18/23 1445)  Swallow Criteria for Skilled Therapeutic Interventions Met: demonstrates skilled criteria (12/18/23 1445)  Anticipated Discharge Disposition (SLP): unknown, anticipate therapy at next level of care (12/18/23 1445)        Predicted Duration Therapy Intervention (Days): until discharge (12/18/23 1445)  Oral Care Recommendations: Oral Care BID/PRN, Suction toothbrush (12/18/23 1445)                                      Oral Care Recommendations: Oral Care BID/PRN, Suction toothbrush (12/18/23 1445)    Plan of Care Reviewed With: patient      SWALLOW EVALUATION (last 72 hours)       SLP Adult Swallow Evaluation       Row Name 12/18/23 1445                   Rehab Evaluation    Document Type re-evaluation  -DV        Subjective Information no complaints  -DV        Patient Observations decreased LOC  -DV        Patient/Family/Caregiver Comments/Observations no family present  -DV        Patient Effort good  -DV        Symptoms Noted During/After Treatment none  -DV           General Information     Patient Profile Reviewed yes  -DV        Pertinent History Of Current Problem See initial eval for full hx. Now with acute R MCA CVA 12/17.  -DV        Current Method of Nutrition NPO  -DV        Precautions/Limitations, Vision difficult to assess  -DV        Precautions/Limitations, Hearing difficult to assess  -DV        Prior Level of Function-Communication unknown  -DV        Prior Level of Function-Swallowing soft to chew;thin liquids  -DV        Plans/Goals Discussed with patient  -DV        Barriers to Rehab medically complex  -DV        Patient's Goals for Discharge patient could not state  -DV           Pain    Additional Documentation Pain Scale: FACES Pre/Post-Treatment (Group)  -DV           Pain Scale: FACES Pre/Post-Treatment    Pain: FACES Scale, Pretreatment 0-->no hurt  -DV        Posttreatment Pain Rating 0-->no hurt  -DV           Oral Motor Structure and Function    Dentition Assessment upper dentures/partial in place;other (see comments)  lower dentures unavailable and ill fitting  -DV        Secretion Management WNL/WFL  -DV        Mucosal Quality moist, healthy  -DV           Oral Musculature and Cranial Nerve Assessment    Oral Motor General Assessment unable to assess  -DV           General Eating/Swallowing Observations    Respiratory Support Currently in Use nasal cannula  -DV        O2 Liters 3L  -DV        Eating/Swallowing Skills fed by SLP  -DV        Positioning During Eating upright in bed  -DV        Utensils Used spoon;straw  -DV        Consistencies Trialed thin liquids;nectar/syrup-thick liquids;pureed  -DV           Respiratory    Respiratory Status WFL  -DV           Clinical Swallow Eval    Oral Prep Phase impaired  -DV        Oral Transit impaired  -DV        Pharyngeal Phase suspected pharyngeal impairment  -DV        Clinical Swallow Evaluation Summary Pt was difficult to arouse and still just barely awake, but took a few sips and bites with eyes closed. Immediate hard  cough with thin. Throat clear with puree, and then a delayed hard, wet cough after nectar-thick liquid trials. Not ready for PO or instrumental assessment at this time.  -DV           Oral Prep Concerns    Oral Prep Concerns oral holding;spits out food prior to swallow  -DV        Oral Holding pudding  -DV        Spits Out Food Prior to Swallow other (see comments)  ice chip  -DV           Oral Transit Concerns    Oral Transit Concerns increased oral transit time  -DV        Increased Oral Transit Time pudding  -DV           Pharyngeal Phase Concerns    Pharyngeal Phase Concerns cough;throat clear  -DV        Cough thin  -DV        Throat Clear pudding;nectar  -DV           SLP Evaluation Clinical Impression    SLP Swallowing Diagnosis suspected pharyngeal dysphagia  -DV        Functional Impact risk of aspiration/pneumonia  -DV        Swallow Criteria for Skilled Therapeutic Interventions Met demonstrates skilled criteria  -DV           Recommendations    Predicted Duration Therapy Intervention (Days) until discharge  -DV        SLP Diet Recommendation NPO  -DV        Recommended Diagnostics reassess via clinical swallow evaluation  -DV        Oral Care Recommendations Oral Care BID/PRN;Suction toothbrush  -DV        SLP Rec. for Method of Medication Administration meds via alternate route  -DV        Anticipated Discharge Disposition (SLP) unknown;anticipate therapy at next level of care  -DV                  User Key  (r) = Recorded By, (t) = Taken By, (c) = Cosigned By      Initials Name Effective Dates    DV Magalis Sepulveda, MS CCC-SLP 06/16/21 -                     EDUCATION  The patient has been educated in the following areas:   Dysphagia (Swallowing Impairment) NPO rationale.        SLP GOALS       Row Name 12/18/23 1543             (LTG) Patient will demonstrate functional swallow for    Diet Texture (Demonstrate functional swallow) soft to chew (whole) textures  -DV      Liquid viscosity (Demonstrate  functional swallow) thin liquids  -DV      [place] (Demonstrate functional swallow) with minimal cues (75-90% accuracy)  -DV      Time Frame (Demonstrate functional swallow) by discharge  -DV      Progress/Outcomes (Demonstrate functional swallow) goal ongoing  -DV         (STG) Patient will tolerate trials of    Consistencies Trialed (Tolerate trials) pureed textures;thin liquids  -DV      Desired Outcome (Tolerate trials) without signs/symptoms of aspiration;with adequate oral prep/transit/clearance  -DV      [place] (Tolerate trials) with minimal cues (75-90% accuracy)  -DV      Time Frame (Tolerate trials) by discharge  -DV      Progress/Outcomes (Tolerate trials) goal ongoing  -DV                User Key  (r) = Recorded By, (t) = Taken By, (c) = Cosigned By      Initials Name Provider Type    Magalis Godinez MS CCC-SLP Speech and Language Pathologist                       Time Calculation:    Time Calculation- SLP       Row Name 12/18/23 1539             Time Calculation- SLP    SLP Start Time 1445  -DV      SLP Received On 12/18/23  -DV         Untimed Charges    SLP Eval/Re-eval  ST Eval Oral Pharyng Swallow - 14292  -DV      [id number] Eval Oral Pharyng Swallow Minutes 45  -DV         Total Minutes    Untimed Charges Total Minutes 45  -DV       Total Minutes 45  -DV                User Key  (r) = Recorded By, (t) = Taken By, (c) = Cosigned By      Initials Name Provider Type    Magalis Godinez MS CCC-SLP Speech and Language Pathologist                    Therapy Charges for Today       Code Description Service Date Service Provider Modifiers Qty    [id number]  ST EVAL ORAL PHARYNG SWALLOW 3 12/18/2023 Magalis Sepulveda MS CCC-CAPRI GN 1                 MS CONNIE Red  12/18/2023

## 2023-12-18 NOTE — THERAPY RE-EVALUATION
Patient Name: Syeda Gomez  : 1927    MRN: [id number]                              Today's Date: 2023       Admit Date: 12/10/2023    Visit Dx:     ICD-10-CM ICD-9-CM   1. COVID-19  U07.1 079.89   2. Pneumonia of left lower lobe due to infectious organism  J18.9 486   3. Dysphagia, unspecified type  R13.10 787.20     Patient Active Problem List   Diagnosis    Pneumonia due to COVID-19 virus    Pancytopenia    Presence of cardiac pacemaker    Atrial fibrillation    Prolonged QTc interval on ECG    Hypothyroidism (acquired)    HTN (hypertension)    HLD (hyperlipidemia)    Elective replacement indicated for pacemaker    CHF (congestive heart failure)    Anemia    Recurrent right pleural effusion    COVID-19 + 12/10/23    Acute Rt MCA CVA s/p TNK    Secondary pulmonary arterial hypertension    Rt Carotid occlusion     Past Medical History:   Diagnosis Date    A-fib     Arthritis     Cancer     - left breast    Carpal tunnel syndrome     CHF (congestive heart failure)     Detached retina     right side    Disease of thyroid gland     Dizzy     Full dentures     GERD (gastroesophageal reflux disease)     Hearing aid worn     bilat    Heart murmur     History of transfusion     x4 just this year- never had reaction    [place] (hard of hearing)     bilat hearinhg aids    Hypertension     Infection     right knee- had infection 15 to 20 years ago- unsure if mrsa or not    Pleural effusion     Rheumatic fever     SOBOE (shortness of breath on exertion)     UTI (urinary tract infection)     Wears glasses      Past Surgical History:   Procedure Laterality Date    APPENDECTOMY      CARDIAC ELECTROPHYSIOLOGY PROCEDURE N/A 2021    Procedure: DEVICE IMPLANT;  Surgeon: Calvin Ventura MD;  Location: [facility] INVASIVE LOCATION;  Service: Cardiovascular;  Laterality: N/A;    CARPAL TUNNEL RELEASE Bilateral     CATARACT EXTRACTION, BILATERAL      CHOLECYSTECTOMY      COLONOSCOPY      HYSTERECTOMY       MASTECTOMY Left     OTHER SURGICAL HISTORY      x2 fluid removed from right side    PLEURAL CATHETER INSERTION Right 4/4/2022    Procedure: PLEURX CATHETER INSERTION;  Surgeon: Fidel Carter MD;  Location: [facility];  Service: Cardiothoracic;  Laterality: Right;    PORTACATH PLACEMENT Left     REPLACEMENT TOTAL KNEE BILATERAL      RETINAL DETACHMENT SURGERY Right     buckle in place    SHOULDER ROTATOR CUFF REPAIR Right     TOTAL HIP ARTHROPLASTY Right     TRIGGER FINGER RELEASE Right     thinks right side      General Information       Row Name 12/18/23 1324          Physical Therapy Time and Intention    Document Type re-evaluation  -AY     Mode of Treatment physical therapy  -AY       Row Name 12/18/23 1324          General Information    Patient Profile Reviewed yes  -AY     Prior Level of Function --  see initial eval  -AY     Existing Precautions/Restrictions fall;oxygen therapy device and L/min  L weakness/inattention  -AY     Barriers to Rehab medically complex;cognitive status;visual deficit  -AY       Row Name 12/18/23 1329          Living Environment    People in Home --  see initial eval for home set-up info  -AY       Row Name 12/18/23 1323          Cognition    Orientation Status (Cognition) unable/difficult to assess  -AY       Row Name 12/18/23 1327          Safety Issues, Functional Mobility    Safety Issues Affecting Function (Mobility) ability to follow commands  -AY     Impairments Affecting Function (Mobility) balance;cognition;endurance/activity tolerance;coordination;grasp;motor control;motor planning;postural/trunk control;range of motion (ROM);sensation/sensory awareness;strength;visual/perceptual  -AY     Cognitive Impairments, Mobility Safety/Performance attention  -AY               User Key  (r) = Recorded By, (t) = Taken By, (c) = Cosigned By      Initials Name Provider Type    AY Jania Nj PT Physical Therapist                   Mobility       Row Name 12/18/23 1329          Bed  Mobility    Bed Mobility scooting/bridging;supine-sit  -AY     Scooting/Bridging [place] (Bed Mobility) dependent (less than 25% patient effort);2 person assist;verbal cues  -AY     Supine-Sit [place] (Bed Mobility) 2 person assist;dependent (less than 25% patient effort);verbal cues  -AY     Assistive Device (Bed Mobility) draw sheet;head of bed elevated  -AY       Row Name 12/18/23 1325          Sit-Stand Transfer    Sit-Stand [place] (Transfers) dependent (less than 25% patient effort);2 person assist;verbal cues  -AY     Assistive Device (Sit-Stand Transfers) other (see comments)  -AY     Comment, (Sit-Stand Transfer) B knee buckling (L>R)  -AY       Row Name 12/18/23 1325          Gait/Stairs (Locomotion)    [place] Level (Gait) not tested  -AY     Comment, (Gait/Stairs) deferred d/t poor standing balance.  -AY               User Key  (r) = Recorded By, (t) = Taken By, (c) = Cosigned By      Initials Name Provider Type    AY Jania Nj PT Physical Therapist                   Obj/Interventions       Row Name 12/18/23 1326          Range of Motion Comprehensive    General Range of Motion bilateral lower extremity ROM WFL  -AY       Row Name 12/18/23 1326          Strength Comprehensive (MMT)    General Manual Muscle Testing (MMT) Assessment lower extremity strength deficits identified  -AY     Comment, General Manual Muscle Testing (MMT) Assessment Formal MMT deferred d/t poor command following. RLE grossly observed to be <2-/5 with functional mobility observation. LLE grossly observed to be 1/5 with functional mobility observation  -AY       Row Name 12/18/23 1326          Motor Skills    Motor Skills coordination  -AY     Coordination gross motor deficit;left;lower extremity;severe impairment  -AY       Row Name 12/18/23 1326          Balance    Balance Assessment sitting static balance;sitting dynamic balance;sit to stand dynamic balance;standing static balance  -AY     Static Sitting  Balance minimal assist  -AY     Dynamic Sitting Balance maximum assist  -AY     Position, Sitting Balance unsupported;sitting edge of bed  -AY     Static Standing Balance dependent  -AY     Position/Device Used, Standing Balance supported  -AY     Comment, Balance pushing L with RUE in sitting balance. cueing for WBing on elbow and weight shifting in sitting (A/P leans performed x5). pt progressed to brief periods of min A for static sitting balance; otherwise was mod A/max A for sitting balance.  -AY       Row Name 12/18/23 1326          Sensory Assessment (Somatosensory)    Sensory Assessment (Somatosensory) unable/difficult to assess  -AY               User Key  (r) = Recorded By, (t) = Taken By, (c) = Cosigned By      Initials Name Provider Type    AY Jania Nj, PT Physical Therapist                   Goals/Plan       Row Name 12/18/23 9420          Bed Mobility Goal 1 (PT)    Activity/Assistive Device (Bed Mobility Goal 1, PT) sit to supine/supine to sit  -AY     [place] Level/Cues Needed (Bed Mobility Goal 1, PT) moderate assist (50-74% patient effort)  -AY     Time Frame (Bed Mobility Goal 1, PT) long term goal (LTG);10 days  -AY     Progress/Outcomes (Bed Mobility Goal 1, PT) goal revised this date;medical status inhibiting progress  -AY       Row Name 12/18/23 9329          Transfer Goal 1 (PT)    Activity/Assistive Device (Transfer Goal 1, PT) sit-to-stand/stand-to-sit;bed-to-chair/chair-to-bed  -AY     [place] Level/Cues Needed (Transfer Goal 1, PT) moderate assist (50-74% patient effort)  -AY     Time Frame (Transfer Goal 1, PT) long term goal (LTG);10 days  -AY     Progress/Outcome (Transfer Goal 1, PT) goal revised this date;medical status inhibiting progress  -AY       Row Name 12/18/23 8610          Gait Training Goal 1 (PT)    Activity/Assistive Device (Gait Training Goal 1, PT) gait (walking locomotion)  -AY     [place] Level (Gait Training Goal 1, PT) moderate assist (50-74%  patient effort)  -AY     Distance (Gait Training Goal 1, PT) 15  -AY     Time Frame (Gait Training Goal 1, PT) long term goal (LTG);10 days  -AY     Progress/Outcome (Gait Training Goal 1, PT) goal revised this date;medical status inhibiting progress  -AY       Row Name 12/18/23 2240          Stairs Goal 1 (PT)    Activity/Assistive Device (Stairs Goal 1, PT) ascending stairs;descending stairs;using handrail, left;using handrail, right  -AY     [place] Level/Cues Needed (Stairs Goal 1, PT) standby assist  -AY     Number of Stairs (Stairs Goal 1, PT) 5  -AY     Time Frame (Stairs Goal 1, PT) long term goal (LTG);by discharge  -AY     Progress/Outcome (Stairs Goal 1, PT) goal no longer appropriate  -AY       Row Name 12/18/23 5699          Therapy Assessment/Plan (PT)    Planned Therapy Interventions (PT) balance training;bed mobility training;gait training;home exercise program;postural re-education;transfer training;patient/family education;strengthening;stair training;neuromuscular re-education  -AY               User Key  (r) = Recorded By, (t) = Taken By, (c) = Cosigned By      Initials Name Provider Type    AY Jania Nj, PT Physical Therapist                   Clinical Impression       Row Name 12/18/23 132          Pain    Pain Intervention(s) Ambulation/increased activity;Repositioned  -AY     Additional Documentation Pain Scale: FACES Pre/Post-Treatment (Group)  -AY       Row Name 12/18/23 132          Pain Scale: FACES Pre/Post-Treatment    Pain: FACES Scale, Pretreatment 0-->no hurt  -AY     Posttreatment Pain Rating 0-->no hurt  -AY       Row Name 12/18/23 1325          Plan of Care Review    Plan of Care Reviewed With patient  -AY     Progress no change  -AY     Outcome Evaluation Pt limited by L weakness, balance deficit, decreased functional endurance, and impaired cognition with decreased command following compared to baseline. Pt required total A to stand with B knee buckling noted (L>R).  Rec continued skilled PT to increase indep with mobility. d/c rec for SNF.  -AY       Row Name 12/18/23 1328          Therapy Assessment/Plan (PT)    Rehab Potential (PT) fair, will monitor progress closely  -AY     Criteria for Skilled Interventions Met (PT) yes;meets criteria;skilled treatment is necessary  -AY     Therapy Frequency (PT) daily  -AY       Row Name 12/18/23 1328          Vital Signs    Pre Systolic BP Rehab 116  -AY     Pre Treatment Diastolic BP 52  -AY     Post Systolic BP Rehab 144  -AY     Post Treatment Diastolic BP 61  -AY     Pretreatment Heart Rate (beats/min) 63  -AY     Posttreatment Heart Rate (beats/min) 67  -AY     Pre SpO2 (%) 100  -AY     O2 Delivery Pre Treatment nasal cannula  -AY     O2 Delivery Intra Treatment nasal cannula  -AY     Post SpO2 (%) 100  -AY     O2 Delivery Post Treatment nasal cannula  -AY     Pre Patient Position Supine  -AY     Intra Patient Position Standing  -AY     Post Patient Position Sitting  -AY       Row Name 12/18/23 1328          Positioning and Restraints    Pre-Treatment Position in bed  -AY     Post Treatment Position bed  -AY     In Bed sitting EOB;with OT;encouraged to call for assist;call light within reach;notified nsg  -AY               User Key  (r) = Recorded By, (t) = Taken By, (c) = Cosigned By      Initials Name Provider Type    AY Jania Nj, PT Physical Therapist                   Outcome Measures       Row Name 12/18/23 1331          How much help from another person do you currently need...    Turning from your back to your side while in flat bed without using bedrails? 2  -AY     Moving from lying on back to sitting on the side of a flat bed without bedrails? 1  -AY     Moving to and from a bed to a chair (including a wheelchair)? 1  -AY     Standing up from a chair using your arms (e.g., wheelchair, bedside chair)? 1  -AY     Climbing 3-5 steps with a railing? 1  -AY     To walk in hospital room? 1  -AY     AM-PAC 6 Clicks Score  (PT) 7  -AY     Highest Level of Mobility Goal 2 --> Bed activities/dependent transfer  -AY       Row Name 12/18/23 1331 12/18/23 1022       Modified Maggi Scale    Pre-Stroke Modified [place] Scale 6 - Unable to determine (UTD) from the medical record documentation  -AY 0 - No Symptoms at all.  -KL    Modified [place] Scale 5 - Severe disability.  Bedridden, incontinent, and requiring constant nursing care and attention.  -AY 4 - Moderately severe disability.  Unable to walk without assistance, and unable to attend to own bodily needs without assistance.  -KL      Row Name 12/18/23 1331 12/18/23 1022       Functional Assessment    Outcome Measure Options AM-PAC 6 Clicks Basic Mobility (PT);Modified [place]  -AY AM-PAC 6 Clicks Daily Activity (OT);Modified [place]  -KL              User Key  (r) = Recorded By, (t) = Taken By, (c) = Cosigned By      Initials Name Provider Type    Jania Mendoza, PT Physical Therapist    [place] Occupational Therapist                                 Physical Therapy Education       Title: PT OT SLP Therapies (In Progress)       Topic: Physical Therapy (In Progress)       Point: Mobility training (In Progress)       Learning Progress Summary             Patient Nonacceptance, E, NR by AY at 12/18/2023 1331    Acceptance, E, VU,NR by ML at 12/14/2023 1411    Acceptance, E,TB, NR by ES at 12/11/2023 0920   Family Acceptance, E, VU,NR by ML at 12/14/2023 1411                         Point: Home exercise program (Not Started)       Learner Progress:  Not documented in this visit.              Point: Body mechanics (In Progress)       Learning Progress Summary             Patient Nonacceptance, E, NR by AY at 12/18/2023 1331    Acceptance, E,TB, NR by ES at 12/11/2023 0920                         Point: Precautions (In Progress)       Learning Progress Summary             Patient Nonacceptance, E, NR by AY at 12/18/2023 1331    Acceptance, E, VU,NR by ML at 12/14/2023 1411     Acceptance, E,TB, NR by ES at 12/11/2023 0920   Family Acceptance, E, VU,NR by ML at 12/14/2023 1411                                         User Key       Initials Effective Dates Name Provider Type Discipline    AY 11/10/20 -  Jania Nj, PT Physical Therapist PT    ML 04/22/21 -  Halina Mojica Physical Therapist PT    ES 08/11/22 -  Kalie Padron PT Physical Therapist PT                  PT Recommendation and Plan  Planned Therapy Interventions (PT): balance training, bed mobility training, gait training, home exercise program, postural re-education, transfer training, patient/family education, strengthening, stair training, neuromuscular re-education  Plan of Care Reviewed With: patient  Progress: no change  Outcome Evaluation: Pt limited by L weakness, balance deficit, decreased functional endurance, and impaired cognition with decreased command following compared to baseline. Pt required total A to stand with B knee buckling noted (L>R). Rec continued skilled PT to increase indep with mobility. d/c rec for SNF.     Time Calculation:         PT Charges       Row Name 12/18/23 1331             Time Calculation    Start Time 0825  -AY      PT Received On 12/18/23  -AY      PT Goal Re-Cert Due Date 12/28/23  -AY         Timed Charges    51064 - PT Therapeutic Activity Minutes 10  -AY         Untimed Charges    PT Eval/Re-eval Minutes 20  -AY         Total Minutes    Timed Charges Total Minutes 10  -AY      Untimed Charges Total Minutes 20  -AY       Total Minutes 30  -AY                User Key  (r) = Recorded By, (t) = Taken By, (c) = Cosigned By      Initials Name Provider Type    AY Jania Nj, PT Physical Therapist                  Therapy Charges for Today       Code Description Service Date Service Provider Modifiers Qty    51531745176 HC PT THERAPEUTIC ACT EA 15 MIN 12/18/2023 Jania Nj, PT GP 1    07746538213 HC PT RE-EVAL ESTABLISHED PLAN 2 12/18/2023 Jania Nj, PT GP 1            PT  G-Codes  Outcome Measure Options: AM-PAC 6 Clicks Basic Mobility (PT), Modified [place]  AM-PAC 6 Clicks Score (PT): 7  AM-PAC 6 Clicks Score (OT): 6  Modified [place] Scale: 5 - Severe disability.  Bedridden, incontinent, and requiring constant nursing care and attention.  PT Discharge Summary  Anticipated Discharge Disposition (PT): skilled nursing facility    Jania Nj, BRIAN  12/18/2023

## 2023-12-18 NOTE — PLAN OF CARE
Problem: Adult Inpatient Plan of Care  Goal: Plan of Care Review  Outcome: Ongoing, Progressing  Flowsheets (Taken 12/18/2023 3177)  Plan of Care Reviewed With: patient   Goal Outcome Evaluation:  Plan of Care Reviewed With: patient      SLP re-evaluation completed. Will address dysphagia with re-evaluation. Will follow-up for cognitive-communication as able. Please see note for further details and recommendations.              Anticipated Discharge Disposition (SLP): unknown, anticipate therapy at next level of care

## 2023-12-18 NOTE — PLAN OF CARE
Goal Outcome Evaluation:  Plan of Care Reviewed With: patient        Progress: declining  Outcome Evaluation: Pt presents to OT re-eval w/ significant deficits in functional balance, mobility and self-care abilities. Pt would benefit from IPOT to address deficits in order to progress towards PLOF. D/c rec is SNF.      Anticipated Discharge Disposition (OT): skilled nursing facility

## 2023-12-18 NOTE — PLAN OF CARE
Goal Outcome Evaluation:      NIH 27. Pt remains oriented only to self and requires repeated stimulation to arouse. Pt is able to nod yes or no appropriately, she reports no pain. Follows commands on Right. Wound care provided per instructions from [facility] nurse. 24 hour head CT negative. Vitals stable on 3L NC. Paced rhythm. Family is leaning towards comfort measures only pending Palliative consult.

## 2023-12-18 NOTE — PROGRESS NOTES
Intensive Care Follow-up     Hospital:  LOS: 8 days   Ms. Syeda Gomez, 89 y.o. female is followed for:   Acute CVA (cerebrovascular accident)        Subjective     Syeda Gomez is a 89 y.o. female with PMH Afib not on anticoag, HFpEF, HTN, CKD stage 3, secondary pulmonary HTN, HLP and hypothyroidism who was admitted 12/10 to the Hospitalist service for shortness of air, weakness, nausea and no appetite.  She had a + COVID home test 12/10. Normally, she walks short distances in the house and is chronically dyspneic w activity though not on home oxygen.  Her BLE swelling (L>R chronically) is worse than baseline, and distal LLE has been weeping fluid.      While on the floor, she completed a course of Paxlovid.  She has had intermittent delirium requiring Seroquel and nightly Melatonin.  She has also been working with PT/OT for weakness.  They recommended SNF at discharge.     Today, she had an RRT after family notified RN of stroke-like symptoms.  She had developed left side flaccidity, left facial droop.  CTH showed hyperdense right MCA sign concerning for thrombus.  CTA H/N revealed complete occlusion of the right internal carotid artery near its origin with extension of thrombus to the right carotid terminus and into the right middle cerebral artery.  Ill-defined hypodensity throughout the right MCA distribution compatible with ischemia and infarct.  CTP revealed large area of cerebral ischemia involving the right MCA distribution.  NIHSS was 30. She was transferred to the ICU.    Interval History:  The chart has been reviewed.  The patient remains poorly responsive today.  Currently awaiting MRI.  She has been cleared by cardiology due to the presence of her pacemaker.    The patient's past medical, surgical and social history were reviewed and updated in Epic as appropriate.        Objective     Infusions:     Medications:  aspirin, 81 mg, Oral, Daily   Or  aspirin, 300 mg, Rectal, Daily  atorvastatin, 80 mg,  "Oral, Nightly  castor oil-balsam peru, 1 application , Topical, Q12H  insulin regular, 2-9 Units, Subcutaneous, Q6H  levothyroxine sodium, 40 mcg, Intravenous, Daily  pantoprazole, 40 mg, Intravenous, Q AM  sodium chloride, 10 mL, Intravenous, Q12H        Vital Sign Min/Max for last 24 hours  Temp  Min: 97.1 °F (36.2 °C)  Max: 98.3 °F (36.8 °C)   BP  Min: 95/49  Max: 162/65   Pulse  Min: 51  Max: 86   Resp  Min: 16  Max: 24   SpO2  Min: 94 %  Max: 100 %   Flow (L/min)  Min: 3  Max: 3       Input/Output for last 24 hour shift  12/17 0701 - 12/18 0700  In: -   Out: 640 [Urine:640]      Objective:  General Appearance:  In no acute distress, ill-appearing and uncomfortable.    Vital signs: (most recent): Blood pressure 137/56, pulse 70, temperature 98.1 °F (36.7 °C), temperature source Axillary, resp. rate 24, height 160 cm (63\"), weight 63 kg (139 lb), SpO2 99%.    Lungs:  Normal effort and normal respiratory rate.  Breath sounds clear to auscultation.  She is not in respiratory distress.  No rales, wheezes or rhonchi.    Heart: Normal rate.  Regular rhythm.  S1 normal and S2 normal.  No murmur, gallop or friction rub.   Chest: Symmetric chest wall expansion.   Abdomen: Abdomen is soft and non-distended.  Bowel sounds are normal.   There is no abdominal tenderness.   There is no mass. There is no splenomegaly. There is no hepatomegaly.   Extremities: There is no deformity or dependent edema.    Neurological: (Patient is currently poorly responsive.).    Pupils:  Pupils are equal, round, and reactive to light.    Skin:  Warm and dry.                Results from last 7 days   Lab Units 12/18/23  0512 12/16/23  0427 12/13/23  0338   WBC 10*3/mm3 7.96 5.15 8.55   HEMOGLOBIN g/dL 12.0 12.2 12.4   PLATELETS 10*3/mm3 196 183 193     Results from last 7 days   Lab Units 12/18/23  0512 12/16/23  0427 12/13/23  0338 12/12/23  0445   SODIUM mmol/L 140 140 134* 132*   POTASSIUM mmol/L 3.7 3.6 3.3* 3.5   CO2 mmol/L 27.0 29.0 23.0 " 25.0   BUN mg/dL 25* 24* 27* 27*   CREATININE mg/dL 1.00 1.04* 0.98 1.13*   MAGNESIUM mg/dL 2.3  --   --  2.2   PHOSPHORUS mg/dL 3.5  --   --  3.2   GLUCOSE mg/dL 189* 194* 248* 344*     Estimated Creatinine Clearance: 29.5 mL/min (by C-G formula based on SCr of 1 mg/dL).    Results from last 7 days   Lab Units 12/17/23  1410   PH, ARTERIAL pH units 7.459*   PCO2, ARTERIAL mm Hg 38.4   PO2 ART mm Hg 79.8*           I reviewed the patient's results and images.     Assessment & Plan   Impression        Acute Rt MCA CVA s/p TNK    Atrial fibrillation    Hypothyroidism (acquired)    HTN (hypertension)    HLD (hyperlipidemia)    COVID-19 + 12/10/23    Secondary pulmonary arterial hypertension    Rt Carotid occlusion       Plan        MRI is pending for today.  Continue with close blood pressure control and neurologic checks.  Currently protecting airway.  Very poor prognosis for recovery.  Orders have been placed for tomorrow.    Plan of care and goals reviewed with mulitdisciplinary/antibiotic stewardship team during rounds.   I discussed the patient's findings and my recommendations with patient and nursing staff     High level of risk due to:  drug(s) requiring intensive monitoring for toxicity and parenteral controlled substances.        Choco Tanner MD, [facility]  Pulmonology and Critical Care Medicine

## 2023-12-19 NOTE — PLAN OF CARE
Goal Outcome Evaluation:  Plan of Care Reviewed With: patient        Progress: no change  Outcome Evaluation: New palliative consult for assistance with GOC per Dr. Hallman.  ACP on file names Alyssa Borges as HCS and Betty Alonso as secondary HCS.  GONZÁLEZ Tse and palliative RN saw pt for initial palliative consultation.  Palliative brochure and meal discount card provided to family.  Pt. did not demonstrate any visible signs of discomfort during palliative visit.  Pt. able to open eyes and grasp with right hand to command.  APRN had conversation re. GOC.  Code status adjusted to comfort measures.  Pt. is not inpatient appropriate at this time as she does not have unmanaged symptom needs at this time.  Hospice consult entered.  Palliative care to continue to follow along for support, POC and ongoing GOC.      Problem: Palliative Care  Goal: Enhanced Quality of Life  Intervention: Promote Advance Care Planning  Flowsheets (Taken 12/19/2023 4932)  Life Transition/Adjustment:   palliative care initiated   palliative care discussed

## 2023-12-19 NOTE — PROGRESS NOTES
"Continued Stay Note  [facility]     Patient Name: Syeda Gomez  MRN: [id number]  Today's Date: 12/19/2023    Admit Date: 12/10/2023    Plan: Inpatient hospice   Discharge Plan       Row Name 12/19/23 1531       Plan    Plan Inpatient hospice    Plan Comments Referral received for inpatient hospice. Per palliative patient does not yet meet criteria for inpatient hospice. They are following and providing comfort orders as needed. Hospice will monitor should patient become eligible for inpatient hospice.   Inpatient hospice criteria is based on guidelines from Medicare/Medicaid. To meet eligibility requirements, one must have \"unmanaged symptoms\" which means symptoms (pain/nausea/dyspnea/agitation/anxiety) unable to be cared for at a lower level of care (home/nursing home/hospital floor). Usually requiring frequent medication titration and/or IV medication management/inability to take PO. Unfortunately non-symptomatic end of life (within days of passing) is not a sole qualification for inpatient hospice. Inpatient hospice is for short term acute symptom management and if symptoms are managed, disposition for discharge will be addressed.  If hospice may be of any assistance please contact us at [phone]                   Discharge Codes    No documentation.                 Expected Discharge Date and Time       Expected Discharge Date Expected Discharge Time    Dec 20, 2023               Mary Olivo    "

## 2023-12-19 NOTE — PROGRESS NOTES
Critical care update:    Patient remains nonresponsive to me this morning.  Seems to be protecting her airway adequately.  Family has met with palliative care services and the patient will be transferred to the floor for further palliation.  We will ask for the hospitalist service to take over her care for the time being.    Choco Tanner MD     Yes

## 2023-12-19 NOTE — SIGNIFICANT NOTE
12/19/23 0920   SLP Deferred Reason   SLP Deferred Reason Unable to evaluate, medical status change  (RN reports pt is not appropriate for ST. Current plan is for Palliative.  SLP to follow up and determine if further ST indicated or ok to sign off.)

## 2023-12-19 NOTE — PLAN OF CARE
Goal Outcome Evaluation:   Palliative meeting with family this shift, Comfort Care goals set. Comfort diet set, pt noted to cough and have wet vocal quality after small spoonfuls of water. Pt denies pain or anxiety. Transfer orders placed. Continue plan of care.

## 2023-12-19 NOTE — CONSULTS
visited patient per palliative spiritual care consult.  Spoke briefly with daughter of patient outside of the room.  Patient's  was with daughter, going into room.  ([facility], [place]).  Will continue to follow as needed for family support.

## 2023-12-19 NOTE — PROGRESS NOTES
Nutrition Services    Patient Name:  Syeda Gomez  YOB: 1927  MRN: [id number]  Admit Date:  12/10/2023    Pt now with comfort measures only. Currently NPO and nonresponsive. RD will continue to follow in the peripheral, please consult Nutrition if needed for specific dietary needs or in the event that GOC should change, thank you.    Electronically signed by:  Criss Concepcion RD  12/19/23 12:32 EST

## 2023-12-19 NOTE — PLAN OF CARE
Problem: Adult Inpatient Plan of Care  Goal: Plan of Care Review  Outcome: Ongoing, Progressing  Goal: Patient-Specific Goal (Individualized)  Outcome: Ongoing, Progressing  Goal: Absence of Hospital-Acquired Illness or Injury  Outcome: Ongoing, Progressing  Intervention: Identify and Manage Fall Risk  Recent Flowsheet Documentation  Taken 12/19/2023 0600 by Venu Broussard RN  Safety Promotion/Fall Prevention:   activity supervised   assistive device/personal items within reach   clutter free environment maintained   room organization consistent   safety round/check completed  Taken 12/19/2023 0400 by Venu Broussard RN  Safety Promotion/Fall Prevention:   activity supervised   assistive device/personal items within reach   clutter free environment maintained   room organization consistent   safety round/check completed  Taken 12/19/2023 0200 by Venu Broussard RN  Safety Promotion/Fall Prevention:   activity supervised   assistive device/personal items within reach   clutter free environment maintained   room organization consistent   safety round/check completed  Taken 12/19/2023 0000 by Venu Broussard RN  Safety Promotion/Fall Prevention:   activity supervised   assistive device/personal items within reach   clutter free environment maintained   room organization consistent   safety round/check completed  Taken 12/18/2023 2200 by Venu Broussard RN  Safety Promotion/Fall Prevention:   activity supervised   assistive device/personal items within reach   clutter free environment maintained   room organization consistent   safety round/check completed  Taken 12/18/2023 2000 by Venu Broussard RN  Safety Promotion/Fall Prevention:   activity supervised   clutter free environment maintained   assistive device/personal items within reach   room organization consistent   safety round/check completed  Intervention: Prevent Skin Injury  Recent Flowsheet Documentation  Taken 12/19/2023 0600 by Venu Broussard RN  Body Position:   turned   lower  extremity elevated   upper extremity elevated  Skin Protection:   adhesive use limited   incontinence pads utilized   skin-to-device areas padded   tubing/devices free from skin contact  Taken 12/19/2023 0400 by Venu Broussard RN  Body Position:   turned   lower extremity elevated   upper extremity elevated  Skin Protection:   adhesive use limited   incontinence pads utilized   skin-to-device areas padded   tubing/devices free from skin contact  Taken 12/19/2023 0200 by Venu Broussard RN  Body Position:   turned   lower extremity elevated   upper extremity elevated  Skin Protection:   adhesive use limited   incontinence pads utilized   skin-to-device areas padded   tubing/devices free from skin contact  Taken 12/19/2023 0000 by Venu Brousasrd RN  Body Position:   turned   lower extremity elevated   upper extremity elevated  Skin Protection:   adhesive use limited   incontinence pads utilized   skin-to-device areas padded   tubing/devices free from skin contact  Taken 12/18/2023 2200 by Venu Broussard RN  Body Position:   turned   lower extremity elevated   upper extremity elevated  Skin Protection:   adhesive use limited   incontinence pads utilized   skin-to-device areas padded   tubing/devices free from skin contact  Taken 12/18/2023 2000 by Venu Broussard RN  Body Position:   turned   lower extremity elevated   upper extremity elevated  Skin Protection:   adhesive use limited   incontinence pads utilized   skin-to-device areas padded   tubing/devices free from skin contact  Intervention: Prevent and Manage VTE (Venous Thromboembolism) Risk  Recent Flowsheet Documentation  Taken 12/19/2023 0600 by Venu Broussadr RN  Activity Management: bedrest  Taken 12/19/2023 0400 by Venu Broussard RN  Activity Management: bedrest  VTE Prevention/Management:   bilateral   sequential compression devices on  Taken 12/19/2023 0200 by Venu Broussard RN  Activity Management: bedrest  Taken 12/19/2023 0000 by Venu Broussard RN  Activity Management:  bedrest  VTE Prevention/Management:   bilateral   sequential compression devices on  Taken 12/18/2023 2200 by Venu Broussard RN  Activity Management: bedrest  Taken 12/18/2023 2000 by Venu Broussard RN  Activity Management: bedrest  VTE Prevention/Management:   bilateral   sequential compression devices on  Range of Motion: ROM (range of motion) performed  Intervention: Prevent Infection  Recent Flowsheet Documentation  Taken 12/19/2023 0600 by Venu Broussard RN  Infection Prevention: rest/sleep promoted  Taken 12/19/2023 0400 by Venu Broussard RN  Infection Prevention: rest/sleep promoted  Taken 12/19/2023 0200 by Venu Broussard RN  Infection Prevention: rest/sleep promoted  Taken 12/19/2023 0000 by Venu Broussard RN  Infection Prevention: rest/sleep promoted  Taken 12/18/2023 2200 by Venu Broussard RN  Infection Prevention: rest/sleep promoted  Taken 12/18/2023 2000 by Venu Broussard RN  Infection Prevention: environmental surveillance performed  Goal: Optimal Comfort and Wellbeing  Outcome: Ongoing, Progressing  Intervention: Provide Person-Centered Care  Recent Flowsheet Documentation  Taken 12/18/2023 2000 by Venu Broussard RN  Trust Relationship/Rapport:   care explained   choices provided   emotional support provided   empathic listening provided   questions encouraged   reassurance provided   thoughts/feelings acknowledged   questions answered  Goal: Readiness for Transition of Care  Outcome: Ongoing, Progressing     Problem: Diabetes Comorbidity  Goal: Blood Glucose Level Within Targeted Range  Outcome: Ongoing, Progressing  Intervention: Monitor and Manage Glycemia  Recent Flowsheet Documentation  Taken 12/18/2023 2000 by Venu Broussard RN  Glycemic Management: blood glucose monitored     Problem: Hypertension Comorbidity  Goal: Blood Pressure in Desired Range  Outcome: Ongoing, Progressing  Intervention: Maintain Blood Pressure Management  Recent Flowsheet Documentation  Taken 12/18/2023 2000 by Venu Broussard RN  Medication  Review/Management: medications reviewed     Problem: Skin Injury Risk Increased  Goal: Skin Health and Integrity  Outcome: Ongoing, Progressing  Intervention: Optimize Skin Protection  Recent Flowsheet Documentation  Taken 12/19/2023 0600 by Vneu Broussard RN  Pressure Reduction Techniques:   heels elevated off bed   positioned off wounds   weight shift assistance provided  Head of Bed (HOB) Positioning: HOB at 30 degrees  Pressure Reduction Devices:   positioning supports utilized   pressure-redistributing mattress utilized  Skin Protection:   adhesive use limited   incontinence pads utilized   skin-to-device areas padded   tubing/devices free from skin contact  Taken 12/19/2023 0400 by Venu Broussard RN  Pressure Reduction Techniques:   heels elevated off bed   weight shift assistance provided  Head of Bed (HOB) Positioning: HOB at 30 degrees  Pressure Reduction Devices:   positioning supports utilized   pressure-redistributing mattress utilized  Skin Protection:   adhesive use limited   incontinence pads utilized   skin-to-device areas padded   tubing/devices free from skin contact  Taken 12/19/2023 0200 by Venu Broussard RN  Pressure Reduction Techniques:   heels elevated off bed   weight shift assistance provided  Head of Bed (HOB) Positioning: HOB at 30 degrees  Pressure Reduction Devices:   positioning supports utilized   pressure-redistributing mattress utilized  Skin Protection:   adhesive use limited   incontinence pads utilized   skin-to-device areas padded   tubing/devices free from skin contact  Taken 12/19/2023 0000 by Venu Broussard RN  Pressure Reduction Techniques:   heels elevated off bed   weight shift assistance provided  Head of Bed (HOB) Positioning: HOB at 30 degrees  Pressure Reduction Devices:   positioning supports utilized   heel offloading device utilized   pressure-redistributing mattress utilized  Skin Protection:   adhesive use limited   incontinence pads utilized   skin-to-device areas padded    tubing/devices free from skin contact  Taken 12/18/2023 2200 by Venu Broussard RN  Pressure Reduction Techniques:   heels elevated off bed   positioned off wounds   pressure points protected   weight shift assistance provided  Head of Bed (HOB) Positioning: HOB at 30 degrees  Pressure Reduction Devices:   positioning supports utilized   pressure-redistributing mattress utilized   heel offloading device utilized  Skin Protection:   adhesive use limited   incontinence pads utilized   skin-to-device areas padded   tubing/devices free from skin contact  Taken 12/18/2023 2000 by Venu Broussard RN  Pressure Reduction Techniques:   weight shift assistance provided   positioned off wounds   heels elevated off bed   pressure points protected  Head of Bed (HOB) Positioning: HOB at 30 degrees  Pressure Reduction Devices:   positioning supports utilized   pressure-redistributing mattress utilized  Skin Protection:   adhesive use limited   incontinence pads utilized   skin-to-device areas padded   tubing/devices free from skin contact     Problem: Fall Injury Risk  Goal: Absence of Fall and Fall-Related Injury  Outcome: Ongoing, Progressing  Intervention: Identify and Manage Contributors  Recent Flowsheet Documentation  Taken 12/18/2023 2100 by Venu Broussard RN  Self-Care Promotion: independence encouraged  Taken 12/18/2023 2000 by Venu Broussard RN  Medication Review/Management: medications reviewed  Intervention: Promote Injury-Free Environment  Recent Flowsheet Documentation  Taken 12/19/2023 0600 by Venu Broussard RN  Safety Promotion/Fall Prevention:   activity supervised   assistive device/personal items within reach   clutter free environment maintained   room organization consistent   safety round/check completed  Taken 12/19/2023 0400 by Venu Broussard RN  Safety Promotion/Fall Prevention:   activity supervised   assistive device/personal items within reach   clutter free environment maintained   room organization consistent   safety  round/check completed  Taken 12/19/2023 0200 by Venu Broussard RN  Safety Promotion/Fall Prevention:   activity supervised   assistive device/personal items within reach   clutter free environment maintained   room organization consistent   safety round/check completed  Taken 12/19/2023 0000 by Venu Broussard RN  Safety Promotion/Fall Prevention:   activity supervised   assistive device/personal items within reach   clutter free environment maintained   room organization consistent   safety round/check completed  Taken 12/18/2023 2200 by Venu Broussard RN  Safety Promotion/Fall Prevention:   activity supervised   assistive device/personal items within reach   clutter free environment maintained   room organization consistent   safety round/check completed  Taken 12/18/2023 2000 by Venu Broussard RN  Safety Promotion/Fall Prevention:   activity supervised   clutter free environment maintained   assistive device/personal items within reach   room organization consistent   safety round/check completed   Goal Outcome Evaluation:

## 2023-12-19 NOTE — CONSULTS
Palliative Care Initial Consult   Attending Physician: Theo Lindsey,*  Referring Provider: Shakeel Hallman MD     Reason for Referral:  assistance with clarification of goals of care    Code Status:   Code Status and Medical Interventions:   Ordered at: 12/10/23 1941     Medical Intervention Limits:    NO intubation (DNI)     Level Of Support Discussed With:    Patient     Code Status (Patient has no pulse and is not breathing):    No CPR (Do Not Attempt to Resuscitate)     Medical Interventions (Patient has pulse or is breathing):    Limited Support      Advanced Directives: Advance Directive Status: Patient has advance directive, copy in chart   Family/Support: Betty Rocha (dtr), Katia Abdi (grandchild)  Goals of Care: TBD.    HPI: Syeda Gomez is a 89 y.o. female with PMH significant for Afib not on anticoagulation, HFpEF, HTN, CKD III, iron deficiency anemia iron infusion dependent, HLP, hypothyroidism, arthritis, GERD, remote history of breast cancer. Patient presented to [facility] ED on 12/10 due to nausea, weakness, shortness of breath, BLE swelling that is worsening and LLE weeping as well as positive COVID test at home. Work up revealed COVID-19 infection. Hospitalization complicated by patient developing stroke-like symptoms on 12/17 and imaging revealed Acute ischemic stroke, right M1 occlusion. Patient received TNK. Repeat CT of head showed evolution of the large right MCA infarct. Palliative Care consulted for [facility] in the context of complex medical decision making.   Patient does not open eyes during visit, even when requested that she open her eyes, but she does grasp with her right hand. She does withdraw from stimuli to left foot. Patient appears comfortable. No family in room. Plan for family meeting at 10am.   Met with daughter Betty and granddaughter Katia. Daughter reports patient with significant decline over the past year. Patient lived at home with daughter as her  caregiver, cooking meals and aiding in most ADL's.     ROS: ROS limited by AMS/condition.       Past Medical History:   Diagnosis Date    A-fib     Arthritis     Cancer     1988- left breast    Carpal tunnel syndrome     CHF (congestive heart failure)     Detached retina     right side    Disease of thyroid gland     Dizzy     Full dentures     GERD (gastroesophageal reflux disease)     Hearing aid worn     bilat    Heart murmur     History of transfusion     x4 just this year- never had reaction    [place] (hard of hearing)     bilat hearinhg aids    Hypertension     Infection     right knee- had infection 15 to 20 years ago- unsure if mrsa or not    Pleural effusion     Rheumatic fever     SOBOE (shortness of breath on exertion)     UTI (urinary tract infection)     Wears glasses      Past Surgical History:   Procedure Laterality Date    APPENDECTOMY      CARDIAC ELECTROPHYSIOLOGY PROCEDURE N/A 06/25/2021    Procedure: DEVICE IMPLANT;  Surgeon: Calvin Ventura MD;  Location:  [facility]  INVASIVE LOCATION;  Service: Cardiovascular;  Laterality: N/A;    CARPAL TUNNEL RELEASE Bilateral     CATARACT EXTRACTION, BILATERAL      CHOLECYSTECTOMY      COLONOSCOPY      HYSTERECTOMY      MASTECTOMY Left     OTHER SURGICAL HISTORY      x2 fluid removed from right side    PLEURAL CATHETER INSERTION Right 4/4/2022    Procedure: PLEURX CATHETER INSERTION;  Surgeon: Fidel Carter MD;  Location:  [facility];  Service: Cardiothoracic;  Laterality: Right;    PORTACATH PLACEMENT Left     REPLACEMENT TOTAL KNEE BILATERAL      RETINAL DETACHMENT SURGERY Right     buckle in place    SHOULDER ROTATOR CUFF REPAIR Right     TOTAL HIP ARTHROPLASTY Right     TRIGGER FINGER RELEASE Right     thinks right side     Social History     Socioeconomic History    Marital status:     Number of children: 1   Tobacco Use    Smoking status: Never    Smokeless tobacco: Never   Vaping Use    Vaping Use: Never used   Substance and Sexual Activity     "Alcohol use: No    Drug use: No    Sexual activity: Defer     Family History   Problem Relation Age of Onset    Heart attack Mother     Hypertension Father     Stroke Father        Allergies   Allergen Reactions    Cardizem [Diltiazem] Other (See Comments)     \"unknown\"    Codeine GI Intolerance    Morphine Other (See Comments)     \"unknown\"    Percocet [Oxycodone-Acetaminophen] Other (See Comments)     \"unknown\"      Sulfa Antibiotics Unknown (See Comments)     Unknown         Current medication reviewed for route, type, dose and frequency and are current per MAR at time of dictation.    Palliative Performance Scale Score:  30%    /56 (BP Location: Right arm, Patient Position: Lying)   Pulse 84   Temp 97.9 °F (36.6 °C) (Axillary)   Resp 16   Ht 160 cm (63\")   Wt 63 kg (139 lb)   SpO2 97%   BMI 24.62 kg/m²     Intake/Output Summary (Last 24 hours) at 12/19/2023 0828  Last data filed at 12/18/2023 2200  Gross per 24 hour   Intake --   Output 350 ml   Net -350 ml       Physical Exam:    General Appearance:    Patient laying in bed, eyes closed, A/C ill appearing, NAD   HEENT:    NC/AT, MMM, face relaxed, NC in place   Neck:   supple, trachea midline, no JVD   Lungs:     CTA bilat, diminished in bases; respirations regular, even and unlabored; RR 16-18 on exam, 3LNC    Heart:    RRR, normal S1 and S2, no M/R/G, HR 84 on monitor   Abdomen:     Normal bowel sounds, soft, nontender, nondistended   G/U:   Deferred   MSK/Extremities:   Wasting, no edema   Pulses:   Pulses palpable and equal bilaterally   Skin:   Warm, dry   Neurologic:   Does not follow commands, grasps with right hand, left hand flaccid, withdraws left foot to stimuli   Psych:   No signs of agitation, does not open eyes         Labs:   Results from last 7 days   Lab Units 12/19/23  0447   WBC 10*3/mm3 7.70   HEMOGLOBIN g/dL 12.8   HEMATOCRIT % 40.3   PLATELETS 10*3/mm3 210     Results from last 7 days   Lab Units 12/19/23  0447   SODIUM mmol/L " 141   POTASSIUM mmol/L 4.4   CHLORIDE mmol/L 104   CO2 mmol/L 30.0*   BUN mg/dL 19   CREATININE mg/dL 0.92   GLUCOSE mg/dL 149*   CALCIUM mg/dL 8.9     Results from last 7 days   Lab Units 12/19/23  0447 12/16/23  0427 12/13/23  0338   SODIUM mmol/L 141   < > 134*   POTASSIUM mmol/L 4.4   < > 3.3*   CHLORIDE mmol/L 104   < > 95*   CO2 mmol/L 30.0*   < > 23.0   BUN mg/dL 19   < > 27*   CREATININE mg/dL 0.92   < > 0.98   CALCIUM mg/dL 8.9   < > 8.0*   BILIRUBIN mg/dL  --   --  0.6   ALK PHOS U/L  --   --  127*   ALT (SGPT) U/L  --   --  26   AST (SGOT) U/L  --   --  21   GLUCOSE mg/dL 149*   < > 248*    < > = values in this interval not displayed.     Imaging Results (Last 72 Hours)       Procedure Component Value Units Date/Time    CT Head Without Contrast [[id number]] Collected: 12/18/23 1422     Updated: 12/18/23 1428    Narrative:      CT HEAD WO CONTRAST    Date of Exam: 12/18/2023 2:09 PM EST    Indication: Stroke, follow up  24 Hours Post Thrombolytic Administration.    Comparison: 1 day prior.    Technique: Axial CT images were obtained of the head without contrast administration.  Automated exposure control and iterative construction methods were used.      Findings:  Prominent interval evolution of large right MCA territory infarct, well corresponding to findings on CT perfusion with some overlying sulcal effacement and diffuse right-sided cerebral edema. There is no midline shift or brain herniation. There is no   evidence of hemorrhage. Generalized volume loss is also present. The orbits appear normal.      Impression:      Impression:  Evolving large right MCA territory infarct with associated diffuse right-sided cerebral edema. There is no evidence of hemorrhage, midline shift or brain herniation at this time.        Electronically Signed: Aiden Olivares MD    12/18/2023 2:25 PM EST    Workstation ID: [id number]    CT Angiogram Neck [[id number]] Collected: 12/17/23 1432     Updated: 12/17/23 1435     Narrative:      CT ANGIOGRAM NECK    Date of Exam: 12/17/2023 1:46 PM EST    Indication: Neuro deficit, acute, stroke suspected.    Comparison: CT head and CT perfusion 12/17/2023  Technique: CTA of the neck was performed before and after the uneventful intravenous administration of 115 cc Isovue-370 . Reconstructed coronal and sagittal images were also obtained. In addition, a 3-D volume rendered image was created for   interpretation. Automated exposure control and iterative reconstruction methods were used.    Findings:CT ANGIOGRAM HEAD W AI ANALYSIS OF LVO        Findings: CTA neck: Aortic arch and origins of the great vessels are within normal limits.    Right brachiocephalic and right subclavian artery are patent without focal stenosis. Normal right common carotid artery is patent without focal stenosis. There is moderate calcified plaque at the right carotid bifurcation. There is complete occlusion of   the right internal carotid artery just above the origin. Right internal carotid artery is occluded to its terminus. Right external carotid artery is patent.  Left common, internal, and external carotid arteries are patent without focal stenosis.    Left subclavian artery is patent without focal stenosis.    Vertebral arteries are codominant and hypoplastic bilaterally. Vertebral artery is hypoplastic..    The soft tissues of the neck appear within normal limits. The visualized lung apices are clear. No lytic or sclerotic bony lesions are identified.    CTA HEAD:    Intracranial right internal carotid artery is completely occluded. There is extension of thrombus into the right middle cerebral artery. Right anterior cerebral artery is patent but likely fills via the ACOM from left-sided circulation.  The left anterior cerebral and middle cerebral arteries are patent without focal stenosis.  No anterior circulation aneurysm identified.    Vertebral arteries are hypoplastic at the skull base.  Basilar artery is  hypoplastic but patent.  There is fetal-type origin of the left posterior cerebral artery. Right posterior cerebral artery appears to fill via the basilar. Posterior cerebral arteries appear patent. Superior cerebellar arteries are patent.    No posterior circulation aneurysm identified.    No pathologic intracranial contrast enhancement. There is ill-defined hypodensity throughout the right middle cerebral artery distribution compatible with ischemia and infarct.    Globes and orbits appear within normal limits.    Visualized paranasal sinuses and mastoid air cells are clear.      Impression:      Impression:      1. 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 There is extension of thrombus to the right carotid terminus and into the right middle cerebral artery.  2. Ill-defined hypodensity throughout the right MCA distribution compatible with ischemia and infarct.  3. No pathologic intracranial contrast enhancement.  4. No left carotid artery stenosis. No left-sided intracranial vascular stenosis or occlusion.  Findings personally discussed with Audra of the stroke team at 2:15 p.m. on 2/17/2023      Electronically Signed: Tyler Carter MD    12/17/2023 2:32 PM EST    Workstation ID: [id number]    CT Angiogram Head w AI Analysis of LVO [[id number]] Collected: 12/17/23 1407     Updated: 12/17/23 1418    Narrative:      CT ANGIOGRAM HEAD W AI ANALYSIS OF LVO    Date of Exam: 12/17/2023 1:46 PM EST    Indication: Headache, acute, norm neuro exam.    Comparison: CT head and CT perfusion 12/17/2023    Technique: CTA of the head was performed after the uneventful intravenous administration of 115 cc Isovue-370 . Reconstructed coronal and sagittal images were also obtained. In addition, a 3-D volume rendered image was created for interpretation.   Automated exposure control and iterative reconstruction methods were used.      Findings: CTA neck: Aortic arch and origins of the great vessels are  within normal limits.    Right brachiocephalic and right subclavian artery are patent without focal stenosis. Normal right common carotid artery is patent without focal stenosis. There is moderate calcified plaque at the right carotid bifurcation. There is complete occlusion of   the right internal carotid artery just above the origin. Right internal carotid artery is occluded to its terminus. Right external carotid artery is patent.  Left common, internal, and external carotid arteries are patent without focal stenosis.    Left subclavian artery is patent without focal stenosis.    Vertebral arteries are codominant and hypoplastic bilaterally. Vertebral artery is hypoplastic..    The soft tissues of the neck appear within normal limits. The visualized lung apices are clear. No lytic or sclerotic bony lesions are identified.    CTA HEAD:    Intracranial right internal carotid artery is completely occluded. There is extension of thrombus into the right middle cerebral artery. Right anterior cerebral artery is patent but likely fills via the ACOM from left-sided circulation.  The left anterior cerebral and middle cerebral arteries are patent without focal stenosis.  No anterior circulation aneurysm identified.    Vertebral arteries are hypoplastic at the skull base.  Basilar artery is hypoplastic but patent.  There is fetal-type origin of the left posterior cerebral artery. Right posterior cerebral artery appears to fill via the basilar. Posterior cerebral arteries appear patent. Superior cerebellar arteries are patent.    No posterior circulation aneurysm identified.    No pathologic intracranial contrast enhancement. There is ill-defined hypodensity throughout the right middle cerebral artery distribution compatible with ischemia and infarct.    Globes and orbits appear within normal limits.    Visualized paranasal sinuses and mastoid air cells are clear.      Impression:      Impression:      1. Complete occlusion  of the right internal carotid artery near its origin. There is extension of thrombus to the right carotid terminus and into the right middle cerebral artery.  2. Ill-defined hypodensity throughout the right MCA distribution compatible with ischemia and infarct.  3. No pathologic intracranial contrast enhancement.  4. No left carotid artery stenosis. No left-sided intracranial vascular stenosis or occlusion.  Findings personally discussed with Audra of the stroke team at 2:15 p.m. on 2/17/2023      Electronically Signed: Tyler Carter MD    12/17/2023 2:15 PM EST    Workstation ID: [id number]    CT CEREBRAL PERFUSION WITH & WITHOUT CONTRAST [[id number]] Collected: 12/17/23 1405     Updated: 12/17/23 1410    Narrative:      CT CEREBRAL PERFUSION W WO CONTRAST    Date of Exam: 12/17/2023 1:46 PM EST    Indication: Neuro deficit, acute, stroke suspected.     Comparison: None available.    Technique: Axial CT images of the brain were obtained prior to and after the administration of 115 cc Isovue-370 . Core blood volume, core blood flow, mean transit time, and Tmax images were obtained utilizing the Rapid software protocol. A limited CT   angiogram of the head was also performed to measure the blood vessel density.    The radiation dose reduction device was turned on for each scan per the ALARA (As Low as Reasonably Achievable) protocol.      Findings:            There is a large perfusion defect involving the right MCA distribution.  CBF<30% volume: 105 mL  Tmax>6sec volume: 183 mL  Mismatch volume: 78 mL  Mismatch ratio: 1.7            Impression:        1. Large area of cerebral ischemia involving the right MCA distribution with a total volume of approximately 183 cc. There is a 105 cc volume of brain parenchyma with cerebral blood flow less than 30% compatible with large core infarct.        Electronically Signed: Tyler Carter MD    12/17/2023 2:07 PM EST    Workstation ID: [id number]    CT Head Without Contrast  Stroke Protocol [[id number]] Collected: 12/17/23 1358     Updated: 12/17/23 1408    Narrative:      CT HEAD WO CONTRAST STROKE PROTOCOL    Date of Exam: 12/17/2023 1:46 PM EST    Indication: Neuro deficit, acute, stroke suspected.    Comparison: Head CT 9/13/2023.    Technique: Axial CT images were obtained of the head without contrast administration.  Reconstructed coronal images were also obtained. Automated exposure control and iterative construction methods were used.    Scan Time: 1356 on 12/17/2023  Results discussed with  Audra CLAUDIO   at 1401 on 12/17/2023    Findings:  Hypodense defects within the right M1 segment MCA. Negative for large territory loss of gray-white differentiation, acute intracranial hemorrhage, large mass lesion or midline shift. Mild parenchymal volume loss. Mild periventricular hypodensities   suggesting chronic microvascular ischemic change and similar to prior comparison. No large extra-axial collection. No obstructive sinus disease. No mastoid effusion. Negative for depressed skull fracture.      Impression:      Impression:  Hyperdense right MCA sign concerning for thrombus. No large territory loss of gray-white differentiation or acute intracranial hemorrhage. CT perfusion and angiogram dictated separately.        Electronically Signed: Franc Braga MD    12/17/2023 2:05 PM EST    Workstation ID: [id number]              Lab 12/18/23  0512   HEMOGLOBIN A1C 6.90*         Diagnostics: Reviewed    A:   Acute Rt MCA CVA s/p TNK    Atrial fibrillation    Hypothyroidism (acquired)    HTN (hypertension)    HLD (hyperlipidemia)    COVID-19 + 12/10/23    Secondary pulmonary arterial hypertension    Rt Carotid occlusion     89 y.o. female with right MCA, A-fib, COVID-19 infection.   S/S:   Dysphagia -family electing comfort diet    2. Debility -family electing comfort measures    3. Dyspnea -currently on 3LNC  -nebs  -started Hydromorphone 0.25mg IV q 1 hour prn dyspnea/pain    4.  Agitation/Anxiety -started Lorazepam 0.25mg IV q 4 hours prn agitation  -started Haldol 1mg IV q 6 hours prn agitation if Lorazepam ineffective    5. GOC -DNR/DNI/Comfort Measures -per discussion   -met with daughter Betty (HCS) and granddaughter Katia  -reviewed patient's condition  -reviewed continued full treatment versus comfort measures  -family electing comfort measures with use of medications for symptom management, election of comfort diet at end of life  -inpatient hospice consult placed, patient does not qualify at present due to no unmanaged symptoms requiring IV medications    P: Introduced Palliative Care and services to daughter and granddaughter. Long discussion regarding patient's condition. Daughter and granddaughter report patient expressly stated that she did not want any type of artificial nutrition in previous discussions. Family elects a comfort focused plan of care with symptom management at end of life. Discussed that at present patient does not meet inpatient hospice criteria but may if she were to develop symptoms. Family reports they are unable to care for patient at home with hospice as unable to provide 24/7 care due to working. Discussed potential prognosis of days to weeks with no intake of nutrition. Emotional support provided throughout discussion. Family shared multiple memories of patient.    Thank you for this consult and allowing us to participate in patient's plan of care. Palliative Care Team will continue to follow patient. Please do not hesitate to contact us regarding further symptom management or goals of care needs.  Time: 60 minutes spent reviewing medical and medication records, assessing and examining patient, discussing with family, answering questions, providing some guidance about a plan and documentation of care, and coordinating care with other healthcare members, with > 50% time spent face to face.         Dana Tripathi, APRN  12/19/2023

## 2023-12-20 PROBLEM — R13.10 DYSPHAGIA: Status: ACTIVE | Noted: 2023-01-01

## 2023-12-20 NOTE — CASE MANAGEMENT/SOCIAL WORK
Continued Stay Note  [facility]     Patient Name: Syeda Gomez  MRN: [id number]  Today's Date: 12/20/2023    Admit Date: 12/10/2023    Plan: Ongoing   Discharge Plan       Row Name 12/20/23 1246       Plan    Plan Ongoing    Plan Comments Discussed patient in MDR.  Palliative and hospice following, patient now comfort measures.  CM will continue to follow.    Final Discharge Disposition Code 30 - still a patient                   Discharge Codes    No documentation.                       Fariba Rojas RN

## 2023-12-20 NOTE — THERAPY DISCHARGE NOTE
Acute Care - Physical Therapy Discharge Summary  [facility]       Patient Name: Syeda Gomez  : 1927  MRN: [id number]    Today's Date: 2023                 Admit Date: 12/10/2023      PT Recommendation and Plan    Visit Dx:    ICD-10-CM ICD-9-CM   1. COVID-19  U07.1 079.89   2. Pneumonia of left lower lobe due to infectious organism  J18.9 486   3. Dysphagia, unspecified type  R13.10 787.20                PT Rehab Goals       Row Name 23 1125             Bed Mobility Goal 1 (PT)    Activity/Assistive Device (Bed Mobility Goal 1, PT) sit to supine/supine to sit  -AY      [place] Level/Cues Needed (Bed Mobility Goal 1, PT) moderate assist (50-74% patient effort)  -AY      Time Frame (Bed Mobility Goal 1, PT) long term goal (LTG);10 days  -AY      Progress/Outcomes (Bed Mobility Goal 1, PT) medical status inhibiting progress;goal not met  -AY         Transfer Goal 1 (PT)    Activity/Assistive Device (Transfer Goal 1, PT) sit-to-stand/stand-to-sit;bed-to-chair/chair-to-bed  -AY      [place] Level/Cues Needed (Transfer Goal 1, PT) moderate assist (50-74% patient effort)  -AY      Time Frame (Transfer Goal 1, PT) long term goal (LTG);10 days  -AY      Progress/Outcome (Transfer Goal 1, PT) medical status inhibiting progress;goal no longer appropriate  -AY         Gait Training Goal 1 (PT)    Activity/Assistive Device (Gait Training Goal 1, PT) gait (walking locomotion)  -AY      [place] Level (Gait Training Goal 1, PT) moderate assist (50-74% patient effort)  -AY      Distance (Gait Training Goal 1, PT) 15  -AY      Time Frame (Gait Training Goal 1, PT) long term goal (LTG);10 days  -AY      Progress/Outcome (Gait Training Goal 1, PT) goal no longer appropriate;medical status inhibiting progress  -AY                User Key  (r) = Recorded By, (t) = Taken By, (c) = Cosigned By      Initials Name Provider Type Discipline    Jania Mendoza, PT Physical Therapist PT                  MD  completed PT orders at this time. Will discharge patient from PT services at this time. If pt able and willing to participate please re-consult. Thank you.      PT Discharge Summary  Anticipated Discharge Disposition (PT): other (see comments)  Reason for Discharge: Per MD order  Outcomes Achieved: Unable to make functional progress toward goals at this time, Refer to plan of care for updates on goals achieved      Jania Nj, PT   12/20/2023

## 2023-12-20 NOTE — PLAN OF CARE
"  Problem: Palliative Care  Goal: Enhanced Quality of Life  Intervention: Promote Advance Care Planning  Recent Flowsheet Documentation  Taken 12/20/2023 1300 by Lauren Dumas \"Cecily\" NAS WARNER  Life Transition/Adjustment: (Palliative IDT: MD, APRN, NAS, RN, MDiv) --  Taken 12/20/2023 1207 by Lauren Dumas \"Cecily\" NAS WARNER  Life Transition/Adjustment: (patient is comfort measures, transferred from ICU to med-surg; inpatient hospice following-does not meet criteria at this time for inpatient hospice admission.  Palliative Care continues to follow for support and assist with terminal symptom management)      "

## 2023-12-20 NOTE — PROGRESS NOTES
"Continued Stay Note  [facility]     Patient Name: Syeda Gomez  MRN: [id number]  Today's Date: 12/20/2023    Admit Date: 12/10/2023    Plan: Ongoing   Discharge Plan       Row Name 12/20/23 5877       Plan    Plan Comments Hospice visit to bedside. Patient's granddaughter at bedside. She relays that patient is to transfer upstairs to  today. Patient is awake, lethargic, making eye contact. She is speaking in whisper, weak. She relays \"no\" and shakes head no to pain, dyspnea. She is repeatedly attempting to speak with her granddaughter. Rn notes patient has taken her oxygen off. Rn inquires if she'd like it on. \"no\". Patient is warm to the touch. Rn performs oral care with swab. Patient tolerates well. Discussed with granddaughter that patient appears to be trying to converse with her and is weak and having difficulty. Discussed hospice, inpatient hospice, symptom management, medication requirements. Discussed with KENNY CLAUDIO. At present patient is without unmanaged symptoms. She has not required prn injectable medications for symptom palliation. Medicare guidelines for inpatient hospice admission are not present at this time. Hospice will continue to follow. Discussed with Cecily Hatch and ISAIAH CLAUDIO. *Inpatient hospice criteria are based on guidelines from Medicare. Inpatient hospice care is short term, symptom driven care delivered in an acute setting. Patients must have unmanaged symptoms (i.e. pain, dyspnea, anxiety, restlessness, n/v) with inability to take po medications for symptom management and symptoms requiring management with injectable medications. Patients must require a level of care that is unable to be delivered in an alternate care setting such as home or long term care. Non symptomatic end of life decline and/or limited, absence of oral intake are not sole qualifications for inpatient hospice services. *  Hospice will continue to follow. Please call [phone] with concerns.       "              Discharge Codes    No documentation.                 Expected Discharge Date and Time       Expected Discharge Date Expected Discharge Time    Dec 22, 2023               Haylee Avery RN

## 2023-12-20 NOTE — SIGNIFICANT NOTE
12/20/23 0912   SLP Deferred Reason   SLP Deferred Reason Routine  (Spoke to RN, pt not appropriate to participate this date. SLP will place on hold & f/u as appropriate/pending pt status)

## 2023-12-20 NOTE — NURSING NOTE
Woc reassessed wound.  Still not convinced this is pressure.    Although pressure cannot be ruled out the wound is not progressing like an evolving deep tissue injury.    Areas of scattered purple nonblanching this are to far distributed there is 1 area of open dermis that is slightly bruising but everything around it is healed.  There is a pale white in the gluteal cleft indicative of starting ITD although the patient was dry and this perirectal area has lightened to kind of a brownish it is very irregular in shape starting of the tip of the coccyx and goes out where the dark area is but it has not really changed or evolved in a typical fashion of a deep tissue injury.    Given patient's overall health it must be known that the skin itself is probably not that healthy.    Continue with previous Woc orders of Venelex twice daily and silicone foam dressing.

## 2023-12-21 PROBLEM — I63.511 ACUTE RIGHT ARTERIAL ISCHEMIC STROKE, MIDDLE CEREBRAL ARTERY (MCA): Status: ACTIVE | Noted: 2023-01-01

## 2023-12-21 NOTE — PLAN OF CARE
Goal Outcome Evaluation:  Plan of Care Reviewed With: family, patient        Progress: declining  Outcome Evaluation: Transferred to floor. Pt c/o pain after transfer and has been receiving prn Dilaudid approx [id number] r/t breathing/grimace. Family at bedside.

## 2023-12-21 NOTE — PROGRESS NOTES
Continued Stay Note  [facility]     Patient Name: Syeda Gomez  MRN: [id number]  Today's Date: 12/21/2023    Admit Date: 12/21/2023    Plan: Inpatient hospice   Discharge Plan       Row Name 12/21/23 1219       Plan    Plan Inpatient hospice    Plan Comments Patient lying in bed with eyes open. She does not track or respond to yes/no questions. Unresponsive to voice or touch during assessment. Intermittent scratching of her chest noted. Met with daughter, Betty outside of room and discussed GOC/POC. She desires full comfort measures for her mom and is electing the inpatient hospice benefit. Patient approved for inpatient hospice admission by [facility] for skilled nursing assessment, medication titration, and injectable medication administration. Dr. Carpenter and Dana CLAUDIO aware of discharge/readmit to hospice. Coordinated care with nurseAshley.    Final Discharge Disposition Code 51 - hospice medical facility                   Discharge Codes    No documentation.                       Kira Nair RN

## 2023-12-21 NOTE — DISCHARGE SUMMARY
[facility] Medicine Services  DISCHARGE TO INPATIENT HOSPICE    Patient Name: Syeda Gomez  : 1927  MRN: [id number]    Date of Admission: 12/10/2023  Date of Discharge:  23  Primary Care Physician: Omayra Alcazar DO    Consults       Date and Time Order Name Status Description    2023  2:44 PM Inpatient Palliative Care MD Consult Completed     2023  3:00 PM Inpatient Neurology Consult Stroke Completed           Hospital Course     Presenting Problem:   COVID-19 [U07.1]    Active Hospital Problems    Diagnosis  POA    **Acute Rt MCA CVA s/p TNK [I63.9]  Yes    Secondary pulmonary arterial hypertension [I27.21]  Yes    Rt Carotid occlusion [I65.21]  Yes    COVID-19 + 12/10/23 [U07.1]  Yes    Dysphagia [R13.10]  Unknown    Hypothyroidism (acquired) [E03.9]  Yes    HTN (hypertension) [I10]  Yes    HLD (hyperlipidemia) [E78.5]  Yes    Atrial fibrillation [I48.91]  Yes      Resolved Hospital Problems   No resolved problems to display.          Hospital Course:  Syeda Gomez is a 89 y.o. female with history of A-fib not on anticoagulation, heart failure with preserved ejection fraction, CKD 3, pulmonary hypertension, hypothyroid, hypertension who presents on 12/10 due to shortness of air and found to be COVID +.  Patient completed Paxlovid.  She had issues on the floor with delirium and admitted pending placement.  Patient had code stroke called on .  She had a CT head which showed hyperdense right MCA sign concerning for thrombus.  CTA head and neck with complete occlusion of the right internal carotid artery.  CT perfusion with large area of cerebral ischemia involving the right MCA distribution.  Patient was transferred to the ICU.  She was deemed not a candidate for mechanical thrombectomy.  She was evaluated by neurology and decision was made for comfort care plan.  Palliative and hospice were consulted.  Patient was transferred to inpatient  hospice.    Day of Discharge     HPI:   No acute events. Daughter states that medication seems to be helping.  States that she becomes restless and feels medication helps.  Meeting with the patient hospice later this morning.    ROS:  Unable to obtain    Vital Signs:         Physical Exam:  Constitutional: unresponsive, ill appearing   HENT: NCAT, mucous membranes moist  Respiratory: Coarse; non-labored   Cardiovascular: RRR, no murmurs, rubs, or gallops  Gastrointestinal: Positive bowel sounds, soft, nontender, nondistended  Musculoskeletal: No bilateral ankle edema  Psychiatric: unresponsive   Neurologic: unresponsive  Skin: No rashes      Discharge Details     Discharge Disposition:  Transfer care to inpatient Hospice at [facility]    Time Spent on Discharge:  33 minutes    Angeles Carpenter DO  12/21/23

## 2023-12-21 NOTE — PLAN OF CARE
Goal Outcome Evaluation:  Plan of Care Reviewed With: patient, daughter, family        Progress: declining  Outcome Evaluation: PRN dilaudid given x5 so far this shift for nonverbal indicators of pain (tachypnea, grimacing). Ativan given x1. Periods of tachypnea w/ ~10-15 second periods of apnea noted throughout night. Skin assessments and wound care performed. Pt producing minimal concentrated, dark UOP. Pt able to  w/ R hand at beginning of shift, recently only able to open eyes w/ pain/stimulation. Daughter remains at bs, family at bs at beginning of shift, education given as needed. Pt appears comfortable at this time.

## 2023-12-21 NOTE — H&P
Hospice History and Physical     Patient Name:  Syeda Gomez   : 1927   Sex: female    Patient Care Team:  Omayra Alcazar DO as PCP - General (Internal Medicine)  Tyler Schilling MD as PCP - Internal Medicine (Hematology and Oncology)    Code Status:   Code Status and Medical Interventions:   Ordered at: 23 1110     Level Of Support Discussed With:    Next of Kin (If No Surrogate)     Code Status (Patient has no pulse and is not breathing):    No CPR (Do Not Attempt to Resuscitate)     Medical Interventions (Patient has pulse or is breathing):    Comfort Measures        Subjective     Patient is a 89 y.o. year old female with a hospice diagnosis of Acute CVA (cerebrovascular accident).  she is being admitted to [facility] for symptom management of pain, anxiety, and dyspnea.  She was admitted to [facility] on 12/10/2023 from home with increased SOB and found to be COVID positive.  Hospital course has been complicated by PMH below.  During hospitalization she was found to have delirium and a code stroke was called on . CT head which showed hyperdense right MCA sign concerning for thrombus.  CTA head and neck with complete occlusion of the right internal carotid artery.  CT perfusion with large area of cerebral ischemia involving the right MCA distribution.  Unfortunately, she was deemed not a candidate for mechanical thrombectomy.  She was evaluated by neurology and decision was made for comfort care plan. Family at bedside for exam today. Nursing reports periods of apnea along with periods of tachypnea and grimacing.  POC outlined below. Nursing reports no additional needs or concerns at this time.      Review of Systems  Review of Systems   Unable to perform ROS: Patient unresponsive       History  Past Medical History:   Diagnosis Date    A-fib     Arthritis     Cancer     - left breast    Carpal tunnel syndrome     CHF (congestive heart failure)     Detached retina     right side     Disease of thyroid gland     Dizzy     Full dentures     GERD (gastroesophageal reflux disease)     Hearing aid worn     bilat    Heart murmur     History of transfusion     x4 just this year- never had reaction    Agdaagux (hard of hearing)     bilat hearinhg aids    Hypertension     Infection     right knee- had infection 15 to 20 years ago- unsure if mrsa or not    Pleural effusion     Rheumatic fever     SOBOE (shortness of breath on exertion)     UTI (urinary tract infection)     Wears glasses      Past Surgical History:   Procedure Laterality Date    APPENDECTOMY      CARDIAC ELECTROPHYSIOLOGY PROCEDURE N/A 06/25/2021    Procedure: DEVICE IMPLANT;  Surgeon: Calvin Ventura MD;  Location:  [facility] INVASIVE LOCATION;  Service: Cardiovascular;  Laterality: N/A;    CARPAL TUNNEL RELEASE Bilateral     CATARACT EXTRACTION, BILATERAL      CHOLECYSTECTOMY      COLONOSCOPY      HYSTERECTOMY      MASTECTOMY Left     OTHER SURGICAL HISTORY      x2 fluid removed from right side    PLEURAL CATHETER INSERTION Right 4/4/2022    Procedure: PLEURX CATHETER INSERTION;  Surgeon: Fidel Carter MD;  Location:  [facility] OR;  Service: Cardiothoracic;  Laterality: Right;    PORTACATH PLACEMENT Left     REPLACEMENT TOTAL KNEE BILATERAL      RETINAL DETACHMENT SURGERY Right     buckle in place    SHOULDER ROTATOR CUFF REPAIR Right     TOTAL HIP ARTHROPLASTY Right     TRIGGER FINGER RELEASE Right     thinks right side     Family history   Family History   Problem Relation Age of Onset    Heart attack Mother     Hypertension Father     Stroke Father      Social history  Social History     Socioeconomic History    Marital status:     Number of children: 1   Tobacco Use    Smoking status: Never    Smokeless tobacco: Never   Vaping Use    Vaping Use: Never used   Substance and Sexual Activity    Alcohol use: No    Drug use: No    Sexual activity: Defer     Medications   Current Facility-Administered Medications   Medication Dose  Route Frequency Provider Last Rate Last Admin    acetaminophen (TYLENOL) suppository 650 mg  650 mg Rectal Q4H PRN Patt Gordon DO        bisacodyl (DULCOLAX) suppository 10 mg  10 mg Rectal Daily PRN Patt Gordon DO        glycopyrrolate (ROBINUL) injection 0.4 mg  0.4 mg Intravenous Q2H PRN Patt Gordon, DO        Or    glycopyrrolate (ROBINUL) injection 0.4 mg  0.4 mg Subcutaneous Q2H PRN Patt Gordon DO        haloperidol lactate (HALDOL) injection 1 mg  1 mg Subcutaneous Q4H PRN Patt Gordon DO        HYDROmorphone (DILAUDID) 50 mg in 50 mL infusion  0.1 mg/hr Intravenous Continuous Patt Gordon,  0.1 mL/hr at 12/21/23 1315 0.1 mg/hr at 12/21/23 1315    HYDROmorphone (DILAUDID) injection 0.25 mg  0.25 mg Intravenous Q1H PRN Patt Gordon DO        ipratropium-albuterol (DUO-NEB) nebulizer solution 3 mL  3 mL Nebulization Q4H PRN Patt Gordon DO        ketorolac (TORADOL) injection 15 mg  15 mg Intravenous Q6H PRN Patt Gordon DO        [START ON 12/22/2023] levothyroxine sodium injection 40 mcg  40 mcg Intravenous Daily Patt Gordon DO        midazolam (VERSED) injection 0.5 mg  0.5 mg Intravenous Q1H PRN Patt Gordon DO        ondansetron (ZOFRAN) injection 4 mg  4 mg Intravenous Q6H PRN Patt Gordon DO        palliative care oral rinse   Mouth/Throat PRN Patt Gordon DO        [START ON 12/22/2023] pantoprazole (PROTONIX) injection 40 mg  40 mg Intravenous Q AM Patt Gordon,         polyvinyl alcohol (LIQUIFILM) 1.4 % ophthalmic solution 1 drop  1 drop Both Eyes Q30 Min PRN Patt Gordon DO         HYDROmorphone 1 mg/ml, 0.1 mg/hr, Last Rate: 0.1 mg/hr (12/21/23 1315)        acetaminophen    bisacodyl    glycopyrrolate **OR** glycopyrrolate    haloperidol lactate    HYDROmorphone    ipratropium-albuterol    ketorolac    midazolam **OR**  "[DISCONTINUED] midazolam    ondansetron    palliative care oral rinse    polyvinyl alcohol  Allergies   Allergies   Allergen Reactions    Cardizem [Diltiazem] Other (See Comments)     \"unknown\"    Codeine GI Intolerance    Morphine Other (See Comments)     \"unknown\"    Percocet [Oxycodone-Acetaminophen] Other (See Comments)     \"unknown\"      Sulfa Antibiotics Unknown (See Comments)     Unknown           Objective     Vital Signs       Intake/Output Summary (Last 24 hours) at 12/21/2023 1405  Last data filed at 12/21/2023 0500  Gross per 24 hour   Intake --   Output 150 ml   Net -150 ml      Last known BM: 12/13  PPS:Palliative Performance Scale score as of 12/21/2023, 14:22 EST is 10% based on the following measures:   Ambulation: Totally bed bound  Activity and Evidence of Disease: Unable to do any work, extensive evidence of disease  Self-Care: Total care  Intake:  Mouth care only  LOC: Drowsy or coma       Physical Exam  Vitals and nursing note reviewed.   Constitutional:       Comments: Unresponsive    HENT:      Head: Normocephalic and atraumatic.      Mouth/Throat:      Mouth: Mucous membranes are moist.      Pharynx: Oropharynx is clear.   Eyes:      Comments: Eyes closed    Neck:      Vascular: Carotid bruit and JVD present.   Cardiovascular:      Rate and Rhythm: Tachycardia present. Rhythm irregular.      Pulses: Normal pulses.      Heart sounds: Murmur heard.   Pulmonary:      Effort: Pulmonary effort is normal.      Breath sounds: Normal breath sounds. Decreased air movement present.      Comments: 10-15 second periods of apnea noted   Abdominal:      General: Abdomen is flat.      Palpations: Abdomen is soft.      Comments: Hypoactive BS    Musculoskeletal:      Right lower leg: Edema present.      Left lower leg: Edema present.   Skin:     General: Skin is warm and dry.   Neurological:      Comments: Unresponsive    Psychiatric:      Comments: calm         Results Reviewed:  LAB RESULTS:      Lab " 12/19/23 0447 12/18/23  0512 12/16/23 0427   WBC 7.70 7.96 5.15   HEMOGLOBIN 12.8 12.0 12.2   HEMATOCRIT 40.3 37.4 37.7   PLATELETS 210 196 183   NEUTROS ABS 6.25 6.58 3.86   IMMATURE GRANS (ABS) 0.04 0.04 0.03   LYMPHS ABS 0.68* 0.77 0.83   MONOS ABS 0.59 0.46 0.36   EOS ABS 0.11 0.10 0.06   MCV 88.2 86.8 86.3   CRP  --   --  <0.30         Lab 12/19/23 0447 12/18/23  0512 12/16/23 0427   SODIUM 141 140 140   POTASSIUM 4.4 3.7 3.6   CHLORIDE 104 102 101   CO2 30.0* 27.0 29.0   ANION GAP 7.0 11.0 10.0   BUN 19 25* 24*   CREATININE 0.92 1.00 1.04*   EGFR 57.1* 51.7* 49.3*   GLUCOSE 149* 189* 194*   CALCIUM 8.9 8.7 8.8   MAGNESIUM 2.7* 2.3  --    PHOSPHORUS  --  3.5  --    HEMOGLOBIN A1C  --  6.90*  --          Lab 12/18/23  0512   ALBUMIN 3.8             Lab 12/18/23  0512   CHOLESTEROL 133   LDL CHOL 62   HDL CHOL 54   TRIGLYCERIDES 86             Lab 12/17/23  1410   PH, ARTERIAL 7.459*   PCO2, ARTERIAL 38.4   PO2 ART 79.8*   FIO2 32   HCO3 ART 27.3*   BASE EXCESS ART 3.3*   CARBOXYHEMOGLOBIN 1.7     Brief Urine Lab Results  (Last result in the past 365 days)        Color   Clarity   Blood   Leuk Est   Nitrite   Protein   CREAT   Urine HCG        09/13/23 1321 Yellow   Clear   Negative   Negative   Negative   Trace                   Microbiology Results Abnormal       None              Acute Rt MCA CVA s/p TNK    Acute right arterial ischemic stroke, middle cerebral artery (MCA)      Assessment & Plan   Patient medical record reviewed and patient examined     Assessment/Plan:   89 y.o. female  admitted to [facility] hospice with a diagnosis of Acute CVA (cerebrovascular accident)     Managing comfort in current setting due to likely inability to survive transfer     -Coordination of care with nursing staff and BCN IDT.     Pain  -turn q 2 hours and PRN  for comfort   -hydromorphone 0.1 mg/hr IV continuous infusion  -hydromorphone 0.25 mg IV q 1 hour PRN     Dyspnea  -Cessation of IVF and/or tube feeds to  avoid worsening volume overload  -oxygen 1-6L via NC/trach collar for comfort  -duo-neb q 4 hours PRN  -Fan to bedside PRN  -Pain control per above     Respiratory congestion  -glycopyrrolate 0.4 mg IV/ Sq Q 2 hours PRN     Anxiety/Agitation  -midazolam 0.5 mg IV/ SQ q 1 hours PRN  -haldol 1 mg IV/ SQ q 4 hours PRN     Fever  -tylenol  mg Q 6 hours PRN  -ketorolac 15 mg IV/SQ q 6 hours PRN     Nausea  -zofran 4 mg IV/ SQ q 6 hours PRN  -Haldol as above may be beneficial     Dry mucus membranes  -please have chapstick and pink or green swabs at bedside for frequent PRN mouth care for comfort     Opioid induced constipation  -Bisacodyl supp [place] daily PRN    GOC  -DNR/DNI  -Complete comfort care focused care plan    -Dispo: Transfer home or to facility with hospice once symptoms are managed and patient stable for transfer, however the patient is unlikely to survive to transfer.      Total Visit Time: 50 minutes  Face to Face Time: 18 minutes     Justification for care:  Patient meets criteria for GIP scatterbed level of care due to current uncontrolled symptoms that require dose finding and titration of medications, frequent nursing assessments and frequent interventions with IV/ SQ medications to achieve comfort that can not be achieved at a lower level of care at this time.     Stefany Knox, MSN, APRN  [facility]  Hospice Nurse Practitioner  12/21/23  14:05 EST

## 2023-12-22 NOTE — PROGRESS NOTES
Continued Stay Note  [facility]     Patient Name: Syeda Gomez  MRN: [id number]  Today's Date: 12/22/2023    Admit Date: 12/21/2023    Plan: Inpatient hospice   Discharge Plan       Row Name 12/22/23 0936       Plan    Plan Inpatient hospice    Plan Comments Patient resting peacefully with eyes closed. Face, jaw, and body relaxed and breathing even and unlabored. Undisturbed by assessment. Cheyne-garcia breathing noted. Patient required 4 prns of Dilaudid 0.25mg overnight and 2 prns of Versed 0.5mg in the past 24 hours. She continues to require inpatient hospice for skilled nursing assessment, medication titration, and injectable medication administration. Called daughter, Betty with an update.                    Discharge Codes    No documentation.                 Expected Discharge Date and Time       Expected Discharge Date Expected Discharge Time    Dec 21, 2023               Kira Nair RN

## 2023-12-22 NOTE — PLAN OF CARE
Goal Outcome Evaluation:  Plan of Care Reviewed With: patient, family        Progress: declining  Outcome Evaluation: PRN Versed given for agitation several times this shift per family observation. Bowel movement + this afternoon. Reinforced s/s to look out for at end of life. Family instructed they can swab pt's mouth with palliative rinse if they feel comfortable to promote family involvement in care. 10-15s pauses in breath noted.

## 2023-12-22 NOTE — PLAN OF CARE
Goal Outcome Evaluation:  Plan of Care Reviewed With: patient, daughter, family        Progress: declining  Outcome Evaluation: PRN dilaudid x4 and versed given. Periods of apnea and tachypnea since previous night. Pulses on feet weaker. Daugher remains at bedside throughout night. Pt appears comfortable at this time.

## 2023-12-22 NOTE — PROGRESS NOTES
Hospice Progress Note    Patient Name: Syeda Gomez   : 1927  Gender: female    Code Status: comfort measures    Date of Admission: 2023    Subjective:  89 y.o. female  with a hospice diagnosis of Acute CVA (cerebrovascular accident).  Admitted to scatter bed [facility] hospice on 2023 for symptom management of pain, anxiety and dyspnea.  Follow up visit today completed for needs assessment and medication efficacy.  Appropriate PPE donned and doffed per infectious disease protocols.  Medical record again reviewed and unchanged from H&P.  No family at bedside for exam today.  Nursing reports periods of facial grimace and moaning over night with effective PRNs. PRN medication in previous 24 hours: hydromorphone 0.25 mg IV x 4 and midazolam 0.5 mg IV x 2. POC reviewed with bedside RN.  Nurse and family express no additional needs or concerns at this time.   - Scheduled:  Bisacodyl supp x 1 today  Scheduled mouth care  Scheduled versed q 12 hours ATC  - Intake/Output  Intake & Output (last 3 days)          0701   0700  0701   0700  0701   0700  0701   0700    I.V. (mL/kg)   0.6 (0) 1.2 (0)    Total Intake(mL/kg)   0.6 (0) 1.2 (0)    Urine (mL/kg/hr) 525 (0.3) 150 (0.1) 850 (0.6)     Total Output 525 150 850     Net -525 -150 -849.4 +1.2            Urine Unmeasured Occurrence  1 x                 ROS:  Review of Systems   Unable to perform ROS: Patient unresponsive       Reviewed current scheduled and prn medications for route, type, dose and frequency.  HYDROmorphone 1 mg/ml, 0.1 mg/hr, Last Rate: 0.1 mg/hr (23 1315)        acetaminophen    bisacodyl    glycopyrrolate **OR** glycopyrrolate    haloperidol lactate    HYDROmorphone    ipratropium-albuterol    ketorolac    midazolam **OR** [DISCONTINUED] midazolam    ondansetron    palliative care oral rinse    polyvinyl alcohol    Objective:   There were no vitals taken for this visit.     PPS: Palliative  Performance Scale score as of 12/22/2023, 11:50 EST is 10% based on the following measures:   Ambulation: Totally bed bound  Activity and Evidence of Disease: Unable to do any work, extensive evidence of disease  Self-Care: Total care  Intake:  Mouth care only  LOC: Drowsy or coma     Physical Exam:  Physical Exam  Vitals and nursing note reviewed.   Constitutional:       Comments: Unresponsive to exam    HENT:      Head: Normocephalic and atraumatic.      Mouth/Throat:      Mouth: Mucous membranes are moist.      Pharynx: Oropharynx is clear.   Eyes:      Comments: Eyes closed    Cardiovascular:      Rate and Rhythm: Normal rate and regular rhythm.      Pulses: Normal pulses.      Heart sounds: Normal heart sounds.   Pulmonary:      Effort: Pulmonary effort is normal.      Breath sounds: Normal breath sounds.   Abdominal:      General: Abdomen is flat. Bowel sounds are normal.      Palpations: Abdomen is soft.   Musculoskeletal:         General: Normal range of motion.      Right lower leg: Edema present.      Left lower leg: Edema present.   Skin:     General: Skin is warm and dry.   Neurological:      Comments: Unresponsive    Psychiatric:      Comments: calm             Acute Rt MCA CVA s/p TNK    Acute right arterial ischemic stroke, middle cerebral artery (MCA)      Assessment/Plan:     Symptoms:    Pain  -turn q 2 hours and PRN  for comfort   -hydromorphone 0.1 mg/hr IV continuous infusion  -hydromorphone 0.25 mg IV q 1 hour PRN      Dyspnea  -Cessation of IVF and/or tube feeds to avoid worsening volume overload  -oxygen 1-6L via NC/trach collar for comfort  -duo-neb q 4 hours PRN  -Fan to bedside PRN  -Pain control per above      Respiratory congestion  -glycopyrrolate 0.4 mg IV/ Sq Q 2 hours PRN      Anxiety/Agitation  -add versed 0.5 mg q 12 hours ATC  -midazolam 0.5 mg IV/ SQ q 1 hours PRN  -haldol 1 mg IV/ SQ q 4 hours PRN      Fever  -tylenol  mg Q 6 hours PRN  -ketorolac 15 mg IV/SQ q 6 hours PRN       Nausea  -zofran 4 mg IV/ SQ q 6 hours PRN  -Haldol as above may be beneficial      Dry mucus membranes  -please have chapstick and pink or green swabs at bedside for frequent PRN mouth care for comfort  -palliative oral rinse q 8 hours ATC and PRN     Opioid induced constipation  -last noted BM 12/17  -Bisacodyl supp [place] daily PRN- nursing to give today     GOC  -DNR/DNI  -Complete comfort care focused care plan     -Dispo: Transfer home or to facility with hospice once symptoms are managed and patient stable for transfer, however the patient is unlikely to survive to transfer.         Total Visit Time: 25 minutes   Face to Face Time: 10 minutes     Justification for care:  Patient meets criteria for acute in-patient care with required nursing assessment and interventions for symptoms with IV medications.    GONZÁLEZ Jerez   [facility]  Hospice Nurse practitioner   12/22/23  11:48 EST

## 2023-12-23 NOTE — PLAN OF CARE
Goal Outcome Evaluation:  Plan of Care Reviewed With: patient, family        Progress: declining  Outcome Evaluation: PRN dilaudid and versed given x1. Dilaudid infusion remains unchanged. 10s pauses in breath noted this shift. Family at bedside. Comfort measures continued.

## 2023-12-23 NOTE — PROGRESS NOTES
Continued Stay Note  [facility]     Patient Name: Syeda Gomez  MRN: [id number]  Today's Date: 12/23/2023    Admit Date: 12/21/2023    Plan: Inpatient hospice   Discharge Plan       Row Name 12/23/23 1024       Plan    Plan Inpatient hospice    Plan Comments Patient is a 89 year old female admitted with Covid. PPS 10%. Family present at bedside. Patient does not wake to verbal stimuli. Breathing unlabored. Face and musculature relaxed. Patient on scheduled, injectable Dilaudid and Versed. She has received 2 PRN doses of Dilaudid and 4 PRN doses of Versed in the lat 24 hours. Patient continues to require inpatient hospice services for skilled nursing assessment, medication titration, and injectable medication administration for palliation of symptoms. Discharge is dependent on survival of this hospitalization and becoming appropriate for a lower level of care.                   Discharge Codes    No documentation.                 Expected Discharge Date and Time       Expected Discharge Date Expected Discharge Time    Dec 21, 2023               Mary Olivo

## 2023-12-23 NOTE — PLAN OF CARE
Goal Outcome Evaluation:  Plan of Care Reviewed With: patient, family        Progress: declining  Outcome Evaluation: rested well throughout most of day, PRN dilaudid given for dyspnea and versed given for restlessness, family at bedside, turned q2-4h, comfort measures

## 2023-12-24 NOTE — PLAN OF CARE
Goal Outcome Evaluation:  Plan of Care Reviewed With: patient, family        Progress: declining  Outcome Evaluation: Rested well. PRN dilaudid given for dyspnea. Turns conducted for pt comfort. Family at bedside. Comfort measures continued.

## 2023-12-25 NOTE — SIGNIFICANT NOTE
Exam confirms with auscultation zero audible heart tones and zero audible respirations. Ms.Dora TIMMY Gomez was pronounced dead at 2234.  MD notified by Patient's RN.    Billie Dunn RN  Clinical House Supervisor  12/24/2023 23:45 EST

## 2023-12-25 NOTE — PROGRESS NOTES
Hospice Progress Note    Patient Name: Syeda Gomez   : 1927  Gender: female    Code Status: comfort measures    Date of Admission: 2023    Subjective:      - Scheduled:    - PRNs:      - Intake/Output  Intake & Output (last 3 days)          07 07 07 07 07 07 07 07    P.O.   0     I.V. (mL/kg) 2.4 (0) 2.4 (0) 1.2 (0)     Total Intake(mL/kg) 2.4 (0) 2.4 (0) 1.2 (0)     Urine (mL/kg/hr) 800 (0.5) 275 (0.2)      Stool 0       Total Output 800 275      Net -797.6 -272.7 +1.2             Stool Unmeasured Occurrence 1 x                  ROS:  Review of Systems    Reviewed current scheduled and prn medications for route, type, dose and frequency.  No current facility-administered medications for this encounter.        Objective:   There were no vitals taken for this visit.     PPS:     Physical Exam:  Physical Exam        Acute Rt MCA CVA s/p TNK    Acute right arterial ischemic stroke, middle cerebral artery (MCA)      Assessment/Plan:   Syeda Gomez is a 89 y.o. female admitted to inpatient hospice 2023 with diagnosis of Acute right arterial ischemic stroke, middle cerebral artery (MCA) [I63.511]  for symptom management.       Symptoms:    Discharge Disposition:    Total Visit Time:  Face to Face Time:    Justification for care:  Patient meets criteria for acute in-patient care due to need for frequent skilled nursing assessments to determine patient comfort and medication effectiveness at end of life.  Frequent adjustments to medications and interventions for symptom management, including injectable medications.      Tami Clarke, MSN, APRN  [facility]  Hospice and Palliative Care Nurse Practitioner  23  12:45 EST    TNK    Acute right arterial ischemic stroke, middle cerebral artery (MCA)      Assessment/Plan:   Syeda Gomez is a 89 y.o. female admitted to inpatient hospice 12/21/2023 with diagnosis of Acute right arterial ischemic stroke, middle cerebral artery (MCA) [I63.511]  for symptom management.     Symptoms:  Pain   Terminal restlessness   Terminal secretions   Dyspnea     Discharge Disposition: EOLC     -Increase dilaudid infusion to 0.3 mg/hr cont  -Increase PRN dilaudid to 1 mg q 1 hr PRN for BT pain or dyspnea.   -Increase Versed to 1 mg q 1 hr PRN for terminal restlessness.   -Continue toradol PRN for terminal fever.     Total Visit Time: 20 min   Face to Face Time: 10 min     Justification for care:  Patient meets criteria for acute in-patient care due to need for frequent skilled nursing assessments to determine patient comfort and medication effectiveness at end of life.  Frequent adjustments to medications and interventions for symptom management, including injectable medications.      Tami Clarke, MSN, APRN  [facility]  Hospice and Palliative Care Nurse Practitioner  01/05/24  12:40 EST

## 2023-12-25 NOTE — PROGRESS NOTES
Continued Stay Note  [facility]     Patient Name: Syeda Gomez  MRN: [id number]  Today's Date: 12/24/2023    Admit Date: 12/21/2023    Plan: Inpatient hospice   Discharge Plan       Row Name 12/24/23 1910       Plan    Plan Inpatient hospice    Plan Comments DA 10% pps is a 89yo female with a terminal diagnosis of Acute (R) mca cva s/p TNK. Chart reviewed, (Noted LBM 12/22/23. PRN's: dilaudid x5, versed x1), visit to bedside, assess completed. Patient's granddaughter is present and supportive at bedside. Patient unresponsive, dusky, pallor noted. She is tachypneic at 28/min, tachycardic at 146/min. Warm to the touch with (L) l.e. cool. RN discusses with granddaughter assess, condition, nonverbal indicators of comfort and need for prn doses of dilaudid and versed for symptom palliation. Granddaughter verbalizes understanding and appreciation. RN provides for support and open communication. Care coordinated with Bernard DAS. Updated VDixie CLAUDIO. Patient continues to require injectable medications for symptom palliation necessitating frequent skilled nursing interventions. Her current level of care is unable to be provided in an alternate setting.    Final Discharge Disposition Code 51 - hospice medical facility                   Discharge Codes    No documentation.                 Expected Discharge Date and Time       Expected Discharge Date Expected Discharge Time    Dec 21, 2023               Haylee Avery, RN

## 2023-12-25 NOTE — PROGRESS NOTES
Hospice Progress Note    Patient Name: Syeda Gomez   : 1927  Gender: female    Code Status: comfort measures    Date of Admission: 2023    Subjective:      - Scheduled:    - PRNs:      - Intake/Output  Intake & Output (last 3 days)          07 07 07 07 07 07 07 07    P.O.   0     I.V. (mL/kg) 2.4 (0) 2.4 (0) 1.2 (0)     Total Intake(mL/kg) 2.4 (0) 2.4 (0) 1.2 (0)     Urine (mL/kg/hr) 800 (0.5) 275 (0.2)      Stool 0       Total Output 800 275      Net -797.6 -272.7 +1.2             Stool Unmeasured Occurrence 1 x                  ROS:  Review of Systems    Reviewed current scheduled and prn medications for route, type, dose and frequency.  No current facility-administered medications for this encounter.        Objective:   There were no vitals taken for this visit.     PPS:     Physical Exam:  Physical Exam        Acute Rt MCA CVA s/p TNK    Acute right arterial ischemic stroke, middle cerebral artery (MCA)      Assessment/Plan:   Syeda Gomez is a 89 y.o. female admitted to inpatient hospice 2023 with diagnosis of Acute right arterial ischemic stroke, middle cerebral artery (MCA) [I63.511]  for symptom management.       Symptoms:    Discharge Disposition:    Total Visit Time:  Face to Face Time:    Justification for care:  Patient meets criteria for acute in-patient care due to need for frequent skilled nursing assessments to determine patient comfort and medication effectiveness at end of life.  Frequent adjustments to medications and interventions for symptom management, including injectable medications.      Tami Clarke, MSN, APRN  [facility]  Hospice and Palliative Care Nurse Practitioner  23  12:44 EST    no abdominal tenderness. There is no guarding.   Skin:     General: Skin is warm.      Coloration: Skin is pale.             Acute Rt MCA CVA s/p TNK    Acute right arterial ischemic stroke, middle cerebral artery (MCA)      Assessment/Plan:   Syeda Gomez is a 89 y.o. female admitted to inpatient hospice 12/21/2023 with diagnosis of Acute right arterial ischemic stroke, middle cerebral artery (MCA) [I63.511]  for symptom management.     Symptoms:  Pain   Terminal restlessness   Terminal secretions   Dyspnea     Discharge Disposition: EOLC     -No medication adjustments required today.   -Pt continues to require frequent skilled nursing assessments    Total Visit Time: 20 min   Face to Face Time: 10 min     Justification for care:  Patient meets criteria for acute in-patient care due to need for frequent skilled nursing assessments to determine patient comfort and medication effectiveness at end of life.  Frequent adjustments to medications and interventions for symptom management, including injectable medications.      Tami Clarke, MSN, APRN  [facility]  Hospice and Palliative Care Nurse Practitioner  12/25/23  12:44 EST

## 2023-12-25 NOTE — DISCHARGE SUMMARY
Date of Admission: 12/21/2023   Date and Time of Death: 12/24/2023 at 10:34 PM    Hospital Course:  Syeda Gomez is a 89 y.o. female admitted to inpatient hospice with diagnosis of Acute right arterial ischemic stroke, middle cerebral artery (MCA) [I63.511]  for symptom management. Medications were available throughout admission for symptom management and comfort. Patient continued to decline after admission as expected ultimately to their death on 12/24/2023 at 10:34 PM. Patient was pronounced dead by Clinical House Supervisor. Spiritual and psychosocial support were available to patient and family throughout admission. Patient's remains were released per [facility] protocol.       Tami Clarke, MSN, APRN, ACHPN  [facility]  Hospice and Palliative Care Nurse Practitioner  12/25/23  11:51 EST

## (undated) DEVICE — SOL NACL 0.9PCT 1000ML

## (undated) DEVICE — ST EXT IV SMARTSITE 2VLV SP M LL 5ML IV1

## (undated) DEVICE — PENCL ES MEGADINE EZ/CLEAN BUTN W/HOLSTR 10FT

## (undated) DEVICE — SAFELINER SUCTION CANISTER 1000CC: Brand: DEROYAL

## (undated) DEVICE — ADULT, W/LG. BACK PAD, RADIOTRANSPARENT ELEMENT AND LEAD WIRE: Brand: DEFIBRILLATION ELECTRODES

## (undated) DEVICE — SET PRIMARY GRVTY 10DP MALE LL 104IN

## (undated) DEVICE — LEX ELECTRO PHYSIOLOGY: Brand: MEDLINE INDUSTRIES, INC.

## (undated) DEVICE — IRRIGATOR BULB ASEPTO 60CC STRL

## (undated) DEVICE — KT CATH DRN PLEURL PLEURX/SAFE/T SIL 15.5F 66CM 4BT/1000ML

## (undated) DEVICE — DRSNG SURESITE123 4X4.8IN

## (undated) DEVICE — PENCL ROCKRSWCH MEGADYNE W/HOLSTR 10FT SS

## (undated) DEVICE — MEDI-VAC YANKAUER SUCTION HANDLE W/BULBOUS TIP: Brand: CARDINAL HEALTH

## (undated) DEVICE — UNDERGLV SURG BIOGEL INDICAT PI SZ8 BLU

## (undated) DEVICE — TBG PENCL TELESCP MEGADYNE SMOKE EVAC 10FT

## (undated) DEVICE — DECANT BG O JET

## (undated) DEVICE — LIMB HOLDER, WRIST/ANKLE: Brand: DEROYAL

## (undated) DEVICE — GLV SURG BIOGEL LTX PF 7 1/2

## (undated) DEVICE — CANN NASL CO2 DIVIDED A/

## (undated) DEVICE — TUBING, SUCTION, 1/4" X 10', STRAIGHT: Brand: MEDLINE

## (undated) DEVICE — CAUTERY TIP POLISHER: Brand: DEVON

## (undated) DEVICE — PK MINOR SPLT 10

## (undated) DEVICE — PATIENT RETURN ELECTRODE, SINGLE-USE, CONTACT QUALITY MONITORING, ADULT, WITH 9FT CORD, FOR PATIENTS WEIGING OVER 33LBS. (15KG): Brand: MEGADYNE

## (undated) DEVICE — ST INF PRI SMRTSTE 20DRP 2VLV 24ML 117

## (undated) DEVICE — TRAP FLD MINIVAC MEGADYNE 100ML